# Patient Record
Sex: FEMALE | Race: WHITE | NOT HISPANIC OR LATINO | Employment: OTHER | RURAL
[De-identification: names, ages, dates, MRNs, and addresses within clinical notes are randomized per-mention and may not be internally consistent; named-entity substitution may affect disease eponyms.]

---

## 2017-03-14 ENCOUNTER — CONVERSION ENCOUNTER (OUTPATIENT)
Dept: FAMILY MEDICINE CLINIC | Facility: CLINIC | Age: 72
End: 2017-03-14

## 2017-09-20 ENCOUNTER — CONVERSION ENCOUNTER (OUTPATIENT)
Dept: FAMILY MEDICINE CLINIC | Facility: CLINIC | Age: 72
End: 2017-09-20

## 2017-10-06 ENCOUNTER — CONVERSION ENCOUNTER (OUTPATIENT)
Dept: FAMILY MEDICINE CLINIC | Facility: CLINIC | Age: 72
End: 2017-10-06

## 2017-11-03 ENCOUNTER — CONVERSION ENCOUNTER (OUTPATIENT)
Dept: FAMILY MEDICINE CLINIC | Facility: CLINIC | Age: 72
End: 2017-11-03

## 2017-11-17 ENCOUNTER — CONVERSION ENCOUNTER (OUTPATIENT)
Dept: FAMILY MEDICINE CLINIC | Facility: CLINIC | Age: 72
End: 2017-11-17

## 2017-12-12 ENCOUNTER — HOSPITAL ENCOUNTER (OUTPATIENT)
Dept: RESPIRATORY THERAPY | Facility: HOSPITAL | Age: 72
Discharge: HOME | End: 2017-12-12
Attending: INTERNAL MEDICINE | Admitting: INTERNAL MEDICINE

## 2018-01-26 ENCOUNTER — CONVERSION ENCOUNTER (OUTPATIENT)
Dept: FAMILY MEDICINE CLINIC | Facility: CLINIC | Age: 73
End: 2018-01-26

## 2018-02-05 ENCOUNTER — CONVERSION ENCOUNTER (OUTPATIENT)
Dept: FAMILY MEDICINE CLINIC | Facility: CLINIC | Age: 73
End: 2018-02-05

## 2018-03-21 ENCOUNTER — CONVERSION ENCOUNTER (OUTPATIENT)
Dept: FAMILY MEDICINE CLINIC | Facility: CLINIC | Age: 73
End: 2018-03-21

## 2018-03-21 ENCOUNTER — HOSPITAL ENCOUNTER (OUTPATIENT)
Dept: GENERAL RADIOLOGY | Facility: HOSPITAL | Age: 73
Discharge: HOME | End: 2018-03-21
Attending: FAMILY MEDICINE | Admitting: FAMILY MEDICINE

## 2018-10-25 ENCOUNTER — CONVERSION ENCOUNTER (OUTPATIENT)
Dept: FAMILY MEDICINE CLINIC | Facility: CLINIC | Age: 73
End: 2018-10-25

## 2018-11-09 ENCOUNTER — CONVERSION ENCOUNTER (OUTPATIENT)
Dept: FAMILY MEDICINE CLINIC | Facility: CLINIC | Age: 73
End: 2018-11-09

## 2018-12-11 ENCOUNTER — HOSPITAL ENCOUNTER (OUTPATIENT)
Dept: RESPIRATORY THERAPY | Facility: HOSPITAL | Age: 73
Discharge: HOME | End: 2018-12-11
Attending: INTERNAL MEDICINE | Admitting: INTERNAL MEDICINE

## 2019-02-18 ENCOUNTER — CONVERSION ENCOUNTER (OUTPATIENT)
Dept: FAMILY MEDICINE CLINIC | Facility: CLINIC | Age: 74
End: 2019-02-18

## 2019-03-27 ENCOUNTER — HOSPITAL ENCOUNTER (OUTPATIENT)
Dept: OTHER | Facility: HOSPITAL | Age: 74
Discharge: HOME | End: 2019-03-27
Attending: NURSE PRACTITIONER | Admitting: NURSE PRACTITIONER

## 2019-03-27 ENCOUNTER — CONVERSION ENCOUNTER (OUTPATIENT)
Dept: FAMILY MEDICINE CLINIC | Facility: CLINIC | Age: 74
End: 2019-03-27

## 2019-04-08 ENCOUNTER — CONVERSION ENCOUNTER (OUTPATIENT)
Dept: FAMILY MEDICINE CLINIC | Facility: CLINIC | Age: 74
End: 2019-04-08

## 2019-05-20 ENCOUNTER — HOSPITAL ENCOUNTER (OUTPATIENT)
Dept: OTHER | Facility: HOSPITAL | Age: 74
Discharge: HOME | End: 2019-05-20
Attending: FAMILY MEDICINE | Admitting: FAMILY MEDICINE

## 2019-06-03 VITALS
HEIGHT: 59 IN | DIASTOLIC BLOOD PRESSURE: 80 MMHG | HEIGHT: 59 IN | SYSTOLIC BLOOD PRESSURE: 111 MMHG | DIASTOLIC BLOOD PRESSURE: 77 MMHG | HEIGHT: 59 IN | WEIGHT: 197.4 LBS | HEART RATE: 100 BPM | RESPIRATION RATE: 18 BRPM | DIASTOLIC BLOOD PRESSURE: 86 MMHG | BODY MASS INDEX: 39.11 KG/M2 | HEART RATE: 93 BPM | OXYGEN SATURATION: 96 % | HEIGHT: 59 IN | HEART RATE: 86 BPM | SYSTOLIC BLOOD PRESSURE: 104 MMHG | OXYGEN SATURATION: 97 % | DIASTOLIC BLOOD PRESSURE: 75 MMHG | SYSTOLIC BLOOD PRESSURE: 161 MMHG | BODY MASS INDEX: 37.62 KG/M2 | WEIGHT: 186.25 LBS | WEIGHT: 196.2 LBS | HEIGHT: 59 IN | BODY MASS INDEX: 39.36 KG/M2 | RESPIRATION RATE: 18 BRPM | OXYGEN SATURATION: 96 % | DIASTOLIC BLOOD PRESSURE: 69 MMHG | DIASTOLIC BLOOD PRESSURE: 68 MMHG | BODY MASS INDEX: 38.75 KG/M2 | HEIGHT: 59 IN | HEIGHT: 59 IN | WEIGHT: 194 LBS | RESPIRATION RATE: 16 BRPM | SYSTOLIC BLOOD PRESSURE: 126 MMHG | HEIGHT: 59 IN | SYSTOLIC BLOOD PRESSURE: 102 MMHG | WEIGHT: 192.2 LBS | WEIGHT: 196 LBS | BODY MASS INDEX: 39.59 KG/M2 | HEIGHT: 59 IN | HEART RATE: 78 BPM | DIASTOLIC BLOOD PRESSURE: 77 MMHG | BODY MASS INDEX: 39.51 KG/M2 | OXYGEN SATURATION: 97 % | WEIGHT: 195.25 LBS | OXYGEN SATURATION: 94 % | OXYGEN SATURATION: 95 % | RESPIRATION RATE: 16 BRPM | HEART RATE: 85 BPM | HEART RATE: 80 BPM | SYSTOLIC BLOOD PRESSURE: 126 MMHG | WEIGHT: 188 LBS | OXYGEN SATURATION: 95 % | BODY MASS INDEX: 39.55 KG/M2 | HEART RATE: 80 BPM | DIASTOLIC BLOOD PRESSURE: 75 MMHG | HEART RATE: 96 BPM | HEART RATE: 85 BPM | DIASTOLIC BLOOD PRESSURE: 68 MMHG | SYSTOLIC BLOOD PRESSURE: 128 MMHG | BODY MASS INDEX: 39.8 KG/M2 | BODY MASS INDEX: 37.55 KG/M2 | SYSTOLIC BLOOD PRESSURE: 131 MMHG | HEART RATE: 84 BPM | WEIGHT: 196 LBS | RESPIRATION RATE: 20 BRPM | RESPIRATION RATE: 18 BRPM | BODY MASS INDEX: 39.43 KG/M2 | DIASTOLIC BLOOD PRESSURE: 77 MMHG | SYSTOLIC BLOOD PRESSURE: 137 MMHG | RESPIRATION RATE: 16 BRPM | WEIGHT: 195.6 LBS | SYSTOLIC BLOOD PRESSURE: 104 MMHG | RESPIRATION RATE: 18 BRPM | SYSTOLIC BLOOD PRESSURE: 125 MMHG | OXYGEN SATURATION: 96 % | BODY MASS INDEX: 37.9 KG/M2 | HEIGHT: 59 IN | HEART RATE: 77 BPM | DIASTOLIC BLOOD PRESSURE: 79 MMHG

## 2019-06-27 RX ORDER — LISINOPRIL 20 MG/1
TABLET ORAL
Qty: 90 TABLET | Refills: 1 | Status: SHIPPED | OUTPATIENT
Start: 2019-06-27 | End: 2019-12-13 | Stop reason: SDUPTHER

## 2019-07-04 RX ORDER — ATORVASTATIN CALCIUM 40 MG/1
TABLET, FILM COATED ORAL
Qty: 90 TABLET | Refills: 1 | Status: SHIPPED | OUTPATIENT
Start: 2019-07-04 | End: 2019-12-27 | Stop reason: SDUPTHER

## 2019-07-09 RX ORDER — POTASSIUM CHLORIDE 750 MG/1
TABLET, FILM COATED, EXTENDED RELEASE ORAL
Qty: 450 TABLET | Refills: 1 | Status: SHIPPED | OUTPATIENT
Start: 2019-07-09 | End: 2019-12-27 | Stop reason: SDUPTHER

## 2019-08-07 ENCOUNTER — OFFICE VISIT (OUTPATIENT)
Dept: FAMILY MEDICINE CLINIC | Facility: CLINIC | Age: 74
End: 2019-08-07

## 2019-08-07 ENCOUNTER — TELEPHONE (OUTPATIENT)
Dept: FAMILY MEDICINE CLINIC | Facility: CLINIC | Age: 74
End: 2019-08-07

## 2019-08-07 VITALS
RESPIRATION RATE: 18 BRPM | TEMPERATURE: 98 F | BODY MASS INDEX: 36.8 KG/M2 | HEART RATE: 81 BPM | WEIGHT: 200 LBS | SYSTOLIC BLOOD PRESSURE: 153 MMHG | HEIGHT: 62 IN | OXYGEN SATURATION: 94 % | DIASTOLIC BLOOD PRESSURE: 83 MMHG

## 2019-08-07 DIAGNOSIS — M79.662 PAIN OF LEFT CALF: Primary | ICD-10-CM

## 2019-08-07 PROBLEM — E03.9 HYPOTHYROIDISM: Status: ACTIVE | Noted: 2019-08-07

## 2019-08-07 PROBLEM — I27.0 PULMONARY HYPERTENSION, PRIMARY: Status: ACTIVE | Noted: 2019-08-07

## 2019-08-07 PROBLEM — M51.369 DEGENERATION OF INTERVERTEBRAL DISC OF LUMBAR REGION: Status: ACTIVE | Noted: 2019-08-07

## 2019-08-07 PROBLEM — I20.9 ANGINA PECTORIS: Status: ACTIVE | Noted: 2017-10-06

## 2019-08-07 PROBLEM — J30.9 ALLERGIC RHINITIS: Status: ACTIVE | Noted: 2019-08-07

## 2019-08-07 PROBLEM — I25.10 CORONARY ARTERY DISEASE: Status: ACTIVE | Noted: 2019-08-07

## 2019-08-07 PROBLEM — M51.36 DEGENERATION OF INTERVERTEBRAL DISC OF LUMBAR REGION: Status: ACTIVE | Noted: 2019-08-07

## 2019-08-07 PROBLEM — E78.5 HYPERLIPIDEMIA: Status: ACTIVE | Noted: 2019-08-07

## 2019-08-07 PROBLEM — M54.9 BACK PAIN: Status: ACTIVE | Noted: 2017-09-20

## 2019-08-07 PROBLEM — R32 URINARY INCONTINENCE: Status: ACTIVE | Noted: 2019-08-07

## 2019-08-07 PROBLEM — L43.9 LICHEN PLANUS: Status: ACTIVE | Noted: 2019-08-07

## 2019-08-07 PROBLEM — E66.9 OBESITY: Status: ACTIVE | Noted: 2019-08-07

## 2019-08-07 PROBLEM — F32.A DEPRESSION: Status: ACTIVE | Noted: 2019-08-07

## 2019-08-07 PROBLEM — J45.909 ASTHMA: Status: ACTIVE | Noted: 2019-08-07

## 2019-08-07 PROBLEM — I11.9 HYPERTENSIVE CARDIOVASCULAR DISEASE: Status: ACTIVE | Noted: 2019-08-07

## 2019-08-07 PROBLEM — I63.81 LACUNAR INFARCTION: Status: ACTIVE | Noted: 2019-08-07

## 2019-08-07 PROBLEM — L21.9 SEBORRHEA: Status: ACTIVE | Noted: 2019-08-07

## 2019-08-07 PROBLEM — I10 HYPERTENSION: Status: ACTIVE | Noted: 2019-08-07

## 2019-08-07 PROBLEM — G25.81 RESTLESS LEG SYNDROME: Status: ACTIVE | Noted: 2019-08-07

## 2019-08-07 PROBLEM — I05.9 MITRAL VALVE DISORDER: Status: ACTIVE | Noted: 2019-08-07

## 2019-08-07 PROBLEM — M81.0 POSTMENOPAUSAL OSTEOPOROSIS: Status: ACTIVE | Noted: 2019-08-07

## 2019-08-07 PROCEDURE — 99213 OFFICE O/P EST LOW 20 MIN: CPT | Performed by: NURSE PRACTITIONER

## 2019-08-07 RX ORDER — ASPIRIN 81 MG/1
TABLET ORAL
COMMUNITY
Start: 2014-04-10

## 2019-08-07 RX ORDER — ISOSORBIDE MONONITRATE 30 MG/1
1 TABLET, EXTENDED RELEASE ORAL EVERY 24 HOURS
Refills: 0 | COMMUNITY
Start: 2019-05-20 | End: 2019-11-18 | Stop reason: SDUPTHER

## 2019-08-07 RX ORDER — LEVOTHYROXINE SODIUM 0.07 MG/1
1 TABLET ORAL EVERY 24 HOURS
Refills: 0 | COMMUNITY
Start: 2019-06-05 | End: 2019-09-05 | Stop reason: SDUPTHER

## 2019-08-07 RX ORDER — PREDNISONE 10 MG/1
10 TABLET ORAL 2 TIMES DAILY
Refills: 0 | COMMUNITY
Start: 2019-05-20 | End: 2019-10-21

## 2019-08-07 RX ORDER — LORATADINE 10 MG/1
TABLET ORAL
COMMUNITY
Start: 2013-02-27 | End: 2023-03-03

## 2019-08-07 RX ORDER — FUROSEMIDE 40 MG/1
1 TABLET ORAL EVERY 24 HOURS
Refills: 0 | COMMUNITY
Start: 2019-07-31 | End: 2020-04-23

## 2019-08-07 RX ORDER — THEOPHYLLINE 400 MG/1
0.5 TABLET, EXTENDED RELEASE ORAL EVERY 24 HOURS
Refills: 0 | COMMUNITY
Start: 2019-07-13

## 2019-08-07 RX ORDER — ARFORMOTEROL TARTRATE 15 UG/2ML
1 SOLUTION RESPIRATORY (INHALATION) 2 TIMES DAILY
Refills: 0 | COMMUNITY
Start: 2019-06-19 | End: 2022-12-28

## 2019-08-07 RX ORDER — PANTOPRAZOLE SODIUM 40 MG/1
TABLET, DELAYED RELEASE ORAL
COMMUNITY
Start: 2017-11-17 | End: 2019-11-08

## 2019-09-05 RX ORDER — LEVOTHYROXINE SODIUM 0.07 MG/1
TABLET ORAL
Qty: 90 TABLET | Refills: 1 | Status: SHIPPED | OUTPATIENT
Start: 2019-09-05 | End: 2020-03-04

## 2019-10-03 ENCOUNTER — OFFICE VISIT (OUTPATIENT)
Dept: FAMILY MEDICINE CLINIC | Facility: CLINIC | Age: 74
End: 2019-10-03

## 2019-10-03 VITALS
SYSTOLIC BLOOD PRESSURE: 133 MMHG | BODY MASS INDEX: 38.09 KG/M2 | HEIGHT: 60 IN | WEIGHT: 194 LBS | DIASTOLIC BLOOD PRESSURE: 83 MMHG | RESPIRATION RATE: 16 BRPM | TEMPERATURE: 97.8 F | HEART RATE: 71 BPM | OXYGEN SATURATION: 95 %

## 2019-10-03 DIAGNOSIS — J01.00 ACUTE NON-RECURRENT MAXILLARY SINUSITIS: ICD-10-CM

## 2019-10-03 DIAGNOSIS — J42 ACUTE EXACERBATION OF CHRONIC BRONCHITIS (HCC): Primary | ICD-10-CM

## 2019-10-03 DIAGNOSIS — J20.9 ACUTE EXACERBATION OF CHRONIC BRONCHITIS (HCC): Primary | ICD-10-CM

## 2019-10-03 PROBLEM — E87.6 HYPOKALEMIA: Status: ACTIVE | Noted: 2019-10-03

## 2019-10-03 PROBLEM — N18.30 CHRONIC RENAL INSUFFICIENCY, STAGE III (MODERATE) (HCC): Status: ACTIVE | Noted: 2019-10-03

## 2019-10-03 PROCEDURE — 99213 OFFICE O/P EST LOW 20 MIN: CPT | Performed by: NURSE PRACTITIONER

## 2019-10-03 RX ORDER — DOXYCYCLINE 100 MG/1
100 CAPSULE ORAL 2 TIMES DAILY
Qty: 20 CAPSULE | Refills: 0 | Status: SHIPPED | OUTPATIENT
Start: 2019-10-03 | End: 2019-10-21

## 2019-10-21 ENCOUNTER — OFFICE VISIT (OUTPATIENT)
Dept: FAMILY MEDICINE CLINIC | Facility: CLINIC | Age: 74
End: 2019-10-21

## 2019-10-21 VITALS
TEMPERATURE: 97.6 F | RESPIRATION RATE: 18 BRPM | BODY MASS INDEX: 38.48 KG/M2 | SYSTOLIC BLOOD PRESSURE: 113 MMHG | DIASTOLIC BLOOD PRESSURE: 74 MMHG | OXYGEN SATURATION: 94 % | HEART RATE: 83 BPM | WEIGHT: 196 LBS | HEIGHT: 60 IN

## 2019-10-21 DIAGNOSIS — I10 ESSENTIAL HYPERTENSION: ICD-10-CM

## 2019-10-21 DIAGNOSIS — F33.41 RECURRENT MAJOR DEPRESSIVE DISORDER, IN PARTIAL REMISSION (HCC): ICD-10-CM

## 2019-10-21 DIAGNOSIS — E78.01 FAMILIAL HYPERCHOLESTEROLEMIA: ICD-10-CM

## 2019-10-21 DIAGNOSIS — E06.3 HYPOTHYROIDISM DUE TO HASHIMOTO'S THYROIDITIS: ICD-10-CM

## 2019-10-21 DIAGNOSIS — I27.0 PULMONARY HYPERTENSION, PRIMARY (HCC): ICD-10-CM

## 2019-10-21 DIAGNOSIS — M15.9 OSTEOARTHRITIS, GENERALIZED: ICD-10-CM

## 2019-10-21 DIAGNOSIS — L40.9 PSORIASIS: ICD-10-CM

## 2019-10-21 DIAGNOSIS — I25.118 CORONARY ARTERY DISEASE OF NATIVE HEART WITH STABLE ANGINA PECTORIS, UNSPECIFIED VESSEL OR LESION TYPE (HCC): ICD-10-CM

## 2019-10-21 DIAGNOSIS — G25.81 RESTLESS LEG SYNDROME: ICD-10-CM

## 2019-10-21 DIAGNOSIS — M50.90 CERVICAL DISC DISEASE: ICD-10-CM

## 2019-10-21 DIAGNOSIS — L43.9 LICHEN PLANUS: ICD-10-CM

## 2019-10-21 DIAGNOSIS — N18.30 CHRONIC RENAL INSUFFICIENCY, STAGE III (MODERATE) (HCC): ICD-10-CM

## 2019-10-21 DIAGNOSIS — N39.3 STRESS INCONTINENCE OF URINE: ICD-10-CM

## 2019-10-21 DIAGNOSIS — Z00.00 MEDICARE ANNUAL WELLNESS VISIT, SUBSEQUENT: Primary | ICD-10-CM

## 2019-10-21 DIAGNOSIS — E03.8 HYPOTHYROIDISM DUE TO HASHIMOTO'S THYROIDITIS: ICD-10-CM

## 2019-10-21 DIAGNOSIS — J30.1 SEASONAL ALLERGIC RHINITIS DUE TO POLLEN: ICD-10-CM

## 2019-10-21 DIAGNOSIS — Z23 NEED FOR IMMUNIZATION AGAINST INFLUENZA: ICD-10-CM

## 2019-10-21 DIAGNOSIS — J43.2 CENTRILOBULAR EMPHYSEMA (HCC): ICD-10-CM

## 2019-10-21 PROBLEM — M79.662 PAIN OF LEFT CALF: Status: RESOLVED | Noted: 2019-08-07 | Resolved: 2019-10-21

## 2019-10-21 PROCEDURE — 90653 IIV ADJUVANT VACCINE IM: CPT | Performed by: FAMILY MEDICINE

## 2019-10-21 PROCEDURE — G0439 PPPS, SUBSEQ VISIT: HCPCS | Performed by: FAMILY MEDICINE

## 2019-10-21 PROCEDURE — 99214 OFFICE O/P EST MOD 30 MIN: CPT | Performed by: FAMILY MEDICINE

## 2019-10-21 PROCEDURE — G0008 ADMIN INFLUENZA VIRUS VAC: HCPCS | Performed by: FAMILY MEDICINE

## 2019-10-22 LAB
ALBUMIN SERPL-MCNC: 4.2 G/DL (ref 3.5–4.8)
ALBUMIN/GLOB SERPL: 1.9 {RATIO} (ref 1.2–2.2)
ALP SERPL-CCNC: 82 IU/L (ref 39–117)
ALT SERPL-CCNC: 17 IU/L (ref 0–32)
AST SERPL-CCNC: 21 IU/L (ref 0–40)
BASOPHILS # BLD AUTO: 0 X10E3/UL (ref 0–0.2)
BASOPHILS NFR BLD AUTO: 0 %
BILIRUB SERPL-MCNC: 0.7 MG/DL (ref 0–1.2)
BUN SERPL-MCNC: 20 MG/DL (ref 8–27)
BUN/CREAT SERPL: 16 (ref 12–28)
CALCIUM SERPL-MCNC: 9.4 MG/DL (ref 8.7–10.3)
CHLORIDE SERPL-SCNC: 102 MMOL/L (ref 96–106)
CHOLEST SERPL-MCNC: 113 MG/DL (ref 100–199)
CO2 SERPL-SCNC: 25 MMOL/L (ref 20–29)
CREAT SERPL-MCNC: 1.22 MG/DL (ref 0.57–1)
EOSINOPHIL # BLD AUTO: 0.3 X10E3/UL (ref 0–0.4)
EOSINOPHIL NFR BLD AUTO: 3 %
ERYTHROCYTE [DISTWIDTH] IN BLOOD BY AUTOMATED COUNT: 13.7 % (ref 12.3–15.4)
GLOBULIN SER CALC-MCNC: 2.2 G/DL (ref 1.5–4.5)
GLUCOSE SERPL-MCNC: 91 MG/DL (ref 65–99)
HCT VFR BLD AUTO: 44.7 % (ref 34–46.6)
HDLC SERPL-MCNC: 41 MG/DL
HGB BLD-MCNC: 14.2 G/DL (ref 11.1–15.9)
IMM GRANULOCYTES # BLD AUTO: 0 X10E3/UL (ref 0–0.1)
IMM GRANULOCYTES NFR BLD AUTO: 0 %
LDLC SERPL CALC-MCNC: 46 MG/DL (ref 0–99)
LYMPHOCYTES # BLD AUTO: 2.7 X10E3/UL (ref 0.7–3.1)
LYMPHOCYTES NFR BLD AUTO: 27 %
MCH RBC QN AUTO: 28.7 PG (ref 26.6–33)
MCHC RBC AUTO-ENTMCNC: 31.8 G/DL (ref 31.5–35.7)
MCV RBC AUTO: 91 FL (ref 79–97)
MONOCYTES # BLD AUTO: 0.8 X10E3/UL (ref 0.1–0.9)
MONOCYTES NFR BLD AUTO: 8 %
NEUTROPHILS # BLD AUTO: 6.1 X10E3/UL (ref 1.4–7)
NEUTROPHILS NFR BLD AUTO: 62 %
PLATELET # BLD AUTO: 221 X10E3/UL (ref 150–450)
POTASSIUM SERPL-SCNC: 4.2 MMOL/L (ref 3.5–5.2)
PROT SERPL-MCNC: 6.4 G/DL (ref 6–8.5)
RBC # BLD AUTO: 4.94 X10E6/UL (ref 3.77–5.28)
SODIUM SERPL-SCNC: 144 MMOL/L (ref 134–144)
TRIGL SERPL-MCNC: 132 MG/DL (ref 0–149)
TSH SERPL DL<=0.005 MIU/L-ACNC: 2.19 UIU/ML (ref 0.45–4.5)
VLDLC SERPL CALC-MCNC: 26 MG/DL (ref 5–40)
WBC # BLD AUTO: 10 X10E3/UL (ref 3.4–10.8)

## 2019-11-05 PROBLEM — E78.2 MIXED HYPERLIPIDEMIA: Status: ACTIVE | Noted: 2019-08-07

## 2019-11-08 ENCOUNTER — OFFICE VISIT (OUTPATIENT)
Dept: CARDIOLOGY | Facility: CLINIC | Age: 74
End: 2019-11-08

## 2019-11-08 VITALS
SYSTOLIC BLOOD PRESSURE: 113 MMHG | DIASTOLIC BLOOD PRESSURE: 76 MMHG | BODY MASS INDEX: 38.09 KG/M2 | HEART RATE: 84 BPM | HEIGHT: 60 IN | WEIGHT: 194 LBS

## 2019-11-08 DIAGNOSIS — E78.2 MIXED HYPERLIPIDEMIA: ICD-10-CM

## 2019-11-08 DIAGNOSIS — I25.10 CORONARY ARTERY DISEASE INVOLVING NATIVE CORONARY ARTERY OF NATIVE HEART WITHOUT ANGINA PECTORIS: Primary | ICD-10-CM

## 2019-11-08 DIAGNOSIS — I10 ESSENTIAL HYPERTENSION: ICD-10-CM

## 2019-11-08 DIAGNOSIS — I05.9 MITRAL VALVE DISORDER: ICD-10-CM

## 2019-11-08 DIAGNOSIS — I20.9 ANGINA PECTORIS (HCC): ICD-10-CM

## 2019-11-08 PROCEDURE — 93000 ELECTROCARDIOGRAM COMPLETE: CPT | Performed by: INTERNAL MEDICINE

## 2019-11-08 PROCEDURE — 99214 OFFICE O/P EST MOD 30 MIN: CPT | Performed by: INTERNAL MEDICINE

## 2019-11-18 ENCOUNTER — TELEPHONE (OUTPATIENT)
Dept: CARDIOLOGY | Facility: CLINIC | Age: 74
End: 2019-11-18

## 2019-11-18 RX ORDER — ISOSORBIDE MONONITRATE 30 MG/1
30 TABLET, EXTENDED RELEASE ORAL EVERY 24 HOURS
Qty: 30 TABLET | Refills: 2 | Status: SHIPPED | OUTPATIENT
Start: 2019-11-18 | End: 2020-02-18

## 2019-12-13 RX ORDER — LISINOPRIL 20 MG/1
TABLET ORAL
Qty: 90 TABLET | Refills: 1 | Status: SHIPPED | OUTPATIENT
Start: 2019-12-13 | End: 2020-07-06 | Stop reason: SDUPTHER

## 2019-12-27 RX ORDER — POTASSIUM CHLORIDE 750 MG/1
TABLET, FILM COATED, EXTENDED RELEASE ORAL
Qty: 450 TABLET | Refills: 1 | Status: SHIPPED | OUTPATIENT
Start: 2019-12-27 | End: 2020-07-06 | Stop reason: SDUPTHER

## 2019-12-27 RX ORDER — ATORVASTATIN CALCIUM 40 MG/1
TABLET, FILM COATED ORAL
Qty: 90 TABLET | Refills: 1 | Status: SHIPPED | OUTPATIENT
Start: 2019-12-27 | End: 2020-07-07

## 2020-01-06 ENCOUNTER — OFFICE VISIT (OUTPATIENT)
Dept: FAMILY MEDICINE CLINIC | Facility: CLINIC | Age: 75
End: 2020-01-06

## 2020-01-06 VITALS
RESPIRATION RATE: 20 BRPM | OXYGEN SATURATION: 93 % | SYSTOLIC BLOOD PRESSURE: 101 MMHG | DIASTOLIC BLOOD PRESSURE: 69 MMHG | BODY MASS INDEX: 38.48 KG/M2 | HEART RATE: 93 BPM | TEMPERATURE: 97.5 F | HEIGHT: 60 IN | WEIGHT: 196 LBS

## 2020-01-06 DIAGNOSIS — J43.2 CENTRILOBULAR EMPHYSEMA (HCC): Primary | ICD-10-CM

## 2020-01-06 PROCEDURE — 99213 OFFICE O/P EST LOW 20 MIN: CPT | Performed by: FAMILY MEDICINE

## 2020-01-06 RX ORDER — PREDNISONE 20 MG/1
20 TABLET ORAL DAILY
Qty: 20 TABLET | Refills: 0 | Status: SHIPPED | OUTPATIENT
Start: 2020-01-06 | End: 2020-03-05 | Stop reason: SDUPTHER

## 2020-01-06 RX ORDER — SULFAMETHOXAZOLE AND TRIMETHOPRIM 800; 160 MG/1; MG/1
1 TABLET ORAL 2 TIMES DAILY
Qty: 20 TABLET | Refills: 0 | Status: SHIPPED | OUTPATIENT
Start: 2020-01-06 | End: 2020-03-05 | Stop reason: SDUPTHER

## 2020-02-18 RX ORDER — ISOSORBIDE MONONITRATE 30 MG/1
TABLET, EXTENDED RELEASE ORAL
Qty: 90 TABLET | Refills: 2 | Status: SHIPPED | OUTPATIENT
Start: 2020-02-18 | End: 2020-09-16 | Stop reason: SDUPTHER

## 2020-03-04 RX ORDER — LEVOTHYROXINE SODIUM 0.07 MG/1
TABLET ORAL
Qty: 90 TABLET | Refills: 1 | Status: SHIPPED | OUTPATIENT
Start: 2020-03-04 | End: 2020-09-02

## 2020-03-05 ENCOUNTER — TELEPHONE (OUTPATIENT)
Dept: FAMILY MEDICINE CLINIC | Facility: CLINIC | Age: 75
End: 2020-03-05

## 2020-03-05 RX ORDER — SULFAMETHOXAZOLE AND TRIMETHOPRIM 800; 160 MG/1; MG/1
1 TABLET ORAL 2 TIMES DAILY
Qty: 20 TABLET | Refills: 0 | Status: SHIPPED | OUTPATIENT
Start: 2020-03-05 | End: 2020-06-08

## 2020-04-13 ENCOUNTER — TELEPHONE (OUTPATIENT)
Dept: FAMILY MEDICINE CLINIC | Facility: CLINIC | Age: 75
End: 2020-04-13

## 2020-04-22 ENCOUNTER — TELEPHONE (OUTPATIENT)
Dept: FAMILY MEDICINE CLINIC | Facility: CLINIC | Age: 75
End: 2020-04-22

## 2020-04-23 RX ORDER — FUROSEMIDE 40 MG/1
TABLET ORAL
Qty: 90 TABLET | Refills: 1 | Status: SHIPPED | OUTPATIENT
Start: 2020-04-23 | End: 2020-09-16

## 2020-06-08 ENCOUNTER — OFFICE VISIT (OUTPATIENT)
Dept: FAMILY MEDICINE CLINIC | Facility: CLINIC | Age: 75
End: 2020-06-08

## 2020-06-08 VITALS
TEMPERATURE: 98.7 F | BODY MASS INDEX: 38.72 KG/M2 | HEIGHT: 60 IN | WEIGHT: 197.2 LBS | SYSTOLIC BLOOD PRESSURE: 133 MMHG | RESPIRATION RATE: 18 BRPM | OXYGEN SATURATION: 93 % | HEART RATE: 80 BPM | DIASTOLIC BLOOD PRESSURE: 79 MMHG

## 2020-06-08 DIAGNOSIS — J43.1 PANLOBULAR EMPHYSEMA (HCC): ICD-10-CM

## 2020-06-08 DIAGNOSIS — I10 ESSENTIAL HYPERTENSION: Primary | ICD-10-CM

## 2020-06-08 DIAGNOSIS — I25.10 CORONARY ARTERY DISEASE INVOLVING NATIVE CORONARY ARTERY OF NATIVE HEART WITHOUT ANGINA PECTORIS: ICD-10-CM

## 2020-06-08 PROBLEM — J42 ACUTE EXACERBATION OF CHRONIC BRONCHITIS: Status: RESOLVED | Noted: 2019-10-03 | Resolved: 2020-06-08

## 2020-06-08 PROBLEM — J20.9 ACUTE EXACERBATION OF CHRONIC BRONCHITIS: Status: RESOLVED | Noted: 2019-10-03 | Resolved: 2020-06-08

## 2020-06-08 PROCEDURE — 99213 OFFICE O/P EST LOW 20 MIN: CPT | Performed by: FAMILY MEDICINE

## 2020-06-08 RX ORDER — CEPHALEXIN 500 MG/1
CAPSULE ORAL
COMMUNITY
Start: 2020-05-22 | End: 2020-11-13

## 2020-07-06 RX ORDER — POTASSIUM CHLORIDE 750 MG/1
10 TABLET, FILM COATED, EXTENDED RELEASE ORAL 2 TIMES DAILY
Qty: 60 TABLET | Refills: 0 | Status: SHIPPED | OUTPATIENT
Start: 2020-07-06 | End: 2020-07-06

## 2020-07-06 RX ORDER — POTASSIUM CHLORIDE 750 MG/1
TABLET, FILM COATED, EXTENDED RELEASE ORAL
Qty: 180 TABLET | Refills: 1 | Status: SHIPPED | OUTPATIENT
Start: 2020-07-06 | End: 2020-10-01

## 2020-07-06 RX ORDER — LISINOPRIL 20 MG/1
20 TABLET ORAL DAILY
Qty: 90 TABLET | Refills: 1 | Status: SHIPPED | OUTPATIENT
Start: 2020-07-06 | End: 2020-10-01

## 2020-07-07 RX ORDER — ATORVASTATIN CALCIUM 40 MG/1
TABLET, FILM COATED ORAL
Qty: 90 TABLET | Refills: 1 | Status: SHIPPED | OUTPATIENT
Start: 2020-07-07 | End: 2020-12-12

## 2020-07-31 ENCOUNTER — TELEPHONE (OUTPATIENT)
Dept: FAMILY MEDICINE CLINIC | Facility: CLINIC | Age: 75
End: 2020-07-31

## 2020-07-31 DIAGNOSIS — E06.3 HYPOTHYROIDISM DUE TO HASHIMOTO'S THYROIDITIS: ICD-10-CM

## 2020-07-31 DIAGNOSIS — E03.8 HYPOTHYROIDISM DUE TO HASHIMOTO'S THYROIDITIS: ICD-10-CM

## 2020-07-31 DIAGNOSIS — E78.01 FAMILIAL HYPERCHOLESTEROLEMIA: ICD-10-CM

## 2020-07-31 DIAGNOSIS — I10 ESSENTIAL HYPERTENSION: Primary | ICD-10-CM

## 2020-08-05 LAB
ALBUMIN SERPL-MCNC: 4 G/DL (ref 3.7–4.7)
ALBUMIN/GLOB SERPL: 1.9 {RATIO} (ref 1.2–2.2)
ALP SERPL-CCNC: 88 IU/L (ref 39–117)
ALT SERPL-CCNC: 12 IU/L (ref 0–32)
AST SERPL-CCNC: 17 IU/L (ref 0–40)
BASOPHILS # BLD AUTO: 0.1 X10E3/UL (ref 0–0.2)
BASOPHILS NFR BLD AUTO: 1 %
BILIRUB SERPL-MCNC: 0.6 MG/DL (ref 0–1.2)
BUN SERPL-MCNC: 17 MG/DL (ref 8–27)
BUN/CREAT SERPL: 14 (ref 12–28)
CALCIUM SERPL-MCNC: 9.3 MG/DL (ref 8.7–10.3)
CHLORIDE SERPL-SCNC: 103 MMOL/L (ref 96–106)
CHOLEST SERPL-MCNC: 109 MG/DL (ref 100–199)
CO2 SERPL-SCNC: 25 MMOL/L (ref 20–29)
CREAT SERPL-MCNC: 1.2 MG/DL (ref 0.57–1)
EOSINOPHIL # BLD AUTO: 0.4 X10E3/UL (ref 0–0.4)
EOSINOPHIL NFR BLD AUTO: 4 %
ERYTHROCYTE [DISTWIDTH] IN BLOOD BY AUTOMATED COUNT: 12.5 % (ref 11.7–15.4)
GLOBULIN SER CALC-MCNC: 2.1 G/DL (ref 1.5–4.5)
GLUCOSE SERPL-MCNC: 96 MG/DL (ref 65–99)
HCT VFR BLD AUTO: 43.2 % (ref 34–46.6)
HDLC SERPL-MCNC: 40 MG/DL
HGB BLD-MCNC: 14.3 G/DL (ref 11.1–15.9)
IMM GRANULOCYTES # BLD AUTO: 0 X10E3/UL (ref 0–0.1)
IMM GRANULOCYTES NFR BLD AUTO: 0 %
LDLC SERPL CALC-MCNC: 50 MG/DL (ref 0–99)
LYMPHOCYTES # BLD AUTO: 2.4 X10E3/UL (ref 0.7–3.1)
LYMPHOCYTES NFR BLD AUTO: 26 %
MCH RBC QN AUTO: 29.9 PG (ref 26.6–33)
MCHC RBC AUTO-ENTMCNC: 33.1 G/DL (ref 31.5–35.7)
MCV RBC AUTO: 90 FL (ref 79–97)
MONOCYTES # BLD AUTO: 0.9 X10E3/UL (ref 0.1–0.9)
MONOCYTES NFR BLD AUTO: 10 %
NEUTROPHILS # BLD AUTO: 5.6 X10E3/UL (ref 1.4–7)
NEUTROPHILS NFR BLD AUTO: 59 %
PLATELET # BLD AUTO: 209 X10E3/UL (ref 150–450)
POTASSIUM SERPL-SCNC: 4.2 MMOL/L (ref 3.5–5.2)
PROT SERPL-MCNC: 6.1 G/DL (ref 6–8.5)
RBC # BLD AUTO: 4.79 X10E6/UL (ref 3.77–5.28)
SODIUM SERPL-SCNC: 143 MMOL/L (ref 134–144)
TRIGL SERPL-MCNC: 95 MG/DL (ref 0–149)
TSH SERPL DL<=0.005 MIU/L-ACNC: 1.37 UIU/ML (ref 0.45–4.5)
VLDLC SERPL CALC-MCNC: 19 MG/DL (ref 5–40)
WBC # BLD AUTO: 9.4 X10E3/UL (ref 3.4–10.8)

## 2020-09-02 RX ORDER — LEVOTHYROXINE SODIUM 0.07 MG/1
TABLET ORAL
Qty: 90 TABLET | Refills: 1 | Status: SHIPPED | OUTPATIENT
Start: 2020-09-02 | End: 2020-12-12

## 2020-09-16 ENCOUNTER — FLU SHOT (OUTPATIENT)
Dept: FAMILY MEDICINE CLINIC | Facility: CLINIC | Age: 75
End: 2020-09-16

## 2020-09-16 DIAGNOSIS — Z23 NEED FOR INFLUENZA VACCINATION: ICD-10-CM

## 2020-09-16 PROCEDURE — G0008 ADMIN INFLUENZA VIRUS VAC: HCPCS | Performed by: FAMILY MEDICINE

## 2020-09-16 PROCEDURE — 90694 VACC AIIV4 NO PRSRV 0.5ML IM: CPT | Performed by: FAMILY MEDICINE

## 2020-09-16 RX ORDER — ISOSORBIDE MONONITRATE 30 MG/1
30 TABLET, EXTENDED RELEASE ORAL DAILY
Qty: 90 TABLET | Refills: 2 | Status: SHIPPED | OUTPATIENT
Start: 2020-09-16 | End: 2021-05-27

## 2020-09-16 RX ORDER — FUROSEMIDE 40 MG/1
TABLET ORAL
Qty: 90 TABLET | Refills: 1 | Status: SHIPPED | OUTPATIENT
Start: 2020-09-16 | End: 2021-02-26

## 2020-10-01 RX ORDER — POTASSIUM CHLORIDE 750 MG/1
TABLET, FILM COATED, EXTENDED RELEASE ORAL
Qty: 450 TABLET | Refills: 0 | Status: SHIPPED | OUTPATIENT
Start: 2020-10-01 | End: 2021-01-03

## 2020-10-01 RX ORDER — LISINOPRIL 20 MG/1
TABLET ORAL
Qty: 90 TABLET | Refills: 1 | Status: SHIPPED | OUTPATIENT
Start: 2020-10-01 | End: 2021-05-27

## 2020-11-13 ENCOUNTER — OFFICE VISIT (OUTPATIENT)
Dept: CARDIOLOGY | Facility: CLINIC | Age: 75
End: 2020-11-13

## 2020-11-13 VITALS
HEART RATE: 83 BPM | HEIGHT: 60 IN | OXYGEN SATURATION: 95 % | WEIGHT: 198 LBS | SYSTOLIC BLOOD PRESSURE: 128 MMHG | DIASTOLIC BLOOD PRESSURE: 75 MMHG | BODY MASS INDEX: 38.87 KG/M2

## 2020-11-13 DIAGNOSIS — I05.9 MITRAL VALVE DISORDER: ICD-10-CM

## 2020-11-13 DIAGNOSIS — I20.9 ANGINA PECTORIS (HCC): Primary | ICD-10-CM

## 2020-11-13 DIAGNOSIS — I25.10 CORONARY ARTERY DISEASE INVOLVING NATIVE CORONARY ARTERY OF NATIVE HEART WITHOUT ANGINA PECTORIS: ICD-10-CM

## 2020-11-13 DIAGNOSIS — I10 ESSENTIAL HYPERTENSION: ICD-10-CM

## 2020-11-13 PROBLEM — J44.9 CHRONIC OBSTRUCTIVE PULMONARY DISEASE: Status: ACTIVE | Noted: 2020-11-13

## 2020-11-13 PROCEDURE — 99214 OFFICE O/P EST MOD 30 MIN: CPT | Performed by: INTERNAL MEDICINE

## 2020-11-13 PROCEDURE — 93000 ELECTROCARDIOGRAM COMPLETE: CPT | Performed by: INTERNAL MEDICINE

## 2020-12-10 ENCOUNTER — OFFICE VISIT (OUTPATIENT)
Dept: FAMILY MEDICINE CLINIC | Facility: CLINIC | Age: 75
End: 2020-12-10

## 2020-12-10 VITALS
SYSTOLIC BLOOD PRESSURE: 135 MMHG | DIASTOLIC BLOOD PRESSURE: 74 MMHG | BODY MASS INDEX: 39.07 KG/M2 | WEIGHT: 199 LBS | OXYGEN SATURATION: 93 % | RESPIRATION RATE: 18 BRPM | TEMPERATURE: 98.7 F | HEART RATE: 77 BPM | HEIGHT: 60 IN

## 2020-12-10 DIAGNOSIS — M50.90 CERVICAL DISC DISEASE: ICD-10-CM

## 2020-12-10 DIAGNOSIS — M81.0 POSTMENOPAUSAL OSTEOPOROSIS: ICD-10-CM

## 2020-12-10 DIAGNOSIS — I10 ESSENTIAL HYPERTENSION: ICD-10-CM

## 2020-12-10 DIAGNOSIS — M51.36 DEGENERATION OF INTERVERTEBRAL DISC OF LUMBAR REGION: ICD-10-CM

## 2020-12-10 DIAGNOSIS — E78.2 MIXED HYPERLIPIDEMIA: ICD-10-CM

## 2020-12-10 DIAGNOSIS — N18.31 CHRONIC RENAL IMPAIRMENT, STAGE 3A (HCC): ICD-10-CM

## 2020-12-10 DIAGNOSIS — I25.10 CORONARY ARTERY DISEASE INVOLVING NATIVE CORONARY ARTERY OF NATIVE HEART WITHOUT ANGINA PECTORIS: ICD-10-CM

## 2020-12-10 DIAGNOSIS — F33.41 RECURRENT MAJOR DEPRESSIVE DISORDER, IN PARTIAL REMISSION (HCC): ICD-10-CM

## 2020-12-10 DIAGNOSIS — M15.9 OSTEOARTHRITIS, GENERALIZED: ICD-10-CM

## 2020-12-10 DIAGNOSIS — J43.1 PANLOBULAR EMPHYSEMA (HCC): ICD-10-CM

## 2020-12-10 DIAGNOSIS — J30.1 SEASONAL ALLERGIC RHINITIS DUE TO POLLEN: ICD-10-CM

## 2020-12-10 DIAGNOSIS — N95.2 VAGINITIS, ATROPHIC: ICD-10-CM

## 2020-12-10 DIAGNOSIS — E03.9 ACQUIRED HYPOTHYROIDISM: ICD-10-CM

## 2020-12-10 DIAGNOSIS — E66.01 MORBIDLY OBESE (HCC): ICD-10-CM

## 2020-12-10 DIAGNOSIS — J45.50 SEVERE PERSISTENT ASTHMA WITHOUT COMPLICATION: ICD-10-CM

## 2020-12-10 DIAGNOSIS — G25.81 RESTLESS LEG SYNDROME: ICD-10-CM

## 2020-12-10 DIAGNOSIS — I27.0 PULMONARY HYPERTENSION, PRIMARY (HCC): ICD-10-CM

## 2020-12-10 DIAGNOSIS — Z00.00 MEDICARE ANNUAL WELLNESS VISIT, SUBSEQUENT: Primary | ICD-10-CM

## 2020-12-10 PROBLEM — I63.81 LACUNAR INFARCTION: Status: RESOLVED | Noted: 2019-08-07 | Resolved: 2020-12-10

## 2020-12-10 PROBLEM — M54.9 BACK PAIN: Status: RESOLVED | Noted: 2017-09-20 | Resolved: 2020-12-10

## 2020-12-10 PROBLEM — E87.6 HYPOKALEMIA: Status: RESOLVED | Noted: 2019-10-03 | Resolved: 2020-12-10

## 2020-12-10 PROBLEM — E66.9 OBESITY: Status: RESOLVED | Noted: 2019-08-07 | Resolved: 2020-12-10

## 2020-12-10 PROBLEM — I20.9 ANGINA PECTORIS (HCC): Status: RESOLVED | Noted: 2017-10-06 | Resolved: 2020-12-10

## 2020-12-10 PROBLEM — J44.9 CHRONIC OBSTRUCTIVE PULMONARY DISEASE (HCC): Status: RESOLVED | Noted: 2020-11-13 | Resolved: 2020-12-10

## 2020-12-10 PROCEDURE — G0439 PPPS, SUBSEQ VISIT: HCPCS | Performed by: FAMILY MEDICINE

## 2020-12-10 RX ORDER — TRIAMCINOLONE ACETONIDE 1 MG/G
CREAM TOPICAL 2 TIMES DAILY
Qty: 30 G | Refills: 0 | Status: SHIPPED | OUTPATIENT
Start: 2020-12-10 | End: 2022-12-28

## 2020-12-11 LAB
ALBUMIN SERPL-MCNC: 4.6 G/DL (ref 3.7–4.7)
ALBUMIN/GLOB SERPL: 2.1 {RATIO} (ref 1.2–2.2)
ALP SERPL-CCNC: 101 IU/L (ref 39–117)
ALT SERPL-CCNC: 16 IU/L (ref 0–32)
AST SERPL-CCNC: 21 IU/L (ref 0–40)
BASOPHILS # BLD AUTO: 0.1 X10E3/UL (ref 0–0.2)
BASOPHILS NFR BLD AUTO: 1 %
BILIRUB SERPL-MCNC: 0.8 MG/DL (ref 0–1.2)
BUN SERPL-MCNC: 20 MG/DL (ref 8–27)
BUN/CREAT SERPL: 16 (ref 12–28)
CALCIUM SERPL-MCNC: 9.5 MG/DL (ref 8.7–10.3)
CHLORIDE SERPL-SCNC: 103 MMOL/L (ref 96–106)
CO2 SERPL-SCNC: 27 MMOL/L (ref 20–29)
CREAT SERPL-MCNC: 1.23 MG/DL (ref 0.57–1)
EOSINOPHIL # BLD AUTO: 0.3 X10E3/UL (ref 0–0.4)
EOSINOPHIL NFR BLD AUTO: 3 %
ERYTHROCYTE [DISTWIDTH] IN BLOOD BY AUTOMATED COUNT: 12.6 % (ref 11.7–15.4)
GLOBULIN SER CALC-MCNC: 2.2 G/DL (ref 1.5–4.5)
GLUCOSE SERPL-MCNC: 98 MG/DL (ref 65–99)
HCT VFR BLD AUTO: 46.5 % (ref 34–46.6)
HGB BLD-MCNC: 15.3 G/DL (ref 11.1–15.9)
IMM GRANULOCYTES # BLD AUTO: 0 X10E3/UL (ref 0–0.1)
IMM GRANULOCYTES NFR BLD AUTO: 0 %
LYMPHOCYTES # BLD AUTO: 2.3 X10E3/UL (ref 0.7–3.1)
LYMPHOCYTES NFR BLD AUTO: 24 %
MCH RBC QN AUTO: 29.7 PG (ref 26.6–33)
MCHC RBC AUTO-ENTMCNC: 32.9 G/DL (ref 31.5–35.7)
MCV RBC AUTO: 90 FL (ref 79–97)
MONOCYTES # BLD AUTO: 0.8 X10E3/UL (ref 0.1–0.9)
MONOCYTES NFR BLD AUTO: 9 %
NEUTROPHILS # BLD AUTO: 6.1 X10E3/UL (ref 1.4–7)
NEUTROPHILS NFR BLD AUTO: 63 %
PLATELET # BLD AUTO: 223 X10E3/UL (ref 150–450)
POTASSIUM SERPL-SCNC: 4.6 MMOL/L (ref 3.5–5.2)
PROT SERPL-MCNC: 6.8 G/DL (ref 6–8.5)
RBC # BLD AUTO: 5.16 X10E6/UL (ref 3.77–5.28)
SODIUM SERPL-SCNC: 143 MMOL/L (ref 134–144)
WBC # BLD AUTO: 9.6 X10E3/UL (ref 3.4–10.8)

## 2020-12-12 RX ORDER — LEVOTHYROXINE SODIUM 0.07 MG/1
TABLET ORAL
Qty: 90 TABLET | Refills: 1 | Status: SHIPPED | OUTPATIENT
Start: 2020-12-12 | End: 2021-08-17

## 2020-12-12 RX ORDER — ATORVASTATIN CALCIUM 40 MG/1
TABLET, FILM COATED ORAL
Qty: 90 TABLET | Refills: 1 | Status: SHIPPED | OUTPATIENT
Start: 2020-12-12 | End: 2021-05-27

## 2021-01-03 RX ORDER — POTASSIUM CHLORIDE 750 MG/1
TABLET, FILM COATED, EXTENDED RELEASE ORAL
Qty: 450 TABLET | Refills: 0 | Status: SHIPPED | OUTPATIENT
Start: 2021-01-03 | End: 2021-05-24

## 2021-02-26 RX ORDER — FUROSEMIDE 40 MG/1
TABLET ORAL
Qty: 90 TABLET | Refills: 1 | Status: SHIPPED | OUTPATIENT
Start: 2021-02-26 | End: 2021-08-17

## 2021-03-04 ENCOUNTER — TELEPHONE (OUTPATIENT)
Dept: FAMILY MEDICINE CLINIC | Facility: CLINIC | Age: 76
End: 2021-03-04

## 2021-05-24 RX ORDER — POTASSIUM CHLORIDE 750 MG/1
TABLET, FILM COATED, EXTENDED RELEASE ORAL
Qty: 450 TABLET | Refills: 0 | Status: SHIPPED | OUTPATIENT
Start: 2021-05-24 | End: 2021-08-17

## 2021-05-27 RX ORDER — LISINOPRIL 20 MG/1
TABLET ORAL
Qty: 90 TABLET | Refills: 1 | Status: SHIPPED | OUTPATIENT
Start: 2021-05-27 | End: 2021-11-15

## 2021-05-27 RX ORDER — ISOSORBIDE MONONITRATE 30 MG/1
30 TABLET, EXTENDED RELEASE ORAL DAILY
Qty: 90 TABLET | Refills: 2 | Status: SHIPPED | OUTPATIENT
Start: 2021-05-27 | End: 2022-02-14

## 2021-05-27 RX ORDER — ATORVASTATIN CALCIUM 40 MG/1
TABLET, FILM COATED ORAL
Qty: 90 TABLET | Refills: 1 | Status: SHIPPED | OUTPATIENT
Start: 2021-05-27 | End: 2021-11-15

## 2021-06-14 ENCOUNTER — OFFICE VISIT (OUTPATIENT)
Dept: FAMILY MEDICINE CLINIC | Facility: CLINIC | Age: 76
End: 2021-06-14

## 2021-06-14 VITALS
DIASTOLIC BLOOD PRESSURE: 78 MMHG | BODY MASS INDEX: 39.38 KG/M2 | SYSTOLIC BLOOD PRESSURE: 125 MMHG | HEART RATE: 73 BPM | RESPIRATION RATE: 18 BRPM | WEIGHT: 200.6 LBS | HEIGHT: 60 IN | OXYGEN SATURATION: 96 % | TEMPERATURE: 98.2 F

## 2021-06-14 DIAGNOSIS — R10.2 PELVIC PAIN: ICD-10-CM

## 2021-06-14 DIAGNOSIS — E03.9 ACQUIRED HYPOTHYROIDISM: ICD-10-CM

## 2021-06-14 DIAGNOSIS — E78.2 MIXED HYPERLIPIDEMIA: ICD-10-CM

## 2021-06-14 DIAGNOSIS — I11.9 HYPERTENSIVE HEART DISEASE WITHOUT HEART FAILURE: ICD-10-CM

## 2021-06-14 DIAGNOSIS — N95.2 VAGINITIS, ATROPHIC: ICD-10-CM

## 2021-06-14 DIAGNOSIS — I10 ESSENTIAL HYPERTENSION: Primary | ICD-10-CM

## 2021-06-14 PROCEDURE — 99214 OFFICE O/P EST MOD 30 MIN: CPT | Performed by: FAMILY MEDICINE

## 2021-06-15 LAB
BASOPHILS # BLD AUTO: 0.1 X10E3/UL (ref 0–0.2)
BASOPHILS NFR BLD AUTO: 1 %
CHOLEST SERPL-MCNC: 104 MG/DL (ref 100–199)
EOSINOPHIL # BLD AUTO: 0.3 X10E3/UL (ref 0–0.4)
EOSINOPHIL NFR BLD AUTO: 3 %
ERYTHROCYTE [DISTWIDTH] IN BLOOD BY AUTOMATED COUNT: 12.2 % (ref 11.7–15.4)
HCT VFR BLD AUTO: 41.8 % (ref 34–46.6)
HDLC SERPL-MCNC: 35 MG/DL
HGB BLD-MCNC: 14.1 G/DL (ref 11.1–15.9)
IMM GRANULOCYTES # BLD AUTO: 0 X10E3/UL (ref 0–0.1)
IMM GRANULOCYTES NFR BLD AUTO: 0 %
LDLC SERPL CALC-MCNC: 50 MG/DL (ref 0–99)
LYMPHOCYTES # BLD AUTO: 2.4 X10E3/UL (ref 0.7–3.1)
LYMPHOCYTES NFR BLD AUTO: 25 %
MCH RBC QN AUTO: 29.9 PG (ref 26.6–33)
MCHC RBC AUTO-ENTMCNC: 33.7 G/DL (ref 31.5–35.7)
MCV RBC AUTO: 89 FL (ref 79–97)
MONOCYTES # BLD AUTO: 0.9 X10E3/UL (ref 0.1–0.9)
MONOCYTES NFR BLD AUTO: 10 %
NEUTROPHILS # BLD AUTO: 5.8 X10E3/UL (ref 1.4–7)
NEUTROPHILS NFR BLD AUTO: 61 %
PLATELET # BLD AUTO: 215 X10E3/UL (ref 150–450)
RBC # BLD AUTO: 4.71 X10E6/UL (ref 3.77–5.28)
TRIGL SERPL-MCNC: 101 MG/DL (ref 0–149)
TSH SERPL DL<=0.005 MIU/L-ACNC: 3.45 UIU/ML (ref 0.45–4.5)
VLDLC SERPL CALC-MCNC: 19 MG/DL (ref 5–40)
WBC # BLD AUTO: 9.5 X10E3/UL (ref 3.4–10.8)

## 2021-08-17 RX ORDER — LEVOTHYROXINE SODIUM 0.07 MG/1
TABLET ORAL
Qty: 90 TABLET | Refills: 1 | Status: SHIPPED | OUTPATIENT
Start: 2021-08-17 | End: 2022-02-13

## 2021-08-17 RX ORDER — FUROSEMIDE 40 MG/1
TABLET ORAL
Qty: 90 TABLET | Refills: 1 | Status: SHIPPED | OUTPATIENT
Start: 2021-08-17 | End: 2022-02-13

## 2021-08-17 RX ORDER — POTASSIUM CHLORIDE 750 MG/1
TABLET, FILM COATED, EXTENDED RELEASE ORAL
Qty: 450 TABLET | Refills: 0 | Status: SHIPPED | OUTPATIENT
Start: 2021-08-17 | End: 2021-11-15

## 2021-11-15 RX ORDER — POTASSIUM CHLORIDE 750 MG/1
TABLET, FILM COATED, EXTENDED RELEASE ORAL
Qty: 450 TABLET | Refills: 0 | Status: SHIPPED | OUTPATIENT
Start: 2021-11-15 | End: 2022-02-13

## 2021-11-15 RX ORDER — ATORVASTATIN CALCIUM 40 MG/1
TABLET, FILM COATED ORAL
Qty: 90 TABLET | Refills: 1 | Status: SHIPPED | OUTPATIENT
Start: 2021-11-15 | End: 2022-05-23

## 2021-11-15 RX ORDER — LISINOPRIL 20 MG/1
TABLET ORAL
Qty: 90 TABLET | Refills: 1 | Status: SHIPPED | OUTPATIENT
Start: 2021-11-15 | End: 2022-05-23

## 2021-12-07 ENCOUNTER — TELEPHONE (OUTPATIENT)
Dept: FAMILY MEDICINE CLINIC | Facility: CLINIC | Age: 76
End: 2021-12-07

## 2021-12-07 DIAGNOSIS — E03.9 ACQUIRED HYPOTHYROIDISM: ICD-10-CM

## 2021-12-07 DIAGNOSIS — I10 ESSENTIAL HYPERTENSION: Primary | ICD-10-CM

## 2021-12-07 DIAGNOSIS — E78.2 MIXED HYPERLIPIDEMIA: ICD-10-CM

## 2021-12-08 LAB
ALBUMIN SERPL-MCNC: 4.3 G/DL (ref 3.7–4.7)
ALBUMIN/GLOB SERPL: 1.9 {RATIO} (ref 1.2–2.2)
ALP SERPL-CCNC: 99 IU/L (ref 44–121)
ALT SERPL-CCNC: 17 IU/L (ref 0–32)
AST SERPL-CCNC: 21 IU/L (ref 0–40)
BASOPHILS # BLD AUTO: 0.1 X10E3/UL (ref 0–0.2)
BASOPHILS NFR BLD AUTO: 1 %
BILIRUB SERPL-MCNC: 0.9 MG/DL (ref 0–1.2)
BUN SERPL-MCNC: 16 MG/DL (ref 8–27)
BUN/CREAT SERPL: 14 (ref 12–28)
CALCIUM SERPL-MCNC: 9.4 MG/DL (ref 8.7–10.3)
CHLORIDE SERPL-SCNC: 103 MMOL/L (ref 96–106)
CHOLEST SERPL-MCNC: 113 MG/DL (ref 100–199)
CO2 SERPL-SCNC: 27 MMOL/L (ref 20–29)
CREAT SERPL-MCNC: 1.11 MG/DL (ref 0.57–1)
EOSINOPHIL # BLD AUTO: 0.3 X10E3/UL (ref 0–0.4)
EOSINOPHIL NFR BLD AUTO: 4 %
ERYTHROCYTE [DISTWIDTH] IN BLOOD BY AUTOMATED COUNT: 12.6 % (ref 11.7–15.4)
GLOBULIN SER CALC-MCNC: 2.3 G/DL (ref 1.5–4.5)
GLUCOSE SERPL-MCNC: 97 MG/DL (ref 65–99)
HCT VFR BLD AUTO: 46.1 % (ref 34–46.6)
HDLC SERPL-MCNC: 41 MG/DL
HGB BLD-MCNC: 14.8 G/DL (ref 11.1–15.9)
IMM GRANULOCYTES # BLD AUTO: 0 X10E3/UL (ref 0–0.1)
IMM GRANULOCYTES NFR BLD AUTO: 0 %
LDLC SERPL CALC-MCNC: 56 MG/DL (ref 0–99)
LYMPHOCYTES # BLD AUTO: 2.3 X10E3/UL (ref 0.7–3.1)
LYMPHOCYTES NFR BLD AUTO: 26 %
MCH RBC QN AUTO: 29.7 PG (ref 26.6–33)
MCHC RBC AUTO-ENTMCNC: 32.1 G/DL (ref 31.5–35.7)
MCV RBC AUTO: 93 FL (ref 79–97)
MONOCYTES # BLD AUTO: 0.8 X10E3/UL (ref 0.1–0.9)
MONOCYTES NFR BLD AUTO: 9 %
NEUTROPHILS # BLD AUTO: 5.2 X10E3/UL (ref 1.4–7)
NEUTROPHILS NFR BLD AUTO: 60 %
PLATELET # BLD AUTO: 210 X10E3/UL (ref 150–450)
POTASSIUM SERPL-SCNC: 4.6 MMOL/L (ref 3.5–5.2)
PROT SERPL-MCNC: 6.6 G/DL (ref 6–8.5)
RBC # BLD AUTO: 4.98 X10E6/UL (ref 3.77–5.28)
SODIUM SERPL-SCNC: 143 MMOL/L (ref 134–144)
TRIGL SERPL-MCNC: 80 MG/DL (ref 0–149)
TSH SERPL DL<=0.005 MIU/L-ACNC: 1.22 UIU/ML (ref 0.45–4.5)
VLDLC SERPL CALC-MCNC: 16 MG/DL (ref 5–40)
WBC # BLD AUTO: 8.8 X10E3/UL (ref 3.4–10.8)

## 2022-01-14 ENCOUNTER — OFFICE VISIT (OUTPATIENT)
Dept: CARDIOLOGY | Facility: CLINIC | Age: 77
End: 2022-01-14

## 2022-01-14 VITALS
SYSTOLIC BLOOD PRESSURE: 134 MMHG | HEIGHT: 60 IN | DIASTOLIC BLOOD PRESSURE: 81 MMHG | HEART RATE: 56 BPM | BODY MASS INDEX: 39.27 KG/M2 | WEIGHT: 200 LBS

## 2022-01-14 DIAGNOSIS — I25.10 CORONARY ARTERY DISEASE INVOLVING NATIVE CORONARY ARTERY OF NATIVE HEART WITHOUT ANGINA PECTORIS: Primary | ICD-10-CM

## 2022-01-14 DIAGNOSIS — E78.2 MIXED HYPERLIPIDEMIA: ICD-10-CM

## 2022-01-14 DIAGNOSIS — I05.9 MITRAL VALVE DISORDER: ICD-10-CM

## 2022-01-14 DIAGNOSIS — I10 ESSENTIAL HYPERTENSION: ICD-10-CM

## 2022-01-14 PROCEDURE — 93000 ELECTROCARDIOGRAM COMPLETE: CPT | Performed by: INTERNAL MEDICINE

## 2022-01-14 PROCEDURE — 99214 OFFICE O/P EST MOD 30 MIN: CPT | Performed by: INTERNAL MEDICINE

## 2022-02-13 RX ORDER — FUROSEMIDE 40 MG/1
TABLET ORAL
Qty: 90 TABLET | Refills: 1 | Status: SHIPPED | OUTPATIENT
Start: 2022-02-13 | End: 2022-08-12

## 2022-02-13 RX ORDER — LEVOTHYROXINE SODIUM 0.07 MG/1
TABLET ORAL
Qty: 90 TABLET | Refills: 1 | Status: SHIPPED | OUTPATIENT
Start: 2022-02-13 | End: 2022-08-10

## 2022-02-13 RX ORDER — POTASSIUM CHLORIDE 750 MG/1
TABLET, FILM COATED, EXTENDED RELEASE ORAL
Qty: 450 TABLET | Refills: 0 | Status: SHIPPED | OUTPATIENT
Start: 2022-02-13 | End: 2022-05-23

## 2022-02-14 RX ORDER — ISOSORBIDE MONONITRATE 30 MG/1
30 TABLET, EXTENDED RELEASE ORAL DAILY
Qty: 90 TABLET | Refills: 2 | Status: SHIPPED | OUTPATIENT
Start: 2022-02-14 | End: 2022-11-10

## 2022-05-04 ENCOUNTER — OFFICE VISIT (OUTPATIENT)
Dept: FAMILY MEDICINE CLINIC | Facility: CLINIC | Age: 77
End: 2022-05-04

## 2022-05-04 VITALS
TEMPERATURE: 97.5 F | DIASTOLIC BLOOD PRESSURE: 84 MMHG | HEIGHT: 60 IN | OXYGEN SATURATION: 94 % | WEIGHT: 204 LBS | RESPIRATION RATE: 16 BRPM | SYSTOLIC BLOOD PRESSURE: 132 MMHG | HEART RATE: 68 BPM | BODY MASS INDEX: 40.05 KG/M2

## 2022-05-04 DIAGNOSIS — E03.9 ACQUIRED HYPOTHYROIDISM: ICD-10-CM

## 2022-05-04 DIAGNOSIS — I10 ESSENTIAL HYPERTENSION: Primary | ICD-10-CM

## 2022-05-04 DIAGNOSIS — R30.0 DYSURIA: ICD-10-CM

## 2022-05-04 DIAGNOSIS — E78.2 MIXED HYPERLIPIDEMIA: ICD-10-CM

## 2022-05-04 DIAGNOSIS — R10.9 ABDOMINAL CRAMPING: ICD-10-CM

## 2022-05-04 LAB
ALBUMIN SERPL-MCNC: 4.3 G/DL (ref 3.5–5.2)
ALBUMIN/GLOB SERPL: 1.7 G/DL
ALP SERPL-CCNC: 93 U/L (ref 39–117)
ALT SERPL W P-5'-P-CCNC: 16 U/L (ref 1–33)
ANION GAP SERPL CALCULATED.3IONS-SCNC: 8.5 MMOL/L (ref 5–15)
AST SERPL-CCNC: 20 U/L (ref 1–32)
BASOPHILS # BLD AUTO: 0.08 10*3/MM3 (ref 0–0.2)
BASOPHILS NFR BLD AUTO: 0.8 % (ref 0–1.5)
BILIRUB BLD-MCNC: NEGATIVE MG/DL
BILIRUB SERPL-MCNC: 0.7 MG/DL (ref 0–1.2)
BUN SERPL-MCNC: 25 MG/DL (ref 8–23)
BUN/CREAT SERPL: 21.6 (ref 7–25)
CALCIUM SPEC-SCNC: 9.7 MG/DL (ref 8.6–10.5)
CHLORIDE SERPL-SCNC: 105 MMOL/L (ref 98–107)
CHOLEST SERPL-MCNC: 113 MG/DL (ref 0–200)
CLARITY, POC: CLEAR
CO2 SERPL-SCNC: 27.5 MMOL/L (ref 22–29)
COLOR UR: YELLOW
CREAT SERPL-MCNC: 1.16 MG/DL (ref 0.57–1)
DEPRECATED RDW RBC AUTO: 41.4 FL (ref 37–54)
EGFRCR SERPLBLD CKD-EPI 2021: 49 ML/MIN/1.73
EOSINOPHIL # BLD AUTO: 0.21 10*3/MM3 (ref 0–0.4)
EOSINOPHIL NFR BLD AUTO: 2.2 % (ref 0.3–6.2)
ERYTHROCYTE [DISTWIDTH] IN BLOOD BY AUTOMATED COUNT: 12.5 % (ref 12.3–15.4)
EXPIRATION DATE: NORMAL
GLOBULIN UR ELPH-MCNC: 2.5 GM/DL
GLUCOSE SERPL-MCNC: 103 MG/DL (ref 65–99)
GLUCOSE UR STRIP-MCNC: NEGATIVE MG/DL
HCT VFR BLD AUTO: 48.2 % (ref 34–46.6)
HDLC SERPL-MCNC: 39 MG/DL (ref 40–60)
HGB BLD-MCNC: 15.5 G/DL (ref 12–15.9)
IMM GRANULOCYTES # BLD AUTO: 0.04 10*3/MM3 (ref 0–0.05)
IMM GRANULOCYTES NFR BLD AUTO: 0.4 % (ref 0–0.5)
KETONES UR QL: NEGATIVE
LDLC SERPL CALC-MCNC: 52 MG/DL (ref 0–100)
LDLC/HDLC SERPL: 1.28 {RATIO}
LEUKOCYTE EST, POC: NEGATIVE
LYMPHOCYTES # BLD AUTO: 1.54 10*3/MM3 (ref 0.7–3.1)
LYMPHOCYTES NFR BLD AUTO: 16.2 % (ref 19.6–45.3)
Lab: NORMAL
MCH RBC QN AUTO: 28.9 PG (ref 26.6–33)
MCHC RBC AUTO-ENTMCNC: 32.2 G/DL (ref 31.5–35.7)
MCV RBC AUTO: 89.9 FL (ref 79–97)
MONOCYTES # BLD AUTO: 0.78 10*3/MM3 (ref 0.1–0.9)
MONOCYTES NFR BLD AUTO: 8.2 % (ref 5–12)
NEUTROPHILS NFR BLD AUTO: 6.84 10*3/MM3 (ref 1.7–7)
NEUTROPHILS NFR BLD AUTO: 72.2 % (ref 42.7–76)
NITRITE UR-MCNC: NEGATIVE MG/ML
NRBC BLD AUTO-RTO: 0 /100 WBC (ref 0–0.2)
PH UR: 5 [PH] (ref 5–8)
PLATELET # BLD AUTO: 203 10*3/MM3 (ref 140–450)
PMV BLD AUTO: 10.8 FL (ref 6–12)
POTASSIUM SERPL-SCNC: 4.7 MMOL/L (ref 3.5–5.2)
PROT SERPL-MCNC: 6.8 G/DL (ref 6–8.5)
PROT UR STRIP-MCNC: NEGATIVE MG/DL
RBC # BLD AUTO: 5.36 10*6/MM3 (ref 3.77–5.28)
RBC # UR STRIP: NEGATIVE /UL
SODIUM SERPL-SCNC: 141 MMOL/L (ref 136–145)
SP GR UR: 1.02 (ref 1–1.03)
TRIGL SERPL-MCNC: 120 MG/DL (ref 0–150)
TSH SERPL DL<=0.05 MIU/L-ACNC: 4.34 UIU/ML (ref 0.27–4.2)
UROBILINOGEN UR QL: NORMAL
VLDLC SERPL-MCNC: 22 MG/DL (ref 5–40)
WBC NRBC COR # BLD: 9.49 10*3/MM3 (ref 3.4–10.8)

## 2022-05-04 PROCEDURE — 1159F MED LIST DOCD IN RCRD: CPT | Performed by: NURSE PRACTITIONER

## 2022-05-04 PROCEDURE — 81003 URINALYSIS AUTO W/O SCOPE: CPT | Performed by: NURSE PRACTITIONER

## 2022-05-04 PROCEDURE — 85025 COMPLETE CBC W/AUTO DIFF WBC: CPT | Performed by: NURSE PRACTITIONER

## 2022-05-04 PROCEDURE — 84443 ASSAY THYROID STIM HORMONE: CPT | Performed by: NURSE PRACTITIONER

## 2022-05-04 PROCEDURE — 99214 OFFICE O/P EST MOD 30 MIN: CPT | Performed by: NURSE PRACTITIONER

## 2022-05-04 PROCEDURE — 80061 LIPID PANEL: CPT | Performed by: NURSE PRACTITIONER

## 2022-05-04 PROCEDURE — 1125F AMNT PAIN NOTED PAIN PRSNT: CPT | Performed by: NURSE PRACTITIONER

## 2022-05-04 PROCEDURE — 80053 COMPREHEN METABOLIC PANEL: CPT | Performed by: NURSE PRACTITIONER

## 2022-05-04 PROCEDURE — G0439 PPPS, SUBSEQ VISIT: HCPCS | Performed by: NURSE PRACTITIONER

## 2022-05-04 PROCEDURE — 36415 COLL VENOUS BLD VENIPUNCTURE: CPT | Performed by: NURSE PRACTITIONER

## 2022-05-04 PROCEDURE — 1170F FXNL STATUS ASSESSED: CPT | Performed by: NURSE PRACTITIONER

## 2022-05-04 RX ORDER — DICYCLOMINE HYDROCHLORIDE 10 MG/1
10 CAPSULE ORAL 2 TIMES DAILY
Qty: 60 CAPSULE | Refills: 1 | Status: SHIPPED | OUTPATIENT
Start: 2022-05-04 | End: 2022-12-28

## 2022-05-16 RX ORDER — LISINOPRIL 20 MG/1
TABLET ORAL
Qty: 90 TABLET | Refills: 1 | OUTPATIENT
Start: 2022-05-16

## 2022-05-16 RX ORDER — ATORVASTATIN CALCIUM 40 MG/1
TABLET, FILM COATED ORAL
Qty: 90 TABLET | Refills: 1 | OUTPATIENT
Start: 2022-05-16

## 2022-05-16 RX ORDER — POTASSIUM CHLORIDE 750 MG/1
TABLET, FILM COATED, EXTENDED RELEASE ORAL
Qty: 450 TABLET | Refills: 0 | OUTPATIENT
Start: 2022-05-16

## 2022-05-23 RX ORDER — POTASSIUM CHLORIDE 750 MG/1
TABLET, FILM COATED, EXTENDED RELEASE ORAL
Qty: 450 TABLET | Refills: 0 | Status: SHIPPED | OUTPATIENT
Start: 2022-05-23 | End: 2022-08-12

## 2022-05-23 RX ORDER — ATORVASTATIN CALCIUM 40 MG/1
TABLET, FILM COATED ORAL
Qty: 90 TABLET | Refills: 1 | Status: SHIPPED | OUTPATIENT
Start: 2022-05-23 | End: 2022-11-29

## 2022-05-23 RX ORDER — LISINOPRIL 20 MG/1
TABLET ORAL
Qty: 90 TABLET | Refills: 1 | Status: SHIPPED | OUTPATIENT
Start: 2022-05-23 | End: 2022-11-23

## 2022-08-09 ENCOUNTER — OFFICE VISIT (OUTPATIENT)
Dept: FAMILY MEDICINE CLINIC | Facility: CLINIC | Age: 77
End: 2022-08-09

## 2022-08-09 VITALS
WEIGHT: 199.2 LBS | BODY MASS INDEX: 39.11 KG/M2 | SYSTOLIC BLOOD PRESSURE: 150 MMHG | HEART RATE: 68 BPM | OXYGEN SATURATION: 92 % | TEMPERATURE: 97.3 F | DIASTOLIC BLOOD PRESSURE: 82 MMHG | HEIGHT: 60 IN

## 2022-08-09 DIAGNOSIS — J43.1 PANLOBULAR EMPHYSEMA: ICD-10-CM

## 2022-08-09 DIAGNOSIS — E03.9 ACQUIRED HYPOTHYROIDISM: ICD-10-CM

## 2022-08-09 DIAGNOSIS — I10 ESSENTIAL HYPERTENSION: Primary | ICD-10-CM

## 2022-08-09 PROCEDURE — 84443 ASSAY THYROID STIM HORMONE: CPT | Performed by: NURSE PRACTITIONER

## 2022-08-09 PROCEDURE — 99213 OFFICE O/P EST LOW 20 MIN: CPT | Performed by: NURSE PRACTITIONER

## 2022-08-09 PROCEDURE — 36415 COLL VENOUS BLD VENIPUNCTURE: CPT | Performed by: NURSE PRACTITIONER

## 2022-08-09 RX ORDER — IPRATROPIUM BROMIDE 17 UG/1
2 AEROSOL, METERED RESPIRATORY (INHALATION) 4 TIMES DAILY PRN
Qty: 100 EACH | Refills: 11 | Status: SHIPPED | OUTPATIENT
Start: 2022-08-09 | End: 2022-12-28

## 2022-08-10 ENCOUNTER — TELEPHONE (OUTPATIENT)
Dept: FAMILY MEDICINE CLINIC | Facility: CLINIC | Age: 77
End: 2022-08-10

## 2022-08-10 DIAGNOSIS — E03.9 ACQUIRED HYPOTHYROIDISM: Primary | ICD-10-CM

## 2022-08-10 LAB — TSH SERPL DL<=0.05 MIU/L-ACNC: 5.58 UIU/ML (ref 0.27–4.2)

## 2022-08-10 RX ORDER — LEVOTHYROXINE SODIUM 0.1 MG/1
100 TABLET ORAL DAILY
Qty: 30 TABLET | Refills: 6 | Status: SHIPPED | OUTPATIENT
Start: 2022-08-10 | End: 2023-02-23

## 2022-08-12 RX ORDER — POTASSIUM CHLORIDE 750 MG/1
TABLET, FILM COATED, EXTENDED RELEASE ORAL
Qty: 450 TABLET | Refills: 0 | Status: SHIPPED | OUTPATIENT
Start: 2022-08-12 | End: 2022-12-28

## 2022-08-12 RX ORDER — LEVOTHYROXINE SODIUM 0.07 MG/1
TABLET ORAL
Qty: 90 TABLET | Refills: 1 | Status: SHIPPED | OUTPATIENT
Start: 2022-08-12 | End: 2022-12-28

## 2022-08-12 RX ORDER — FUROSEMIDE 40 MG/1
TABLET ORAL
Qty: 90 TABLET | Refills: 1 | Status: SHIPPED | OUTPATIENT
Start: 2022-08-12 | End: 2022-12-28

## 2022-09-23 RX ORDER — POTASSIUM CHLORIDE 750 MG/1
TABLET, FILM COATED, EXTENDED RELEASE ORAL
Qty: 450 TABLET | Refills: 0 | OUTPATIENT
Start: 2022-09-23

## 2022-10-12 ENCOUNTER — OFFICE VISIT (OUTPATIENT)
Dept: CARDIOLOGY | Facility: CLINIC | Age: 77
End: 2022-10-12

## 2022-10-12 VITALS
DIASTOLIC BLOOD PRESSURE: 88 MMHG | HEIGHT: 60 IN | OXYGEN SATURATION: 97 % | HEART RATE: 83 BPM | WEIGHT: 200 LBS | SYSTOLIC BLOOD PRESSURE: 152 MMHG | BODY MASS INDEX: 39.27 KG/M2

## 2022-10-12 DIAGNOSIS — I25.10 CORONARY ARTERY DISEASE INVOLVING NATIVE CORONARY ARTERY OF NATIVE HEART WITHOUT ANGINA PECTORIS: Primary | ICD-10-CM

## 2022-10-12 DIAGNOSIS — I10 ESSENTIAL HYPERTENSION: ICD-10-CM

## 2022-10-12 DIAGNOSIS — R06.02 SHORTNESS OF BREATH: ICD-10-CM

## 2022-10-12 DIAGNOSIS — E78.2 MIXED HYPERLIPIDEMIA: ICD-10-CM

## 2022-10-12 PROCEDURE — 93000 ELECTROCARDIOGRAM COMPLETE: CPT | Performed by: INTERNAL MEDICINE

## 2022-10-12 PROCEDURE — 99214 OFFICE O/P EST MOD 30 MIN: CPT | Performed by: INTERNAL MEDICINE

## 2022-11-10 RX ORDER — ISOSORBIDE MONONITRATE 30 MG/1
30 TABLET, EXTENDED RELEASE ORAL DAILY
Qty: 90 TABLET | Refills: 2 | Status: SHIPPED | OUTPATIENT
Start: 2022-11-10

## 2022-11-17 RX ORDER — POTASSIUM CHLORIDE 750 MG/1
TABLET, FILM COATED, EXTENDED RELEASE ORAL
Qty: 450 TABLET | Refills: 0 | OUTPATIENT
Start: 2022-11-17

## 2022-11-17 RX ORDER — POTASSIUM CHLORIDE 750 MG/1
TABLET, EXTENDED RELEASE ORAL
Qty: 450 TABLET | OUTPATIENT
Start: 2022-11-17

## 2022-11-23 RX ORDER — LISINOPRIL 20 MG/1
TABLET ORAL
Qty: 90 TABLET | Refills: 1 | Status: SHIPPED | OUTPATIENT
Start: 2022-11-23 | End: 2023-01-25

## 2022-11-29 RX ORDER — ATORVASTATIN CALCIUM 40 MG/1
TABLET, FILM COATED ORAL
Qty: 90 TABLET | Refills: 1 | Status: SHIPPED | OUTPATIENT
Start: 2022-11-29

## 2022-12-26 ENCOUNTER — TELEPHONE (OUTPATIENT)
Dept: FAMILY MEDICINE CLINIC | Facility: CLINIC | Age: 77
End: 2022-12-26

## 2022-12-28 ENCOUNTER — HOME HEALTH ADMISSION (OUTPATIENT)
Dept: HOME HEALTH SERVICES | Facility: HOME HEALTHCARE | Age: 77
End: 2022-12-28

## 2022-12-28 ENCOUNTER — OFFICE VISIT (OUTPATIENT)
Dept: FAMILY MEDICINE CLINIC | Facility: CLINIC | Age: 77
End: 2022-12-28

## 2022-12-28 VITALS
WEIGHT: 187.4 LBS | SYSTOLIC BLOOD PRESSURE: 176 MMHG | TEMPERATURE: 97.3 F | BODY MASS INDEX: 36.79 KG/M2 | HEIGHT: 60 IN | HEART RATE: 67 BPM | DIASTOLIC BLOOD PRESSURE: 84 MMHG | OXYGEN SATURATION: 93 %

## 2022-12-28 DIAGNOSIS — J43.1 PANLOBULAR EMPHYSEMA: ICD-10-CM

## 2022-12-28 DIAGNOSIS — R60.9 EDEMA, UNSPECIFIED TYPE: ICD-10-CM

## 2022-12-28 DIAGNOSIS — I10 ESSENTIAL HYPERTENSION: ICD-10-CM

## 2022-12-28 DIAGNOSIS — J18.9 PNEUMONIA DUE TO INFECTIOUS ORGANISM, UNSPECIFIED LATERALITY, UNSPECIFIED PART OF LUNG: Primary | ICD-10-CM

## 2022-12-28 PROCEDURE — 99214 OFFICE O/P EST MOD 30 MIN: CPT | Performed by: NURSE PRACTITIONER

## 2022-12-28 RX ORDER — POTASSIUM CHLORIDE 750 MG/1
10 TABLET, FILM COATED, EXTENDED RELEASE ORAL DAILY
COMMUNITY
End: 2022-12-28

## 2022-12-28 RX ORDER — CEFUROXIME AXETIL 250 MG/1
500 TABLET ORAL
COMMUNITY
Start: 2022-12-25 | End: 2022-12-29

## 2022-12-28 RX ORDER — FUROSEMIDE 40 MG/1
40 TABLET ORAL DAILY
COMMUNITY
End: 2022-12-28

## 2022-12-28 RX ORDER — ACETAMINOPHEN 500 MG/1
5 TABLET, COATED ORAL DAILY PRN
COMMUNITY
End: 2023-02-25

## 2022-12-28 RX ORDER — PREDNISONE 20 MG/1
40 TABLET ORAL
COMMUNITY
Start: 2022-12-25 | End: 2022-12-28

## 2022-12-28 RX ORDER — IPRATROPIUM BROMIDE 17 UG/1
2 AEROSOL, METERED RESPIRATORY (INHALATION) 4 TIMES DAILY PRN
Qty: 100 EACH | Refills: 11
Start: 2022-12-28

## 2022-12-28 RX ORDER — FUROSEMIDE 40 MG/1
40 TABLET ORAL DAILY
Qty: 90 TABLET | Refills: 1 | Status: SHIPPED | OUTPATIENT
Start: 2022-12-28

## 2022-12-28 RX ORDER — POTASSIUM CHLORIDE 20 MEQ/1
20 TABLET, EXTENDED RELEASE ORAL 2 TIMES DAILY
Qty: 180 TABLET | Refills: 1 | Status: SHIPPED | OUTPATIENT
Start: 2022-12-28

## 2023-01-25 ENCOUNTER — OFFICE VISIT (OUTPATIENT)
Dept: FAMILY MEDICINE CLINIC | Facility: CLINIC | Age: 78
End: 2023-01-25
Payer: MEDICARE

## 2023-01-25 VITALS
HEART RATE: 76 BPM | HEIGHT: 60 IN | OXYGEN SATURATION: 97 % | TEMPERATURE: 96.8 F | DIASTOLIC BLOOD PRESSURE: 90 MMHG | WEIGHT: 188 LBS | SYSTOLIC BLOOD PRESSURE: 146 MMHG | BODY MASS INDEX: 36.91 KG/M2

## 2023-01-25 DIAGNOSIS — I10 ESSENTIAL HYPERTENSION: Primary | ICD-10-CM

## 2023-01-25 DIAGNOSIS — J43.1 PANLOBULAR EMPHYSEMA: ICD-10-CM

## 2023-01-25 PROCEDURE — 99213 OFFICE O/P EST LOW 20 MIN: CPT | Performed by: NURSE PRACTITIONER

## 2023-01-25 RX ORDER — LISINOPRIL 40 MG/1
40 TABLET ORAL DAILY
Qty: 90 TABLET | Refills: 1 | Status: SHIPPED | OUTPATIENT
Start: 2023-01-25

## 2023-02-06 RX ORDER — LEVOTHYROXINE SODIUM 0.07 MG/1
TABLET ORAL
Qty: 90 TABLET | Refills: 1 | OUTPATIENT
Start: 2023-02-06

## 2023-02-09 ENCOUNTER — OFFICE VISIT (OUTPATIENT)
Dept: FAMILY MEDICINE CLINIC | Facility: CLINIC | Age: 78
End: 2023-02-09
Payer: MEDICARE

## 2023-02-09 VITALS
DIASTOLIC BLOOD PRESSURE: 115 MMHG | HEART RATE: 69 BPM | SYSTOLIC BLOOD PRESSURE: 178 MMHG | OXYGEN SATURATION: 95 % | HEIGHT: 60 IN | BODY MASS INDEX: 36.48 KG/M2 | WEIGHT: 185.8 LBS | TEMPERATURE: 97.3 F

## 2023-02-09 DIAGNOSIS — E03.9 ACQUIRED HYPOTHYROIDISM: ICD-10-CM

## 2023-02-09 DIAGNOSIS — I10 ESSENTIAL HYPERTENSION: Primary | ICD-10-CM

## 2023-02-09 DIAGNOSIS — R73.9 HYPERGLYCEMIA: ICD-10-CM

## 2023-02-09 DIAGNOSIS — E78.2 MIXED HYPERLIPIDEMIA: ICD-10-CM

## 2023-02-09 DIAGNOSIS — I10 ESSENTIAL HYPERTENSION: ICD-10-CM

## 2023-02-09 LAB — HBA1C MFR BLD: 5.5 % (ref 3.5–5.6)

## 2023-02-09 PROCEDURE — 85025 COMPLETE CBC W/AUTO DIFF WBC: CPT | Performed by: NURSE PRACTITIONER

## 2023-02-09 PROCEDURE — 83036 HEMOGLOBIN GLYCOSYLATED A1C: CPT | Performed by: NURSE PRACTITIONER

## 2023-02-09 PROCEDURE — 99214 OFFICE O/P EST MOD 30 MIN: CPT | Performed by: NURSE PRACTITIONER

## 2023-02-09 PROCEDURE — 36415 COLL VENOUS BLD VENIPUNCTURE: CPT | Performed by: NURSE PRACTITIONER

## 2023-02-09 PROCEDURE — 80053 COMPREHEN METABOLIC PANEL: CPT | Performed by: NURSE PRACTITIONER

## 2023-02-09 PROCEDURE — 84443 ASSAY THYROID STIM HORMONE: CPT | Performed by: NURSE PRACTITIONER

## 2023-02-09 PROCEDURE — 80061 LIPID PANEL: CPT | Performed by: NURSE PRACTITIONER

## 2023-02-09 RX ORDER — AMLODIPINE BESYLATE 5 MG/1
5 TABLET ORAL DAILY
Qty: 30 TABLET | Refills: 6 | Status: SHIPPED | OUTPATIENT
Start: 2023-02-09

## 2023-02-10 ENCOUNTER — TELEPHONE (OUTPATIENT)
Dept: FAMILY MEDICINE CLINIC | Facility: CLINIC | Age: 78
End: 2023-02-10
Payer: MEDICARE

## 2023-02-10 LAB
ALBUMIN SERPL-MCNC: 4.1 G/DL (ref 3.5–5.2)
ALBUMIN/GLOB SERPL: 1.7 G/DL
ALP SERPL-CCNC: 88 U/L (ref 39–117)
ALT SERPL W P-5'-P-CCNC: 15 U/L (ref 1–33)
ANION GAP SERPL CALCULATED.3IONS-SCNC: 6 MMOL/L (ref 5–15)
AST SERPL-CCNC: 14 U/L (ref 1–32)
BASOPHILS # BLD AUTO: 0.08 10*3/MM3 (ref 0–0.2)
BASOPHILS NFR BLD AUTO: 1 % (ref 0–1.5)
BILIRUB SERPL-MCNC: 0.9 MG/DL (ref 0–1.2)
BUN SERPL-MCNC: 13 MG/DL (ref 8–23)
BUN/CREAT SERPL: 11.6 (ref 7–25)
CALCIUM SPEC-SCNC: 9.6 MG/DL (ref 8.6–10.5)
CHLORIDE SERPL-SCNC: 100 MMOL/L (ref 98–107)
CHOLEST SERPL-MCNC: 203 MG/DL (ref 0–200)
CO2 SERPL-SCNC: 34 MMOL/L (ref 22–29)
CREAT SERPL-MCNC: 1.12 MG/DL (ref 0.57–1)
DEPRECATED RDW RBC AUTO: 44.6 FL (ref 37–54)
EGFRCR SERPLBLD CKD-EPI 2021: 50.8 ML/MIN/1.73
EOSINOPHIL # BLD AUTO: 0.13 10*3/MM3 (ref 0–0.4)
EOSINOPHIL NFR BLD AUTO: 1.6 % (ref 0.3–6.2)
ERYTHROCYTE [DISTWIDTH] IN BLOOD BY AUTOMATED COUNT: 13.8 % (ref 12.3–15.4)
GLOBULIN UR ELPH-MCNC: 2.4 GM/DL
GLUCOSE SERPL-MCNC: 114 MG/DL (ref 65–99)
HCT VFR BLD AUTO: 45.4 % (ref 34–46.6)
HDLC SERPL-MCNC: 47 MG/DL (ref 40–60)
HGB BLD-MCNC: 14.9 G/DL (ref 12–15.9)
IMM GRANULOCYTES # BLD AUTO: 0.02 10*3/MM3 (ref 0–0.05)
IMM GRANULOCYTES NFR BLD AUTO: 0.3 % (ref 0–0.5)
LDLC SERPL CALC-MCNC: 137 MG/DL (ref 0–100)
LDLC/HDLC SERPL: 2.86 {RATIO}
LYMPHOCYTES # BLD AUTO: 1.74 10*3/MM3 (ref 0.7–3.1)
LYMPHOCYTES NFR BLD AUTO: 21.8 % (ref 19.6–45.3)
MCH RBC QN AUTO: 28.8 PG (ref 26.6–33)
MCHC RBC AUTO-ENTMCNC: 32.8 G/DL (ref 31.5–35.7)
MCV RBC AUTO: 87.8 FL (ref 79–97)
MONOCYTES # BLD AUTO: 0.56 10*3/MM3 (ref 0.1–0.9)
MONOCYTES NFR BLD AUTO: 7 % (ref 5–12)
NEUTROPHILS NFR BLD AUTO: 5.47 10*3/MM3 (ref 1.7–7)
NEUTROPHILS NFR BLD AUTO: 68.3 % (ref 42.7–76)
NRBC BLD AUTO-RTO: 0 /100 WBC (ref 0–0.2)
PLATELET # BLD AUTO: 204 10*3/MM3 (ref 140–450)
PMV BLD AUTO: 10.7 FL (ref 6–12)
POTASSIUM SERPL-SCNC: 3.5 MMOL/L (ref 3.5–5.2)
PROT SERPL-MCNC: 6.5 G/DL (ref 6–8.5)
RBC # BLD AUTO: 5.17 10*6/MM3 (ref 3.77–5.28)
SODIUM SERPL-SCNC: 140 MMOL/L (ref 136–145)
TRIGL SERPL-MCNC: 108 MG/DL (ref 0–150)
TSH SERPL DL<=0.05 MIU/L-ACNC: 82 UIU/ML (ref 0.27–4.2)
VLDLC SERPL-MCNC: 19 MG/DL (ref 5–40)
WBC NRBC COR # BLD: 8 10*3/MM3 (ref 3.4–10.8)

## 2023-02-10 RX ORDER — AMLODIPINE BESYLATE 5 MG/1
5 TABLET ORAL DAILY
Qty: 90 TABLET | OUTPATIENT
Start: 2023-02-10

## 2023-02-21 ENCOUNTER — TELEPHONE (OUTPATIENT)
Dept: FAMILY MEDICINE CLINIC | Facility: CLINIC | Age: 78
End: 2023-02-21

## 2023-02-23 ENCOUNTER — OFFICE VISIT (OUTPATIENT)
Dept: FAMILY MEDICINE CLINIC | Facility: CLINIC | Age: 78
End: 2023-02-23
Payer: MEDICARE

## 2023-02-23 ENCOUNTER — TELEPHONE (OUTPATIENT)
Dept: FAMILY MEDICINE CLINIC | Facility: CLINIC | Age: 78
End: 2023-02-23

## 2023-02-23 ENCOUNTER — HOME HEALTH ADMISSION (OUTPATIENT)
Dept: HOME HEALTH SERVICES | Facility: HOME HEALTHCARE | Age: 78
End: 2023-02-23
Payer: MEDICARE

## 2023-02-23 VITALS
RESPIRATION RATE: 18 BRPM | DIASTOLIC BLOOD PRESSURE: 96 MMHG | HEIGHT: 60 IN | TEMPERATURE: 97.8 F | HEART RATE: 68 BPM | SYSTOLIC BLOOD PRESSURE: 146 MMHG | OXYGEN SATURATION: 99 % | WEIGHT: 190 LBS | BODY MASS INDEX: 37.3 KG/M2

## 2023-02-23 DIAGNOSIS — R29.6 MULTIPLE FALLS: ICD-10-CM

## 2023-02-23 DIAGNOSIS — E03.9 ACQUIRED HYPOTHYROIDISM: ICD-10-CM

## 2023-02-23 DIAGNOSIS — M15.9 OSTEOARTHRITIS, GENERALIZED: ICD-10-CM

## 2023-02-23 DIAGNOSIS — J43.1 PANLOBULAR EMPHYSEMA: Primary | ICD-10-CM

## 2023-02-23 PROCEDURE — 99214 OFFICE O/P EST MOD 30 MIN: CPT | Performed by: NURSE PRACTITIONER

## 2023-02-23 RX ORDER — LEVOTHYROXINE SODIUM 0.12 MG/1
125 TABLET ORAL DAILY
Qty: 30 TABLET | Refills: 3 | Status: SHIPPED | OUTPATIENT
Start: 2023-02-23

## 2023-02-25 ENCOUNTER — HOME CARE VISIT (OUTPATIENT)
Dept: HOME HEALTH SERVICES | Facility: HOME HEALTHCARE | Age: 78
End: 2023-02-25
Payer: MEDICARE

## 2023-02-25 PROCEDURE — G0299 HHS/HOSPICE OF RN EA 15 MIN: HCPCS

## 2023-02-26 VITALS
OXYGEN SATURATION: 95 % | BODY MASS INDEX: 37.3 KG/M2 | WEIGHT: 190 LBS | HEART RATE: 66 BPM | SYSTOLIC BLOOD PRESSURE: 128 MMHG | RESPIRATION RATE: 19 BRPM | HEIGHT: 60 IN | DIASTOLIC BLOOD PRESSURE: 66 MMHG | TEMPERATURE: 98 F

## 2023-02-28 ENCOUNTER — HOME CARE VISIT (OUTPATIENT)
Dept: HOME HEALTH SERVICES | Facility: HOME HEALTHCARE | Age: 78
End: 2023-02-28
Payer: MEDICARE

## 2023-02-28 PROCEDURE — G0299 HHS/HOSPICE OF RN EA 15 MIN: HCPCS

## 2023-03-03 ENCOUNTER — HOME CARE VISIT (OUTPATIENT)
Dept: HOME HEALTH SERVICES | Facility: HOME HEALTHCARE | Age: 78
End: 2023-03-03
Payer: MEDICARE

## 2023-03-06 PROCEDURE — G0180 MD CERTIFICATION HHA PATIENT: HCPCS | Performed by: NURSE PRACTITIONER

## 2023-03-08 ENCOUNTER — HOME CARE VISIT (OUTPATIENT)
Dept: HOME HEALTH SERVICES | Facility: HOME HEALTHCARE | Age: 78
End: 2023-03-08
Payer: MEDICARE

## 2023-03-09 ENCOUNTER — HOME CARE VISIT (OUTPATIENT)
Dept: HOME HEALTH SERVICES | Facility: HOME HEALTHCARE | Age: 78
End: 2023-03-09
Payer: MEDICARE

## 2023-03-09 VITALS
SYSTOLIC BLOOD PRESSURE: 150 MMHG | TEMPERATURE: 97.6 F | HEART RATE: 71 BPM | DIASTOLIC BLOOD PRESSURE: 64 MMHG | OXYGEN SATURATION: 94 % | RESPIRATION RATE: 24 BRPM

## 2023-03-09 PROCEDURE — G0493 RN CARE EA 15 MIN HH/HOSPICE: HCPCS

## 2023-03-10 ENCOUNTER — HOME CARE VISIT (OUTPATIENT)
Dept: HOME HEALTH SERVICES | Facility: HOME HEALTHCARE | Age: 78
End: 2023-03-10
Payer: MEDICARE

## 2023-03-10 PROCEDURE — G0155 HHCP-SVS OF CSW,EA 15 MIN: HCPCS

## 2023-03-15 ENCOUNTER — HOME CARE VISIT (OUTPATIENT)
Dept: HOME HEALTH SERVICES | Facility: HOME HEALTHCARE | Age: 78
End: 2023-03-15
Payer: MEDICARE

## 2023-03-15 VITALS
DIASTOLIC BLOOD PRESSURE: 64 MMHG | TEMPERATURE: 97.2 F | SYSTOLIC BLOOD PRESSURE: 102 MMHG | RESPIRATION RATE: 28 BRPM | OXYGEN SATURATION: 90 % | HEART RATE: 64 BPM

## 2023-03-15 PROCEDURE — G0493 RN CARE EA 15 MIN HH/HOSPICE: HCPCS

## 2023-03-22 ENCOUNTER — HOME CARE VISIT (OUTPATIENT)
Dept: HOME HEALTH SERVICES | Facility: HOME HEALTHCARE | Age: 78
End: 2023-03-22
Payer: MEDICARE

## 2023-03-22 VITALS
SYSTOLIC BLOOD PRESSURE: 122 MMHG | RESPIRATION RATE: 17 BRPM | DIASTOLIC BLOOD PRESSURE: 66 MMHG | HEART RATE: 56 BPM | OXYGEN SATURATION: 95 % | TEMPERATURE: 97.6 F

## 2023-03-22 PROCEDURE — G0299 HHS/HOSPICE OF RN EA 15 MIN: HCPCS

## 2023-03-29 ENCOUNTER — HOME CARE VISIT (OUTPATIENT)
Dept: HOME HEALTH SERVICES | Facility: HOME HEALTHCARE | Age: 78
End: 2023-03-29
Payer: MEDICARE

## 2023-03-29 VITALS
HEART RATE: 60 BPM | RESPIRATION RATE: 18 BRPM | DIASTOLIC BLOOD PRESSURE: 90 MMHG | SYSTOLIC BLOOD PRESSURE: 140 MMHG | OXYGEN SATURATION: 95 % | TEMPERATURE: 97.4 F

## 2023-03-29 PROCEDURE — G0299 HHS/HOSPICE OF RN EA 15 MIN: HCPCS

## 2023-04-04 ENCOUNTER — HOME CARE VISIT (OUTPATIENT)
Dept: HOME HEALTH SERVICES | Facility: HOME HEALTHCARE | Age: 78
End: 2023-04-04
Payer: MEDICARE

## 2023-04-04 VITALS
TEMPERATURE: 98.8 F | RESPIRATION RATE: 17 BRPM | OXYGEN SATURATION: 97 % | HEART RATE: 78 BPM | DIASTOLIC BLOOD PRESSURE: 72 MMHG | SYSTOLIC BLOOD PRESSURE: 120 MMHG

## 2023-04-04 PROCEDURE — G0299 HHS/HOSPICE OF RN EA 15 MIN: HCPCS

## 2023-04-12 ENCOUNTER — HOME CARE VISIT (OUTPATIENT)
Dept: HOME HEALTH SERVICES | Facility: HOME HEALTHCARE | Age: 78
End: 2023-04-12
Payer: MEDICARE

## 2023-04-12 VITALS
DIASTOLIC BLOOD PRESSURE: 70 MMHG | OXYGEN SATURATION: 97 % | SYSTOLIC BLOOD PRESSURE: 140 MMHG | RESPIRATION RATE: 18 BRPM | HEART RATE: 65 BPM | TEMPERATURE: 98 F

## 2023-04-12 PROCEDURE — G0299 HHS/HOSPICE OF RN EA 15 MIN: HCPCS

## 2023-04-18 ENCOUNTER — HOME CARE VISIT (OUTPATIENT)
Dept: HOME HEALTH SERVICES | Facility: HOME HEALTHCARE | Age: 78
End: 2023-04-18
Payer: MEDICARE

## 2023-04-18 PROCEDURE — G0299 HHS/HOSPICE OF RN EA 15 MIN: HCPCS

## 2023-04-19 ENCOUNTER — OFFICE VISIT (OUTPATIENT)
Dept: FAMILY MEDICINE CLINIC | Facility: CLINIC | Age: 78
End: 2023-04-19
Payer: MEDICARE

## 2023-04-19 VITALS
SYSTOLIC BLOOD PRESSURE: 203 MMHG | DIASTOLIC BLOOD PRESSURE: 107 MMHG | OXYGEN SATURATION: 90 % | TEMPERATURE: 97.8 F | HEIGHT: 60 IN | WEIGHT: 193.6 LBS | HEART RATE: 70 BPM | BODY MASS INDEX: 38.01 KG/M2

## 2023-04-19 DIAGNOSIS — I10 ESSENTIAL HYPERTENSION: ICD-10-CM

## 2023-04-19 DIAGNOSIS — J44.1 COPD WITH EXACERBATION: ICD-10-CM

## 2023-04-19 DIAGNOSIS — R50.9 FEVER, UNSPECIFIED FEVER CAUSE: Primary | ICD-10-CM

## 2023-04-19 LAB
EXPIRATION DATE: NORMAL
INTERNAL CONTROL: NORMAL
Lab: NORMAL
SARS-COV-2 AG UPPER RESP QL IA.RAPID: NOT DETECTED

## 2023-04-19 PROCEDURE — 99213 OFFICE O/P EST LOW 20 MIN: CPT | Performed by: NURSE PRACTITIONER

## 2023-04-19 PROCEDURE — 87426 SARSCOV CORONAVIRUS AG IA: CPT | Performed by: NURSE PRACTITIONER

## 2023-04-19 PROCEDURE — 1159F MED LIST DOCD IN RCRD: CPT | Performed by: NURSE PRACTITIONER

## 2023-04-19 PROCEDURE — 3080F DIAST BP >= 90 MM HG: CPT | Performed by: NURSE PRACTITIONER

## 2023-04-19 PROCEDURE — 1160F RVW MEDS BY RX/DR IN RCRD: CPT | Performed by: NURSE PRACTITIONER

## 2023-04-19 PROCEDURE — 3077F SYST BP >= 140 MM HG: CPT | Performed by: NURSE PRACTITIONER

## 2023-04-19 RX ORDER — DOXYCYCLINE 100 MG/1
100 CAPSULE ORAL 2 TIMES DAILY
Qty: 20 CAPSULE | Refills: 0 | Status: SHIPPED | OUTPATIENT
Start: 2023-04-19

## 2023-04-28 RX ORDER — LISINOPRIL 20 MG/1
TABLET ORAL
Qty: 90 TABLET | Refills: 1 | OUTPATIENT
Start: 2023-04-28

## 2023-06-04 ENCOUNTER — APPOINTMENT (OUTPATIENT)
Dept: GENERAL RADIOLOGY | Facility: HOSPITAL | Age: 78
End: 2023-06-04
Payer: MEDICARE

## 2023-06-04 ENCOUNTER — HOSPITAL ENCOUNTER (INPATIENT)
Facility: HOSPITAL | Age: 78
LOS: 7 days | Discharge: NURSING, REHAB OR LONG-TERM CARE | End: 2023-06-12
Attending: EMERGENCY MEDICINE | Admitting: INTERNAL MEDICINE
Payer: MEDICARE

## 2023-06-04 DIAGNOSIS — R60.9 EDEMA, UNSPECIFIED TYPE: ICD-10-CM

## 2023-06-04 DIAGNOSIS — I10 ESSENTIAL HYPERTENSION: ICD-10-CM

## 2023-06-04 DIAGNOSIS — R06.03 ACUTE RESPIRATORY DISTRESS: Primary | ICD-10-CM

## 2023-06-04 DIAGNOSIS — T17.800A MULTIPLE TRACHEOBRONCHIAL MUCUS PLUGS: ICD-10-CM

## 2023-06-04 DIAGNOSIS — J44.1 CHRONIC OBSTRUCTIVE PULMONARY DISEASE WITH ACUTE EXACERBATION: ICD-10-CM

## 2023-06-04 LAB
ALBUMIN SERPL-MCNC: 4.2 G/DL (ref 3.5–5.2)
ALBUMIN/GLOB SERPL: 1.8 G/DL
ALP SERPL-CCNC: 70 U/L (ref 39–117)
ALT SERPL W P-5'-P-CCNC: 18 U/L (ref 1–33)
ANION GAP SERPL CALCULATED.3IONS-SCNC: 11 MMOL/L (ref 5–15)
AST SERPL-CCNC: 26 U/L (ref 1–32)
B PARAPERT DNA SPEC QL NAA+PROBE: NOT DETECTED
B PERT DNA SPEC QL NAA+PROBE: NOT DETECTED
BASOPHILS # BLD AUTO: 0.1 10*3/MM3 (ref 0–0.2)
BASOPHILS NFR BLD AUTO: 0.9 % (ref 0–1.5)
BILIRUB SERPL-MCNC: 1 MG/DL (ref 0–1.2)
BUN SERPL-MCNC: 11 MG/DL (ref 8–23)
BUN/CREAT SERPL: 8.7 (ref 7–25)
C PNEUM DNA NPH QL NAA+NON-PROBE: NOT DETECTED
CALCIUM SPEC-SCNC: 9 MG/DL (ref 8.6–10.5)
CHLORIDE SERPL-SCNC: 102 MMOL/L (ref 98–107)
CO2 SERPL-SCNC: 31 MMOL/L (ref 22–29)
CREAT SERPL-MCNC: 1.26 MG/DL (ref 0.57–1)
D-LACTATE SERPL-SCNC: 0.8 MMOL/L (ref 0.3–2)
DEPRECATED RDW RBC AUTO: 50.3 FL (ref 37–54)
EGFRCR SERPLBLD CKD-EPI 2021: 44.1 ML/MIN/1.73
EOSINOPHIL # BLD AUTO: 0.1 10*3/MM3 (ref 0–0.4)
EOSINOPHIL NFR BLD AUTO: 1.6 % (ref 0.3–6.2)
ERYTHROCYTE [DISTWIDTH] IN BLOOD BY AUTOMATED COUNT: 16 % (ref 12.3–15.4)
FLUAV SUBTYP SPEC NAA+PROBE: NOT DETECTED
FLUBV RNA ISLT QL NAA+PROBE: NOT DETECTED
GLOBULIN UR ELPH-MCNC: 2.4 GM/DL
GLUCOSE SERPL-MCNC: 80 MG/DL (ref 65–99)
HADV DNA SPEC NAA+PROBE: NOT DETECTED
HCOV 229E RNA SPEC QL NAA+PROBE: NOT DETECTED
HCOV HKU1 RNA SPEC QL NAA+PROBE: NOT DETECTED
HCOV NL63 RNA SPEC QL NAA+PROBE: NOT DETECTED
HCOV OC43 RNA SPEC QL NAA+PROBE: NOT DETECTED
HCT VFR BLD AUTO: 43.5 % (ref 34–46.6)
HGB BLD-MCNC: 14.3 G/DL (ref 12–15.9)
HMPV RNA NPH QL NAA+NON-PROBE: NOT DETECTED
HPIV1 RNA ISLT QL NAA+PROBE: NOT DETECTED
HPIV2 RNA SPEC QL NAA+PROBE: NOT DETECTED
HPIV3 RNA NPH QL NAA+PROBE: NOT DETECTED
HPIV4 P GENE NPH QL NAA+PROBE: NOT DETECTED
LYMPHOCYTES # BLD AUTO: 1.7 10*3/MM3 (ref 0.7–3.1)
LYMPHOCYTES NFR BLD AUTO: 19.6 % (ref 19.6–45.3)
M PNEUMO IGG SER IA-ACNC: NOT DETECTED
MCH RBC QN AUTO: 29.9 PG (ref 26.6–33)
MCHC RBC AUTO-ENTMCNC: 32.9 G/DL (ref 31.5–35.7)
MCV RBC AUTO: 91 FL (ref 79–97)
MONOCYTES # BLD AUTO: 0.8 10*3/MM3 (ref 0.1–0.9)
MONOCYTES NFR BLD AUTO: 9.5 % (ref 5–12)
NEUTROPHILS NFR BLD AUTO: 5.8 10*3/MM3 (ref 1.7–7)
NEUTROPHILS NFR BLD AUTO: 68.4 % (ref 42.7–76)
NRBC BLD AUTO-RTO: 0.1 /100 WBC (ref 0–0.2)
NT-PROBNP SERPL-MCNC: 279 PG/ML (ref 0–1800)
PLATELET # BLD AUTO: 175 10*3/MM3 (ref 140–450)
PMV BLD AUTO: 7.7 FL (ref 6–12)
POTASSIUM SERPL-SCNC: 3.3 MMOL/L (ref 3.5–5.2)
PROT SERPL-MCNC: 6.6 G/DL (ref 6–8.5)
RBC # BLD AUTO: 4.78 10*6/MM3 (ref 3.77–5.28)
RHINOVIRUS RNA SPEC NAA+PROBE: NOT DETECTED
RSV RNA NPH QL NAA+NON-PROBE: NOT DETECTED
SARS-COV-2 RNA NPH QL NAA+NON-PROBE: NOT DETECTED
SODIUM SERPL-SCNC: 144 MMOL/L (ref 136–145)
THEOPHYLLINE SERPL-MCNC: <0.8 MCG/ML (ref 10–20)
WBC NRBC COR # BLD: 8.5 10*3/MM3 (ref 3.4–10.8)

## 2023-06-04 PROCEDURE — 71045 X-RAY EXAM CHEST 1 VIEW: CPT

## 2023-06-04 PROCEDURE — 83605 ASSAY OF LACTIC ACID: CPT

## 2023-06-04 PROCEDURE — 80198 ASSAY OF THEOPHYLLINE: CPT | Performed by: EMERGENCY MEDICINE

## 2023-06-04 PROCEDURE — 87040 BLOOD CULTURE FOR BACTERIA: CPT | Performed by: EMERGENCY MEDICINE

## 2023-06-04 PROCEDURE — 0202U NFCT DS 22 TRGT SARS-COV-2: CPT | Performed by: INTERNAL MEDICINE

## 2023-06-04 PROCEDURE — 25010000002 ENOXAPARIN PER 10 MG: Performed by: INTERNAL MEDICINE

## 2023-06-04 PROCEDURE — 83880 ASSAY OF NATRIURETIC PEPTIDE: CPT | Performed by: EMERGENCY MEDICINE

## 2023-06-04 PROCEDURE — 94799 UNLISTED PULMONARY SVC/PX: CPT

## 2023-06-04 PROCEDURE — 80053 COMPREHEN METABOLIC PANEL: CPT | Performed by: EMERGENCY MEDICINE

## 2023-06-04 PROCEDURE — 0 POTASSIUM CHLORIDE 10 MEQ/100ML SOLUTION: Performed by: INTERNAL MEDICINE

## 2023-06-04 PROCEDURE — 99285 EMERGENCY DEPT VISIT HI MDM: CPT

## 2023-06-04 PROCEDURE — 94761 N-INVAS EAR/PLS OXIMETRY MLT: CPT

## 2023-06-04 PROCEDURE — G0378 HOSPITAL OBSERVATION PER HR: HCPCS

## 2023-06-04 PROCEDURE — 25010000002 DEXAMETHASONE PER 1 MG: Performed by: EMERGENCY MEDICINE

## 2023-06-04 PROCEDURE — 85025 COMPLETE CBC W/AUTO DIFF WBC: CPT | Performed by: EMERGENCY MEDICINE

## 2023-06-04 PROCEDURE — 93005 ELECTROCARDIOGRAM TRACING: CPT | Performed by: EMERGENCY MEDICINE

## 2023-06-04 RX ORDER — ATORVASTATIN CALCIUM 40 MG/1
40 TABLET, FILM COATED ORAL EVERY EVENING
Status: DISCONTINUED | OUTPATIENT
Start: 2023-06-05 | End: 2023-06-12 | Stop reason: HOSPADM

## 2023-06-04 RX ORDER — SODIUM CHLORIDE 0.9 % (FLUSH) 0.9 %
10 SYRINGE (ML) INJECTION AS NEEDED
Status: DISCONTINUED | OUTPATIENT
Start: 2023-06-04 | End: 2023-06-12 | Stop reason: HOSPADM

## 2023-06-04 RX ORDER — SODIUM CHLORIDE 0.9 % (FLUSH) 0.9 %
10 SYRINGE (ML) INJECTION EVERY 12 HOURS SCHEDULED
Status: DISCONTINUED | OUTPATIENT
Start: 2023-06-04 | End: 2023-06-12 | Stop reason: HOSPADM

## 2023-06-04 RX ORDER — ALBUTEROL SULFATE 2.5 MG/3ML
2.5 SOLUTION RESPIRATORY (INHALATION) ONCE
Status: COMPLETED | OUTPATIENT
Start: 2023-06-04 | End: 2023-06-04

## 2023-06-04 RX ORDER — ONDANSETRON 2 MG/ML
4 INJECTION INTRAMUSCULAR; INTRAVENOUS EVERY 6 HOURS PRN
Status: DISCONTINUED | OUTPATIENT
Start: 2023-06-04 | End: 2023-06-12 | Stop reason: HOSPADM

## 2023-06-04 RX ORDER — DEXAMETHASONE SODIUM PHOSPHATE 4 MG/ML
10 INJECTION, SOLUTION INTRA-ARTICULAR; INTRALESIONAL; INTRAMUSCULAR; INTRAVENOUS; SOFT TISSUE ONCE
Status: COMPLETED | OUTPATIENT
Start: 2023-06-04 | End: 2023-06-04

## 2023-06-04 RX ORDER — ISOSORBIDE MONONITRATE 30 MG/1
30 TABLET, EXTENDED RELEASE ORAL DAILY
Status: DISCONTINUED | OUTPATIENT
Start: 2023-06-04 | End: 2023-06-12 | Stop reason: HOSPADM

## 2023-06-04 RX ORDER — FUROSEMIDE 40 MG/1
40 TABLET ORAL DAILY
Status: DISCONTINUED | OUTPATIENT
Start: 2023-06-05 | End: 2023-06-05

## 2023-06-04 RX ORDER — ONDANSETRON 4 MG/1
4 TABLET, FILM COATED ORAL EVERY 6 HOURS PRN
Status: DISCONTINUED | OUTPATIENT
Start: 2023-06-04 | End: 2023-06-12 | Stop reason: HOSPADM

## 2023-06-04 RX ORDER — ENOXAPARIN SODIUM 100 MG/ML
40 INJECTION SUBCUTANEOUS
Status: DISCONTINUED | OUTPATIENT
Start: 2023-06-04 | End: 2023-06-06

## 2023-06-04 RX ORDER — SODIUM CHLORIDE 9 MG/ML
40 INJECTION, SOLUTION INTRAVENOUS AS NEEDED
Status: DISCONTINUED | OUTPATIENT
Start: 2023-06-04 | End: 2023-06-12 | Stop reason: HOSPADM

## 2023-06-04 RX ORDER — ASPIRIN 81 MG/1
81 TABLET ORAL DAILY
Status: DISCONTINUED | OUTPATIENT
Start: 2023-06-05 | End: 2023-06-12 | Stop reason: HOSPADM

## 2023-06-04 RX ORDER — ACETAMINOPHEN 650 MG/1
650 SUPPOSITORY RECTAL EVERY 4 HOURS PRN
Status: DISCONTINUED | OUTPATIENT
Start: 2023-06-04 | End: 2023-06-12 | Stop reason: HOSPADM

## 2023-06-04 RX ORDER — LISINOPRIL 20 MG/1
20 TABLET ORAL DAILY
Status: DISCONTINUED | OUTPATIENT
Start: 2023-06-04 | End: 2023-06-05

## 2023-06-04 RX ORDER — DOXYCYCLINE 100 MG/1
100 TABLET ORAL EVERY 12 HOURS SCHEDULED
Status: DISCONTINUED | OUTPATIENT
Start: 2023-06-04 | End: 2023-06-09

## 2023-06-04 RX ORDER — POTASSIUM CHLORIDE 20 MEQ/1
20 TABLET, EXTENDED RELEASE ORAL 2 TIMES DAILY
Status: DISCONTINUED | OUTPATIENT
Start: 2023-06-04 | End: 2023-06-12 | Stop reason: HOSPADM

## 2023-06-04 RX ORDER — NITROGLYCERIN 0.4 MG/1
0.4 TABLET SUBLINGUAL
Status: DISCONTINUED | OUTPATIENT
Start: 2023-06-04 | End: 2023-06-12 | Stop reason: HOSPADM

## 2023-06-04 RX ORDER — LEVOTHYROXINE SODIUM 0.12 MG/1
125 TABLET ORAL
Status: DISCONTINUED | OUTPATIENT
Start: 2023-06-05 | End: 2023-06-06

## 2023-06-04 RX ORDER — ACETAMINOPHEN 325 MG/1
650 TABLET ORAL EVERY 4 HOURS PRN
Status: DISCONTINUED | OUTPATIENT
Start: 2023-06-04 | End: 2023-06-12 | Stop reason: HOSPADM

## 2023-06-04 RX ORDER — LISINOPRIL 20 MG/1
20 TABLET ORAL DAILY
COMMUNITY
End: 2023-06-12 | Stop reason: HOSPADM

## 2023-06-04 RX ORDER — ACETAMINOPHEN 160 MG/5ML
650 SOLUTION ORAL EVERY 4 HOURS PRN
Status: DISCONTINUED | OUTPATIENT
Start: 2023-06-04 | End: 2023-06-12 | Stop reason: HOSPADM

## 2023-06-04 RX ORDER — IPRATROPIUM BROMIDE AND ALBUTEROL SULFATE 2.5; .5 MG/3ML; MG/3ML
3 SOLUTION RESPIRATORY (INHALATION)
Status: DISCONTINUED | OUTPATIENT
Start: 2023-06-04 | End: 2023-06-08

## 2023-06-04 RX ORDER — ASPIRIN 81 MG/1
81 TABLET ORAL DAILY
Status: ON HOLD | COMMUNITY

## 2023-06-04 RX ORDER — AMLODIPINE BESYLATE 5 MG/1
5 TABLET ORAL DAILY
Status: DISCONTINUED | OUTPATIENT
Start: 2023-06-04 | End: 2023-06-12 | Stop reason: HOSPADM

## 2023-06-04 RX ORDER — POLYETHYLENE GLYCOL 3350 17 G/17G
17 POWDER, FOR SOLUTION ORAL DAILY PRN
Status: DISCONTINUED | OUTPATIENT
Start: 2023-06-04 | End: 2023-06-12 | Stop reason: HOSPADM

## 2023-06-04 RX ORDER — AMOXICILLIN 250 MG
2 CAPSULE ORAL 2 TIMES DAILY
Status: DISCONTINUED | OUTPATIENT
Start: 2023-06-04 | End: 2023-06-12 | Stop reason: HOSPADM

## 2023-06-04 RX ORDER — BUDESONIDE AND FORMOTEROL FUMARATE DIHYDRATE 160; 4.5 UG/1; UG/1
2 AEROSOL RESPIRATORY (INHALATION)
Status: DISCONTINUED | OUTPATIENT
Start: 2023-06-04 | End: 2023-06-08

## 2023-06-04 RX ORDER — IPRATROPIUM BROMIDE AND ALBUTEROL SULFATE 2.5; .5 MG/3ML; MG/3ML
3 SOLUTION RESPIRATORY (INHALATION) ONCE
Status: COMPLETED | OUTPATIENT
Start: 2023-06-04 | End: 2023-06-04

## 2023-06-04 RX ORDER — BISACODYL 10 MG
10 SUPPOSITORY, RECTAL RECTAL DAILY PRN
Status: DISCONTINUED | OUTPATIENT
Start: 2023-06-04 | End: 2023-06-12 | Stop reason: HOSPADM

## 2023-06-04 RX ORDER — PREDNISONE 20 MG/1
40 TABLET ORAL
Status: DISCONTINUED | OUTPATIENT
Start: 2023-06-05 | End: 2023-06-06

## 2023-06-04 RX ORDER — LABETALOL HYDROCHLORIDE 5 MG/ML
10 INJECTION, SOLUTION INTRAVENOUS
Status: DISCONTINUED | OUTPATIENT
Start: 2023-06-04 | End: 2023-06-12 | Stop reason: HOSPADM

## 2023-06-04 RX ORDER — POTASSIUM CHLORIDE 7.45 MG/ML
10 INJECTION INTRAVENOUS
Status: DISPENSED | OUTPATIENT
Start: 2023-06-04 | End: 2023-06-04

## 2023-06-04 RX ORDER — BISACODYL 5 MG/1
5 TABLET, DELAYED RELEASE ORAL DAILY PRN
Status: DISCONTINUED | OUTPATIENT
Start: 2023-06-04 | End: 2023-06-12 | Stop reason: HOSPADM

## 2023-06-04 RX ADMIN — ISOSORBIDE MONONITRATE 30 MG: 30 TABLET, EXTENDED RELEASE ORAL at 18:07

## 2023-06-04 RX ADMIN — POTASSIUM CHLORIDE 10 MEQ: 7.46 INJECTION, SOLUTION INTRAVENOUS at 19:47

## 2023-06-04 RX ADMIN — IPRATROPIUM BROMIDE AND ALBUTEROL SULFATE 3 ML: .5; 3 SOLUTION RESPIRATORY (INHALATION) at 15:25

## 2023-06-04 RX ADMIN — DOXYCYCLINE 100 MG: 100 TABLET ORAL at 21:49

## 2023-06-04 RX ADMIN — LISINOPRIL 20 MG: 20 TABLET ORAL at 18:07

## 2023-06-04 RX ADMIN — SENNOSIDES AND DOCUSATE SODIUM 2 TABLET: 8.6; 5 TABLET ORAL at 21:49

## 2023-06-04 RX ADMIN — ALBUTEROL SULFATE 2.5 MG: 2.5 SOLUTION RESPIRATORY (INHALATION) at 15:28

## 2023-06-04 RX ADMIN — DEXAMETHASONE SODIUM PHOSPHATE 10 MG: 4 INJECTION, SOLUTION INTRAMUSCULAR; INTRAVENOUS at 15:37

## 2023-06-04 RX ADMIN — ALBUTEROL SULFATE 2.5 MG: 2.5 SOLUTION RESPIRATORY (INHALATION) at 17:28

## 2023-06-04 RX ADMIN — Medication 10 ML: at 21:49

## 2023-06-04 RX ADMIN — POTASSIUM CHLORIDE 20 MEQ: 1500 TABLET, EXTENDED RELEASE ORAL at 21:49

## 2023-06-04 RX ADMIN — AMLODIPINE BESYLATE 5 MG: 5 TABLET ORAL at 18:07

## 2023-06-04 RX ADMIN — ENOXAPARIN SODIUM 40 MG: 100 INJECTION SUBCUTANEOUS at 18:07

## 2023-06-05 LAB
ANION GAP SERPL CALCULATED.3IONS-SCNC: 8 MMOL/L (ref 5–15)
BASOPHILS # BLD AUTO: 0 10*3/MM3 (ref 0–0.2)
BASOPHILS NFR BLD AUTO: 0.1 % (ref 0–1.5)
BUN SERPL-MCNC: 19 MG/DL (ref 8–23)
BUN/CREAT SERPL: 11.3 (ref 7–25)
CALCIUM SPEC-SCNC: 8.8 MG/DL (ref 8.6–10.5)
CHLORIDE SERPL-SCNC: 102 MMOL/L (ref 98–107)
CO2 SERPL-SCNC: 34 MMOL/L (ref 22–29)
CREAT SERPL-MCNC: 1.68 MG/DL (ref 0.57–1)
DEPRECATED RDW RBC AUTO: 52.9 FL (ref 37–54)
EGFRCR SERPLBLD CKD-EPI 2021: 31.2 ML/MIN/1.73
EOSINOPHIL # BLD AUTO: 0 10*3/MM3 (ref 0–0.4)
EOSINOPHIL NFR BLD AUTO: 0 % (ref 0.3–6.2)
ERYTHROCYTE [DISTWIDTH] IN BLOOD BY AUTOMATED COUNT: 16.1 % (ref 12.3–15.4)
GLUCOSE SERPL-MCNC: 118 MG/DL (ref 65–99)
HCT VFR BLD AUTO: 38.3 % (ref 34–46.6)
HGB BLD-MCNC: 12.8 G/DL (ref 12–15.9)
LYMPHOCYTES # BLD AUTO: 0.7 10*3/MM3 (ref 0.7–3.1)
LYMPHOCYTES NFR BLD AUTO: 7.6 % (ref 19.6–45.3)
MCH RBC QN AUTO: 30 PG (ref 26.6–33)
MCHC RBC AUTO-ENTMCNC: 33.4 G/DL (ref 31.5–35.7)
MCV RBC AUTO: 89.7 FL (ref 79–97)
MONOCYTES # BLD AUTO: 0.4 10*3/MM3 (ref 0.1–0.9)
MONOCYTES NFR BLD AUTO: 4.2 % (ref 5–12)
NEUTROPHILS NFR BLD AUTO: 7.9 10*3/MM3 (ref 1.7–7)
NEUTROPHILS NFR BLD AUTO: 88.1 % (ref 42.7–76)
NRBC BLD AUTO-RTO: 0.1 /100 WBC (ref 0–0.2)
PLATELET # BLD AUTO: 155 10*3/MM3 (ref 140–450)
PMV BLD AUTO: 8.6 FL (ref 6–12)
POTASSIUM SERPL-SCNC: 4.2 MMOL/L (ref 3.5–5.2)
QT INTERVAL: 207 MS
RBC # BLD AUTO: 4.26 10*6/MM3 (ref 3.77–5.28)
SODIUM SERPL-SCNC: 144 MMOL/L (ref 136–145)
WBC NRBC COR # BLD: 8.9 10*3/MM3 (ref 3.4–10.8)

## 2023-06-05 PROCEDURE — 25010000002 ENOXAPARIN PER 10 MG: Performed by: INTERNAL MEDICINE

## 2023-06-05 PROCEDURE — 36415 COLL VENOUS BLD VENIPUNCTURE: CPT | Performed by: INTERNAL MEDICINE

## 2023-06-05 PROCEDURE — 93005 ELECTROCARDIOGRAM TRACING: CPT | Performed by: INTERNAL MEDICINE

## 2023-06-05 PROCEDURE — 94664 DEMO&/EVAL PT USE INHALER: CPT

## 2023-06-05 PROCEDURE — 93010 ELECTROCARDIOGRAM REPORT: CPT | Performed by: INTERNAL MEDICINE

## 2023-06-05 PROCEDURE — 97166 OT EVAL MOD COMPLEX 45 MIN: CPT

## 2023-06-05 PROCEDURE — 80048 BASIC METABOLIC PNL TOTAL CA: CPT | Performed by: INTERNAL MEDICINE

## 2023-06-05 PROCEDURE — 94640 AIRWAY INHALATION TREATMENT: CPT

## 2023-06-05 PROCEDURE — 85025 COMPLETE CBC W/AUTO DIFF WBC: CPT | Performed by: INTERNAL MEDICINE

## 2023-06-05 PROCEDURE — 25010000002 ONDANSETRON PER 1 MG: Performed by: INTERNAL MEDICINE

## 2023-06-05 PROCEDURE — 63710000001 PREDNISONE PER 1 MG: Performed by: INTERNAL MEDICINE

## 2023-06-05 PROCEDURE — 94799 UNLISTED PULMONARY SVC/PX: CPT

## 2023-06-05 PROCEDURE — 97162 PT EVAL MOD COMPLEX 30 MIN: CPT

## 2023-06-05 PROCEDURE — 94761 N-INVAS EAR/PLS OXIMETRY MLT: CPT

## 2023-06-05 RX ORDER — SODIUM CHLORIDE 9 MG/ML
75 INJECTION, SOLUTION INTRAVENOUS CONTINUOUS
Status: DISPENSED | OUTPATIENT
Start: 2023-06-05 | End: 2023-06-06

## 2023-06-05 RX ADMIN — ASPIRIN 81 MG: 81 TABLET, COATED ORAL at 08:44

## 2023-06-05 RX ADMIN — DOXYCYCLINE 100 MG: 100 TABLET ORAL at 08:45

## 2023-06-05 RX ADMIN — IPRATROPIUM BROMIDE AND ALBUTEROL SULFATE 3 ML: .5; 3 SOLUTION RESPIRATORY (INHALATION) at 06:58

## 2023-06-05 RX ADMIN — IPRATROPIUM BROMIDE AND ALBUTEROL SULFATE 3 ML: .5; 3 SOLUTION RESPIRATORY (INHALATION) at 15:12

## 2023-06-05 RX ADMIN — IPRATROPIUM BROMIDE AND ALBUTEROL SULFATE 3 ML: .5; 3 SOLUTION RESPIRATORY (INHALATION) at 20:38

## 2023-06-05 RX ADMIN — BUDESONIDE AND FORMOTEROL FUMARATE DIHYDRATE 2 PUFF: 160; 4.5 AEROSOL RESPIRATORY (INHALATION) at 20:38

## 2023-06-05 RX ADMIN — SODIUM CHLORIDE 75 ML/HR: 9 INJECTION, SOLUTION INTRAVENOUS at 08:45

## 2023-06-05 RX ADMIN — IPRATROPIUM BROMIDE AND ALBUTEROL SULFATE 3 ML: .5; 3 SOLUTION RESPIRATORY (INHALATION) at 11:02

## 2023-06-05 RX ADMIN — PREDNISONE 40 MG: 20 TABLET ORAL at 08:45

## 2023-06-05 RX ADMIN — BUDESONIDE AND FORMOTEROL FUMARATE DIHYDRATE 2 PUFF: 160; 4.5 AEROSOL RESPIRATORY (INHALATION) at 06:58

## 2023-06-05 RX ADMIN — ONDANSETRON 4 MG: 2 INJECTION INTRAMUSCULAR; INTRAVENOUS at 00:03

## 2023-06-05 RX ADMIN — AMLODIPINE BESYLATE 5 MG: 5 TABLET ORAL at 08:44

## 2023-06-05 RX ADMIN — LEVOTHYROXINE SODIUM 125 MCG: 0.12 TABLET ORAL at 05:53

## 2023-06-05 RX ADMIN — Medication 10 ML: at 08:45

## 2023-06-05 RX ADMIN — SENNOSIDES AND DOCUSATE SODIUM 2 TABLET: 8.6; 5 TABLET ORAL at 20:59

## 2023-06-05 RX ADMIN — SENNOSIDES AND DOCUSATE SODIUM 2 TABLET: 8.6; 5 TABLET ORAL at 08:44

## 2023-06-05 RX ADMIN — DOXYCYCLINE 100 MG: 100 TABLET ORAL at 20:59

## 2023-06-05 RX ADMIN — ATORVASTATIN CALCIUM 40 MG: 40 TABLET, FILM COATED ORAL at 16:56

## 2023-06-05 RX ADMIN — POTASSIUM CHLORIDE 20 MEQ: 1500 TABLET, EXTENDED RELEASE ORAL at 20:59

## 2023-06-05 RX ADMIN — ENOXAPARIN SODIUM 40 MG: 100 INJECTION SUBCUTANEOUS at 16:56

## 2023-06-05 RX ADMIN — POTASSIUM CHLORIDE 20 MEQ: 1500 TABLET, EXTENDED RELEASE ORAL at 08:45

## 2023-06-05 RX ADMIN — ISOSORBIDE MONONITRATE 30 MG: 30 TABLET, EXTENDED RELEASE ORAL at 08:44

## 2023-06-06 PROBLEM — T17.800A MULTIPLE TRACHEOBRONCHIAL MUCUS PLUGS: Status: ACTIVE | Noted: 2023-06-04

## 2023-06-06 LAB
ANION GAP SERPL CALCULATED.3IONS-SCNC: 10 MMOL/L (ref 5–15)
BASOPHILS # BLD AUTO: 0 10*3/MM3 (ref 0–0.2)
BASOPHILS NFR BLD AUTO: 0.1 % (ref 0–1.5)
BUN SERPL-MCNC: 22 MG/DL (ref 8–23)
BUN/CREAT SERPL: 14.8 (ref 7–25)
CALCIUM SPEC-SCNC: 9.3 MG/DL (ref 8.6–10.5)
CHLORIDE SERPL-SCNC: 105 MMOL/L (ref 98–107)
CO2 SERPL-SCNC: 31 MMOL/L (ref 22–29)
CREAT SERPL-MCNC: 1.49 MG/DL (ref 0.57–1)
DEPRECATED RDW RBC AUTO: 51.2 FL (ref 37–54)
EGFRCR SERPLBLD CKD-EPI 2021: 36 ML/MIN/1.73
EOSINOPHIL # BLD AUTO: 0 10*3/MM3 (ref 0–0.4)
EOSINOPHIL NFR BLD AUTO: 0 % (ref 0.3–6.2)
ERYTHROCYTE [DISTWIDTH] IN BLOOD BY AUTOMATED COUNT: 16 % (ref 12.3–15.4)
GEN 5 2HR TROPONIN T REFLEX: 32 NG/L
GLUCOSE SERPL-MCNC: 172 MG/DL (ref 65–99)
HCT VFR BLD AUTO: 37.8 % (ref 34–46.6)
HGB BLD-MCNC: 12 G/DL (ref 12–15.9)
LYMPHOCYTES # BLD AUTO: 0.7 10*3/MM3 (ref 0.7–3.1)
LYMPHOCYTES NFR BLD AUTO: 5.1 % (ref 19.6–45.3)
MCH RBC QN AUTO: 29.3 PG (ref 26.6–33)
MCHC RBC AUTO-ENTMCNC: 31.9 G/DL (ref 31.5–35.7)
MCV RBC AUTO: 92 FL (ref 79–97)
MONOCYTES # BLD AUTO: 1 10*3/MM3 (ref 0.1–0.9)
MONOCYTES NFR BLD AUTO: 6.9 % (ref 5–12)
NEUTROPHILS NFR BLD AUTO: 12.5 10*3/MM3 (ref 1.7–7)
NEUTROPHILS NFR BLD AUTO: 87.9 % (ref 42.7–76)
NRBC BLD AUTO-RTO: 0 /100 WBC (ref 0–0.2)
PLATELET # BLD AUTO: 170 10*3/MM3 (ref 140–450)
PMV BLD AUTO: 8.3 FL (ref 6–12)
POTASSIUM SERPL-SCNC: 4.5 MMOL/L (ref 3.5–5.2)
RBC # BLD AUTO: 4.11 10*6/MM3 (ref 3.77–5.28)
SODIUM SERPL-SCNC: 146 MMOL/L (ref 136–145)
T4 FREE SERPL-MCNC: 0.18 NG/DL (ref 0.93–1.7)
TROPONIN T DELTA: 4 NG/L
TROPONIN T SERPL HS-MCNC: 28 NG/L
TSH SERPL DL<=0.05 MIU/L-ACNC: 133.6 UIU/ML (ref 0.27–4.2)
WBC NRBC COR # BLD: 14.2 10*3/MM3 (ref 3.4–10.8)

## 2023-06-06 PROCEDURE — 25010000002 ENOXAPARIN PER 10 MG: Performed by: NURSE PRACTITIONER

## 2023-06-06 PROCEDURE — 63710000001 PREDNISONE PER 1 MG: Performed by: INTERNAL MEDICINE

## 2023-06-06 PROCEDURE — 93005 ELECTROCARDIOGRAM TRACING: CPT | Performed by: NURSE PRACTITIONER

## 2023-06-06 PROCEDURE — 84484 ASSAY OF TROPONIN QUANT: CPT | Performed by: NURSE PRACTITIONER

## 2023-06-06 PROCEDURE — 25010000002 FUROSEMIDE PER 20 MG: Performed by: INTERNAL MEDICINE

## 2023-06-06 PROCEDURE — 84443 ASSAY THYROID STIM HORMONE: CPT | Performed by: NURSE PRACTITIONER

## 2023-06-06 PROCEDURE — 80048 BASIC METABOLIC PNL TOTAL CA: CPT | Performed by: INTERNAL MEDICINE

## 2023-06-06 PROCEDURE — 36415 COLL VENOUS BLD VENIPUNCTURE: CPT | Performed by: INTERNAL MEDICINE

## 2023-06-06 PROCEDURE — 93010 ELECTROCARDIOGRAM REPORT: CPT | Performed by: INTERNAL MEDICINE

## 2023-06-06 PROCEDURE — 84439 ASSAY OF FREE THYROXINE: CPT | Performed by: INTERNAL MEDICINE

## 2023-06-06 PROCEDURE — 85025 COMPLETE CBC W/AUTO DIFF WBC: CPT | Performed by: INTERNAL MEDICINE

## 2023-06-06 PROCEDURE — 94799 UNLISTED PULMONARY SVC/PX: CPT

## 2023-06-06 PROCEDURE — 94664 DEMO&/EVAL PT USE INHALER: CPT

## 2023-06-06 PROCEDURE — 99222 1ST HOSP IP/OBS MODERATE 55: CPT | Performed by: INTERNAL MEDICINE

## 2023-06-06 RX ORDER — PREDNISONE 20 MG/1
40 TABLET ORAL 2 TIMES DAILY WITH MEALS
Status: DISCONTINUED | OUTPATIENT
Start: 2023-06-06 | End: 2023-06-08

## 2023-06-06 RX ORDER — ENOXAPARIN SODIUM 100 MG/ML
80 INJECTION SUBCUTANEOUS EVERY 12 HOURS SCHEDULED
Status: DISCONTINUED | OUTPATIENT
Start: 2023-06-06 | End: 2023-06-08

## 2023-06-06 RX ORDER — FUROSEMIDE 10 MG/ML
40 INJECTION INTRAMUSCULAR; INTRAVENOUS DAILY
Status: DISCONTINUED | OUTPATIENT
Start: 2023-06-06 | End: 2023-06-07

## 2023-06-06 RX ORDER — LEVOTHYROXINE SODIUM 0.15 MG/1
150 TABLET ORAL
Status: DISCONTINUED | OUTPATIENT
Start: 2023-06-07 | End: 2023-06-12 | Stop reason: HOSPADM

## 2023-06-06 RX ADMIN — IPRATROPIUM BROMIDE AND ALBUTEROL SULFATE 3 ML: .5; 3 SOLUTION RESPIRATORY (INHALATION) at 22:24

## 2023-06-06 RX ADMIN — IPRATROPIUM BROMIDE AND ALBUTEROL SULFATE 3 ML: .5; 3 SOLUTION RESPIRATORY (INHALATION) at 07:22

## 2023-06-06 RX ADMIN — SODIUM CHLORIDE 75 ML/HR: 9 INJECTION, SOLUTION INTRAVENOUS at 02:14

## 2023-06-06 RX ADMIN — PREDNISONE 40 MG: 20 TABLET ORAL at 08:34

## 2023-06-06 RX ADMIN — POTASSIUM CHLORIDE 20 MEQ: 1500 TABLET, EXTENDED RELEASE ORAL at 08:34

## 2023-06-06 RX ADMIN — Medication 10 ML: at 21:20

## 2023-06-06 RX ADMIN — ATORVASTATIN CALCIUM 40 MG: 40 TABLET, FILM COATED ORAL at 17:07

## 2023-06-06 RX ADMIN — BUDESONIDE AND FORMOTEROL FUMARATE DIHYDRATE 2 PUFF: 160; 4.5 AEROSOL RESPIRATORY (INHALATION) at 22:19

## 2023-06-06 RX ADMIN — ENOXAPARIN SODIUM 80 MG: 100 INJECTION SUBCUTANEOUS at 12:18

## 2023-06-06 RX ADMIN — SENNOSIDES AND DOCUSATE SODIUM 2 TABLET: 8.6; 5 TABLET ORAL at 21:20

## 2023-06-06 RX ADMIN — PREDNISONE 40 MG: 20 TABLET ORAL at 17:07

## 2023-06-06 RX ADMIN — BUDESONIDE AND FORMOTEROL FUMARATE DIHYDRATE 2 PUFF: 160; 4.5 AEROSOL RESPIRATORY (INHALATION) at 07:28

## 2023-06-06 RX ADMIN — LEVOTHYROXINE SODIUM 125 MCG: 0.12 TABLET ORAL at 05:17

## 2023-06-06 RX ADMIN — ENOXAPARIN SODIUM 80 MG: 100 INJECTION SUBCUTANEOUS at 21:20

## 2023-06-06 RX ADMIN — FUROSEMIDE 40 MG: 40 INJECTION, SOLUTION INTRAMUSCULAR; INTRAVENOUS at 18:02

## 2023-06-06 RX ADMIN — ISOSORBIDE MONONITRATE 30 MG: 30 TABLET, EXTENDED RELEASE ORAL at 08:34

## 2023-06-06 RX ADMIN — ASPIRIN 81 MG: 81 TABLET, COATED ORAL at 08:34

## 2023-06-06 RX ADMIN — POTASSIUM CHLORIDE 20 MEQ: 1500 TABLET, EXTENDED RELEASE ORAL at 21:20

## 2023-06-06 RX ADMIN — IPRATROPIUM BROMIDE AND ALBUTEROL SULFATE 3 ML: .5; 3 SOLUTION RESPIRATORY (INHALATION) at 14:42

## 2023-06-06 RX ADMIN — AMLODIPINE BESYLATE 5 MG: 5 TABLET ORAL at 08:34

## 2023-06-06 RX ADMIN — DOXYCYCLINE 100 MG: 100 TABLET ORAL at 08:34

## 2023-06-06 RX ADMIN — DOXYCYCLINE 100 MG: 100 TABLET ORAL at 21:20

## 2023-06-06 RX ADMIN — Medication 10 ML: at 08:34

## 2023-06-06 RX ADMIN — SENNOSIDES AND DOCUSATE SODIUM 2 TABLET: 8.6; 5 TABLET ORAL at 08:34

## 2023-06-07 ENCOUNTER — ANESTHESIA EVENT (OUTPATIENT)
Dept: MEDSURG UNIT | Facility: HOSPITAL | Age: 78
End: 2023-06-07

## 2023-06-07 ENCOUNTER — ANESTHESIA (OUTPATIENT)
Dept: MEDSURG UNIT | Facility: HOSPITAL | Age: 78
End: 2023-06-07

## 2023-06-07 ENCOUNTER — APPOINTMENT (OUTPATIENT)
Dept: CARDIOLOGY | Facility: HOSPITAL | Age: 78
End: 2023-06-07
Payer: MEDICARE

## 2023-06-07 LAB
ANION GAP SERPL CALCULATED.3IONS-SCNC: 11 MMOL/L (ref 5–15)
BASOPHILS # BLD AUTO: 0.1 10*3/MM3 (ref 0–0.2)
BASOPHILS NFR BLD AUTO: 0.8 % (ref 0–1.5)
BH CV ECHO MEAS - ACS: 1.59 CM
BH CV ECHO MEAS - AO MAX PG: 5.9 MMHG
BH CV ECHO MEAS - AO MEAN PG: 3.5 MMHG
BH CV ECHO MEAS - AO ROOT DIAM: 3 CM
BH CV ECHO MEAS - AO V2 MAX: 121.5 CM/SEC
BH CV ECHO MEAS - AO V2 VTI: 29.2 CM
BH CV ECHO MEAS - AVA(I,D): 1.98 CM2
BH CV ECHO MEAS - EDV(CUBED): 105.9 ML
BH CV ECHO MEAS - EDV(MOD-SP4): 38.5 ML
BH CV ECHO MEAS - EF(MOD-SP4): 54.6 %
BH CV ECHO MEAS - ESV(CUBED): 23.8 ML
BH CV ECHO MEAS - ESV(MOD-SP4): 17.5 ML
BH CV ECHO MEAS - FS: 39.2 %
BH CV ECHO MEAS - IVS/LVPW: 0.94 CM
BH CV ECHO MEAS - IVSD: 1.07 CM
BH CV ECHO MEAS - LA DIMENSION: 4.3 CM
BH CV ECHO MEAS - LV DIASTOLIC VOL/BSA (35-75): 20.6 CM2
BH CV ECHO MEAS - LV MASS(C)D: 190.5 GRAMS
BH CV ECHO MEAS - LV MAX PG: 3.4 MMHG
BH CV ECHO MEAS - LV MEAN PG: 1.87 MMHG
BH CV ECHO MEAS - LV SYSTOLIC VOL/BSA (12-30): 9.3 CM2
BH CV ECHO MEAS - LV V1 MAX: 92.1 CM/SEC
BH CV ECHO MEAS - LV V1 VTI: 21.3 CM
BH CV ECHO MEAS - LVIDD: 4.7 CM
BH CV ECHO MEAS - LVIDS: 2.9 CM
BH CV ECHO MEAS - LVOT AREA: 2.7 CM2
BH CV ECHO MEAS - LVOT DIAM: 1.86 CM
BH CV ECHO MEAS - LVPWD: 1.14 CM
BH CV ECHO MEAS - MV A MAX VEL: 68 CM/SEC
BH CV ECHO MEAS - MV DEC SLOPE: 435.7 CM/SEC2
BH CV ECHO MEAS - MV DEC TIME: 0.18 MSEC
BH CV ECHO MEAS - MV E MAX VEL: 79.9 CM/SEC
BH CV ECHO MEAS - MV E/A: 1.17
BH CV ECHO MEAS - MV MAX PG: 2.6 MMHG
BH CV ECHO MEAS - MV MEAN PG: 1.05 MMHG
BH CV ECHO MEAS - MV V2 VTI: 25.3 CM
BH CV ECHO MEAS - MVA(VTI): 2.29 CM2
BH CV ECHO MEAS - PA ACC TIME: 0.09 SEC
BH CV ECHO MEAS - PA PR(ACCEL): 38.5 MMHG
BH CV ECHO MEAS - PA V2 MAX: 94.7 CM/SEC
BH CV ECHO MEAS - RV MAX PG: 1.84 MMHG
BH CV ECHO MEAS - RV V1 MAX: 67.8 CM/SEC
BH CV ECHO MEAS - RV V1 VTI: 11.7 CM
BH CV ECHO MEAS - RVDD: 2.43 CM
BH CV ECHO MEAS - SI(MOD-SP4): 11.2 ML/M2
BH CV ECHO MEAS - SV(LVOT): 57.9 ML
BH CV ECHO MEAS - SV(MOD-SP4): 21 ML
BILIRUB UR QL STRIP: NEGATIVE
BUN SERPL-MCNC: 24 MG/DL (ref 8–23)
BUN/CREAT SERPL: 16.4 (ref 7–25)
CALCIUM SPEC-SCNC: 9.2 MG/DL (ref 8.6–10.5)
CHLORIDE SERPL-SCNC: 102 MMOL/L (ref 98–107)
CLARITY UR: CLEAR
CO2 SERPL-SCNC: 32 MMOL/L (ref 22–29)
COLOR UR: YELLOW
CREAT SERPL-MCNC: 1.46 MG/DL (ref 0.57–1)
DEPRECATED RDW RBC AUTO: 55.6 FL (ref 37–54)
EGFRCR SERPLBLD CKD-EPI 2021: 36.9 ML/MIN/1.73
EOSINOPHIL # BLD AUTO: 0 10*3/MM3 (ref 0–0.4)
EOSINOPHIL NFR BLD AUTO: 0 % (ref 0.3–6.2)
ERYTHROCYTE [DISTWIDTH] IN BLOOD BY AUTOMATED COUNT: 16.6 % (ref 12.3–15.4)
GLUCOSE SERPL-MCNC: 177 MG/DL (ref 65–99)
GLUCOSE UR STRIP-MCNC: NEGATIVE MG/DL
HCT VFR BLD AUTO: 39.4 % (ref 34–46.6)
HGB BLD-MCNC: 13 G/DL (ref 12–15.9)
HGB UR QL STRIP.AUTO: NEGATIVE
KETONES UR QL STRIP: NEGATIVE
LEUKOCYTE ESTERASE UR QL STRIP.AUTO: NEGATIVE
LYMPHOCYTES # BLD AUTO: 0.6 10*3/MM3 (ref 0.7–3.1)
LYMPHOCYTES NFR BLD AUTO: 4.3 % (ref 19.6–45.3)
MCH RBC QN AUTO: 29.8 PG (ref 26.6–33)
MCHC RBC AUTO-ENTMCNC: 32.9 G/DL (ref 31.5–35.7)
MCV RBC AUTO: 90.5 FL (ref 79–97)
MONOCYTES # BLD AUTO: 0.4 10*3/MM3 (ref 0.1–0.9)
MONOCYTES NFR BLD AUTO: 2.4 % (ref 5–12)
NEUTROPHILS NFR BLD AUTO: 13.5 10*3/MM3 (ref 1.7–7)
NEUTROPHILS NFR BLD AUTO: 92.5 % (ref 42.7–76)
NITRITE UR QL STRIP: NEGATIVE
NRBC BLD AUTO-RTO: 0.1 /100 WBC (ref 0–0.2)
PH UR STRIP.AUTO: 6 [PH] (ref 5–8)
PLATELET # BLD AUTO: 176 10*3/MM3 (ref 140–450)
PMV BLD AUTO: 8.8 FL (ref 6–12)
POTASSIUM SERPL-SCNC: 4.5 MMOL/L (ref 3.5–5.2)
PROT UR QL STRIP: NEGATIVE
QT INTERVAL: 414 MS
RBC # BLD AUTO: 4.35 10*6/MM3 (ref 3.77–5.28)
SODIUM SERPL-SCNC: 145 MMOL/L (ref 136–145)
SP GR UR STRIP: 1.01 (ref 1–1.03)
UROBILINOGEN UR QL STRIP: NORMAL
WBC NRBC COR # BLD: 14.6 10*3/MM3 (ref 3.4–10.8)

## 2023-06-07 PROCEDURE — 25010000002 ONDANSETRON PER 1 MG: Performed by: INTERNAL MEDICINE

## 2023-06-07 PROCEDURE — 36415 COLL VENOUS BLD VENIPUNCTURE: CPT | Performed by: INTERNAL MEDICINE

## 2023-06-07 PROCEDURE — 85025 COMPLETE CBC W/AUTO DIFF WBC: CPT | Performed by: INTERNAL MEDICINE

## 2023-06-07 PROCEDURE — 80048 BASIC METABOLIC PNL TOTAL CA: CPT | Performed by: INTERNAL MEDICINE

## 2023-06-07 PROCEDURE — 99232 SBSQ HOSP IP/OBS MODERATE 35: CPT | Performed by: INTERNAL MEDICINE

## 2023-06-07 PROCEDURE — 94799 UNLISTED PULMONARY SVC/PX: CPT

## 2023-06-07 PROCEDURE — 81003 URINALYSIS AUTO W/O SCOPE: CPT | Performed by: INTERNAL MEDICINE

## 2023-06-07 PROCEDURE — 93306 TTE W/DOPPLER COMPLETE: CPT

## 2023-06-07 PROCEDURE — 63710000001 PREDNISONE PER 1 MG: Performed by: INTERNAL MEDICINE

## 2023-06-07 PROCEDURE — 93306 TTE W/DOPPLER COMPLETE: CPT | Performed by: INTERNAL MEDICINE

## 2023-06-07 PROCEDURE — 25010000002 FUROSEMIDE PER 20 MG: Performed by: INTERNAL MEDICINE

## 2023-06-07 PROCEDURE — 94664 DEMO&/EVAL PT USE INHALER: CPT

## 2023-06-07 PROCEDURE — 25010000002 ENOXAPARIN PER 10 MG: Performed by: NURSE PRACTITIONER

## 2023-06-07 RX ORDER — FUROSEMIDE 10 MG/ML
40 INJECTION INTRAMUSCULAR; INTRAVENOUS
Status: DISCONTINUED | OUTPATIENT
Start: 2023-06-07 | End: 2023-06-08

## 2023-06-07 RX ADMIN — POTASSIUM CHLORIDE 20 MEQ: 1500 TABLET, EXTENDED RELEASE ORAL at 21:31

## 2023-06-07 RX ADMIN — FUROSEMIDE 40 MG: 40 INJECTION, SOLUTION INTRAMUSCULAR; INTRAVENOUS at 21:31

## 2023-06-07 RX ADMIN — BUDESONIDE AND FORMOTEROL FUMARATE DIHYDRATE 2 PUFF: 160; 4.5 AEROSOL RESPIRATORY (INHALATION) at 06:49

## 2023-06-07 RX ADMIN — FUROSEMIDE 40 MG: 40 INJECTION, SOLUTION INTRAMUSCULAR; INTRAVENOUS at 13:01

## 2023-06-07 RX ADMIN — Medication 10 MG: at 07:38

## 2023-06-07 RX ADMIN — LEVOTHYROXINE SODIUM 150 MCG: 0.15 TABLET ORAL at 05:00

## 2023-06-07 RX ADMIN — IPRATROPIUM BROMIDE AND ALBUTEROL SULFATE 3 ML: .5; 3 SOLUTION RESPIRATORY (INHALATION) at 20:14

## 2023-06-07 RX ADMIN — ISOSORBIDE MONONITRATE 30 MG: 30 TABLET, EXTENDED RELEASE ORAL at 13:00

## 2023-06-07 RX ADMIN — ASPIRIN 81 MG: 81 TABLET, COATED ORAL at 13:00

## 2023-06-07 RX ADMIN — DOXYCYCLINE 100 MG: 100 TABLET ORAL at 21:31

## 2023-06-07 RX ADMIN — ATORVASTATIN CALCIUM 40 MG: 40 TABLET, FILM COATED ORAL at 17:04

## 2023-06-07 RX ADMIN — IPRATROPIUM BROMIDE AND ALBUTEROL SULFATE 3 ML: .5; 3 SOLUTION RESPIRATORY (INHALATION) at 12:00

## 2023-06-07 RX ADMIN — IPRATROPIUM BROMIDE AND ALBUTEROL SULFATE 3 ML: .5; 3 SOLUTION RESPIRATORY (INHALATION) at 06:49

## 2023-06-07 RX ADMIN — SENNOSIDES AND DOCUSATE SODIUM 2 TABLET: 8.6; 5 TABLET ORAL at 21:31

## 2023-06-07 RX ADMIN — Medication 10 ML: at 21:31

## 2023-06-07 RX ADMIN — AMLODIPINE BESYLATE 5 MG: 5 TABLET ORAL at 13:01

## 2023-06-07 RX ADMIN — Medication 10 MG: at 04:53

## 2023-06-07 RX ADMIN — Medication 10 ML: at 07:40

## 2023-06-07 RX ADMIN — ONDANSETRON 4 MG: 2 INJECTION INTRAMUSCULAR; INTRAVENOUS at 17:04

## 2023-06-07 RX ADMIN — BUDESONIDE AND FORMOTEROL FUMARATE DIHYDRATE 2 PUFF: 160; 4.5 AEROSOL RESPIRATORY (INHALATION) at 20:14

## 2023-06-07 RX ADMIN — ENOXAPARIN SODIUM 80 MG: 100 INJECTION SUBCUTANEOUS at 21:31

## 2023-06-07 RX ADMIN — PREDNISONE 40 MG: 20 TABLET ORAL at 17:04

## 2023-06-08 ENCOUNTER — ANESTHESIA (OUTPATIENT)
Dept: GASTROENTEROLOGY | Facility: HOSPITAL | Age: 78
End: 2023-06-08
Payer: MEDICARE

## 2023-06-08 ENCOUNTER — ANESTHESIA EVENT (OUTPATIENT)
Dept: GASTROENTEROLOGY | Facility: HOSPITAL | Age: 78
End: 2023-06-08
Payer: MEDICARE

## 2023-06-08 LAB
ANION GAP SERPL CALCULATED.3IONS-SCNC: 10 MMOL/L (ref 5–15)
ANION GAP SERPL CALCULATED.3IONS-SCNC: 10 MMOL/L (ref 5–15)
ARTERIAL PATENCY WRIST A: POSITIVE
ATMOSPHERIC PRESS: ABNORMAL MM[HG]
B PARAPERT DNA SPEC QL NAA+PROBE: NOT DETECTED
B PERT DNA SPEC QL NAA+PROBE: NOT DETECTED
BASE EXCESS BLDA CALC-SCNC: 10.3 MMOL/L (ref 0–3)
BASOPHILS # BLD AUTO: 0 10*3/MM3 (ref 0–0.2)
BASOPHILS NFR BLD AUTO: 0.1 % (ref 0–1.5)
BDY SITE: ABNORMAL
BILIRUB UR QL STRIP: NEGATIVE
BUN SERPL-MCNC: 33 MG/DL (ref 8–23)
BUN SERPL-MCNC: 34 MG/DL (ref 8–23)
BUN/CREAT SERPL: 18.3 (ref 7–25)
BUN/CREAT SERPL: 23.6 (ref 7–25)
C PNEUM DNA NPH QL NAA+NON-PROBE: NOT DETECTED
CALCIUM SPEC-SCNC: 9 MG/DL (ref 8.6–10.5)
CALCIUM SPEC-SCNC: 9.5 MG/DL (ref 8.6–10.5)
CHLORIDE SERPL-SCNC: 94 MMOL/L (ref 98–107)
CHLORIDE SERPL-SCNC: 95 MMOL/L (ref 98–107)
CLARITY UR: CLEAR
CO2 BLDA-SCNC: 40.5 MMOL/L (ref 22–29)
CO2 SERPL-SCNC: 36 MMOL/L (ref 22–29)
CO2 SERPL-SCNC: 37 MMOL/L (ref 22–29)
COLOR UR: YELLOW
CREAT SERPL-MCNC: 1.44 MG/DL (ref 0.57–1)
CREAT SERPL-MCNC: 1.8 MG/DL (ref 0.57–1)
CREAT UR-MCNC: 38.3 MG/DL
DEPRECATED RDW RBC AUTO: 53.8 FL (ref 37–54)
EGFRCR SERPLBLD CKD-EPI 2021: 28.7 ML/MIN/1.73
EGFRCR SERPLBLD CKD-EPI 2021: 37.5 ML/MIN/1.73
EOSINOPHIL # BLD AUTO: 0 10*3/MM3 (ref 0–0.4)
EOSINOPHIL NFR BLD AUTO: 0 % (ref 0.3–6.2)
ERYTHROCYTE [DISTWIDTH] IN BLOOD BY AUTOMATED COUNT: 16.7 % (ref 12.3–15.4)
FLUAV SUBTYP SPEC NAA+PROBE: NOT DETECTED
FLUBV RNA ISLT QL NAA+PROBE: NOT DETECTED
GLUCOSE SERPL-MCNC: 138 MG/DL (ref 65–99)
GLUCOSE SERPL-MCNC: 163 MG/DL (ref 65–99)
GLUCOSE UR STRIP-MCNC: NEGATIVE MG/DL
HADV DNA SPEC NAA+PROBE: NOT DETECTED
HCO3 BLDA-SCNC: 38.5 MMOL/L (ref 21–28)
HCOV 229E RNA SPEC QL NAA+PROBE: NOT DETECTED
HCOV HKU1 RNA SPEC QL NAA+PROBE: NOT DETECTED
HCOV NL63 RNA SPEC QL NAA+PROBE: NOT DETECTED
HCOV OC43 RNA SPEC QL NAA+PROBE: NOT DETECTED
HCT VFR BLD AUTO: 42 % (ref 34–46.6)
HEMODILUTION: NO
HGB BLD-MCNC: 13.5 G/DL (ref 12–15.9)
HGB UR QL STRIP.AUTO: NEGATIVE
HMPV RNA NPH QL NAA+NON-PROBE: NOT DETECTED
HPIV1 RNA ISLT QL NAA+PROBE: NOT DETECTED
HPIV2 RNA SPEC QL NAA+PROBE: NOT DETECTED
HPIV3 RNA NPH QL NAA+PROBE: NOT DETECTED
HPIV4 P GENE NPH QL NAA+PROBE: NOT DETECTED
INHALED O2 CONCENTRATION: 44 %
KETONES UR QL STRIP: NEGATIVE
LEUKOCYTE ESTERASE UR QL STRIP.AUTO: NEGATIVE
LYMPHOCYTES # BLD AUTO: 0.9 10*3/MM3 (ref 0.7–3.1)
LYMPHOCYTES NFR BLD AUTO: 6.8 % (ref 19.6–45.3)
M PNEUMO IGG SER IA-ACNC: NOT DETECTED
MCH RBC QN AUTO: 29.6 PG (ref 26.6–33)
MCHC RBC AUTO-ENTMCNC: 32.1 G/DL (ref 31.5–35.7)
MCV RBC AUTO: 92.2 FL (ref 79–97)
MODALITY: ABNORMAL
MONOCYTES # BLD AUTO: 0.4 10*3/MM3 (ref 0.1–0.9)
MONOCYTES NFR BLD AUTO: 2.6 % (ref 5–12)
NEUTROPHILS NFR BLD AUTO: 12.4 10*3/MM3 (ref 1.7–7)
NEUTROPHILS NFR BLD AUTO: 90.5 % (ref 42.7–76)
NITRITE UR QL STRIP: NEGATIVE
NRBC BLD AUTO-RTO: 0 /100 WBC (ref 0–0.2)
NT-PROBNP SERPL-MCNC: 823 PG/ML (ref 0–1800)
PCO2 BLDA: 63.6 MM HG (ref 35–48)
PH BLDA: 7.39 PH UNITS (ref 7.35–7.45)
PH UR STRIP.AUTO: 8.5 [PH] (ref 5–8)
PLATELET # BLD AUTO: 216 10*3/MM3 (ref 140–450)
PMV BLD AUTO: 8.6 FL (ref 6–12)
PO2 BLDA: 60.5 MM HG (ref 83–108)
POTASSIUM SERPL-SCNC: 4.2 MMOL/L (ref 3.5–5.2)
POTASSIUM SERPL-SCNC: 4.9 MMOL/L (ref 3.5–5.2)
PROT ?TM UR-MCNC: 5.6 MG/DL
PROT UR QL STRIP: NEGATIVE
QT INTERVAL: 684 MS
RBC # BLD AUTO: 4.55 10*6/MM3 (ref 3.77–5.28)
RHINOVIRUS RNA SPEC NAA+PROBE: NOT DETECTED
RSV RNA NPH QL NAA+NON-PROBE: NOT DETECTED
SAO2 % BLDCOA: 89.4 % (ref 94–98)
SARS-COV-2 RNA NPH QL NAA+NON-PROBE: DETECTED
SODIUM SERPL-SCNC: 140 MMOL/L (ref 136–145)
SODIUM SERPL-SCNC: 142 MMOL/L (ref 136–145)
SODIUM UR-SCNC: 147 MMOL/L
SP GR UR STRIP: 1.01 (ref 1–1.03)
UROBILINOGEN UR QL STRIP: ABNORMAL
WBC NRBC COR # BLD: 13.7 10*3/MM3 (ref 3.4–10.8)

## 2023-06-08 PROCEDURE — 97535 SELF CARE MNGMENT TRAINING: CPT | Performed by: OCCUPATIONAL THERAPIST

## 2023-06-08 PROCEDURE — 87116 MYCOBACTERIA CULTURE: CPT | Performed by: INTERNAL MEDICINE

## 2023-06-08 PROCEDURE — 87102 FUNGUS ISOLATION CULTURE: CPT | Performed by: INTERNAL MEDICINE

## 2023-06-08 PROCEDURE — 80048 BASIC METABOLIC PNL TOTAL CA: CPT | Performed by: INTERNAL MEDICINE

## 2023-06-08 PROCEDURE — 0202U NFCT DS 22 TRGT SARS-COV-2: CPT | Performed by: INTERNAL MEDICINE

## 2023-06-08 PROCEDURE — 87798 DETECT AGENT NOS DNA AMP: CPT | Performed by: INTERNAL MEDICINE

## 2023-06-08 PROCEDURE — 94799 UNLISTED PULMONARY SVC/PX: CPT

## 2023-06-08 PROCEDURE — 87077 CULTURE AEROBIC IDENTIFY: CPT | Performed by: INTERNAL MEDICINE

## 2023-06-08 PROCEDURE — 81003 URINALYSIS AUTO W/O SCOPE: CPT | Performed by: INTERNAL MEDICINE

## 2023-06-08 PROCEDURE — 97530 THERAPEUTIC ACTIVITIES: CPT

## 2023-06-08 PROCEDURE — 63710000001 PREDNISONE PER 1 MG: Performed by: INTERNAL MEDICINE

## 2023-06-08 PROCEDURE — 97116 GAIT TRAINING THERAPY: CPT

## 2023-06-08 PROCEDURE — 87205 SMEAR GRAM STAIN: CPT | Performed by: INTERNAL MEDICINE

## 2023-06-08 PROCEDURE — 97530 THERAPEUTIC ACTIVITIES: CPT | Performed by: OCCUPATIONAL THERAPIST

## 2023-06-08 PROCEDURE — 84156 ASSAY OF PROTEIN URINE: CPT | Performed by: INTERNAL MEDICINE

## 2023-06-08 PROCEDURE — 36415 COLL VENOUS BLD VENIPUNCTURE: CPT | Performed by: INTERNAL MEDICINE

## 2023-06-08 PROCEDURE — 83880 ASSAY OF NATRIURETIC PEPTIDE: CPT | Performed by: INTERNAL MEDICINE

## 2023-06-08 PROCEDURE — 94761 N-INVAS EAR/PLS OXIMETRY MLT: CPT

## 2023-06-08 PROCEDURE — 36600 WITHDRAWAL OF ARTERIAL BLOOD: CPT

## 2023-06-08 PROCEDURE — 97110 THERAPEUTIC EXERCISES: CPT

## 2023-06-08 PROCEDURE — 25010000002 PROPOFOL 200 MG/20ML EMULSION: Performed by: ANESTHESIOLOGIST ASSISTANT

## 2023-06-08 PROCEDURE — 87070 CULTURE OTHR SPECIMN AEROBIC: CPT | Performed by: INTERNAL MEDICINE

## 2023-06-08 PROCEDURE — 87185 SC STD ENZYME DETCJ PER NZM: CPT | Performed by: INTERNAL MEDICINE

## 2023-06-08 PROCEDURE — 82570 ASSAY OF URINE CREATININE: CPT | Performed by: INTERNAL MEDICINE

## 2023-06-08 PROCEDURE — 93005 ELECTROCARDIOGRAM TRACING: CPT | Performed by: INTERNAL MEDICINE

## 2023-06-08 PROCEDURE — 88108 CYTOPATH CONCENTRATE TECH: CPT | Performed by: INTERNAL MEDICINE

## 2023-06-08 PROCEDURE — 87206 SMEAR FLUORESCENT/ACID STAI: CPT | Performed by: INTERNAL MEDICINE

## 2023-06-08 PROCEDURE — 85025 COMPLETE CBC W/AUTO DIFF WBC: CPT | Performed by: INTERNAL MEDICINE

## 2023-06-08 PROCEDURE — 84300 ASSAY OF URINE SODIUM: CPT | Performed by: INTERNAL MEDICINE

## 2023-06-08 PROCEDURE — 82803 BLOOD GASES ANY COMBINATION: CPT

## 2023-06-08 PROCEDURE — 25010000002 ENOXAPARIN PER 10 MG: Performed by: INTERNAL MEDICINE

## 2023-06-08 RX ORDER — IPRATROPIUM BROMIDE AND ALBUTEROL SULFATE 2.5; .5 MG/3ML; MG/3ML
3 SOLUTION RESPIRATORY (INHALATION)
Status: DISCONTINUED | OUTPATIENT
Start: 2023-06-08 | End: 2023-06-08 | Stop reason: SDUPTHER

## 2023-06-08 RX ORDER — IPRATROPIUM BROMIDE AND ALBUTEROL SULFATE 2.5; .5 MG/3ML; MG/3ML
3 SOLUTION RESPIRATORY (INHALATION)
Status: DISCONTINUED | OUTPATIENT
Start: 2023-06-08 | End: 2023-06-08

## 2023-06-08 RX ORDER — BUDESONIDE 0.5 MG/2ML
0.5 INHALANT ORAL
Status: DISCONTINUED | OUTPATIENT
Start: 2023-06-08 | End: 2023-06-08

## 2023-06-08 RX ORDER — FUROSEMIDE 40 MG/1
40 TABLET ORAL DAILY
Status: DISCONTINUED | OUTPATIENT
Start: 2023-06-08 | End: 2023-06-09

## 2023-06-08 RX ORDER — ENOXAPARIN SODIUM 100 MG/ML
1 INJECTION SUBCUTANEOUS EVERY 24 HOURS
Status: DISCONTINUED | OUTPATIENT
Start: 2023-06-08 | End: 2023-06-09

## 2023-06-08 RX ORDER — SODIUM CHLORIDE 9 MG/ML
INJECTION, SOLUTION INTRAVENOUS CONTINUOUS PRN
Status: DISCONTINUED | OUTPATIENT
Start: 2023-06-08 | End: 2023-06-08 | Stop reason: SURG

## 2023-06-08 RX ORDER — BUDESONIDE AND FORMOTEROL FUMARATE DIHYDRATE 160; 4.5 UG/1; UG/1
2 AEROSOL RESPIRATORY (INHALATION)
Status: DISCONTINUED | OUTPATIENT
Start: 2023-06-08 | End: 2023-06-12 | Stop reason: HOSPADM

## 2023-06-08 RX ORDER — ALBUTEROL SULFATE 90 UG/1
2 AEROSOL, METERED RESPIRATORY (INHALATION)
Status: DISCONTINUED | OUTPATIENT
Start: 2023-06-08 | End: 2023-06-12 | Stop reason: HOSPADM

## 2023-06-08 RX ORDER — COLCHICINE 0.6 MG/1
0.3 TABLET ORAL DAILY
Status: DISCONTINUED | OUTPATIENT
Start: 2023-06-08 | End: 2023-06-12 | Stop reason: HOSPADM

## 2023-06-08 RX ORDER — PROPOFOL 10 MG/ML
INJECTION, EMULSION INTRAVENOUS AS NEEDED
Status: DISCONTINUED | OUTPATIENT
Start: 2023-06-08 | End: 2023-06-08 | Stop reason: SURG

## 2023-06-08 RX ORDER — SODIUM CHLORIDE 9 MG/ML
10 INJECTION, SOLUTION INTRAVENOUS ONCE
Status: COMPLETED | OUTPATIENT
Start: 2023-06-08 | End: 2023-06-08

## 2023-06-08 RX ORDER — ACETAZOLAMIDE 250 MG/1
250 TABLET ORAL DAILY
Status: DISCONTINUED | OUTPATIENT
Start: 2023-06-08 | End: 2023-06-09

## 2023-06-08 RX ORDER — LIDOCAINE HYDROCHLORIDE 20 MG/ML
INJECTION, SOLUTION INFILTRATION; PERINEURAL AS NEEDED
Status: DISCONTINUED | OUTPATIENT
Start: 2023-06-08 | End: 2023-06-08 | Stop reason: HOSPADM

## 2023-06-08 RX ORDER — LIDOCAINE 50 MG/G
OINTMENT TOPICAL AS NEEDED
Status: DISCONTINUED | OUTPATIENT
Start: 2023-06-08 | End: 2023-06-08 | Stop reason: HOSPADM

## 2023-06-08 RX ORDER — GUAIFENESIN 600 MG/1
1200 TABLET, EXTENDED RELEASE ORAL EVERY 12 HOURS SCHEDULED
Status: DISCONTINUED | OUTPATIENT
Start: 2023-06-08 | End: 2023-06-12 | Stop reason: HOSPADM

## 2023-06-08 RX ADMIN — Medication 10 ML: at 20:45

## 2023-06-08 RX ADMIN — SENNOSIDES AND DOCUSATE SODIUM 2 TABLET: 8.6; 5 TABLET ORAL at 10:21

## 2023-06-08 RX ADMIN — GUAIFENESIN 1200 MG: 600 TABLET, EXTENDED RELEASE ORAL at 10:21

## 2023-06-08 RX ADMIN — POTASSIUM CHLORIDE 20 MEQ: 1500 TABLET, EXTENDED RELEASE ORAL at 20:45

## 2023-06-08 RX ADMIN — ASPIRIN 81 MG: 81 TABLET, COATED ORAL at 10:22

## 2023-06-08 RX ADMIN — SENNOSIDES AND DOCUSATE SODIUM 2 TABLET: 8.6; 5 TABLET ORAL at 20:45

## 2023-06-08 RX ADMIN — GUAIFENESIN 1200 MG: 600 TABLET, EXTENDED RELEASE ORAL at 20:45

## 2023-06-08 RX ADMIN — AMLODIPINE BESYLATE 5 MG: 5 TABLET ORAL at 10:21

## 2023-06-08 RX ADMIN — COLCHICINE 0.3 MG: 0.6 TABLET, FILM COATED ORAL at 11:56

## 2023-06-08 RX ADMIN — ACETAZOLAMIDE 250 MG: 250 TABLET ORAL at 11:56

## 2023-06-08 RX ADMIN — ISOSORBIDE MONONITRATE 30 MG: 30 TABLET, EXTENDED RELEASE ORAL at 10:21

## 2023-06-08 RX ADMIN — SODIUM CHLORIDE 10 ML/HR: 9 INJECTION, SOLUTION INTRAVENOUS at 07:49

## 2023-06-08 RX ADMIN — POTASSIUM CHLORIDE 20 MEQ: 1500 TABLET, EXTENDED RELEASE ORAL at 10:21

## 2023-06-08 RX ADMIN — ENOXAPARIN SODIUM 90 MG: 100 INJECTION SUBCUTANEOUS at 20:46

## 2023-06-08 RX ADMIN — DOXYCYCLINE 100 MG: 100 TABLET ORAL at 20:45

## 2023-06-08 RX ADMIN — BUDESONIDE AND FORMOTEROL FUMARATE DIHYDRATE 2 PUFF: 160; 4.5 AEROSOL RESPIRATORY (INHALATION) at 19:03

## 2023-06-08 RX ADMIN — PROPOFOL 70 MG: 10 INJECTION, EMULSION INTRAVENOUS at 07:58

## 2023-06-08 RX ADMIN — DOXYCYCLINE 100 MG: 100 TABLET ORAL at 10:21

## 2023-06-08 RX ADMIN — SODIUM CHLORIDE: 0.9 INJECTION, SOLUTION INTRAVENOUS at 07:54

## 2023-06-08 RX ADMIN — FUROSEMIDE 40 MG: 40 TABLET ORAL at 11:56

## 2023-06-08 RX ADMIN — IPRATROPIUM BROMIDE AND ALBUTEROL SULFATE 3 ML: .5; 3 SOLUTION RESPIRATORY (INHALATION) at 08:30

## 2023-06-08 RX ADMIN — ALBUTEROL SULFATE 2 PUFF: 108 INHALANT RESPIRATORY (INHALATION) at 19:01

## 2023-06-08 RX ADMIN — ATORVASTATIN CALCIUM 40 MG: 40 TABLET, FILM COATED ORAL at 17:42

## 2023-06-08 RX ADMIN — Medication 10 ML: at 10:22

## 2023-06-08 RX ADMIN — PREDNISONE 40 MG: 20 TABLET ORAL at 10:22

## 2023-06-08 RX ADMIN — ALBUTEROL SULFATE 2 PUFF: 108 INHALANT RESPIRATORY (INHALATION) at 11:22

## 2023-06-09 LAB
ALBUMIN SERPL-MCNC: 4.2 G/DL (ref 3.5–5.2)
ALBUMIN/GLOB SERPL: 1.8 G/DL
ALP SERPL-CCNC: 86 U/L (ref 39–117)
ALT SERPL W P-5'-P-CCNC: 25 U/L (ref 1–33)
ANION GAP SERPL CALCULATED.3IONS-SCNC: 10 MMOL/L (ref 5–15)
AST SERPL-CCNC: 26 U/L (ref 1–32)
BACTERIA SPEC AEROBE CULT: NORMAL
BACTERIA SPEC AEROBE CULT: NORMAL
BASOPHILS # BLD AUTO: 0.1 10*3/MM3 (ref 0–0.2)
BASOPHILS NFR BLD AUTO: 0.3 % (ref 0–1.5)
BILIRUB SERPL-MCNC: 0.5 MG/DL (ref 0–1.2)
BUN SERPL-MCNC: 33 MG/DL (ref 8–23)
BUN/CREAT SERPL: 26.6 (ref 7–25)
CALCIUM SPEC-SCNC: 9.4 MG/DL (ref 8.6–10.5)
CHLORIDE SERPL-SCNC: 95 MMOL/L (ref 98–107)
CO2 SERPL-SCNC: 38 MMOL/L (ref 22–29)
CREAT SERPL-MCNC: 1.24 MG/DL (ref 0.57–1)
DEPRECATED RDW RBC AUTO: 53.8 FL (ref 37–54)
EGFRCR SERPLBLD CKD-EPI 2021: 44.9 ML/MIN/1.73
EOSINOPHIL # BLD AUTO: 0 10*3/MM3 (ref 0–0.4)
EOSINOPHIL NFR BLD AUTO: 0.1 % (ref 0.3–6.2)
ERYTHROCYTE [DISTWIDTH] IN BLOOD BY AUTOMATED COUNT: 16.6 % (ref 12.3–15.4)
GLOBULIN UR ELPH-MCNC: 2.4 GM/DL
GLUCOSE SERPL-MCNC: 114 MG/DL (ref 65–99)
HCT VFR BLD AUTO: 43.6 % (ref 34–46.6)
HGB BLD-MCNC: 14 G/DL (ref 12–15.9)
LAB AP CASE REPORT: NORMAL
LYMPHOCYTES # BLD AUTO: 1.2 10*3/MM3 (ref 0.7–3.1)
LYMPHOCYTES NFR BLD AUTO: 6.3 % (ref 19.6–45.3)
MAGNESIUM SERPL-MCNC: 2.2 MG/DL (ref 1.6–2.4)
MCH RBC QN AUTO: 29.6 PG (ref 26.6–33)
MCHC RBC AUTO-ENTMCNC: 32.2 G/DL (ref 31.5–35.7)
MCV RBC AUTO: 92 FL (ref 79–97)
MONOCYTES # BLD AUTO: 1 10*3/MM3 (ref 0.1–0.9)
MONOCYTES NFR BLD AUTO: 4.9 % (ref 5–12)
NEUTROPHILS NFR BLD AUTO: 17.2 10*3/MM3 (ref 1.7–7)
NEUTROPHILS NFR BLD AUTO: 88.4 % (ref 42.7–76)
NRBC BLD AUTO-RTO: 0 /100 WBC (ref 0–0.2)
PATH REPORT.FINAL DX SPEC: NORMAL
PATH REPORT.GROSS SPEC: NORMAL
PHOSPHATE SERPL-MCNC: 4.7 MG/DL (ref 2.5–4.5)
PLATELET # BLD AUTO: 216 10*3/MM3 (ref 140–450)
PMV BLD AUTO: 8.8 FL (ref 6–12)
POTASSIUM SERPL-SCNC: 4.4 MMOL/L (ref 3.5–5.2)
PROT SERPL-MCNC: 6.6 G/DL (ref 6–8.5)
RBC # BLD AUTO: 4.74 10*6/MM3 (ref 3.77–5.28)
SODIUM SERPL-SCNC: 143 MMOL/L (ref 136–145)
WBC NRBC COR # BLD: 19.5 10*3/MM3 (ref 3.4–10.8)

## 2023-06-09 PROCEDURE — 94761 N-INVAS EAR/PLS OXIMETRY MLT: CPT

## 2023-06-09 PROCEDURE — 99232 SBSQ HOSP IP/OBS MODERATE 35: CPT | Performed by: NURSE PRACTITIONER

## 2023-06-09 PROCEDURE — 80053 COMPREHEN METABOLIC PANEL: CPT | Performed by: INTERNAL MEDICINE

## 2023-06-09 PROCEDURE — 97530 THERAPEUTIC ACTIVITIES: CPT

## 2023-06-09 PROCEDURE — 84100 ASSAY OF PHOSPHORUS: CPT | Performed by: INTERNAL MEDICINE

## 2023-06-09 PROCEDURE — 25010000002 ENOXAPARIN PER 10 MG: Performed by: INTERNAL MEDICINE

## 2023-06-09 PROCEDURE — 94799 UNLISTED PULMONARY SVC/PX: CPT

## 2023-06-09 PROCEDURE — 97110 THERAPEUTIC EXERCISES: CPT

## 2023-06-09 PROCEDURE — 83735 ASSAY OF MAGNESIUM: CPT | Performed by: INTERNAL MEDICINE

## 2023-06-09 PROCEDURE — 97116 GAIT TRAINING THERAPY: CPT

## 2023-06-09 PROCEDURE — 94664 DEMO&/EVAL PT USE INHALER: CPT

## 2023-06-09 PROCEDURE — 85025 COMPLETE CBC W/AUTO DIFF WBC: CPT | Performed by: INTERNAL MEDICINE

## 2023-06-09 RX ORDER — FUROSEMIDE 40 MG/1
40 TABLET ORAL
Status: DISCONTINUED | OUTPATIENT
Start: 2023-06-09 | End: 2023-06-12 | Stop reason: HOSPADM

## 2023-06-09 RX ORDER — CEPHALEXIN 500 MG/1
500 CAPSULE ORAL EVERY 8 HOURS SCHEDULED
Status: DISCONTINUED | OUTPATIENT
Start: 2023-06-09 | End: 2023-06-10

## 2023-06-09 RX ORDER — ENOXAPARIN SODIUM 100 MG/ML
1 INJECTION SUBCUTANEOUS EVERY 12 HOURS
Status: DISCONTINUED | OUTPATIENT
Start: 2023-06-09 | End: 2023-06-10

## 2023-06-09 RX ORDER — ACETAZOLAMIDE 250 MG/1
250 TABLET ORAL 2 TIMES DAILY
Status: DISCONTINUED | OUTPATIENT
Start: 2023-06-09 | End: 2023-06-12 | Stop reason: HOSPADM

## 2023-06-09 RX ORDER — DEXAMETHASONE 6 MG/1
6 TABLET ORAL DAILY
Status: DISCONTINUED | OUTPATIENT
Start: 2023-06-09 | End: 2023-06-12 | Stop reason: HOSPADM

## 2023-06-09 RX ADMIN — ALBUTEROL SULFATE 2 PUFF: 108 INHALANT RESPIRATORY (INHALATION) at 06:50

## 2023-06-09 RX ADMIN — POTASSIUM CHLORIDE 20 MEQ: 1500 TABLET, EXTENDED RELEASE ORAL at 08:18

## 2023-06-09 RX ADMIN — FUROSEMIDE 40 MG: 40 TABLET ORAL at 17:21

## 2023-06-09 RX ADMIN — ENOXAPARIN SODIUM 90 MG: 100 INJECTION SUBCUTANEOUS at 10:21

## 2023-06-09 RX ADMIN — SENNOSIDES AND DOCUSATE SODIUM 2 TABLET: 8.6; 5 TABLET ORAL at 08:18

## 2023-06-09 RX ADMIN — ACETAZOLAMIDE 250 MG: 250 TABLET ORAL at 21:11

## 2023-06-09 RX ADMIN — AMLODIPINE BESYLATE 5 MG: 5 TABLET ORAL at 08:18

## 2023-06-09 RX ADMIN — ASPIRIN 81 MG: 81 TABLET, COATED ORAL at 08:18

## 2023-06-09 RX ADMIN — Medication 10 ML: at 21:11

## 2023-06-09 RX ADMIN — COLCHICINE 0.3 MG: 0.6 TABLET, FILM COATED ORAL at 08:18

## 2023-06-09 RX ADMIN — Medication 10 MG: at 04:19

## 2023-06-09 RX ADMIN — ALBUTEROL SULFATE 2 PUFF: 108 INHALANT RESPIRATORY (INHALATION) at 15:14

## 2023-06-09 RX ADMIN — ENOXAPARIN SODIUM 90 MG: 100 INJECTION SUBCUTANEOUS at 21:12

## 2023-06-09 RX ADMIN — DEXAMETHASONE 6 MG: 6 TABLET ORAL at 17:20

## 2023-06-09 RX ADMIN — CEPHALEXIN 500 MG: 500 CAPSULE ORAL at 15:37

## 2023-06-09 RX ADMIN — LEVOTHYROXINE SODIUM 150 MCG: 0.15 TABLET ORAL at 04:19

## 2023-06-09 RX ADMIN — ISOSORBIDE MONONITRATE 30 MG: 30 TABLET, EXTENDED RELEASE ORAL at 08:18

## 2023-06-09 RX ADMIN — BUDESONIDE AND FORMOTEROL FUMARATE DIHYDRATE 2 PUFF: 160; 4.5 AEROSOL RESPIRATORY (INHALATION) at 06:50

## 2023-06-09 RX ADMIN — Medication 5000 UNITS: at 17:20

## 2023-06-09 RX ADMIN — ALBUTEROL SULFATE 2 PUFF: 108 INHALANT RESPIRATORY (INHALATION) at 12:18

## 2023-06-09 RX ADMIN — DOXYCYCLINE 100 MG: 100 TABLET ORAL at 08:18

## 2023-06-09 RX ADMIN — ATORVASTATIN CALCIUM 40 MG: 40 TABLET, FILM COATED ORAL at 17:20

## 2023-06-09 RX ADMIN — GUAIFENESIN 1200 MG: 600 TABLET, EXTENDED RELEASE ORAL at 21:11

## 2023-06-09 RX ADMIN — CEPHALEXIN 500 MG: 500 CAPSULE ORAL at 21:13

## 2023-06-09 RX ADMIN — GUAIFENESIN 1200 MG: 600 TABLET, EXTENDED RELEASE ORAL at 08:18

## 2023-06-09 RX ADMIN — FUROSEMIDE 40 MG: 40 TABLET ORAL at 08:18

## 2023-06-09 RX ADMIN — POTASSIUM CHLORIDE 20 MEQ: 1500 TABLET, EXTENDED RELEASE ORAL at 21:11

## 2023-06-09 RX ADMIN — Medication 10 ML: at 08:19

## 2023-06-09 RX ADMIN — SENNOSIDES AND DOCUSATE SODIUM 2 TABLET: 8.6; 5 TABLET ORAL at 21:11

## 2023-06-09 RX ADMIN — ACETAZOLAMIDE 250 MG: 250 TABLET ORAL at 08:18

## 2023-06-10 LAB
ANION GAP SERPL CALCULATED.3IONS-SCNC: 10 MMOL/L (ref 5–15)
BACTERIA SPEC RESP CULT: ABNORMAL
BACTERIA SPEC RESP CULT: ABNORMAL
BASOPHILS # BLD AUTO: 0 10*3/MM3 (ref 0–0.2)
BASOPHILS NFR BLD AUTO: 0.4 % (ref 0–1.5)
BUN SERPL-MCNC: 39 MG/DL (ref 8–23)
BUN/CREAT SERPL: 30.5 (ref 7–25)
CALCIUM SPEC-SCNC: 9.4 MG/DL (ref 8.6–10.5)
CHLORIDE SERPL-SCNC: 94 MMOL/L (ref 98–107)
CO2 SERPL-SCNC: 36 MMOL/L (ref 22–29)
CREAT SERPL-MCNC: 1.28 MG/DL (ref 0.57–1)
DEPRECATED RDW RBC AUTO: 54.3 FL (ref 37–54)
EGFRCR SERPLBLD CKD-EPI 2021: 43.2 ML/MIN/1.73
EOSINOPHIL # BLD AUTO: 0 10*3/MM3 (ref 0–0.4)
EOSINOPHIL NFR BLD AUTO: 0 % (ref 0.3–6.2)
ERYTHROCYTE [DISTWIDTH] IN BLOOD BY AUTOMATED COUNT: 16.8 % (ref 12.3–15.4)
GLUCOSE SERPL-MCNC: 178 MG/DL (ref 65–99)
GRAM STN SPEC: ABNORMAL
HCT VFR BLD AUTO: 43 % (ref 34–46.6)
HGB BLD-MCNC: 13.7 G/DL (ref 12–15.9)
LYMPHOCYTES # BLD AUTO: 0.7 10*3/MM3 (ref 0.7–3.1)
LYMPHOCYTES NFR BLD AUTO: 5.4 % (ref 19.6–45.3)
MCH RBC QN AUTO: 29.4 PG (ref 26.6–33)
MCHC RBC AUTO-ENTMCNC: 31.9 G/DL (ref 31.5–35.7)
MCV RBC AUTO: 92.1 FL (ref 79–97)
MONOCYTES # BLD AUTO: 0.5 10*3/MM3 (ref 0.1–0.9)
MONOCYTES NFR BLD AUTO: 3.8 % (ref 5–12)
NEUTROPHILS NFR BLD AUTO: 11.5 10*3/MM3 (ref 1.7–7)
NEUTROPHILS NFR BLD AUTO: 90.4 % (ref 42.7–76)
NRBC BLD AUTO-RTO: 0 /100 WBC (ref 0–0.2)
PLATELET # BLD AUTO: 206 10*3/MM3 (ref 140–450)
PMV BLD AUTO: 9.1 FL (ref 6–12)
POTASSIUM SERPL-SCNC: 3.9 MMOL/L (ref 3.5–5.2)
RBC # BLD AUTO: 4.66 10*6/MM3 (ref 3.77–5.28)
SODIUM SERPL-SCNC: 140 MMOL/L (ref 136–145)
WBC NRBC COR # BLD: 12.8 10*3/MM3 (ref 3.4–10.8)

## 2023-06-10 PROCEDURE — 25010000002 ENOXAPARIN PER 10 MG: Performed by: INTERNAL MEDICINE

## 2023-06-10 PROCEDURE — 80048 BASIC METABOLIC PNL TOTAL CA: CPT | Performed by: INTERNAL MEDICINE

## 2023-06-10 PROCEDURE — 94799 UNLISTED PULMONARY SVC/PX: CPT

## 2023-06-10 PROCEDURE — 94664 DEMO&/EVAL PT USE INHALER: CPT

## 2023-06-10 PROCEDURE — 85025 COMPLETE CBC W/AUTO DIFF WBC: CPT | Performed by: INTERNAL MEDICINE

## 2023-06-10 PROCEDURE — 36415 COLL VENOUS BLD VENIPUNCTURE: CPT | Performed by: INTERNAL MEDICINE

## 2023-06-10 RX ORDER — ENOXAPARIN SODIUM 100 MG/ML
40 INJECTION SUBCUTANEOUS DAILY
Status: DISCONTINUED | OUTPATIENT
Start: 2023-06-11 | End: 2023-06-12 | Stop reason: HOSPADM

## 2023-06-10 RX ORDER — CEFDINIR 300 MG/1
300 CAPSULE ORAL EVERY 12 HOURS SCHEDULED
Status: DISCONTINUED | OUTPATIENT
Start: 2023-06-10 | End: 2023-06-11

## 2023-06-10 RX ADMIN — Medication 10 ML: at 20:30

## 2023-06-10 RX ADMIN — POTASSIUM CHLORIDE 20 MEQ: 1500 TABLET, EXTENDED RELEASE ORAL at 20:30

## 2023-06-10 RX ADMIN — ALBUTEROL SULFATE 2 PUFF: 108 INHALANT RESPIRATORY (INHALATION) at 20:55

## 2023-06-10 RX ADMIN — FUROSEMIDE 40 MG: 40 TABLET ORAL at 09:07

## 2023-06-10 RX ADMIN — CEFDINIR 300 MG: 300 CAPSULE ORAL at 15:04

## 2023-06-10 RX ADMIN — Medication 5000 UNITS: at 09:07

## 2023-06-10 RX ADMIN — GUAIFENESIN 1200 MG: 600 TABLET, EXTENDED RELEASE ORAL at 09:06

## 2023-06-10 RX ADMIN — ATORVASTATIN CALCIUM 40 MG: 40 TABLET, FILM COATED ORAL at 17:53

## 2023-06-10 RX ADMIN — LEVOTHYROXINE SODIUM 150 MCG: 0.15 TABLET ORAL at 05:33

## 2023-06-10 RX ADMIN — SENNOSIDES AND DOCUSATE SODIUM 2 TABLET: 8.6; 5 TABLET ORAL at 09:06

## 2023-06-10 RX ADMIN — ACETAZOLAMIDE 250 MG: 250 TABLET ORAL at 20:30

## 2023-06-10 RX ADMIN — BUDESONIDE AND FORMOTEROL FUMARATE DIHYDRATE 2 PUFF: 160; 4.5 AEROSOL RESPIRATORY (INHALATION) at 20:55

## 2023-06-10 RX ADMIN — AMLODIPINE BESYLATE 5 MG: 5 TABLET ORAL at 09:06

## 2023-06-10 RX ADMIN — CEPHALEXIN 500 MG: 500 CAPSULE ORAL at 05:33

## 2023-06-10 RX ADMIN — BUDESONIDE AND FORMOTEROL FUMARATE DIHYDRATE 2 PUFF: 160; 4.5 AEROSOL RESPIRATORY (INHALATION) at 07:00

## 2023-06-10 RX ADMIN — ALBUTEROL SULFATE 2 PUFF: 108 INHALANT RESPIRATORY (INHALATION) at 15:33

## 2023-06-10 RX ADMIN — POTASSIUM CHLORIDE 20 MEQ: 1500 TABLET, EXTENDED RELEASE ORAL at 09:06

## 2023-06-10 RX ADMIN — COLCHICINE 0.3 MG: 0.6 TABLET, FILM COATED ORAL at 09:06

## 2023-06-10 RX ADMIN — ACETAZOLAMIDE 250 MG: 250 TABLET ORAL at 09:07

## 2023-06-10 RX ADMIN — ALBUTEROL SULFATE 2 PUFF: 108 INHALANT RESPIRATORY (INHALATION) at 07:00

## 2023-06-10 RX ADMIN — DEXAMETHASONE 6 MG: 6 TABLET ORAL at 09:07

## 2023-06-10 RX ADMIN — ENOXAPARIN SODIUM 90 MG: 100 INJECTION SUBCUTANEOUS at 09:08

## 2023-06-10 RX ADMIN — FUROSEMIDE 40 MG: 40 TABLET ORAL at 17:53

## 2023-06-10 RX ADMIN — CEFDINIR 300 MG: 300 CAPSULE ORAL at 20:35

## 2023-06-10 RX ADMIN — SENNOSIDES AND DOCUSATE SODIUM 2 TABLET: 8.6; 5 TABLET ORAL at 20:30

## 2023-06-10 RX ADMIN — GUAIFENESIN 1200 MG: 600 TABLET, EXTENDED RELEASE ORAL at 20:30

## 2023-06-10 RX ADMIN — ASPIRIN 81 MG: 81 TABLET, COATED ORAL at 09:06

## 2023-06-10 RX ADMIN — Medication 10 ML: at 09:08

## 2023-06-10 RX ADMIN — ALBUTEROL SULFATE 2 PUFF: 108 INHALANT RESPIRATORY (INHALATION) at 11:04

## 2023-06-10 RX ADMIN — ISOSORBIDE MONONITRATE 30 MG: 30 TABLET, EXTENDED RELEASE ORAL at 09:06

## 2023-06-11 ENCOUNTER — APPOINTMENT (OUTPATIENT)
Dept: GENERAL RADIOLOGY | Facility: HOSPITAL | Age: 78
End: 2023-06-11
Payer: MEDICARE

## 2023-06-11 LAB
ANION GAP SERPL CALCULATED.3IONS-SCNC: 10 MMOL/L (ref 5–15)
BASOPHILS # BLD AUTO: 0 10*3/MM3 (ref 0–0.2)
BASOPHILS NFR BLD AUTO: 0.2 % (ref 0–1.5)
BUN SERPL-MCNC: 43 MG/DL (ref 8–23)
BUN/CREAT SERPL: 30.5 (ref 7–25)
CALCIUM SPEC-SCNC: 9.5 MG/DL (ref 8.6–10.5)
CHLORIDE SERPL-SCNC: 97 MMOL/L (ref 98–107)
CO2 SERPL-SCNC: 36 MMOL/L (ref 22–29)
CREAT SERPL-MCNC: 1.41 MG/DL (ref 0.57–1)
DEPRECATED RDW RBC AUTO: 56.4 FL (ref 37–54)
EGFRCR SERPLBLD CKD-EPI 2021: 38.5 ML/MIN/1.73
EOSINOPHIL # BLD AUTO: 0 10*3/MM3 (ref 0–0.4)
EOSINOPHIL NFR BLD AUTO: 0.1 % (ref 0.3–6.2)
ERYTHROCYTE [DISTWIDTH] IN BLOOD BY AUTOMATED COUNT: 16.7 % (ref 12.3–15.4)
GLUCOSE SERPL-MCNC: 103 MG/DL (ref 65–99)
HCT VFR BLD AUTO: 41 % (ref 34–46.6)
HGB BLD-MCNC: 13.3 G/DL (ref 12–15.9)
LYMPHOCYTES # BLD AUTO: 1.2 10*3/MM3 (ref 0.7–3.1)
LYMPHOCYTES NFR BLD AUTO: 9.1 % (ref 19.6–45.3)
MCH RBC QN AUTO: 29.3 PG (ref 26.6–33)
MCHC RBC AUTO-ENTMCNC: 32.4 G/DL (ref 31.5–35.7)
MCV RBC AUTO: 90.5 FL (ref 79–97)
MONOCYTES # BLD AUTO: 1.3 10*3/MM3 (ref 0.1–0.9)
MONOCYTES NFR BLD AUTO: 9.5 % (ref 5–12)
MRSA DNA SPEC QL NAA+PROBE: NORMAL
NEUTROPHILS NFR BLD AUTO: 10.7 10*3/MM3 (ref 1.7–7)
NEUTROPHILS NFR BLD AUTO: 81.1 % (ref 42.7–76)
NRBC BLD AUTO-RTO: 0.1 /100 WBC (ref 0–0.2)
PLATELET # BLD AUTO: 210 10*3/MM3 (ref 140–450)
PMV BLD AUTO: 8.7 FL (ref 6–12)
POTASSIUM SERPL-SCNC: 3.9 MMOL/L (ref 3.5–5.2)
RBC # BLD AUTO: 4.53 10*6/MM3 (ref 3.77–5.28)
SODIUM SERPL-SCNC: 143 MMOL/L (ref 136–145)
WBC NRBC COR # BLD: 13.2 10*3/MM3 (ref 3.4–10.8)

## 2023-06-11 PROCEDURE — 25010000002 CEFTRIAXONE PER 250 MG: Performed by: INTERNAL MEDICINE

## 2023-06-11 PROCEDURE — 94664 DEMO&/EVAL PT USE INHALER: CPT

## 2023-06-11 PROCEDURE — 80048 BASIC METABOLIC PNL TOTAL CA: CPT | Performed by: INTERNAL MEDICINE

## 2023-06-11 PROCEDURE — 25010000002 ENOXAPARIN PER 10 MG: Performed by: INTERNAL MEDICINE

## 2023-06-11 PROCEDURE — 94761 N-INVAS EAR/PLS OXIMETRY MLT: CPT

## 2023-06-11 PROCEDURE — 94799 UNLISTED PULMONARY SVC/PX: CPT

## 2023-06-11 PROCEDURE — 85025 COMPLETE CBC W/AUTO DIFF WBC: CPT | Performed by: INTERNAL MEDICINE

## 2023-06-11 PROCEDURE — 71045 X-RAY EXAM CHEST 1 VIEW: CPT

## 2023-06-11 PROCEDURE — 87641 MR-STAPH DNA AMP PROBE: CPT | Performed by: INTERNAL MEDICINE

## 2023-06-11 PROCEDURE — 36415 COLL VENOUS BLD VENIPUNCTURE: CPT | Performed by: INTERNAL MEDICINE

## 2023-06-11 RX ADMIN — ACETAZOLAMIDE 250 MG: 250 TABLET ORAL at 20:13

## 2023-06-11 RX ADMIN — CEFDINIR 300 MG: 300 CAPSULE ORAL at 08:01

## 2023-06-11 RX ADMIN — POTASSIUM CHLORIDE 20 MEQ: 1500 TABLET, EXTENDED RELEASE ORAL at 08:01

## 2023-06-11 RX ADMIN — Medication 10 ML: at 08:02

## 2023-06-11 RX ADMIN — SENNOSIDES AND DOCUSATE SODIUM 2 TABLET: 8.6; 5 TABLET ORAL at 20:12

## 2023-06-11 RX ADMIN — AMLODIPINE BESYLATE 5 MG: 5 TABLET ORAL at 08:01

## 2023-06-11 RX ADMIN — GUAIFENESIN 1200 MG: 600 TABLET, EXTENDED RELEASE ORAL at 08:00

## 2023-06-11 RX ADMIN — ASPIRIN 81 MG: 81 TABLET, COATED ORAL at 08:01

## 2023-06-11 RX ADMIN — Medication 10 ML: at 20:12

## 2023-06-11 RX ADMIN — SENNOSIDES AND DOCUSATE SODIUM 2 TABLET: 8.6; 5 TABLET ORAL at 08:00

## 2023-06-11 RX ADMIN — BUDESONIDE AND FORMOTEROL FUMARATE DIHYDRATE 2 PUFF: 160; 4.5 AEROSOL RESPIRATORY (INHALATION) at 19:27

## 2023-06-11 RX ADMIN — ALBUTEROL SULFATE 2 PUFF: 108 INHALANT RESPIRATORY (INHALATION) at 11:08

## 2023-06-11 RX ADMIN — ALBUTEROL SULFATE 2 PUFF: 108 INHALANT RESPIRATORY (INHALATION) at 19:24

## 2023-06-11 RX ADMIN — FUROSEMIDE 40 MG: 40 TABLET ORAL at 08:00

## 2023-06-11 RX ADMIN — ENOXAPARIN SODIUM 40 MG: 100 INJECTION SUBCUTANEOUS at 17:20

## 2023-06-11 RX ADMIN — ISOSORBIDE MONONITRATE 30 MG: 30 TABLET, EXTENDED RELEASE ORAL at 08:01

## 2023-06-11 RX ADMIN — LEVOTHYROXINE SODIUM 150 MCG: 0.15 TABLET ORAL at 05:39

## 2023-06-11 RX ADMIN — Medication 5000 UNITS: at 08:01

## 2023-06-11 RX ADMIN — ALBUTEROL SULFATE 2 PUFF: 108 INHALANT RESPIRATORY (INHALATION) at 15:19

## 2023-06-11 RX ADMIN — DEXAMETHASONE 6 MG: 6 TABLET ORAL at 08:01

## 2023-06-11 RX ADMIN — ACETAZOLAMIDE 250 MG: 250 TABLET ORAL at 08:01

## 2023-06-11 RX ADMIN — ALBUTEROL SULFATE 2 PUFF: 108 INHALANT RESPIRATORY (INHALATION) at 07:40

## 2023-06-11 RX ADMIN — ATORVASTATIN CALCIUM 40 MG: 40 TABLET, FILM COATED ORAL at 17:20

## 2023-06-11 RX ADMIN — COLCHICINE 0.3 MG: 0.6 TABLET, FILM COATED ORAL at 08:00

## 2023-06-11 RX ADMIN — GUAIFENESIN 1200 MG: 600 TABLET, EXTENDED RELEASE ORAL at 20:12

## 2023-06-11 RX ADMIN — CEFTRIAXONE 2 G: 2 INJECTION, POWDER, FOR SOLUTION INTRAMUSCULAR; INTRAVENOUS at 20:13

## 2023-06-11 RX ADMIN — BUDESONIDE AND FORMOTEROL FUMARATE DIHYDRATE 2 PUFF: 160; 4.5 AEROSOL RESPIRATORY (INHALATION) at 07:40

## 2023-06-11 RX ADMIN — FUROSEMIDE 40 MG: 40 TABLET ORAL at 17:20

## 2023-06-11 RX ADMIN — POTASSIUM CHLORIDE 20 MEQ: 1500 TABLET, EXTENDED RELEASE ORAL at 20:13

## 2023-06-12 VITALS
OXYGEN SATURATION: 92 % | DIASTOLIC BLOOD PRESSURE: 80 MMHG | TEMPERATURE: 97.7 F | SYSTOLIC BLOOD PRESSURE: 145 MMHG | BODY MASS INDEX: 37.61 KG/M2 | HEIGHT: 60 IN | RESPIRATION RATE: 20 BRPM | HEART RATE: 72 BPM | WEIGHT: 191.58 LBS

## 2023-06-12 LAB
ANION GAP SERPL CALCULATED.3IONS-SCNC: 10 MMOL/L (ref 5–15)
BASOPHILS # BLD AUTO: 0 10*3/MM3 (ref 0–0.2)
BASOPHILS NFR BLD AUTO: 0.1 % (ref 0–1.5)
BUN SERPL-MCNC: 42 MG/DL (ref 8–23)
BUN/CREAT SERPL: 31.3 (ref 7–25)
CALCIUM SPEC-SCNC: 9.8 MG/DL (ref 8.6–10.5)
CHLORIDE SERPL-SCNC: 97 MMOL/L (ref 98–107)
CO2 SERPL-SCNC: 37 MMOL/L (ref 22–29)
CREAT SERPL-MCNC: 1.34 MG/DL (ref 0.57–1)
DEPRECATED RDW RBC AUTO: 55.1 FL (ref 37–54)
EGFRCR SERPLBLD CKD-EPI 2021: 40.9 ML/MIN/1.73
EOSINOPHIL # BLD AUTO: 0 10*3/MM3 (ref 0–0.4)
EOSINOPHIL NFR BLD AUTO: 0.2 % (ref 0.3–6.2)
ERYTHROCYTE [DISTWIDTH] IN BLOOD BY AUTOMATED COUNT: 16.6 % (ref 12.3–15.4)
GLUCOSE SERPL-MCNC: 102 MG/DL (ref 65–99)
HCT VFR BLD AUTO: 42.5 % (ref 34–46.6)
HGB BLD-MCNC: 14 G/DL (ref 12–15.9)
LYMPHOCYTES # BLD AUTO: 1.6 10*3/MM3 (ref 0.7–3.1)
LYMPHOCYTES NFR BLD AUTO: 12.8 % (ref 19.6–45.3)
MCH RBC QN AUTO: 29.9 PG (ref 26.6–33)
MCHC RBC AUTO-ENTMCNC: 33 G/DL (ref 31.5–35.7)
MCV RBC AUTO: 90.7 FL (ref 79–97)
MONOCYTES # BLD AUTO: 1.1 10*3/MM3 (ref 0.1–0.9)
MONOCYTES NFR BLD AUTO: 8.6 % (ref 5–12)
NEUTROPHILS NFR BLD AUTO: 78.3 % (ref 42.7–76)
NEUTROPHILS NFR BLD AUTO: 9.7 10*3/MM3 (ref 1.7–7)
NRBC BLD AUTO-RTO: 0.1 /100 WBC (ref 0–0.2)
P JIROVECII DNA L RESP QL NAA+NON-PROBE: NEGATIVE
PLATELET # BLD AUTO: 221 10*3/MM3 (ref 140–450)
PMV BLD AUTO: 8.2 FL (ref 6–12)
POTASSIUM SERPL-SCNC: 4.1 MMOL/L (ref 3.5–5.2)
RBC # BLD AUTO: 4.68 10*6/MM3 (ref 3.77–5.28)
REF LAB TEST METHOD: NORMAL
SODIUM SERPL-SCNC: 144 MMOL/L (ref 136–145)
WBC NRBC COR # BLD: 12.4 10*3/MM3 (ref 3.4–10.8)

## 2023-06-12 PROCEDURE — 80048 BASIC METABOLIC PNL TOTAL CA: CPT | Performed by: INTERNAL MEDICINE

## 2023-06-12 PROCEDURE — 94799 UNLISTED PULMONARY SVC/PX: CPT

## 2023-06-12 PROCEDURE — 97110 THERAPEUTIC EXERCISES: CPT

## 2023-06-12 PROCEDURE — 97530 THERAPEUTIC ACTIVITIES: CPT

## 2023-06-12 PROCEDURE — 85025 COMPLETE CBC W/AUTO DIFF WBC: CPT | Performed by: INTERNAL MEDICINE

## 2023-06-12 PROCEDURE — 94664 DEMO&/EVAL PT USE INHALER: CPT

## 2023-06-12 PROCEDURE — 25010000002 ENOXAPARIN PER 10 MG: Performed by: INTERNAL MEDICINE

## 2023-06-12 PROCEDURE — 36415 COLL VENOUS BLD VENIPUNCTURE: CPT | Performed by: INTERNAL MEDICINE

## 2023-06-12 PROCEDURE — 94761 N-INVAS EAR/PLS OXIMETRY MLT: CPT

## 2023-06-12 RX ORDER — COLCHICINE 0.6 MG/1
0.3 TABLET ORAL DAILY
Start: 2023-06-13 | End: 2023-06-12 | Stop reason: SDUPTHER

## 2023-06-12 RX ORDER — ACETAZOLAMIDE 250 MG/1
250 TABLET ORAL 2 TIMES DAILY
Start: 2023-06-12 | End: 2023-06-12 | Stop reason: SDUPTHER

## 2023-06-12 RX ORDER — POTASSIUM CHLORIDE 20 MEQ/1
20 TABLET, EXTENDED RELEASE ORAL 2 TIMES DAILY
Qty: 30 TABLET | Refills: 0 | Status: ON HOLD | OUTPATIENT
Start: 2023-06-12

## 2023-06-12 RX ORDER — ISOSORBIDE MONONITRATE 30 MG/1
30 TABLET, EXTENDED RELEASE ORAL DAILY
Qty: 30 TABLET | Refills: 0 | Status: ON HOLD | OUTPATIENT
Start: 2023-06-12

## 2023-06-12 RX ORDER — ATORVASTATIN CALCIUM 40 MG/1
40 TABLET, FILM COATED ORAL EVERY EVENING
Qty: 90 TABLET | Refills: 0 | Status: ON HOLD | OUTPATIENT
Start: 2023-06-12

## 2023-06-12 RX ORDER — COLCHICINE 0.6 MG/1
0.3 TABLET ORAL DAILY
Qty: 30 TABLET | Refills: 0 | Status: ON HOLD | OUTPATIENT
Start: 2023-06-13 | End: 2023-06-15

## 2023-06-12 RX ORDER — ALBUTEROL SULFATE 90 UG/1
2 AEROSOL, METERED RESPIRATORY (INHALATION)
Start: 2023-06-12 | End: 2023-06-12 | Stop reason: SDUPTHER

## 2023-06-12 RX ORDER — DEXAMETHASONE 6 MG/1
6 TABLET ORAL DAILY
Qty: 3 TABLET | Refills: 0
Start: 2023-06-13 | End: 2023-06-12 | Stop reason: SDUPTHER

## 2023-06-12 RX ORDER — AMLODIPINE BESYLATE 5 MG/1
5 TABLET ORAL DAILY
Qty: 30 TABLET | Refills: 0 | Status: ON HOLD | OUTPATIENT
Start: 2023-06-12

## 2023-06-12 RX ORDER — BUDESONIDE AND FORMOTEROL FUMARATE DIHYDRATE 160; 4.5 UG/1; UG/1
2 AEROSOL RESPIRATORY (INHALATION)
Qty: 6 G | Refills: 0 | Status: ON HOLD | OUTPATIENT
Start: 2023-06-12

## 2023-06-12 RX ORDER — DEXAMETHASONE 6 MG/1
6 TABLET ORAL DAILY
Qty: 3 TABLET | Refills: 0 | Status: ON HOLD | OUTPATIENT
Start: 2023-06-13 | End: 2023-06-16

## 2023-06-12 RX ORDER — LEVOTHYROXINE SODIUM 0.15 MG/1
150 TABLET ORAL
Qty: 30 TABLET | Refills: 0 | Status: ON HOLD | OUTPATIENT
Start: 2023-06-13

## 2023-06-12 RX ORDER — GUAIFENESIN 600 MG/1
1200 TABLET, EXTENDED RELEASE ORAL EVERY 12 HOURS SCHEDULED
Status: ON HOLD
Start: 2023-06-12 | End: 2023-06-15

## 2023-06-12 RX ORDER — FUROSEMIDE 40 MG/1
40 TABLET ORAL 2 TIMES DAILY
Start: 2023-06-12 | End: 2023-06-12 | Stop reason: SDUPTHER

## 2023-06-12 RX ORDER — CEFDINIR 300 MG/1
300 CAPSULE ORAL 2 TIMES DAILY
Qty: 8 CAPSULE | Refills: 0 | Status: ON HOLD | OUTPATIENT
Start: 2023-06-12 | End: 2023-06-16

## 2023-06-12 RX ORDER — ACETAZOLAMIDE 250 MG/1
250 TABLET ORAL 2 TIMES DAILY
Qty: 60 TABLET | Refills: 0 | Status: ON HOLD | OUTPATIENT
Start: 2023-06-12

## 2023-06-12 RX ORDER — FUROSEMIDE 40 MG/1
40 TABLET ORAL 2 TIMES DAILY
Qty: 60 TABLET | Refills: 0 | Status: ON HOLD | OUTPATIENT
Start: 2023-06-12 | End: 2023-06-15

## 2023-06-12 RX ORDER — ALBUTEROL SULFATE 90 UG/1
2 AEROSOL, METERED RESPIRATORY (INHALATION)
Qty: 8 G | Refills: 8 | Status: ON HOLD | OUTPATIENT
Start: 2023-06-12 | End: 2023-06-15

## 2023-06-12 RX ORDER — THEOPHYLLINE 400 MG/1
200 TABLET, EXTENDED RELEASE ORAL EVERY 24 HOURS
Qty: 30 TABLET | Refills: 0 | Status: ON HOLD | OUTPATIENT
Start: 2023-06-12 | End: 2023-06-15

## 2023-06-12 RX ORDER — CEFDINIR 300 MG/1
300 CAPSULE ORAL 2 TIMES DAILY
Qty: 8 CAPSULE | Refills: 0
Start: 2023-06-12 | End: 2023-06-12 | Stop reason: SDUPTHER

## 2023-06-12 RX ORDER — LEVOTHYROXINE SODIUM 0.15 MG/1
150 TABLET ORAL
Start: 2023-06-13 | End: 2023-06-12 | Stop reason: SDUPTHER

## 2023-06-12 RX ORDER — BUDESONIDE AND FORMOTEROL FUMARATE DIHYDRATE 160; 4.5 UG/1; UG/1
2 AEROSOL RESPIRATORY (INHALATION)
Refills: 12
Start: 2023-06-12 | End: 2023-06-12 | Stop reason: SDUPTHER

## 2023-06-12 RX ADMIN — BUDESONIDE AND FORMOTEROL FUMARATE DIHYDRATE 2 PUFF: 160; 4.5 AEROSOL RESPIRATORY (INHALATION) at 18:54

## 2023-06-12 RX ADMIN — AMLODIPINE BESYLATE 5 MG: 5 TABLET ORAL at 08:10

## 2023-06-12 RX ADMIN — GUAIFENESIN 1200 MG: 600 TABLET, EXTENDED RELEASE ORAL at 08:10

## 2023-06-12 RX ADMIN — POTASSIUM CHLORIDE 20 MEQ: 1500 TABLET, EXTENDED RELEASE ORAL at 08:09

## 2023-06-12 RX ADMIN — LEVOTHYROXINE SODIUM 150 MCG: 0.15 TABLET ORAL at 06:06

## 2023-06-12 RX ADMIN — SENNOSIDES AND DOCUSATE SODIUM 2 TABLET: 8.6; 5 TABLET ORAL at 08:10

## 2023-06-12 RX ADMIN — DEXAMETHASONE 6 MG: 6 TABLET ORAL at 08:10

## 2023-06-12 RX ADMIN — ATORVASTATIN CALCIUM 40 MG: 40 TABLET, FILM COATED ORAL at 17:06

## 2023-06-12 RX ADMIN — ENOXAPARIN SODIUM 40 MG: 100 INJECTION SUBCUTANEOUS at 17:06

## 2023-06-12 RX ADMIN — Medication 10 MG: at 04:18

## 2023-06-12 RX ADMIN — BUDESONIDE AND FORMOTEROL FUMARATE DIHYDRATE 2 PUFF: 160; 4.5 AEROSOL RESPIRATORY (INHALATION) at 07:17

## 2023-06-12 RX ADMIN — ALBUTEROL SULFATE 2 PUFF: 108 INHALANT RESPIRATORY (INHALATION) at 18:54

## 2023-06-12 RX ADMIN — COLCHICINE 0.3 MG: 0.6 TABLET, FILM COATED ORAL at 08:10

## 2023-06-12 RX ADMIN — FUROSEMIDE 40 MG: 40 TABLET ORAL at 08:09

## 2023-06-12 RX ADMIN — ISOSORBIDE MONONITRATE 30 MG: 30 TABLET, EXTENDED RELEASE ORAL at 08:10

## 2023-06-12 RX ADMIN — Medication 10 ML: at 08:10

## 2023-06-12 RX ADMIN — Medication 5000 UNITS: at 08:10

## 2023-06-12 RX ADMIN — ALBUTEROL SULFATE 2 PUFF: 108 INHALANT RESPIRATORY (INHALATION) at 07:14

## 2023-06-12 RX ADMIN — FUROSEMIDE 40 MG: 40 TABLET ORAL at 17:06

## 2023-06-12 RX ADMIN — ACETAZOLAMIDE 250 MG: 250 TABLET ORAL at 08:10

## 2023-06-12 RX ADMIN — ALBUTEROL SULFATE 2 PUFF: 108 INHALANT RESPIRATORY (INHALATION) at 11:07

## 2023-06-12 RX ADMIN — ALBUTEROL SULFATE 2 PUFF: 108 INHALANT RESPIRATORY (INHALATION) at 15:28

## 2023-06-12 RX ADMIN — ASPIRIN 81 MG: 81 TABLET, COATED ORAL at 08:10

## 2023-06-15 PROBLEM — J96.21 ACUTE ON CHRONIC RESPIRATORY FAILURE WITH HYPOXIA: Status: ACTIVE | Noted: 2023-06-15

## 2023-06-15 LAB
FUNGUS WND CULT: NORMAL
MYCOBACTERIUM SPEC CULT: NORMAL
NIGHT BLUE STAIN TISS: NORMAL

## 2023-06-22 LAB
FUNGUS WND CULT: NORMAL
MYCOBACTERIUM SPEC CULT: NORMAL
NIGHT BLUE STAIN TISS: NORMAL

## 2023-06-23 ENCOUNTER — INPATIENT HOSPITAL (AMBULATORY)
Dept: URBAN - METROPOLITAN AREA HOSPITAL 84 | Facility: HOSPITAL | Age: 78
End: 2023-06-23

## 2023-06-23 DIAGNOSIS — R11.0 NAUSEA: ICD-10-CM

## 2023-06-23 DIAGNOSIS — R10.84 GENERALIZED ABDOMINAL PAIN: ICD-10-CM

## 2023-06-23 PROCEDURE — 99222 1ST HOSP IP/OBS MODERATE 55: CPT | Mod: FS

## 2023-06-24 ENCOUNTER — INPATIENT HOSPITAL (AMBULATORY)
Dept: URBAN - METROPOLITAN AREA HOSPITAL 84 | Facility: HOSPITAL | Age: 78
End: 2023-06-24

## 2023-06-24 DIAGNOSIS — K63.89 OTHER SPECIFIED DISEASES OF INTESTINE: ICD-10-CM

## 2023-06-24 DIAGNOSIS — D72.829 ELEVATED WHITE BLOOD CELL COUNT, UNSPECIFIED: ICD-10-CM

## 2023-06-24 DIAGNOSIS — R11.0 NAUSEA: ICD-10-CM

## 2023-06-24 DIAGNOSIS — R94.5 ABNORMAL RESULTS OF LIVER FUNCTION STUDIES: ICD-10-CM

## 2023-06-24 DIAGNOSIS — R10.84 GENERALIZED ABDOMINAL PAIN: ICD-10-CM

## 2023-06-24 PROCEDURE — 99232 SBSQ HOSP IP/OBS MODERATE 35: CPT | Mod: FS | Performed by: NURSE PRACTITIONER

## 2023-06-29 LAB
FUNGUS WND CULT: NORMAL
MYCOBACTERIUM SPEC CULT: NORMAL
NIGHT BLUE STAIN TISS: NORMAL

## 2023-07-06 LAB
FUNGUS WND CULT: NORMAL
MYCOBACTERIUM SPEC CULT: NORMAL
NIGHT BLUE STAIN TISS: NORMAL

## 2023-07-11 LAB — QT INTERVAL: 684 MS

## 2023-07-13 LAB
MYCOBACTERIUM SPEC CULT: NORMAL
NIGHT BLUE STAIN TISS: NORMAL

## 2023-07-18 ENCOUNTER — APPOINTMENT (OUTPATIENT)
Dept: GENERAL RADIOLOGY | Facility: HOSPITAL | Age: 78
DRG: 175 | End: 2023-07-18
Payer: MEDICARE

## 2023-07-18 ENCOUNTER — HOSPITAL ENCOUNTER (INPATIENT)
Facility: HOSPITAL | Age: 78
LOS: 2 days | Discharge: NURSING, REHAB OR LONG-TERM CARE | DRG: 175 | End: 2023-07-20
Attending: INTERNAL MEDICINE | Admitting: STUDENT IN AN ORGANIZED HEALTH CARE EDUCATION/TRAINING PROGRAM
Payer: MEDICARE

## 2023-07-18 ENCOUNTER — APPOINTMENT (OUTPATIENT)
Dept: CARDIOLOGY | Facility: HOSPITAL | Age: 78
DRG: 175 | End: 2023-07-18
Payer: MEDICARE

## 2023-07-18 DIAGNOSIS — I26.99 PULMONARY EMBOLISM, UNSPECIFIED CHRONICITY, UNSPECIFIED PULMONARY EMBOLISM TYPE, UNSPECIFIED WHETHER ACUTE COR PULMONALE PRESENT: Primary | ICD-10-CM

## 2023-07-18 PROBLEM — E03.9 HYPOTHYROIDISM: Chronic | Status: ACTIVE | Noted: 2019-08-07

## 2023-07-18 PROBLEM — E66.9 OBESITY (BMI 30-39.9): Status: ACTIVE | Noted: 2023-07-18

## 2023-07-18 PROBLEM — J96.01 ACUTE RESPIRATORY FAILURE WITH HYPOXIA: Status: ACTIVE | Noted: 2023-06-15

## 2023-07-18 PROBLEM — E78.2 MIXED HYPERLIPIDEMIA: Chronic | Status: ACTIVE | Noted: 2019-08-07

## 2023-07-18 PROBLEM — E66.9 OBESITY (BMI 30-39.9): Chronic | Status: ACTIVE | Noted: 2023-07-18

## 2023-07-18 PROBLEM — I10 ESSENTIAL HYPERTENSION: Chronic | Status: ACTIVE | Noted: 2019-08-07

## 2023-07-18 LAB
ANION GAP SERPL CALCULATED.3IONS-SCNC: 8 MMOL/L (ref 5–15)
ANISOCYTOSIS BLD QL: ABNORMAL
APTT PPP: 31.3 SECONDS (ref 24–31)
B PARAPERT DNA SPEC QL NAA+PROBE: NOT DETECTED
B PERT DNA SPEC QL NAA+PROBE: NOT DETECTED
BASOPHILS # BLD MANUAL: 0.09 10*3/MM3 (ref 0–0.2)
BASOPHILS NFR BLD MANUAL: 1 % (ref 0–1.5)
BH CV ECHO MEAS - EDV(CUBED): 57.7 ML
BH CV ECHO MEAS - EDV(MOD-SP4): 54.8 ML
BH CV ECHO MEAS - EF(MOD-BP): 55 %
BH CV ECHO MEAS - EF(MOD-SP4): 54.9 %
BH CV ECHO MEAS - ESV(CUBED): 19.5 ML
BH CV ECHO MEAS - ESV(MOD-SP4): 24.7 ML
BH CV ECHO MEAS - FS: 30.4 %
BH CV ECHO MEAS - IVS/LVPW: 1.08 CM
BH CV ECHO MEAS - IVSD: 1.33 CM
BH CV ECHO MEAS - LA DIMENSION: 4.1 CM
BH CV ECHO MEAS - LV MASS(C)D: 172.4 GRAMS
BH CV ECHO MEAS - LVIDD: 3.9 CM
BH CV ECHO MEAS - LVIDS: 2.7 CM
BH CV ECHO MEAS - LVPWD: 1.23 CM
BH CV ECHO MEAS - RAP SYSTOLE: 8 MMHG
BH CV ECHO MEAS - RVDD: 3.5 CM
BH CV ECHO MEAS - RVSP: 35.3 MMHG
BH CV ECHO MEAS - SV(MOD-SP4): 30.1 ML
BH CV ECHO MEAS - TR MAX PG: 27.3 MMHG
BH CV ECHO MEAS - TR MAX VEL: 260.7 CM/SEC
BUN SERPL-MCNC: 16 MG/DL (ref 8–23)
BUN/CREAT SERPL: 20.8 (ref 7–25)
C PNEUM DNA NPH QL NAA+NON-PROBE: NOT DETECTED
CALCIUM SPEC-SCNC: 8.5 MG/DL (ref 8.6–10.5)
CHLORIDE SERPL-SCNC: 105 MMOL/L (ref 98–107)
CHOLEST SERPL-MCNC: 105 MG/DL (ref 0–200)
CO2 SERPL-SCNC: 25 MMOL/L (ref 22–29)
CREAT SERPL-MCNC: 0.77 MG/DL (ref 0.57–1)
D-LACTATE SERPL-SCNC: 0.6 MMOL/L (ref 0.5–2)
DEPRECATED RDW RBC AUTO: 55.6 FL (ref 37–54)
EGFRCR SERPLBLD CKD-EPI 2021: 79.6 ML/MIN/1.73
EOSINOPHIL # BLD MANUAL: 0.09 10*3/MM3 (ref 0–0.4)
EOSINOPHIL NFR BLD MANUAL: 1 % (ref 0.3–6.2)
ERYTHROCYTE [DISTWIDTH] IN BLOOD BY AUTOMATED COUNT: 16.4 % (ref 12.3–15.4)
FLUAV SUBTYP SPEC NAA+PROBE: NOT DETECTED
FLUBV RNA ISLT QL NAA+PROBE: NOT DETECTED
GLUCOSE SERPL-MCNC: 90 MG/DL (ref 65–99)
HADV DNA SPEC NAA+PROBE: NOT DETECTED
HCOV 229E RNA SPEC QL NAA+PROBE: NOT DETECTED
HCOV HKU1 RNA SPEC QL NAA+PROBE: NOT DETECTED
HCOV NL63 RNA SPEC QL NAA+PROBE: NOT DETECTED
HCOV OC43 RNA SPEC QL NAA+PROBE: NOT DETECTED
HCT VFR BLD AUTO: 29.9 % (ref 34–46.6)
HDLC SERPL-MCNC: 33 MG/DL (ref 40–60)
HGB BLD-MCNC: 10.3 G/DL (ref 12–15.9)
HMPV RNA NPH QL NAA+NON-PROBE: NOT DETECTED
HPIV1 RNA ISLT QL NAA+PROBE: NOT DETECTED
HPIV2 RNA SPEC QL NAA+PROBE: NOT DETECTED
HPIV3 RNA NPH QL NAA+PROBE: NOT DETECTED
HPIV4 P GENE NPH QL NAA+PROBE: NOT DETECTED
INR PPP: 1.07 (ref 0.93–1.1)
LDLC SERPL CALC-MCNC: 53 MG/DL (ref 0–100)
LDLC/HDLC SERPL: 1.58 {RATIO}
LYMPHOCYTES # BLD MANUAL: 1 10*3/MM3 (ref 0.7–3.1)
LYMPHOCYTES NFR BLD MANUAL: 7 % (ref 5–12)
M PNEUMO IGG SER IA-ACNC: NOT DETECTED
MCH RBC QN AUTO: 31.4 PG (ref 26.6–33)
MCHC RBC AUTO-ENTMCNC: 34.2 G/DL (ref 31.5–35.7)
MCV RBC AUTO: 91.7 FL (ref 79–97)
MONOCYTES # BLD: 0.64 10*3/MM3 (ref 0.1–0.9)
NEUTROPHILS # BLD AUTO: 7.28 10*3/MM3 (ref 1.7–7)
NEUTROPHILS NFR BLD MANUAL: 64 % (ref 42.7–76)
NEUTS BAND NFR BLD MANUAL: 16 % (ref 0–5)
NT-PROBNP SERPL-MCNC: 5071 PG/ML (ref 0–1800)
PLAT MORPH BLD: NORMAL
PLATELET # BLD AUTO: 200 10*3/MM3 (ref 140–450)
PMV BLD AUTO: 6.8 FL (ref 6–12)
POIKILOCYTOSIS BLD QL SMEAR: ABNORMAL
POTASSIUM SERPL-SCNC: 4.6 MMOL/L (ref 3.5–5.2)
PROCALCITONIN SERPL-MCNC: 0.12 NG/ML (ref 0–0.25)
PROTHROMBIN TIME: 11.4 SECONDS (ref 9.6–11.7)
RBC # BLD AUTO: 3.26 10*6/MM3 (ref 3.77–5.28)
RHINOVIRUS RNA SPEC NAA+PROBE: NOT DETECTED
RSV RNA NPH QL NAA+NON-PROBE: NOT DETECTED
SARS-COV-2 RNA NPH QL NAA+NON-PROBE: NOT DETECTED
SCAN SLIDE: NORMAL
SODIUM SERPL-SCNC: 138 MMOL/L (ref 136–145)
TRIGL SERPL-MCNC: 100 MG/DL (ref 0–150)
TROPONIN T SERPL HS-MCNC: 42 NG/L
VARIANT LYMPHS NFR BLD MANUAL: 11 % (ref 19.6–45.3)
VLDLC SERPL-MCNC: 19 MG/DL (ref 5–40)
WBC MORPH BLD: NORMAL
WBC NRBC COR # BLD: 9.1 10*3/MM3 (ref 3.4–10.8)

## 2023-07-18 PROCEDURE — 85007 BL SMEAR W/DIFF WBC COUNT: CPT | Performed by: PHYSICIAN ASSISTANT

## 2023-07-18 PROCEDURE — 93321 DOPPLER ECHO F-UP/LMTD STD: CPT | Performed by: INTERNAL MEDICINE

## 2023-07-18 PROCEDURE — 83605 ASSAY OF LACTIC ACID: CPT | Performed by: PHYSICIAN ASSISTANT

## 2023-07-18 PROCEDURE — 87102 FUNGUS ISOLATION CULTURE: CPT | Performed by: PHYSICIAN ASSISTANT

## 2023-07-18 PROCEDURE — 84484 ASSAY OF TROPONIN QUANT: CPT | Performed by: PHYSICIAN ASSISTANT

## 2023-07-18 PROCEDURE — 0202U NFCT DS 22 TRGT SARS-COV-2: CPT | Performed by: PHYSICIAN ASSISTANT

## 2023-07-18 PROCEDURE — 84145 PROCALCITONIN (PCT): CPT | Performed by: PHYSICIAN ASSISTANT

## 2023-07-18 PROCEDURE — 93325 DOPPLER ECHO COLOR FLOW MAPG: CPT | Performed by: INTERNAL MEDICINE

## 2023-07-18 PROCEDURE — 93325 DOPPLER ECHO COLOR FLOW MAPG: CPT

## 2023-07-18 PROCEDURE — 94640 AIRWAY INHALATION TREATMENT: CPT

## 2023-07-18 PROCEDURE — 25010000002 CEFTRIAXONE PER 250 MG: Performed by: STUDENT IN AN ORGANIZED HEALTH CARE EDUCATION/TRAINING PROGRAM

## 2023-07-18 PROCEDURE — 94799 UNLISTED PULMONARY SVC/PX: CPT

## 2023-07-18 PROCEDURE — 25010000002 ENOXAPARIN PER 10 MG: Performed by: PHYSICIAN ASSISTANT

## 2023-07-18 PROCEDURE — 92610 EVALUATE SWALLOWING FUNCTION: CPT

## 2023-07-18 PROCEDURE — 85610 PROTHROMBIN TIME: CPT | Performed by: PHYSICIAN ASSISTANT

## 2023-07-18 PROCEDURE — 85730 THROMBOPLASTIN TIME PARTIAL: CPT | Performed by: PHYSICIAN ASSISTANT

## 2023-07-18 PROCEDURE — 93321 DOPPLER ECHO F-UP/LMTD STD: CPT

## 2023-07-18 PROCEDURE — 83880 ASSAY OF NATRIURETIC PEPTIDE: CPT | Performed by: PHYSICIAN ASSISTANT

## 2023-07-18 PROCEDURE — 93308 TTE F-UP OR LMTD: CPT | Performed by: INTERNAL MEDICINE

## 2023-07-18 PROCEDURE — 85025 COMPLETE CBC W/AUTO DIFF WBC: CPT | Performed by: PHYSICIAN ASSISTANT

## 2023-07-18 PROCEDURE — 80061 LIPID PANEL: CPT | Performed by: PHYSICIAN ASSISTANT

## 2023-07-18 PROCEDURE — 93308 TTE F-UP OR LMTD: CPT

## 2023-07-18 PROCEDURE — 80048 BASIC METABOLIC PNL TOTAL CA: CPT | Performed by: PHYSICIAN ASSISTANT

## 2023-07-18 PROCEDURE — 97162 PT EVAL MOD COMPLEX 30 MIN: CPT

## 2023-07-18 PROCEDURE — 94761 N-INVAS EAR/PLS OXIMETRY MLT: CPT

## 2023-07-18 PROCEDURE — 71045 X-RAY EXAM CHEST 1 VIEW: CPT

## 2023-07-18 RX ORDER — SODIUM CHLORIDE 0.9 % (FLUSH) 0.9 %
10 SYRINGE (ML) INJECTION EVERY 12 HOURS SCHEDULED
Status: DISCONTINUED | OUTPATIENT
Start: 2023-07-18 | End: 2023-07-20 | Stop reason: HOSPADM

## 2023-07-18 RX ORDER — ONDANSETRON 2 MG/ML
4 INJECTION INTRAMUSCULAR; INTRAVENOUS EVERY 6 HOURS PRN
Status: DISCONTINUED | OUTPATIENT
Start: 2023-07-18 | End: 2023-07-20 | Stop reason: HOSPADM

## 2023-07-18 RX ORDER — GUAIFENESIN 600 MG/1
600 TABLET, EXTENDED RELEASE ORAL 2 TIMES DAILY
Status: DISCONTINUED | OUTPATIENT
Start: 2023-07-18 | End: 2023-07-20 | Stop reason: HOSPADM

## 2023-07-18 RX ORDER — DEXAMETHASONE 0.5 MG/1
1 TABLET ORAL EVERY OTHER DAY
Status: DISCONTINUED | OUTPATIENT
Start: 2023-07-25 | End: 2023-07-20 | Stop reason: HOSPADM

## 2023-07-18 RX ORDER — SENNOSIDES A AND B 8.6 MG/1
2 TABLET, FILM COATED ORAL NIGHTLY
Status: DISCONTINUED | OUTPATIENT
Start: 2023-07-18 | End: 2023-07-18 | Stop reason: SDUPTHER

## 2023-07-18 RX ORDER — ENOXAPARIN SODIUM 100 MG/ML
1 INJECTION SUBCUTANEOUS ONCE
Status: DISCONTINUED | OUTPATIENT
Start: 2023-07-18 | End: 2023-07-18

## 2023-07-18 RX ORDER — ACETAMINOPHEN 160 MG/5ML
650 SOLUTION ORAL EVERY 4 HOURS PRN
Status: DISCONTINUED | OUTPATIENT
Start: 2023-07-18 | End: 2023-07-20 | Stop reason: HOSPADM

## 2023-07-18 RX ORDER — SODIUM CHLORIDE 0.9 % (FLUSH) 0.9 %
10 SYRINGE (ML) INJECTION AS NEEDED
Status: DISCONTINUED | OUTPATIENT
Start: 2023-07-18 | End: 2023-07-20 | Stop reason: HOSPADM

## 2023-07-18 RX ORDER — POTASSIUM CHLORIDE 20 MEQ/1
20 TABLET, EXTENDED RELEASE ORAL 2 TIMES DAILY
Status: DISCONTINUED | OUTPATIENT
Start: 2023-07-18 | End: 2023-07-20 | Stop reason: HOSPADM

## 2023-07-18 RX ORDER — ONDANSETRON 4 MG/1
4 TABLET, FILM COATED ORAL EVERY 6 HOURS PRN
Status: DISCONTINUED | OUTPATIENT
Start: 2023-07-18 | End: 2023-07-20 | Stop reason: HOSPADM

## 2023-07-18 RX ORDER — POLYETHYLENE GLYCOL 3350 17 G/17G
17 POWDER, FOR SOLUTION ORAL DAILY PRN
Status: DISCONTINUED | OUTPATIENT
Start: 2023-07-18 | End: 2023-07-20 | Stop reason: HOSPADM

## 2023-07-18 RX ORDER — DEXAMETHASONE 2 MG/1
2 TABLET ORAL
Status: ON HOLD | COMMUNITY
End: 2023-07-18

## 2023-07-18 RX ORDER — ENOXAPARIN SODIUM 100 MG/ML
1 INJECTION SUBCUTANEOUS EVERY 12 HOURS
Status: DISCONTINUED | OUTPATIENT
Start: 2023-07-18 | End: 2023-07-20 | Stop reason: HOSPADM

## 2023-07-18 RX ORDER — ENOXAPARIN SODIUM 100 MG/ML
1 INJECTION SUBCUTANEOUS EVERY 12 HOURS
Status: DISCONTINUED | OUTPATIENT
Start: 2023-07-18 | End: 2023-07-18

## 2023-07-18 RX ORDER — SODIUM CHLORIDE 9 MG/ML
40 INJECTION, SOLUTION INTRAVENOUS AS NEEDED
Status: DISCONTINUED | OUTPATIENT
Start: 2023-07-18 | End: 2023-07-20 | Stop reason: HOSPADM

## 2023-07-18 RX ORDER — BISACODYL 5 MG/1
5 TABLET, DELAYED RELEASE ORAL DAILY PRN
Status: DISCONTINUED | OUTPATIENT
Start: 2023-07-18 | End: 2023-07-20 | Stop reason: HOSPADM

## 2023-07-18 RX ORDER — ALBUTEROL SULFATE 2.5 MG/3ML
2.5 SOLUTION RESPIRATORY (INHALATION) EVERY 4 HOURS PRN
Status: DISCONTINUED | OUTPATIENT
Start: 2023-07-18 | End: 2023-07-20 | Stop reason: HOSPADM

## 2023-07-18 RX ORDER — ACETAMINOPHEN 325 MG/1
650 TABLET ORAL EVERY 6 HOURS PRN
COMMUNITY
End: 2023-07-20 | Stop reason: HOSPADM

## 2023-07-18 RX ORDER — DEXAMETHASONE 0.5 MG/1
2 TABLET ORAL
Status: DISCONTINUED | OUTPATIENT
Start: 2023-07-18 | End: 2023-07-18

## 2023-07-18 RX ORDER — BISACODYL 10 MG
10 SUPPOSITORY, RECTAL RECTAL DAILY PRN
Status: DISCONTINUED | OUTPATIENT
Start: 2023-07-18 | End: 2023-07-20 | Stop reason: HOSPADM

## 2023-07-18 RX ORDER — ALUMINA, MAGNESIA, AND SIMETHICONE 2400; 2400; 240 MG/30ML; MG/30ML; MG/30ML
15 SUSPENSION ORAL EVERY 6 HOURS PRN
Status: DISCONTINUED | OUTPATIENT
Start: 2023-07-18 | End: 2023-07-20 | Stop reason: HOSPADM

## 2023-07-18 RX ORDER — ENOXAPARIN SODIUM 100 MG/ML
85 INJECTION SUBCUTANEOUS EVERY 12 HOURS SCHEDULED
Status: ON HOLD | COMMUNITY
End: 2023-07-18 | Stop reason: SDUPTHER

## 2023-07-18 RX ORDER — ASPIRIN 81 MG/1
81 TABLET ORAL DAILY
Status: DISCONTINUED | OUTPATIENT
Start: 2023-07-18 | End: 2023-07-20 | Stop reason: HOSPADM

## 2023-07-18 RX ORDER — IPRATROPIUM BROMIDE AND ALBUTEROL SULFATE 2.5; .5 MG/3ML; MG/3ML
3 SOLUTION RESPIRATORY (INHALATION) EVERY 4 HOURS PRN
COMMUNITY
End: 2023-07-20 | Stop reason: HOSPADM

## 2023-07-18 RX ORDER — DEXAMETHASONE 0.5 MG/1
1 TABLET ORAL
Status: DISCONTINUED | OUTPATIENT
Start: 2023-07-19 | End: 2023-07-20 | Stop reason: HOSPADM

## 2023-07-18 RX ORDER — CETIRIZINE HYDROCHLORIDE 10 MG/1
10 TABLET ORAL DAILY
Status: DISCONTINUED | OUTPATIENT
Start: 2023-07-18 | End: 2023-07-20 | Stop reason: HOSPADM

## 2023-07-18 RX ORDER — ATORVASTATIN CALCIUM 40 MG/1
40 TABLET, FILM COATED ORAL EVERY EVENING
Status: DISCONTINUED | OUTPATIENT
Start: 2023-07-18 | End: 2023-07-20 | Stop reason: HOSPADM

## 2023-07-18 RX ORDER — MIDODRINE HYDROCHLORIDE 5 MG/1
10 TABLET ORAL
Status: DISCONTINUED | OUTPATIENT
Start: 2023-07-18 | End: 2023-07-20 | Stop reason: HOSPADM

## 2023-07-18 RX ORDER — IPRATROPIUM BROMIDE AND ALBUTEROL SULFATE 2.5; .5 MG/3ML; MG/3ML
3 SOLUTION RESPIRATORY (INHALATION) 4 TIMES DAILY
Status: DISCONTINUED | OUTPATIENT
Start: 2023-07-18 | End: 2023-07-20 | Stop reason: HOSPADM

## 2023-07-18 RX ORDER — DOCUSATE SODIUM 100 MG/1
100 CAPSULE, LIQUID FILLED ORAL 2 TIMES DAILY
Status: DISCONTINUED | OUTPATIENT
Start: 2023-07-18 | End: 2023-07-18 | Stop reason: SDUPTHER

## 2023-07-18 RX ORDER — ENOXAPARIN SODIUM 100 MG/ML
1 INJECTION SUBCUTANEOUS EVERY 24 HOURS
Status: DISCONTINUED | OUTPATIENT
Start: 2023-07-18 | End: 2023-07-18

## 2023-07-18 RX ORDER — ACETAMINOPHEN 650 MG/1
650 SUPPOSITORY RECTAL EVERY 4 HOURS PRN
Status: DISCONTINUED | OUTPATIENT
Start: 2023-07-18 | End: 2023-07-20 | Stop reason: HOSPADM

## 2023-07-18 RX ORDER — POLYETHYLENE GLYCOL 3350 17 G/17G
17 POWDER, FOR SOLUTION ORAL DAILY PRN
COMMUNITY

## 2023-07-18 RX ORDER — CHOLECALCIFEROL (VITAMIN D3) 125 MCG
5 CAPSULE ORAL NIGHTLY PRN
Status: DISCONTINUED | OUTPATIENT
Start: 2023-07-18 | End: 2023-07-20 | Stop reason: HOSPADM

## 2023-07-18 RX ORDER — NITROGLYCERIN 0.4 MG/1
0.4 TABLET SUBLINGUAL
Status: DISCONTINUED | OUTPATIENT
Start: 2023-07-18 | End: 2023-07-20 | Stop reason: HOSPADM

## 2023-07-18 RX ORDER — THEOPHYLLINE 300 MG/1
300 TABLET, EXTENDED RELEASE ORAL DAILY
Status: DISCONTINUED | OUTPATIENT
Start: 2023-07-18 | End: 2023-07-20 | Stop reason: HOSPADM

## 2023-07-18 RX ORDER — ACETAMINOPHEN 325 MG/1
650 TABLET ORAL EVERY 4 HOURS PRN
Status: DISCONTINUED | OUTPATIENT
Start: 2023-07-18 | End: 2023-07-20 | Stop reason: HOSPADM

## 2023-07-18 RX ORDER — LEVOTHYROXINE SODIUM 0.15 MG/1
150 TABLET ORAL
Status: DISCONTINUED | OUTPATIENT
Start: 2023-07-18 | End: 2023-07-20 | Stop reason: HOSPADM

## 2023-07-18 RX ORDER — AMOXICILLIN 250 MG
2 CAPSULE ORAL 2 TIMES DAILY
Status: DISCONTINUED | OUTPATIENT
Start: 2023-07-18 | End: 2023-07-20 | Stop reason: HOSPADM

## 2023-07-18 RX ORDER — BUDESONIDE AND FORMOTEROL FUMARATE DIHYDRATE 160; 4.5 UG/1; UG/1
2 AEROSOL RESPIRATORY (INHALATION)
Status: DISCONTINUED | OUTPATIENT
Start: 2023-07-18 | End: 2023-07-19

## 2023-07-18 RX ADMIN — ENOXAPARIN SODIUM 80 MG: 100 INJECTION SUBCUTANEOUS at 05:08

## 2023-07-18 RX ADMIN — POTASSIUM CHLORIDE 20 MEQ: 1500 TABLET, EXTENDED RELEASE ORAL at 09:12

## 2023-07-18 RX ADMIN — MIDODRINE HYDROCHLORIDE 10 MG: 5 TABLET ORAL at 09:12

## 2023-07-18 RX ADMIN — GUAIFENESIN 600 MG: 600 TABLET, EXTENDED RELEASE ORAL at 22:04

## 2023-07-18 RX ADMIN — ASPIRIN 81 MG: 81 TABLET, COATED ORAL at 09:12

## 2023-07-18 RX ADMIN — POTASSIUM CHLORIDE 20 MEQ: 1500 TABLET, EXTENDED RELEASE ORAL at 20:31

## 2023-07-18 RX ADMIN — SENNOSIDES AND DOCUSATE SODIUM 2 TABLET: 50; 8.6 TABLET ORAL at 09:12

## 2023-07-18 RX ADMIN — CEFTRIAXONE 2000 MG: 2 INJECTION, POWDER, FOR SOLUTION INTRAMUSCULAR; INTRAVENOUS at 12:02

## 2023-07-18 RX ADMIN — THEOPHYLLINE 300 MG: 300 TABLET, EXTENDED RELEASE ORAL at 09:12

## 2023-07-18 RX ADMIN — CETIRIZINE HYDROCHLORIDE 10 MG: 10 TABLET, FILM COATED ORAL at 09:12

## 2023-07-18 RX ADMIN — BUDESONIDE AND FORMOTEROL FUMARATE DIHYDRATE 2 PUFF: 160; 4.5 AEROSOL RESPIRATORY (INHALATION) at 06:54

## 2023-07-18 RX ADMIN — DEXAMETHASONE 2 MG: 0.5 TABLET ORAL at 09:12

## 2023-07-18 RX ADMIN — Medication 10 ML: at 22:04

## 2023-07-18 RX ADMIN — LEVOTHYROXINE SODIUM 150 MCG: 0.15 TABLET ORAL at 05:08

## 2023-07-18 RX ADMIN — Medication 10 ML: at 09:12

## 2023-07-18 RX ADMIN — MIDODRINE HYDROCHLORIDE 10 MG: 5 TABLET ORAL at 12:02

## 2023-07-18 RX ADMIN — GUAIFENESIN 600 MG: 600 TABLET, EXTENDED RELEASE ORAL at 09:12

## 2023-07-18 RX ADMIN — IPRATROPIUM BROMIDE AND ALBUTEROL SULFATE 3 ML: .5; 3 SOLUTION RESPIRATORY (INHALATION) at 06:50

## 2023-07-19 PROCEDURE — 85610 PROTHROMBIN TIME: CPT | Performed by: STUDENT IN AN ORGANIZED HEALTH CARE EDUCATION/TRAINING PROGRAM

## 2023-07-19 PROCEDURE — 94799 UNLISTED PULMONARY SVC/PX: CPT

## 2023-07-19 PROCEDURE — 97530 THERAPEUTIC ACTIVITIES: CPT

## 2023-07-19 PROCEDURE — 97116 GAIT TRAINING THERAPY: CPT

## 2023-07-19 PROCEDURE — 94664 DEMO&/EVAL PT USE INHALER: CPT

## 2023-07-19 PROCEDURE — 97535 SELF CARE MNGMENT TRAINING: CPT

## 2023-07-19 PROCEDURE — 25010000002 ENOXAPARIN PER 10 MG: Performed by: PHYSICIAN ASSISTANT

## 2023-07-19 PROCEDURE — 94761 N-INVAS EAR/PLS OXIMETRY MLT: CPT

## 2023-07-19 PROCEDURE — 92526 ORAL FUNCTION THERAPY: CPT

## 2023-07-19 PROCEDURE — 97166 OT EVAL MOD COMPLEX 45 MIN: CPT

## 2023-07-19 RX ORDER — LEVOFLOXACIN 500 MG/1
500 TABLET, FILM COATED ORAL EVERY 24 HOURS
Status: DISCONTINUED | OUTPATIENT
Start: 2023-07-19 | End: 2023-07-20 | Stop reason: HOSPADM

## 2023-07-19 RX ORDER — WARFARIN SODIUM 3 MG/1
3 TABLET ORAL
Status: DISCONTINUED | OUTPATIENT
Start: 2023-07-19 | End: 2023-07-20 | Stop reason: HOSPADM

## 2023-07-19 RX ORDER — BUDESONIDE 0.5 MG/2ML
0.5 INHALANT ORAL
Status: DISCONTINUED | OUTPATIENT
Start: 2023-07-19 | End: 2023-07-20 | Stop reason: HOSPADM

## 2023-07-19 RX ADMIN — ASPIRIN 81 MG: 81 TABLET, COATED ORAL at 10:01

## 2023-07-19 RX ADMIN — IPRATROPIUM BROMIDE AND ALBUTEROL SULFATE 3 ML: .5; 3 SOLUTION RESPIRATORY (INHALATION) at 18:53

## 2023-07-19 RX ADMIN — Medication 10 ML: at 10:02

## 2023-07-19 RX ADMIN — ACETAMINOPHEN 650 MG: 325 TABLET, FILM COATED ORAL at 17:35

## 2023-07-19 RX ADMIN — IPRATROPIUM BROMIDE AND ALBUTEROL SULFATE 3 ML: .5; 3 SOLUTION RESPIRATORY (INHALATION) at 11:26

## 2023-07-19 RX ADMIN — IPRATROPIUM BROMIDE AND ALBUTEROL SULFATE 3 ML: .5; 3 SOLUTION RESPIRATORY (INHALATION) at 15:14

## 2023-07-19 RX ADMIN — SENNOSIDES AND DOCUSATE SODIUM 2 TABLET: 50; 8.6 TABLET ORAL at 20:23

## 2023-07-19 RX ADMIN — THEOPHYLLINE 300 MG: 300 TABLET, EXTENDED RELEASE ORAL at 10:02

## 2023-07-19 RX ADMIN — Medication 10 ML: at 20:24

## 2023-07-19 RX ADMIN — LEVOFLOXACIN 500 MG: 500 TABLET, FILM COATED ORAL at 11:31

## 2023-07-19 RX ADMIN — GUAIFENESIN 600 MG: 600 TABLET, EXTENDED RELEASE ORAL at 20:23

## 2023-07-19 RX ADMIN — ATORVASTATIN CALCIUM 40 MG: 40 TABLET, FILM COATED ORAL at 17:40

## 2023-07-19 RX ADMIN — WARFARIN SODIUM 3 MG: 3 TABLET ORAL at 17:35

## 2023-07-19 RX ADMIN — CETIRIZINE HYDROCHLORIDE 10 MG: 10 TABLET, FILM COATED ORAL at 10:02

## 2023-07-19 RX ADMIN — IPRATROPIUM BROMIDE AND ALBUTEROL SULFATE 3 ML: .5; 3 SOLUTION RESPIRATORY (INHALATION) at 08:30

## 2023-07-19 RX ADMIN — ENOXAPARIN SODIUM 90 MG: 100 INJECTION SUBCUTANEOUS at 05:55

## 2023-07-19 RX ADMIN — BUDESONIDE AND FORMOTEROL FUMARATE DIHYDRATE 2 PUFF: 160; 4.5 AEROSOL RESPIRATORY (INHALATION) at 08:35

## 2023-07-19 RX ADMIN — SENNOSIDES AND DOCUSATE SODIUM 2 TABLET: 50; 8.6 TABLET ORAL at 10:01

## 2023-07-19 RX ADMIN — DEXAMETHASONE 1 MG: 0.5 TABLET ORAL at 10:01

## 2023-07-19 RX ADMIN — MIDODRINE HYDROCHLORIDE 10 MG: 5 TABLET ORAL at 10:01

## 2023-07-19 RX ADMIN — BUDESONIDE 0.5 MG: 0.5 INHALANT RESPIRATORY (INHALATION) at 18:57

## 2023-07-19 RX ADMIN — GUAIFENESIN 600 MG: 600 TABLET, EXTENDED RELEASE ORAL at 10:02

## 2023-07-19 RX ADMIN — ENOXAPARIN SODIUM 90 MG: 100 INJECTION SUBCUTANEOUS at 17:36

## 2023-07-19 RX ADMIN — MIDODRINE HYDROCHLORIDE 10 MG: 5 TABLET ORAL at 17:35

## 2023-07-19 RX ADMIN — LEVOTHYROXINE SODIUM 150 MCG: 0.15 TABLET ORAL at 05:55

## 2023-07-20 VITALS
SYSTOLIC BLOOD PRESSURE: 156 MMHG | HEART RATE: 84 BPM | WEIGHT: 191.14 LBS | OXYGEN SATURATION: 98 % | HEIGHT: 62 IN | DIASTOLIC BLOOD PRESSURE: 79 MMHG | BODY MASS INDEX: 35.17 KG/M2 | TEMPERATURE: 97.7 F | RESPIRATION RATE: 16 BRPM

## 2023-07-20 PROBLEM — J96.21 ACUTE ON CHRONIC RESPIRATORY FAILURE WITH HYPOXIA: Status: RESOLVED | Noted: 2023-06-15 | Resolved: 2023-07-20

## 2023-07-20 PROBLEM — E03.9 HYPOTHYROIDISM: Chronic | Status: RESOLVED | Noted: 2019-08-07 | Resolved: 2023-07-20

## 2023-07-20 LAB
INR PPP: 1.1 (ref 2–3)
PROTHROMBIN TIME: 11.7 SECONDS (ref 19.4–28.5)

## 2023-07-20 PROCEDURE — 25010000002 ONDANSETRON PER 1 MG: Performed by: PHYSICIAN ASSISTANT

## 2023-07-20 PROCEDURE — 25010000002 ENOXAPARIN PER 10 MG: Performed by: PHYSICIAN ASSISTANT

## 2023-07-20 PROCEDURE — 94761 N-INVAS EAR/PLS OXIMETRY MLT: CPT

## 2023-07-20 PROCEDURE — 94799 UNLISTED PULMONARY SVC/PX: CPT

## 2023-07-20 PROCEDURE — 92526 ORAL FUNCTION THERAPY: CPT

## 2023-07-20 RX ORDER — OFLOXACIN 3 MG/ML
1 SOLUTION/ DROPS OPHTHALMIC 4 TIMES DAILY
Qty: 2 ML | Refills: 0 | Status: SHIPPED | OUTPATIENT
Start: 2023-07-20 | End: 2023-07-27

## 2023-07-20 RX ORDER — DEXAMETHASONE 1 MG
1 TABLET ORAL EVERY OTHER DAY
Qty: 2 TABLET | Refills: 0 | Status: SHIPPED | OUTPATIENT
Start: 2023-07-25 | End: 2023-07-28

## 2023-07-20 RX ORDER — LEVOFLOXACIN 500 MG/1
500 TABLET, FILM COATED ORAL EVERY 24 HOURS
Qty: 4 TABLET | Refills: 0 | Status: SHIPPED | OUTPATIENT
Start: 2023-07-20 | End: 2023-07-24

## 2023-07-20 RX ORDER — OFLOXACIN 3 MG/ML
1 SOLUTION/ DROPS OPHTHALMIC 4 TIMES DAILY
Qty: 2 ML | Refills: 0 | Status: CANCELLED | OUTPATIENT
Start: 2023-07-20 | End: 2023-07-27

## 2023-07-20 RX ORDER — DEXAMETHASONE 1 MG
1 TABLET ORAL
Qty: 4 TABLET | Refills: 0 | Status: SHIPPED | OUTPATIENT
Start: 2023-07-20 | End: 2023-07-24

## 2023-07-20 RX ORDER — WARFARIN SODIUM 3 MG/1
TABLET ORAL
Qty: 1 TABLET | Refills: 0 | Status: SHIPPED | OUTPATIENT
Start: 2023-07-20

## 2023-07-20 RX ORDER — ENOXAPARIN SODIUM 100 MG/ML
1 INJECTION SUBCUTANEOUS EVERY 12 HOURS
Qty: 9 ML | Refills: 0 | Status: SHIPPED | OUTPATIENT
Start: 2023-07-20 | End: 2023-07-25

## 2023-07-20 RX ORDER — OFLOXACIN 3 MG/ML
1 SOLUTION/ DROPS OPHTHALMIC 4 TIMES DAILY
Status: DISCONTINUED | OUTPATIENT
Start: 2023-07-20 | End: 2023-07-20 | Stop reason: HOSPADM

## 2023-07-20 RX ADMIN — OFLOXACIN 1 DROP: 3 SOLUTION OPHTHALMIC at 11:27

## 2023-07-20 RX ADMIN — THEOPHYLLINE 300 MG: 300 TABLET, EXTENDED RELEASE ORAL at 08:51

## 2023-07-20 RX ADMIN — IPRATROPIUM BROMIDE AND ALBUTEROL SULFATE 3 ML: .5; 3 SOLUTION RESPIRATORY (INHALATION) at 11:40

## 2023-07-20 RX ADMIN — BUDESONIDE 0.5 MG: 0.5 INHALANT RESPIRATORY (INHALATION) at 07:00

## 2023-07-20 RX ADMIN — ASPIRIN 81 MG: 81 TABLET, COATED ORAL at 08:50

## 2023-07-20 RX ADMIN — ONDANSETRON 4 MG: 2 INJECTION INTRAMUSCULAR; INTRAVENOUS at 09:04

## 2023-07-20 RX ADMIN — CETIRIZINE HYDROCHLORIDE 10 MG: 10 TABLET, FILM COATED ORAL at 08:51

## 2023-07-20 RX ADMIN — GUAIFENESIN 600 MG: 600 TABLET, EXTENDED RELEASE ORAL at 08:50

## 2023-07-20 RX ADMIN — Medication 10 ML: at 08:51

## 2023-07-20 RX ADMIN — LEVOFLOXACIN 500 MG: 500 TABLET, FILM COATED ORAL at 11:27

## 2023-07-20 RX ADMIN — LEVOTHYROXINE SODIUM 150 MCG: 0.15 TABLET ORAL at 05:34

## 2023-07-20 RX ADMIN — POTASSIUM CHLORIDE 20 MEQ: 1500 TABLET, EXTENDED RELEASE ORAL at 08:52

## 2023-07-20 RX ADMIN — IPRATROPIUM BROMIDE AND ALBUTEROL SULFATE 3 ML: .5; 3 SOLUTION RESPIRATORY (INHALATION) at 06:59

## 2023-07-20 RX ADMIN — ENOXAPARIN SODIUM 90 MG: 100 INJECTION SUBCUTANEOUS at 05:34

## 2023-07-20 RX ADMIN — DEXAMETHASONE 1 MG: 0.5 TABLET ORAL at 08:51

## 2023-07-23 LAB — BACTERIA ISLT: NORMAL

## 2023-08-03 ENCOUNTER — TELEPHONE (OUTPATIENT)
Dept: PHARMACY | Facility: HOSPITAL | Age: 78
End: 2023-08-03
Payer: MEDICARE

## 2023-08-16 ENCOUNTER — TELEPHONE (OUTPATIENT)
Dept: PHARMACY | Facility: HOSPITAL | Age: 78
End: 2023-08-16
Payer: MEDICARE

## 2023-08-23 ENCOUNTER — TELEPHONE (OUTPATIENT)
Dept: PHARMACY | Facility: HOSPITAL | Age: 78
End: 2023-08-23
Payer: MEDICARE

## 2023-08-28 ENCOUNTER — TELEPHONE (OUTPATIENT)
Dept: FAMILY MEDICINE CLINIC | Facility: CLINIC | Age: 78
End: 2023-08-28
Payer: MEDICARE

## 2023-09-01 ENCOUNTER — TELEPHONE (OUTPATIENT)
Dept: PHARMACY | Facility: HOSPITAL | Age: 78
End: 2023-09-01
Payer: MEDICARE

## 2023-09-12 ENCOUNTER — TELEPHONE (OUTPATIENT)
Dept: FAMILY MEDICINE CLINIC | Facility: CLINIC | Age: 78
End: 2023-09-12
Payer: MEDICARE

## 2023-10-06 ENCOUNTER — HOME HEALTH ADMISSION (OUTPATIENT)
Dept: HOME HEALTH SERVICES | Facility: HOME HEALTHCARE | Age: 78
End: 2023-10-06
Payer: MEDICARE

## 2023-10-06 ENCOUNTER — READMISSION MANAGEMENT (OUTPATIENT)
Dept: CALL CENTER | Facility: HOSPITAL | Age: 78
End: 2023-10-06
Payer: MEDICARE

## 2023-10-06 ENCOUNTER — TRANSCRIBE ORDERS (OUTPATIENT)
Dept: HOME HEALTH SERVICES | Facility: HOME HEALTHCARE | Age: 78
End: 2023-10-06
Payer: MEDICARE

## 2023-10-06 DIAGNOSIS — I50.20 SYSTOLIC HEART FAILURE, UNSPECIFIED HF CHRONICITY: Primary | ICD-10-CM

## 2023-10-07 ENCOUNTER — HOME CARE VISIT (OUTPATIENT)
Dept: HOME HEALTH SERVICES | Facility: HOME HEALTHCARE | Age: 78
End: 2023-10-07
Payer: MEDICARE

## 2023-10-07 VITALS
DIASTOLIC BLOOD PRESSURE: 78 MMHG | TEMPERATURE: 97.8 F | RESPIRATION RATE: 18 BRPM | HEART RATE: 93 BPM | OXYGEN SATURATION: 96 % | SYSTOLIC BLOOD PRESSURE: 138 MMHG

## 2023-10-07 PROCEDURE — G0151 HHCP-SERV OF PT,EA 15 MIN: HCPCS

## 2023-10-08 ENCOUNTER — HOME CARE VISIT (OUTPATIENT)
Dept: HOME HEALTH SERVICES | Facility: HOME HEALTHCARE | Age: 78
End: 2023-10-08
Payer: MEDICARE

## 2023-10-09 ENCOUNTER — HOME CARE VISIT (OUTPATIENT)
Dept: HOME HEALTH SERVICES | Facility: HOME HEALTHCARE | Age: 78
End: 2023-10-09
Payer: MEDICARE

## 2023-10-09 ENCOUNTER — TELEPHONE (OUTPATIENT)
Dept: FAMILY MEDICINE CLINIC | Facility: CLINIC | Age: 78
End: 2023-10-09

## 2023-10-09 ENCOUNTER — TRANSITIONAL CARE MANAGEMENT TELEPHONE ENCOUNTER (OUTPATIENT)
Dept: CALL CENTER | Facility: HOSPITAL | Age: 78
End: 2023-10-09
Payer: MEDICARE

## 2023-10-09 PROCEDURE — G0299 HHS/HOSPICE OF RN EA 15 MIN: HCPCS

## 2023-10-10 ENCOUNTER — HOME CARE VISIT (OUTPATIENT)
Dept: HOME HEALTH SERVICES | Facility: HOME HEALTHCARE | Age: 78
End: 2023-10-10
Payer: MEDICARE

## 2023-10-10 ENCOUNTER — TRANSITIONAL CARE MANAGEMENT TELEPHONE ENCOUNTER (OUTPATIENT)
Dept: CALL CENTER | Facility: HOSPITAL | Age: 78
End: 2023-10-10
Payer: MEDICARE

## 2023-10-10 VITALS
HEART RATE: 76 BPM | DIASTOLIC BLOOD PRESSURE: 90 MMHG | OXYGEN SATURATION: 97 % | TEMPERATURE: 98 F | SYSTOLIC BLOOD PRESSURE: 150 MMHG

## 2023-10-10 VITALS
TEMPERATURE: 97 F | WEIGHT: 182 LBS | HEART RATE: 67 BPM | RESPIRATION RATE: 17 BRPM | DIASTOLIC BLOOD PRESSURE: 66 MMHG | SYSTOLIC BLOOD PRESSURE: 124 MMHG | OXYGEN SATURATION: 97 % | BODY MASS INDEX: 33.29 KG/M2

## 2023-10-10 PROCEDURE — G0151 HHCP-SERV OF PT,EA 15 MIN: HCPCS

## 2023-10-11 ENCOUNTER — TELEPHONE (OUTPATIENT)
Dept: FAMILY MEDICINE CLINIC | Facility: CLINIC | Age: 78
End: 2023-10-11
Payer: MEDICARE

## 2023-10-12 ENCOUNTER — HOME CARE VISIT (OUTPATIENT)
Dept: HOME HEALTH SERVICES | Facility: HOME HEALTHCARE | Age: 78
End: 2023-10-12
Payer: MEDICARE

## 2023-10-12 VITALS
RESPIRATION RATE: 15 BRPM | OXYGEN SATURATION: 95 % | DIASTOLIC BLOOD PRESSURE: 70 MMHG | HEART RATE: 82 BPM | SYSTOLIC BLOOD PRESSURE: 132 MMHG | TEMPERATURE: 97.7 F

## 2023-10-12 PROCEDURE — G0157 HHC PT ASSISTANT EA 15: HCPCS

## 2023-10-13 ENCOUNTER — TELEPHONE (OUTPATIENT)
Dept: FAMILY MEDICINE CLINIC | Facility: CLINIC | Age: 78
End: 2023-10-13
Payer: MEDICARE

## 2023-10-13 ENCOUNTER — HOME CARE VISIT (OUTPATIENT)
Dept: HOME HEALTH SERVICES | Facility: HOME HEALTHCARE | Age: 78
End: 2023-10-13
Payer: MEDICARE

## 2023-10-13 DIAGNOSIS — I26.99 PULMONARY EMBOLISM, UNSPECIFIED CHRONICITY, UNSPECIFIED PULMONARY EMBOLISM TYPE, UNSPECIFIED WHETHER ACUTE COR PULMONALE PRESENT: Primary | ICD-10-CM

## 2023-10-13 PROCEDURE — G0299 HHS/HOSPICE OF RN EA 15 MIN: HCPCS

## 2023-10-13 RX ORDER — FLUTICASONE FUROATE AND VILANTEROL TRIFENATATE 100; 25 UG/1; UG/1
1 POWDER RESPIRATORY (INHALATION)
Qty: 3 EACH | Refills: 3 | Status: SHIPPED | OUTPATIENT
Start: 2023-10-13 | End: 2023-11-12

## 2023-10-13 RX ORDER — POLYETHYLENE GLYCOL 3350 17 G/17G
17 POWDER, FOR SOLUTION ORAL DAILY PRN
Qty: 30 EACH | Refills: 11 | Status: SHIPPED | OUTPATIENT
Start: 2023-10-13 | End: 2024-10-07

## 2023-10-13 RX ORDER — LORATADINE 10 MG/1
10 TABLET ORAL DAILY
Qty: 30 TABLET | Refills: 0 | Status: SHIPPED | OUTPATIENT
Start: 2023-10-13

## 2023-10-13 RX ORDER — SENNOSIDES A AND B 8.6 MG/1
2 TABLET, FILM COATED ORAL NIGHTLY
Qty: 60 TABLET | Refills: 0 | Status: SHIPPED | OUTPATIENT
Start: 2023-10-13

## 2023-10-13 RX ORDER — MIDODRINE HYDROCHLORIDE 10 MG/1
10 TABLET ORAL
Qty: 90 TABLET | Refills: 0 | Status: SHIPPED | OUTPATIENT
Start: 2023-10-13

## 2023-10-13 RX ORDER — FLUTICASONE FUROATE AND VILANTEROL TRIFENATATE 100; 25 UG/1; UG/1
1 POWDER RESPIRATORY (INHALATION)
COMMUNITY
End: 2023-10-13 | Stop reason: SDUPTHER

## 2023-10-13 RX ORDER — ALBUTEROL SULFATE 1.25 MG/3ML
1 SOLUTION RESPIRATORY (INHALATION) EVERY 4 HOURS PRN
Qty: 360 ML | Refills: 3 | Status: SHIPPED | OUTPATIENT
Start: 2023-10-13 | End: 2024-01-11

## 2023-10-13 RX ORDER — ALBUTEROL SULFATE 1.25 MG/3ML
SOLUTION RESPIRATORY (INHALATION)
COMMUNITY
Start: 2023-09-27 | Stop reason: SDUPTHER

## 2023-10-14 VITALS
HEART RATE: 77 BPM | RESPIRATION RATE: 16 BRPM | TEMPERATURE: 97.2 F | OXYGEN SATURATION: 97 % | SYSTOLIC BLOOD PRESSURE: 120 MMHG | DIASTOLIC BLOOD PRESSURE: 64 MMHG

## 2023-10-16 ENCOUNTER — TELEPHONE (OUTPATIENT)
Dept: PHARMACY | Facility: HOSPITAL | Age: 78
End: 2023-10-16
Payer: MEDICARE

## 2023-10-16 ENCOUNTER — HOME CARE VISIT (OUTPATIENT)
Dept: HOME HEALTH SERVICES | Facility: HOME HEALTHCARE | Age: 78
End: 2023-10-16
Payer: MEDICARE

## 2023-10-16 DIAGNOSIS — I70.90 ASVD (ARTERIOSCLEROTIC VASCULAR DISEASE): Primary | ICD-10-CM

## 2023-10-17 ENCOUNTER — HOME CARE VISIT (OUTPATIENT)
Dept: HOME HEALTH SERVICES | Facility: HOME HEALTHCARE | Age: 78
End: 2023-10-17
Payer: MEDICARE

## 2023-10-22 ENCOUNTER — TRANSCRIBE ORDERS (OUTPATIENT)
Dept: HOME HEALTH SERVICES | Facility: HOME HEALTHCARE | Age: 78
End: 2023-10-22
Payer: MEDICARE

## 2023-10-22 DIAGNOSIS — J44.1 COPD EXACERBATION: ICD-10-CM

## 2023-10-22 DIAGNOSIS — I48.91 ATRIAL FIBRILLATION, UNSPECIFIED TYPE: Primary | ICD-10-CM

## 2023-10-23 ENCOUNTER — TELEPHONE (OUTPATIENT)
Dept: PHARMACY | Facility: HOSPITAL | Age: 78
End: 2023-10-23
Payer: MEDICARE

## 2023-10-23 ENCOUNTER — OFFICE VISIT (OUTPATIENT)
Dept: FAMILY MEDICINE CLINIC | Facility: CLINIC | Age: 78
End: 2023-10-23
Payer: MEDICARE

## 2023-10-23 ENCOUNTER — TRANSITIONAL CARE MANAGEMENT TELEPHONE ENCOUNTER (OUTPATIENT)
Dept: FAMILY MEDICINE CLINIC | Facility: CLINIC | Age: 78
End: 2023-10-23
Payer: MEDICARE

## 2023-10-23 VITALS
OXYGEN SATURATION: 93 % | HEART RATE: 86 BPM | HEIGHT: 62 IN | SYSTOLIC BLOOD PRESSURE: 150 MMHG | TEMPERATURE: 97.8 F | WEIGHT: 199.2 LBS | BODY MASS INDEX: 36.66 KG/M2 | DIASTOLIC BLOOD PRESSURE: 75 MMHG

## 2023-10-23 DIAGNOSIS — Z00.01 ENCOUNTER FOR ANNUAL GENERAL MEDICAL EXAMINATION WITH ABNORMAL FINDINGS IN ADULT: Primary | ICD-10-CM

## 2023-10-23 DIAGNOSIS — T17.800A MULTIPLE TRACHEOBRONCHIAL MUCUS PLUGS: ICD-10-CM

## 2023-10-23 DIAGNOSIS — I26.99 PULMONARY EMBOLISM, UNSPECIFIED CHRONICITY, UNSPECIFIED PULMONARY EMBOLISM TYPE, UNSPECIFIED WHETHER ACUTE COR PULMONALE PRESENT: ICD-10-CM

## 2023-10-23 DIAGNOSIS — J45.50 SEVERE PERSISTENT ASTHMA WITHOUT COMPLICATION: ICD-10-CM

## 2023-10-23 DIAGNOSIS — J43.1 PANLOBULAR EMPHYSEMA: Chronic | ICD-10-CM

## 2023-10-23 DIAGNOSIS — I10 ESSENTIAL HYPERTENSION: Chronic | ICD-10-CM

## 2023-10-23 DIAGNOSIS — F33.41 RECURRENT MAJOR DEPRESSIVE DISORDER, IN PARTIAL REMISSION: ICD-10-CM

## 2023-10-23 DIAGNOSIS — Z78.0 POSTMENOPAUSE: ICD-10-CM

## 2023-10-23 DIAGNOSIS — I25.10 CORONARY ARTERY DISEASE INVOLVING NATIVE CORONARY ARTERY OF NATIVE HEART WITHOUT ANGINA PECTORIS: ICD-10-CM

## 2023-10-23 DIAGNOSIS — E66.01 CLASS 2 SEVERE OBESITY DUE TO EXCESS CALORIES WITH SERIOUS COMORBIDITY AND BODY MASS INDEX (BMI) OF 36.0 TO 36.9 IN ADULT: ICD-10-CM

## 2023-10-23 DIAGNOSIS — N39.0 URINARY TRACT INFECTION WITHOUT HEMATURIA, SITE UNSPECIFIED: ICD-10-CM

## 2023-10-23 DIAGNOSIS — E55.9 VITAMIN D DEFICIENCY: ICD-10-CM

## 2023-10-23 PROBLEM — R06.03 ACUTE RESPIRATORY DISTRESS: Status: RESOLVED | Noted: 2023-06-04 | Resolved: 2023-10-23

## 2023-10-23 PROBLEM — E66.812 CLASS 2 SEVERE OBESITY WITH SERIOUS COMORBIDITY AND BODY MASS INDEX (BMI) OF 36.0 TO 36.9 IN ADULT: Status: ACTIVE | Noted: 2019-08-07

## 2023-10-23 RX ORDER — THEOPHYLLINE 100 MG/1
100 TABLET, EXTENDED RELEASE ORAL 2 TIMES DAILY
Qty: 60 TABLET | Refills: 0 | Status: SHIPPED | OUTPATIENT
Start: 2023-10-23 | End: 2023-10-24 | Stop reason: SDUPTHER

## 2023-10-23 RX ORDER — TORSEMIDE 20 MG/1
1 TABLET ORAL
COMMUNITY
Start: 2023-10-21

## 2023-10-23 RX ORDER — SPIRONOLACTONE 25 MG/1
1 TABLET ORAL DAILY
COMMUNITY
Start: 2023-10-21

## 2023-10-23 RX ORDER — DILTIAZEM HYDROCHLORIDE 180 MG/1
1 CAPSULE, COATED, EXTENDED RELEASE ORAL DAILY
COMMUNITY
Start: 2023-10-21

## 2023-10-24 ENCOUNTER — ANTICOAGULATION VISIT (OUTPATIENT)
Dept: PHARMACY | Facility: HOSPITAL | Age: 78
End: 2023-10-24
Payer: MEDICARE

## 2023-10-24 ENCOUNTER — HOME CARE VISIT (OUTPATIENT)
Dept: HOME HEALTH SERVICES | Facility: HOME HEALTHCARE | Age: 78
End: 2023-10-24
Payer: MEDICARE

## 2023-10-24 VITALS
HEART RATE: 78 BPM | OXYGEN SATURATION: 98 % | SYSTOLIC BLOOD PRESSURE: 120 MMHG | DIASTOLIC BLOOD PRESSURE: 80 MMHG | TEMPERATURE: 98 F

## 2023-10-24 DIAGNOSIS — I70.90 ASVD (ARTERIOSCLEROTIC VASCULAR DISEASE): Primary | ICD-10-CM

## 2023-10-24 LAB
HH POC INTERNATIONAL NORMALIZATION RATIO: 1.6
HH POC PROTIME: 17.3 SECONDS
INR PPP: 1.6 (ref 2–3)

## 2023-10-24 PROCEDURE — G0151 HHCP-SERV OF PT,EA 15 MIN: HCPCS

## 2023-10-24 PROCEDURE — G0299 HHS/HOSPICE OF RN EA 15 MIN: HCPCS

## 2023-10-24 RX ORDER — MIDODRINE HYDROCHLORIDE 10 MG/1
10 TABLET ORAL
Qty: 90 TABLET | Refills: 0 | Status: SHIPPED | OUTPATIENT
Start: 2023-10-24 | End: 2023-10-27 | Stop reason: SDUPTHER

## 2023-10-24 RX ORDER — BUDESONIDE 90 UG/1
AEROSOL, POWDER RESPIRATORY (INHALATION)
COMMUNITY
Start: 2023-10-15 | End: 2023-10-27 | Stop reason: SDUPTHER

## 2023-10-24 RX ORDER — SENNOSIDES A AND B 8.6 MG/1
2 TABLET, FILM COATED ORAL NIGHTLY
Qty: 60 TABLET | Refills: 0 | Status: SHIPPED | OUTPATIENT
Start: 2023-10-24 | End: 2023-10-27 | Stop reason: SDUPTHER

## 2023-10-24 RX ORDER — GUAIFENESIN 600 MG/1
600 TABLET, EXTENDED RELEASE ORAL 2 TIMES DAILY
Qty: 60 TABLET | Refills: 0 | Status: SHIPPED | OUTPATIENT
Start: 2023-10-24 | End: 2023-10-27 | Stop reason: SDUPTHER

## 2023-10-24 RX ORDER — POLYETHYLENE GLYCOL 3350 17 G/17G
17 POWDER, FOR SOLUTION ORAL DAILY PRN
Qty: 30 EACH | Refills: 11 | Status: SHIPPED | OUTPATIENT
Start: 2023-10-24 | End: 2023-10-27 | Stop reason: SDUPTHER

## 2023-10-24 RX ORDER — THEOPHYLLINE 100 MG/1
100 TABLET, EXTENDED RELEASE ORAL 2 TIMES DAILY
Qty: 60 TABLET | Refills: 0 | Status: SHIPPED | OUTPATIENT
Start: 2023-10-24

## 2023-10-25 ENCOUNTER — TELEPHONE (OUTPATIENT)
Dept: PHARMACY | Facility: HOSPITAL | Age: 78
End: 2023-10-25
Payer: MEDICARE

## 2023-10-25 VITALS
HEART RATE: 65 BPM | SYSTOLIC BLOOD PRESSURE: 120 MMHG | DIASTOLIC BLOOD PRESSURE: 58 MMHG | OXYGEN SATURATION: 98 % | RESPIRATION RATE: 16 BRPM | TEMPERATURE: 97.7 F

## 2023-10-26 ENCOUNTER — HOME CARE VISIT (OUTPATIENT)
Dept: HOME HEALTH SERVICES | Facility: HOME HEALTHCARE | Age: 78
End: 2023-10-26
Payer: MEDICARE

## 2023-10-26 VITALS
TEMPERATURE: 97.7 F | OXYGEN SATURATION: 94 % | DIASTOLIC BLOOD PRESSURE: 72 MMHG | RESPIRATION RATE: 16 BRPM | HEART RATE: 83 BPM | SYSTOLIC BLOOD PRESSURE: 138 MMHG

## 2023-10-26 PROCEDURE — G0157 HHC PT ASSISTANT EA 15: HCPCS

## 2023-10-27 ENCOUNTER — ANTICOAGULATION VISIT (OUTPATIENT)
Dept: PHARMACY | Facility: HOSPITAL | Age: 78
End: 2023-10-27
Payer: MEDICARE

## 2023-10-27 ENCOUNTER — CLINICAL SUPPORT (OUTPATIENT)
Dept: FAMILY MEDICINE CLINIC | Facility: CLINIC | Age: 78
End: 2023-10-27
Payer: MEDICARE

## 2023-10-27 ENCOUNTER — HOME CARE VISIT (OUTPATIENT)
Dept: HOME HEALTH SERVICES | Facility: HOME HEALTHCARE | Age: 78
End: 2023-10-27
Payer: MEDICARE

## 2023-10-27 DIAGNOSIS — F33.41 RECURRENT MAJOR DEPRESSIVE DISORDER, IN PARTIAL REMISSION: ICD-10-CM

## 2023-10-27 DIAGNOSIS — E55.9 VITAMIN D DEFICIENCY: ICD-10-CM

## 2023-10-27 DIAGNOSIS — N39.0 URINARY TRACT INFECTION WITHOUT HEMATURIA, SITE UNSPECIFIED: ICD-10-CM

## 2023-10-27 DIAGNOSIS — I26.99 PULMONARY EMBOLISM, UNSPECIFIED CHRONICITY, UNSPECIFIED PULMONARY EMBOLISM TYPE, UNSPECIFIED WHETHER ACUTE COR PULMONALE PRESENT: ICD-10-CM

## 2023-10-27 DIAGNOSIS — I70.90 ASVD (ARTERIOSCLEROTIC VASCULAR DISEASE): Primary | ICD-10-CM

## 2023-10-27 DIAGNOSIS — I10 ESSENTIAL HYPERTENSION: Chronic | ICD-10-CM

## 2023-10-27 LAB
25(OH)D3 SERPL-MCNC: 30 NG/ML (ref 30–100)
ALBUMIN SERPL-MCNC: 3.7 G/DL (ref 3.5–5.2)
ALBUMIN/GLOB SERPL: 1.4 G/DL
ALP SERPL-CCNC: 62 U/L (ref 39–117)
ALT SERPL W P-5'-P-CCNC: 51 U/L (ref 1–33)
ANION GAP SERPL CALCULATED.3IONS-SCNC: 6.1 MMOL/L (ref 5–15)
AST SERPL-CCNC: 22 U/L (ref 1–32)
BASOPHILS # BLD AUTO: 0.03 10*3/MM3 (ref 0–0.2)
BASOPHILS NFR BLD AUTO: 0.2 % (ref 0–1.5)
BILIRUB SERPL-MCNC: 0.3 MG/DL (ref 0–1.2)
BILIRUB UR QL STRIP: NEGATIVE
BUN SERPL-MCNC: 20 MG/DL (ref 8–23)
BUN/CREAT SERPL: 22.5 (ref 7–25)
CALCIUM SPEC-SCNC: 9.6 MG/DL (ref 8.6–10.5)
CHLORIDE SERPL-SCNC: 97 MMOL/L (ref 98–107)
CLARITY UR: CLEAR
CO2 SERPL-SCNC: 37.9 MMOL/L (ref 22–29)
COLOR UR: YELLOW
CREAT SERPL-MCNC: 0.89 MG/DL (ref 0.57–1)
DEPRECATED RDW RBC AUTO: 41 FL (ref 37–54)
EGFRCR SERPLBLD CKD-EPI 2021: 66.5 ML/MIN/1.73
EOSINOPHIL # BLD AUTO: 0.57 10*3/MM3 (ref 0–0.4)
EOSINOPHIL NFR BLD AUTO: 3.7 % (ref 0.3–6.2)
ERYTHROCYTE [DISTWIDTH] IN BLOOD BY AUTOMATED COUNT: 13.4 % (ref 12.3–15.4)
GLOBULIN UR ELPH-MCNC: 2.7 GM/DL
GLUCOSE SERPL-MCNC: 111 MG/DL (ref 65–99)
GLUCOSE UR STRIP-MCNC: NEGATIVE MG/DL
HCT VFR BLD AUTO: 43 % (ref 34–46.6)
HGB BLD-MCNC: 13.8 G/DL (ref 12–15.9)
HGB UR QL STRIP.AUTO: NEGATIVE
HH POC INTERNATIONAL NORMALIZATION RATIO: 1.7
HH POC PROTIME: 18.4 SECONDS
HOLD SPECIMEN: NORMAL
IMM GRANULOCYTES # BLD AUTO: 0.18 10*3/MM3 (ref 0–0.05)
IMM GRANULOCYTES NFR BLD AUTO: 1.2 % (ref 0–0.5)
INR PPP: 1.26 (ref 2–3)
INR PPP: 1.7 (ref 2–3)
KETONES UR QL STRIP: NEGATIVE
LEUKOCYTE ESTERASE UR QL STRIP.AUTO: NEGATIVE
LYMPHOCYTES # BLD AUTO: 1.59 10*3/MM3 (ref 0.7–3.1)
LYMPHOCYTES NFR BLD AUTO: 10.4 % (ref 19.6–45.3)
MAGNESIUM SERPL-MCNC: 2.1 MG/DL (ref 1.6–2.4)
MCH RBC QN AUTO: 27.5 PG (ref 26.6–33)
MCHC RBC AUTO-ENTMCNC: 32.1 G/DL (ref 31.5–35.7)
MCV RBC AUTO: 85.7 FL (ref 79–97)
MONOCYTES # BLD AUTO: 1.53 10*3/MM3 (ref 0.1–0.9)
MONOCYTES NFR BLD AUTO: 10 % (ref 5–12)
NEUTROPHILS NFR BLD AUTO: 11.43 10*3/MM3 (ref 1.7–7)
NEUTROPHILS NFR BLD AUTO: 74.5 % (ref 42.7–76)
NITRITE UR QL STRIP: NEGATIVE
NRBC BLD AUTO-RTO: 0 /100 WBC (ref 0–0.2)
PH UR STRIP.AUTO: 8 [PH] (ref 5–8)
PLATELET # BLD AUTO: 177 10*3/MM3 (ref 140–450)
PMV BLD AUTO: 12.2 FL (ref 6–12)
POTASSIUM SERPL-SCNC: 3.8 MMOL/L (ref 3.5–5.2)
PROT SERPL-MCNC: 6.4 G/DL (ref 6–8.5)
PROT UR QL STRIP: NEGATIVE
PROTHROMBIN TIME: 13.5 SECONDS (ref 19.4–28.5)
RBC # BLD AUTO: 5.02 10*6/MM3 (ref 3.77–5.28)
SODIUM SERPL-SCNC: 141 MMOL/L (ref 136–145)
SP GR UR STRIP: 1.02 (ref 1–1.03)
TSH SERPL DL<=0.05 MIU/L-ACNC: 18 UIU/ML (ref 0.27–4.2)
UROBILINOGEN UR QL STRIP: NORMAL
VIT B12 BLD-MCNC: 735 PG/ML (ref 211–946)
WBC NRBC COR # BLD: 15.33 10*3/MM3 (ref 3.4–10.8)

## 2023-10-27 PROCEDURE — 85610 PROTHROMBIN TIME: CPT | Performed by: PREVENTIVE MEDICINE

## 2023-10-27 PROCEDURE — 83735 ASSAY OF MAGNESIUM: CPT | Performed by: PREVENTIVE MEDICINE

## 2023-10-27 PROCEDURE — 80053 COMPREHEN METABOLIC PANEL: CPT | Performed by: PREVENTIVE MEDICINE

## 2023-10-27 PROCEDURE — 84443 ASSAY THYROID STIM HORMONE: CPT | Performed by: PREVENTIVE MEDICINE

## 2023-10-27 PROCEDURE — 85025 COMPLETE CBC W/AUTO DIFF WBC: CPT | Performed by: PREVENTIVE MEDICINE

## 2023-10-27 PROCEDURE — 82306 VITAMIN D 25 HYDROXY: CPT | Performed by: PREVENTIVE MEDICINE

## 2023-10-27 PROCEDURE — 81003 URINALYSIS AUTO W/O SCOPE: CPT | Performed by: PREVENTIVE MEDICINE

## 2023-10-27 PROCEDURE — G0299 HHS/HOSPICE OF RN EA 15 MIN: HCPCS

## 2023-10-27 PROCEDURE — 82607 VITAMIN B-12: CPT | Performed by: PREVENTIVE MEDICINE

## 2023-10-28 VITALS
RESPIRATION RATE: 16 BRPM | DIASTOLIC BLOOD PRESSURE: 66 MMHG | HEART RATE: 66 BPM | TEMPERATURE: 97 F | SYSTOLIC BLOOD PRESSURE: 124 MMHG | OXYGEN SATURATION: 99 %

## 2023-10-29 DIAGNOSIS — E03.9 HYPOTHYROIDISM, UNSPECIFIED TYPE: Primary | ICD-10-CM

## 2023-10-29 RX ORDER — LEVOTHYROXINE SODIUM 175 UG/1
175 TABLET ORAL DAILY
Qty: 90 TABLET | Refills: 1 | Status: SHIPPED | OUTPATIENT
Start: 2023-10-29

## 2023-10-30 ENCOUNTER — HOME CARE VISIT (OUTPATIENT)
Dept: HOME HEALTH SERVICES | Facility: HOME HEALTHCARE | Age: 78
End: 2023-10-30
Payer: MEDICARE

## 2023-10-30 ENCOUNTER — TELEPHONE (OUTPATIENT)
Dept: FAMILY MEDICINE CLINIC | Facility: CLINIC | Age: 78
End: 2023-10-30
Payer: MEDICARE

## 2023-10-30 ENCOUNTER — ANTICOAGULATION VISIT (OUTPATIENT)
Dept: PHARMACY | Facility: HOSPITAL | Age: 78
End: 2023-10-30
Payer: MEDICARE

## 2023-10-30 DIAGNOSIS — I70.90 ASVD (ARTERIOSCLEROTIC VASCULAR DISEASE): Primary | ICD-10-CM

## 2023-10-30 LAB
HH POC INTERNATIONAL NORMALIZATION RATIO: 5
HH POC PROTIME: 51.5 SECONDS
INR PPP: 5 (ref 2–3)

## 2023-10-30 PROCEDURE — G0299 HHS/HOSPICE OF RN EA 15 MIN: HCPCS

## 2023-10-30 RX ORDER — POLYETHYLENE GLYCOL 3350 17 G/17G
17 POWDER, FOR SOLUTION ORAL DAILY PRN
Qty: 30 EACH | Refills: 11 | Status: SHIPPED | OUTPATIENT
Start: 2023-10-30 | End: 2024-10-24

## 2023-10-30 RX ORDER — GUAIFENESIN 600 MG/1
600 TABLET, EXTENDED RELEASE ORAL 2 TIMES DAILY
Qty: 60 TABLET | Refills: 0 | Status: SHIPPED | OUTPATIENT
Start: 2023-10-30 | End: 2023-11-29

## 2023-10-30 RX ORDER — MIDODRINE HYDROCHLORIDE 10 MG/1
10 TABLET ORAL
Qty: 90 TABLET | Refills: 0 | Status: SHIPPED | OUTPATIENT
Start: 2023-10-30

## 2023-10-30 RX ORDER — IPRATROPIUM BROMIDE AND ALBUTEROL SULFATE 2.5; .5 MG/3ML; MG/3ML
3 SOLUTION RESPIRATORY (INHALATION) 4 TIMES DAILY
Qty: 360 ML | Refills: 0 | Status: SHIPPED | OUTPATIENT
Start: 2023-10-30

## 2023-10-30 RX ORDER — SENNOSIDES A AND B 8.6 MG/1
2 TABLET, FILM COATED ORAL NIGHTLY
Qty: 60 TABLET | Refills: 0 | Status: SHIPPED | OUTPATIENT
Start: 2023-10-30

## 2023-10-30 RX ORDER — THEOPHYLLINE 100 MG/1
100 TABLET, EXTENDED RELEASE ORAL 2 TIMES DAILY
Qty: 60 TABLET | Refills: 0 | OUTPATIENT
Start: 2023-10-30

## 2023-10-30 RX ORDER — BUDESONIDE 90 UG/1
AEROSOL, POWDER RESPIRATORY (INHALATION)
Qty: 3 EACH | Refills: 3 | Status: SHIPPED | OUTPATIENT
Start: 2023-10-30

## 2023-10-31 ENCOUNTER — HOME CARE VISIT (OUTPATIENT)
Dept: HOME HEALTH SERVICES | Facility: HOME HEALTHCARE | Age: 78
End: 2023-10-31
Payer: MEDICARE

## 2023-10-31 VITALS
OXYGEN SATURATION: 98 % | RESPIRATION RATE: 17 BRPM | SYSTOLIC BLOOD PRESSURE: 128 MMHG | TEMPERATURE: 98 F | HEART RATE: 87 BPM | DIASTOLIC BLOOD PRESSURE: 66 MMHG

## 2023-10-31 VITALS
OXYGEN SATURATION: 94 % | TEMPERATURE: 98.2 F | HEART RATE: 85 BPM | SYSTOLIC BLOOD PRESSURE: 132 MMHG | RESPIRATION RATE: 18 BRPM | DIASTOLIC BLOOD PRESSURE: 60 MMHG

## 2023-10-31 PROCEDURE — G0157 HHC PT ASSISTANT EA 15: HCPCS

## 2023-11-02 ENCOUNTER — HOME CARE VISIT (OUTPATIENT)
Dept: HOME HEALTH SERVICES | Facility: HOME HEALTHCARE | Age: 78
End: 2023-11-02
Payer: MEDICARE

## 2023-11-02 ENCOUNTER — ANTICOAGULATION VISIT (OUTPATIENT)
Dept: PHARMACY | Facility: HOSPITAL | Age: 78
End: 2023-11-02
Payer: MEDICARE

## 2023-11-02 VITALS
DIASTOLIC BLOOD PRESSURE: 68 MMHG | OXYGEN SATURATION: 94 % | SYSTOLIC BLOOD PRESSURE: 130 MMHG | HEART RATE: 85 BPM | TEMPERATURE: 97.8 F | RESPIRATION RATE: 17 BRPM

## 2023-11-02 DIAGNOSIS — I70.90 ASVD (ARTERIOSCLEROTIC VASCULAR DISEASE): Primary | ICD-10-CM

## 2023-11-02 LAB
HH POC INTERNATIONAL NORMALIZATION RATIO: 3.9
HH POC PROTIME: 41 SECONDS
INR PPP: 3.9 (ref 2–3)

## 2023-11-02 PROCEDURE — G0157 HHC PT ASSISTANT EA 15: HCPCS

## 2023-11-02 PROCEDURE — G0299 HHS/HOSPICE OF RN EA 15 MIN: HCPCS

## 2023-11-03 VITALS
BODY MASS INDEX: 32.92 KG/M2 | HEART RATE: 67 BPM | DIASTOLIC BLOOD PRESSURE: 66 MMHG | TEMPERATURE: 98 F | WEIGHT: 180 LBS | RESPIRATION RATE: 17 BRPM | OXYGEN SATURATION: 98 % | SYSTOLIC BLOOD PRESSURE: 120 MMHG

## 2023-11-06 ENCOUNTER — HOME CARE VISIT (OUTPATIENT)
Dept: HOME HEALTH SERVICES | Facility: HOME HEALTHCARE | Age: 78
End: 2023-11-06
Payer: MEDICARE

## 2023-11-06 ENCOUNTER — ANTICOAGULATION VISIT (OUTPATIENT)
Dept: PHARMACY | Facility: HOSPITAL | Age: 78
End: 2023-11-06
Payer: MEDICARE

## 2023-11-06 DIAGNOSIS — I26.99 PULMONARY EMBOLISM, UNSPECIFIED CHRONICITY, UNSPECIFIED PULMONARY EMBOLISM TYPE, UNSPECIFIED WHETHER ACUTE COR PULMONALE PRESENT: ICD-10-CM

## 2023-11-06 DIAGNOSIS — I70.90 ASVD (ARTERIOSCLEROTIC VASCULAR DISEASE): Primary | ICD-10-CM

## 2023-11-06 LAB
HH POC INTERNATIONAL NORMALIZATION RATIO: 4
HH POC PROTIME: 41 SECONDS
INR PPP: 4 (ref 2–3)

## 2023-11-06 PROCEDURE — G0299 HHS/HOSPICE OF RN EA 15 MIN: HCPCS

## 2023-11-06 RX ORDER — WARFARIN SODIUM 2.5 MG/1
TABLET ORAL
Qty: 30 TABLET | Refills: 1 | Status: SHIPPED | OUTPATIENT
Start: 2023-11-06

## 2023-11-07 ENCOUNTER — HOME CARE VISIT (OUTPATIENT)
Dept: HOME HEALTH SERVICES | Facility: HOME HEALTHCARE | Age: 78
End: 2023-11-07
Payer: MEDICARE

## 2023-11-07 VITALS
SYSTOLIC BLOOD PRESSURE: 130 MMHG | TEMPERATURE: 98 F | DIASTOLIC BLOOD PRESSURE: 66 MMHG | RESPIRATION RATE: 16 BRPM | OXYGEN SATURATION: 99 % | HEART RATE: 78 BPM

## 2023-11-08 ENCOUNTER — ANTICOAGULATION VISIT (OUTPATIENT)
Dept: PHARMACY | Facility: HOSPITAL | Age: 78
End: 2023-11-08
Payer: MEDICARE

## 2023-11-08 ENCOUNTER — HOME CARE VISIT (OUTPATIENT)
Dept: HOME HEALTH SERVICES | Facility: HOME HEALTHCARE | Age: 78
End: 2023-11-08
Payer: MEDICARE

## 2023-11-08 DIAGNOSIS — I26.01 ACUTE SEPTIC PULMONARY EMBOLISM WITH ACUTE COR PULMONALE: Primary | ICD-10-CM

## 2023-11-08 LAB
HH POC INTERNATIONAL NORMALIZATION RATIO: 1.9
HH POC PROTIME: 19.8 SECONDS
INR PPP: 1.9 (ref 2–3)

## 2023-11-08 PROCEDURE — G0299 HHS/HOSPICE OF RN EA 15 MIN: HCPCS

## 2023-11-09 ENCOUNTER — HOME CARE VISIT (OUTPATIENT)
Dept: HOME HEALTH SERVICES | Facility: HOME HEALTHCARE | Age: 78
End: 2023-11-09
Payer: MEDICARE

## 2023-11-09 VITALS
HEART RATE: 65 BPM | RESPIRATION RATE: 16 BRPM | TEMPERATURE: 97 F | SYSTOLIC BLOOD PRESSURE: 126 MMHG | OXYGEN SATURATION: 96 % | DIASTOLIC BLOOD PRESSURE: 66 MMHG

## 2023-11-09 VITALS
HEART RATE: 78 BPM | SYSTOLIC BLOOD PRESSURE: 128 MMHG | DIASTOLIC BLOOD PRESSURE: 70 MMHG | OXYGEN SATURATION: 96 % | TEMPERATURE: 98.2 F

## 2023-11-09 PROCEDURE — G0151 HHCP-SERV OF PT,EA 15 MIN: HCPCS

## 2023-11-13 ENCOUNTER — HOME CARE VISIT (OUTPATIENT)
Dept: HOME HEALTH SERVICES | Facility: HOME HEALTHCARE | Age: 78
End: 2023-11-13
Payer: MEDICARE

## 2023-11-13 ENCOUNTER — ANTICOAGULATION VISIT (OUTPATIENT)
Dept: PHARMACY | Facility: HOSPITAL | Age: 78
End: 2023-11-13
Payer: MEDICARE

## 2023-11-13 DIAGNOSIS — I26.99 PULMONARY EMBOLISM, UNSPECIFIED CHRONICITY, UNSPECIFIED PULMONARY EMBOLISM TYPE, UNSPECIFIED WHETHER ACUTE COR PULMONALE PRESENT: Primary | ICD-10-CM

## 2023-11-13 LAB
HH POC INTERNATIONAL NORMALIZATION RATIO: 1.4
HH POC PROTIME: 15.3 SECONDS
INR PPP: 1.4 (ref 2–3)

## 2023-11-13 PROCEDURE — G0299 HHS/HOSPICE OF RN EA 15 MIN: HCPCS

## 2023-11-14 ENCOUNTER — HOME CARE VISIT (OUTPATIENT)
Dept: HOME HEALTH SERVICES | Facility: HOME HEALTHCARE | Age: 78
End: 2023-11-14
Payer: MEDICARE

## 2023-11-14 VITALS
DIASTOLIC BLOOD PRESSURE: 68 MMHG | SYSTOLIC BLOOD PRESSURE: 130 MMHG | TEMPERATURE: 98.7 F | RESPIRATION RATE: 14 BRPM | OXYGEN SATURATION: 95 % | HEART RATE: 82 BPM

## 2023-11-14 PROCEDURE — G0157 HHC PT ASSISTANT EA 15: HCPCS

## 2023-11-15 VITALS
TEMPERATURE: 98 F | RESPIRATION RATE: 16 BRPM | SYSTOLIC BLOOD PRESSURE: 128 MMHG | OXYGEN SATURATION: 99 % | HEART RATE: 78 BPM | DIASTOLIC BLOOD PRESSURE: 70 MMHG

## 2023-11-16 ENCOUNTER — TELEPHONE (OUTPATIENT)
Dept: PHARMACY | Facility: HOSPITAL | Age: 78
End: 2023-11-16
Payer: MEDICARE

## 2023-11-16 ENCOUNTER — HOME CARE VISIT (OUTPATIENT)
Dept: HOME HEALTH SERVICES | Facility: HOME HEALTHCARE | Age: 78
End: 2023-11-16
Payer: MEDICARE

## 2023-11-16 VITALS
OXYGEN SATURATION: 96 % | DIASTOLIC BLOOD PRESSURE: 80 MMHG | TEMPERATURE: 97.9 F | SYSTOLIC BLOOD PRESSURE: 129 MMHG | HEART RATE: 79 BPM

## 2023-11-16 PROCEDURE — G0151 HHCP-SERV OF PT,EA 15 MIN: HCPCS

## 2023-11-17 ENCOUNTER — HOME CARE VISIT (OUTPATIENT)
Dept: HOME HEALTH SERVICES | Facility: HOME HEALTHCARE | Age: 78
End: 2023-11-17
Payer: MEDICARE

## 2023-11-17 ENCOUNTER — TELEPHONE (OUTPATIENT)
Dept: FAMILY MEDICINE CLINIC | Facility: CLINIC | Age: 78
End: 2023-11-17
Payer: MEDICARE

## 2023-11-17 ENCOUNTER — ANTICOAGULATION VISIT (OUTPATIENT)
Dept: PHARMACY | Facility: HOSPITAL | Age: 78
End: 2023-11-17
Payer: MEDICARE

## 2023-11-17 DIAGNOSIS — I26.99 PULMONARY EMBOLISM, UNSPECIFIED CHRONICITY, UNSPECIFIED PULMONARY EMBOLISM TYPE, UNSPECIFIED WHETHER ACUTE COR PULMONALE PRESENT: Primary | ICD-10-CM

## 2023-11-17 LAB
HH POC INTERNATIONAL NORMALIZATION RATIO: 1.2
HH POC PROTIME: 13.7 SECONDS
INR PPP: 1.2 (ref 2–3)

## 2023-11-17 PROCEDURE — G0299 HHS/HOSPICE OF RN EA 15 MIN: HCPCS

## 2023-11-17 RX ORDER — WARFARIN SODIUM 7.5 MG/1
7.5 TABLET ORAL EVERY EVENING
Qty: 30 TABLET | Refills: 1 | Status: SHIPPED | OUTPATIENT
Start: 2023-11-17

## 2023-11-17 RX ORDER — DILTIAZEM HYDROCHLORIDE 180 MG/1
180 CAPSULE, COATED, EXTENDED RELEASE ORAL DAILY
Qty: 90 CAPSULE | Refills: 1 | Status: SHIPPED | OUTPATIENT
Start: 2023-11-17

## 2023-11-17 RX ORDER — FLUTICASONE FUROATE AND VILANTEROL TRIFENATATE 100; 25 UG/1; UG/1
POWDER RESPIRATORY (INHALATION)
Qty: 3 EACH | Refills: 3 | Status: SHIPPED | OUTPATIENT
Start: 2023-11-17

## 2023-11-17 RX ORDER — ATORVASTATIN CALCIUM 40 MG/1
40 TABLET, FILM COATED ORAL EVERY MORNING
Qty: 90 TABLET | Refills: 1 | Status: SHIPPED | OUTPATIENT
Start: 2023-11-17

## 2023-11-17 RX ORDER — SPIRONOLACTONE 25 MG/1
25 TABLET ORAL DAILY
Qty: 90 TABLET | Refills: 0 | Status: SHIPPED | OUTPATIENT
Start: 2023-11-17

## 2023-11-17 RX ORDER — POTASSIUM CHLORIDE 1500 MG/1
TABLET, EXTENDED RELEASE ORAL
Qty: 60 TABLET | Refills: 1 | Status: SHIPPED | OUTPATIENT
Start: 2023-11-17

## 2023-11-17 RX ORDER — THEOPHYLLINE 400 MG/1
1200 TABLET, EXTENDED RELEASE ORAL EVERY MORNING
Qty: 90 TABLET | OUTPATIENT
Start: 2023-11-17

## 2023-11-17 RX ORDER — TORSEMIDE 20 MG/1
20 TABLET ORAL DAILY
Qty: 23 TABLET | Refills: 0 | Status: SHIPPED | OUTPATIENT
Start: 2023-11-17

## 2023-11-17 RX ORDER — LEVOTHYROXINE SODIUM 137 UG/1
137 TABLET ORAL EVERY MORNING
Qty: 30 TABLET | Refills: 1 | Status: SHIPPED | OUTPATIENT
Start: 2023-11-17 | End: 2023-11-20

## 2023-11-18 VITALS
SYSTOLIC BLOOD PRESSURE: 122 MMHG | RESPIRATION RATE: 16 BRPM | DIASTOLIC BLOOD PRESSURE: 60 MMHG | TEMPERATURE: 98 F | OXYGEN SATURATION: 99 % | HEART RATE: 78 BPM

## 2023-11-20 ENCOUNTER — HOME CARE VISIT (OUTPATIENT)
Dept: HOME HEALTH SERVICES | Facility: HOME HEALTHCARE | Age: 78
End: 2023-11-20
Payer: MEDICARE

## 2023-11-20 ENCOUNTER — ANTICOAGULATION VISIT (OUTPATIENT)
Dept: PHARMACY | Facility: HOSPITAL | Age: 78
End: 2023-11-20
Payer: MEDICARE

## 2023-11-20 DIAGNOSIS — I26.99 PULMONARY EMBOLISM, UNSPECIFIED CHRONICITY, UNSPECIFIED PULMONARY EMBOLISM TYPE, UNSPECIFIED WHETHER ACUTE COR PULMONALE PRESENT: Primary | ICD-10-CM

## 2023-11-20 LAB
HH POC INTERNATIONAL NORMALIZATION RATIO: 1.7
HH POC PROTIME: 18.5 SECONDS
INR PPP: 1.7 (ref 2–3)

## 2023-11-20 PROCEDURE — G0299 HHS/HOSPICE OF RN EA 15 MIN: HCPCS

## 2023-11-20 RX ORDER — LEVOTHYROXINE SODIUM 137 UG/1
137 TABLET ORAL EVERY MORNING
Qty: 30 TABLET | Refills: 0 | Status: SHIPPED | OUTPATIENT
Start: 2023-11-20

## 2023-11-21 ENCOUNTER — HOME CARE VISIT (OUTPATIENT)
Dept: HOME HEALTH SERVICES | Facility: HOME HEALTHCARE | Age: 78
End: 2023-11-21
Payer: MEDICARE

## 2023-11-21 VITALS
TEMPERATURE: 97 F | HEART RATE: 67 BPM | OXYGEN SATURATION: 99 % | DIASTOLIC BLOOD PRESSURE: 60 MMHG | SYSTOLIC BLOOD PRESSURE: 136 MMHG | RESPIRATION RATE: 17 BRPM

## 2023-11-22 RX ORDER — MIDODRINE HYDROCHLORIDE 10 MG/1
10 TABLET ORAL
Qty: 270 TABLET | OUTPATIENT
Start: 2023-11-22

## 2023-11-23 RX ORDER — MIDODRINE HYDROCHLORIDE 10 MG/1
10 TABLET ORAL
Qty: 270 TABLET | Refills: 0 | Status: SHIPPED | OUTPATIENT
Start: 2023-11-23

## 2023-11-28 ENCOUNTER — HOME CARE VISIT (OUTPATIENT)
Dept: HOME HEALTH SERVICES | Facility: HOME HEALTHCARE | Age: 78
End: 2023-11-28
Payer: MEDICARE

## 2023-11-28 VITALS
SYSTOLIC BLOOD PRESSURE: 130 MMHG | DIASTOLIC BLOOD PRESSURE: 80 MMHG | HEART RATE: 77 BPM | OXYGEN SATURATION: 97 % | TEMPERATURE: 97.7 F

## 2023-11-28 PROCEDURE — G0151 HHCP-SERV OF PT,EA 15 MIN: HCPCS

## 2023-11-30 ENCOUNTER — ANTICOAGULATION VISIT (OUTPATIENT)
Dept: PHARMACY | Facility: HOSPITAL | Age: 78
End: 2023-11-30
Payer: MEDICARE

## 2023-11-30 ENCOUNTER — HOME CARE VISIT (OUTPATIENT)
Dept: HOME HEALTH SERVICES | Facility: HOME HEALTHCARE | Age: 78
End: 2023-11-30
Payer: MEDICARE

## 2023-11-30 DIAGNOSIS — I26.99 PULMONARY EMBOLISM, UNSPECIFIED CHRONICITY, UNSPECIFIED PULMONARY EMBOLISM TYPE, UNSPECIFIED WHETHER ACUTE COR PULMONALE PRESENT: Primary | ICD-10-CM

## 2023-11-30 LAB
HH POC INTERNATIONAL NORMALIZATION RATIO: 15.7
HH POC PROTIME: 15.7 SECONDS
INR PPP: 1.4 (ref 2–3)

## 2023-11-30 PROCEDURE — G0299 HHS/HOSPICE OF RN EA 15 MIN: HCPCS

## 2023-11-30 RX ORDER — GUAIFENESIN 600 MG/1
600 TABLET, EXTENDED RELEASE ORAL 2 TIMES DAILY
Qty: 60 TABLET | Refills: 0 | Status: SHIPPED | OUTPATIENT
Start: 2023-11-30

## 2023-11-30 RX ORDER — SENNOSIDES 8.6 MG
2 TABLET ORAL EVERY EVENING
Qty: 60 TABLET | Refills: 0 | Status: SHIPPED | OUTPATIENT
Start: 2023-11-30

## 2023-12-01 VITALS
DIASTOLIC BLOOD PRESSURE: 60 MMHG | HEART RATE: 78 BPM | TEMPERATURE: 97.4 F | RESPIRATION RATE: 16 BRPM | OXYGEN SATURATION: 99 % | SYSTOLIC BLOOD PRESSURE: 132 MMHG

## 2023-12-04 ENCOUNTER — OFFICE VISIT (OUTPATIENT)
Dept: FAMILY MEDICINE CLINIC | Facility: CLINIC | Age: 78
End: 2023-12-04
Payer: MEDICARE

## 2023-12-04 VITALS
WEIGHT: 191.4 LBS | TEMPERATURE: 97 F | DIASTOLIC BLOOD PRESSURE: 74 MMHG | BODY MASS INDEX: 35.22 KG/M2 | OXYGEN SATURATION: 96 % | SYSTOLIC BLOOD PRESSURE: 128 MMHG | HEART RATE: 83 BPM | HEIGHT: 62 IN

## 2023-12-04 DIAGNOSIS — J43.1 PANLOBULAR EMPHYSEMA: Chronic | ICD-10-CM

## 2023-12-04 DIAGNOSIS — I70.90 ASVD (ARTERIOSCLEROTIC VASCULAR DISEASE): ICD-10-CM

## 2023-12-04 DIAGNOSIS — E66.01 CLASS 2 SEVERE OBESITY DUE TO EXCESS CALORIES WITH SERIOUS COMORBIDITY AND BODY MASS INDEX (BMI) OF 35.0 TO 35.9 IN ADULT: ICD-10-CM

## 2023-12-04 DIAGNOSIS — J45.50 SEVERE PERSISTENT ASTHMA WITHOUT COMPLICATION: ICD-10-CM

## 2023-12-04 DIAGNOSIS — M81.0 POSTMENOPAUSAL OSTEOPOROSIS: ICD-10-CM

## 2023-12-04 DIAGNOSIS — Z00.01 ENCOUNTER FOR ANNUAL GENERAL MEDICAL EXAMINATION WITH ABNORMAL FINDINGS IN ADULT: Primary | ICD-10-CM

## 2023-12-04 DIAGNOSIS — E78.2 MIXED HYPERLIPIDEMIA: Chronic | ICD-10-CM

## 2023-12-04 DIAGNOSIS — F33.41 RECURRENT MAJOR DEPRESSIVE DISORDER, IN PARTIAL REMISSION: ICD-10-CM

## 2023-12-04 DIAGNOSIS — I26.99 PULMONARY EMBOLISM, UNSPECIFIED CHRONICITY, UNSPECIFIED PULMONARY EMBOLISM TYPE, UNSPECIFIED WHETHER ACUTE COR PULMONALE PRESENT: ICD-10-CM

## 2023-12-04 DIAGNOSIS — I10 ESSENTIAL HYPERTENSION: Chronic | ICD-10-CM

## 2023-12-05 ENCOUNTER — HOME CARE VISIT (OUTPATIENT)
Dept: HOME HEALTH SERVICES | Facility: HOME HEALTHCARE | Age: 78
End: 2023-12-05
Payer: MEDICARE

## 2023-12-05 ENCOUNTER — ANTICOAGULATION VISIT (OUTPATIENT)
Dept: PHARMACY | Facility: HOSPITAL | Age: 78
End: 2023-12-05
Payer: MEDICARE

## 2023-12-05 DIAGNOSIS — I26.99 PULMONARY EMBOLISM, UNSPECIFIED CHRONICITY, UNSPECIFIED PULMONARY EMBOLISM TYPE, UNSPECIFIED WHETHER ACUTE COR PULMONALE PRESENT: Primary | ICD-10-CM

## 2023-12-05 LAB
HH POC INTERNATIONAL NORMALIZATION RATIO: 3.1
HH POC PROTIME: 30.3 SECONDS
INR PPP: 3.1 (ref 2–3)

## 2023-12-05 PROCEDURE — G0299 HHS/HOSPICE OF RN EA 15 MIN: HCPCS

## 2023-12-06 ENCOUNTER — CLINICAL SUPPORT (OUTPATIENT)
Dept: FAMILY MEDICINE CLINIC | Facility: CLINIC | Age: 78
End: 2023-12-06
Payer: MEDICARE

## 2023-12-06 VITALS
RESPIRATION RATE: 16 BRPM | HEART RATE: 87 BPM | TEMPERATURE: 98 F | DIASTOLIC BLOOD PRESSURE: 64 MMHG | OXYGEN SATURATION: 99 % | SYSTOLIC BLOOD PRESSURE: 126 MMHG

## 2023-12-06 DIAGNOSIS — E78.2 MIXED HYPERLIPIDEMIA: Chronic | ICD-10-CM

## 2023-12-06 DIAGNOSIS — I10 ESSENTIAL HYPERTENSION: Chronic | ICD-10-CM

## 2023-12-06 DIAGNOSIS — E03.9 HYPOTHYROIDISM, UNSPECIFIED TYPE: ICD-10-CM

## 2023-12-06 DIAGNOSIS — F33.41 RECURRENT MAJOR DEPRESSIVE DISORDER, IN PARTIAL REMISSION: ICD-10-CM

## 2023-12-06 LAB
ALBUMIN SERPL-MCNC: 4.2 G/DL (ref 3.5–5.2)
ALBUMIN/GLOB SERPL: 1.8 G/DL
ALP SERPL-CCNC: 64 U/L (ref 39–117)
ALT SERPL W P-5'-P-CCNC: 20 U/L (ref 1–33)
ANION GAP SERPL CALCULATED.3IONS-SCNC: 9 MMOL/L (ref 5–15)
AST SERPL-CCNC: 21 U/L (ref 1–32)
BASOPHILS # BLD AUTO: 0.08 10*3/MM3 (ref 0–0.2)
BASOPHILS NFR BLD AUTO: 1 % (ref 0–1.5)
BILIRUB SERPL-MCNC: 0.5 MG/DL (ref 0–1.2)
BUN SERPL-MCNC: 30 MG/DL (ref 8–23)
BUN/CREAT SERPL: 22.1 (ref 7–25)
CALCIUM SPEC-SCNC: 9.6 MG/DL (ref 8.6–10.5)
CHLORIDE SERPL-SCNC: 104 MMOL/L (ref 98–107)
CHOLEST SERPL-MCNC: 121 MG/DL (ref 0–200)
CO2 SERPL-SCNC: 29 MMOL/L (ref 22–29)
CREAT SERPL-MCNC: 1.36 MG/DL (ref 0.57–1)
DEPRECATED RDW RBC AUTO: 43.6 FL (ref 37–54)
EGFRCR SERPLBLD CKD-EPI 2021: 40 ML/MIN/1.73
EOSINOPHIL # BLD AUTO: 0.65 10*3/MM3 (ref 0–0.4)
EOSINOPHIL NFR BLD AUTO: 8.5 % (ref 0.3–6.2)
ERYTHROCYTE [DISTWIDTH] IN BLOOD BY AUTOMATED COUNT: 15 % (ref 12.3–15.4)
GLOBULIN UR ELPH-MCNC: 2.4 GM/DL
GLUCOSE SERPL-MCNC: 111 MG/DL (ref 65–99)
HCT VFR BLD AUTO: 41.7 % (ref 34–46.6)
HDLC SERPL-MCNC: 37 MG/DL (ref 40–60)
HGB BLD-MCNC: 13.4 G/DL (ref 12–15.9)
IMM GRANULOCYTES # BLD AUTO: 0.02 10*3/MM3 (ref 0–0.05)
IMM GRANULOCYTES NFR BLD AUTO: 0.3 % (ref 0–0.5)
LDLC SERPL CALC-MCNC: 63 MG/DL (ref 0–100)
LDLC/HDLC SERPL: 1.64 {RATIO}
LYMPHOCYTES # BLD AUTO: 1.9 10*3/MM3 (ref 0.7–3.1)
LYMPHOCYTES NFR BLD AUTO: 24.9 % (ref 19.6–45.3)
MAGNESIUM SERPL-MCNC: 2 MG/DL (ref 1.6–2.4)
MCH RBC QN AUTO: 26.1 PG (ref 26.6–33)
MCHC RBC AUTO-ENTMCNC: 32.1 G/DL (ref 31.5–35.7)
MCV RBC AUTO: 81.3 FL (ref 79–97)
MONOCYTES # BLD AUTO: 0.84 10*3/MM3 (ref 0.1–0.9)
MONOCYTES NFR BLD AUTO: 11 % (ref 5–12)
NEUTROPHILS NFR BLD AUTO: 4.13 10*3/MM3 (ref 1.7–7)
NEUTROPHILS NFR BLD AUTO: 54.3 % (ref 42.7–76)
NRBC BLD AUTO-RTO: 0 /100 WBC (ref 0–0.2)
PLATELET # BLD AUTO: 174 10*3/MM3 (ref 140–450)
PMV BLD AUTO: 10.8 FL (ref 6–12)
POTASSIUM SERPL-SCNC: 4.2 MMOL/L (ref 3.5–5.2)
PROT SERPL-MCNC: 6.6 G/DL (ref 6–8.5)
RBC # BLD AUTO: 5.13 10*6/MM3 (ref 3.77–5.28)
SODIUM SERPL-SCNC: 142 MMOL/L (ref 136–145)
T4 FREE SERPL-MCNC: 1.67 NG/DL (ref 0.93–1.7)
TRIGL SERPL-MCNC: 117 MG/DL (ref 0–150)
TSH SERPL DL<=0.05 MIU/L-ACNC: 0.07 UIU/ML (ref 0.27–4.2)
VIT B12 BLD-MCNC: 618 PG/ML (ref 211–946)
VLDLC SERPL-MCNC: 21 MG/DL (ref 5–40)
WBC NRBC COR # BLD AUTO: 7.62 10*3/MM3 (ref 3.4–10.8)

## 2023-12-06 PROCEDURE — 36415 COLL VENOUS BLD VENIPUNCTURE: CPT | Performed by: PREVENTIVE MEDICINE

## 2023-12-06 PROCEDURE — 84443 ASSAY THYROID STIM HORMONE: CPT | Performed by: PREVENTIVE MEDICINE

## 2023-12-06 PROCEDURE — 85025 COMPLETE CBC W/AUTO DIFF WBC: CPT | Performed by: PREVENTIVE MEDICINE

## 2023-12-06 PROCEDURE — 83735 ASSAY OF MAGNESIUM: CPT | Performed by: PREVENTIVE MEDICINE

## 2023-12-06 PROCEDURE — 84439 ASSAY OF FREE THYROXINE: CPT | Performed by: PREVENTIVE MEDICINE

## 2023-12-06 PROCEDURE — 82607 VITAMIN B-12: CPT | Performed by: PREVENTIVE MEDICINE

## 2023-12-06 PROCEDURE — 80061 LIPID PANEL: CPT | Performed by: PREVENTIVE MEDICINE

## 2023-12-06 PROCEDURE — 80053 COMPREHEN METABOLIC PANEL: CPT | Performed by: PREVENTIVE MEDICINE

## 2023-12-07 ENCOUNTER — TELEPHONE (OUTPATIENT)
Dept: FAMILY MEDICINE CLINIC | Facility: CLINIC | Age: 78
End: 2023-12-07
Payer: MEDICARE

## 2023-12-07 DIAGNOSIS — E03.9 HYPOTHYROIDISM, UNSPECIFIED TYPE: Primary | ICD-10-CM

## 2023-12-07 DIAGNOSIS — R79.89 ELEVATED SERUM CREATININE: ICD-10-CM

## 2023-12-07 RX ORDER — LEVOTHYROXINE SODIUM 0.12 MG/1
125 TABLET ORAL DAILY
Qty: 90 TABLET | Refills: 1 | Status: SHIPPED | OUTPATIENT
Start: 2023-12-07

## 2023-12-12 ENCOUNTER — ANTICOAGULATION VISIT (OUTPATIENT)
Dept: PHARMACY | Facility: HOSPITAL | Age: 78
End: 2023-12-12
Payer: MEDICARE

## 2023-12-12 ENCOUNTER — HOME CARE VISIT (OUTPATIENT)
Dept: HOME HEALTH SERVICES | Facility: HOME HEALTHCARE | Age: 78
End: 2023-12-12
Payer: MEDICARE

## 2023-12-12 DIAGNOSIS — I26.99 PULMONARY EMBOLISM, UNSPECIFIED CHRONICITY, UNSPECIFIED PULMONARY EMBOLISM TYPE, UNSPECIFIED WHETHER ACUTE COR PULMONALE PRESENT: Primary | ICD-10-CM

## 2023-12-12 LAB
HH POC INTERNATIONAL NORMALIZATION RATIO: 1.6
HH POC PROTIME: 17.7 SECONDS
INR PPP: 1.6 (ref 2–3)

## 2023-12-12 PROCEDURE — G0299 HHS/HOSPICE OF RN EA 15 MIN: HCPCS

## 2023-12-12 RX ORDER — LEVOTHYROXINE SODIUM 137 UG/1
137 TABLET ORAL EVERY MORNING
Qty: 30 TABLET | Refills: 0 | OUTPATIENT
Start: 2023-12-12

## 2023-12-13 VITALS
OXYGEN SATURATION: 98 % | BODY MASS INDEX: 33.84 KG/M2 | TEMPERATURE: 97 F | DIASTOLIC BLOOD PRESSURE: 72 MMHG | RESPIRATION RATE: 17 BRPM | SYSTOLIC BLOOD PRESSURE: 130 MMHG | WEIGHT: 185 LBS | HEART RATE: 77 BPM

## 2023-12-19 ENCOUNTER — HOME CARE VISIT (OUTPATIENT)
Dept: HOME HEALTH SERVICES | Facility: HOME HEALTHCARE | Age: 78
End: 2023-12-19
Payer: MEDICARE

## 2023-12-19 ENCOUNTER — ANTICOAGULATION VISIT (OUTPATIENT)
Dept: PHARMACY | Facility: HOSPITAL | Age: 78
End: 2023-12-19
Payer: MEDICARE

## 2023-12-19 DIAGNOSIS — I26.99 PULMONARY EMBOLISM, UNSPECIFIED CHRONICITY, UNSPECIFIED PULMONARY EMBOLISM TYPE, UNSPECIFIED WHETHER ACUTE COR PULMONALE PRESENT: ICD-10-CM

## 2023-12-19 DIAGNOSIS — I26.01 ACUTE SEPTIC PULMONARY EMBOLISM WITH ACUTE COR PULMONALE: Primary | ICD-10-CM

## 2023-12-19 LAB
HH POC INTERNATIONAL NORMALIZATION RATIO: 1.7
HH POC PROTIME: 18.7 SECONDS
INR PPP: 1.7 (ref 2–3)

## 2023-12-19 PROCEDURE — G0299 HHS/HOSPICE OF RN EA 15 MIN: HCPCS

## 2023-12-19 RX ORDER — WARFARIN SODIUM 2.5 MG/1
TABLET ORAL
Qty: 30 TABLET | Refills: 1 | Status: SHIPPED | OUTPATIENT
Start: 2023-12-19

## 2023-12-20 VITALS
HEART RATE: 78 BPM | SYSTOLIC BLOOD PRESSURE: 126 MMHG | RESPIRATION RATE: 16 BRPM | OXYGEN SATURATION: 99 % | TEMPERATURE: 98 F | DIASTOLIC BLOOD PRESSURE: 66 MMHG

## 2023-12-26 ENCOUNTER — ANTICOAGULATION VISIT (OUTPATIENT)
Dept: PHARMACY | Facility: HOSPITAL | Age: 78
End: 2023-12-26
Payer: MEDICARE

## 2023-12-26 ENCOUNTER — HOME CARE VISIT (OUTPATIENT)
Dept: HOME HEALTH SERVICES | Facility: HOME HEALTHCARE | Age: 78
End: 2023-12-26
Payer: MEDICARE

## 2023-12-26 DIAGNOSIS — I26.99 PULMONARY EMBOLISM, UNSPECIFIED CHRONICITY, UNSPECIFIED PULMONARY EMBOLISM TYPE, UNSPECIFIED WHETHER ACUTE COR PULMONALE PRESENT: Primary | ICD-10-CM

## 2023-12-26 LAB
HH POC INTERNATIONAL NORMALIZATION RATIO: 2.5
HH POC PROTIME: 26.9 SECONDS
INR PPP: 2.5 (ref 2–3)

## 2023-12-26 PROCEDURE — G0299 HHS/HOSPICE OF RN EA 15 MIN: HCPCS

## 2023-12-27 ENCOUNTER — HOME CARE VISIT (OUTPATIENT)
Dept: HOME HEALTH SERVICES | Facility: HOME HEALTHCARE | Age: 78
End: 2023-12-27
Payer: MEDICARE

## 2023-12-28 VITALS
RESPIRATION RATE: 16 BRPM | SYSTOLIC BLOOD PRESSURE: 132 MMHG | HEART RATE: 77 BPM | TEMPERATURE: 97 F | OXYGEN SATURATION: 99 % | DIASTOLIC BLOOD PRESSURE: 66 MMHG

## 2023-12-28 RX ORDER — WARFARIN SODIUM 2.5 MG/1
2.5 TABLET ORAL NIGHTLY
Qty: 30 TABLET | Refills: 1 | Status: SHIPPED | OUTPATIENT
Start: 2023-12-28

## 2024-01-02 ENCOUNTER — HOME CARE VISIT (OUTPATIENT)
Dept: HOME HEALTH SERVICES | Facility: HOME HEALTHCARE | Age: 79
End: 2024-01-02
Payer: MEDICARE

## 2024-01-02 ENCOUNTER — ANTICOAGULATION VISIT (OUTPATIENT)
Dept: PHARMACY | Facility: HOSPITAL | Age: 79
End: 2024-01-02
Payer: MEDICARE

## 2024-01-02 DIAGNOSIS — I26.99 PULMONARY EMBOLISM, UNSPECIFIED CHRONICITY, UNSPECIFIED PULMONARY EMBOLISM TYPE, UNSPECIFIED WHETHER ACUTE COR PULMONALE PRESENT: Primary | ICD-10-CM

## 2024-01-02 LAB — INR PPP: 4.9 (ref 2–3)

## 2024-01-02 PROCEDURE — G0299 HHS/HOSPICE OF RN EA 15 MIN: HCPCS

## 2024-01-02 RX ORDER — GUAIFENESIN 600 MG/1
600 TABLET, EXTENDED RELEASE ORAL 2 TIMES DAILY
Qty: 60 TABLET | Refills: 0 | Status: SHIPPED | OUTPATIENT
Start: 2024-01-02

## 2024-01-02 RX ORDER — SENNOSIDES 8.6 MG
2 TABLET ORAL EVERY EVENING
Qty: 60 TABLET | Refills: 0 | Status: SHIPPED | OUTPATIENT
Start: 2024-01-02

## 2024-01-03 ENCOUNTER — HOME CARE VISIT (OUTPATIENT)
Dept: HOME HEALTH SERVICES | Facility: HOME HEALTHCARE | Age: 79
End: 2024-01-03
Payer: MEDICARE

## 2024-01-03 VITALS
HEART RATE: 77 BPM | SYSTOLIC BLOOD PRESSURE: 128 MMHG | RESPIRATION RATE: 16 BRPM | DIASTOLIC BLOOD PRESSURE: 62 MMHG | OXYGEN SATURATION: 97 % | TEMPERATURE: 97.6 F

## 2024-01-03 LAB
HH POC INTERNATIONAL NORMALIZATION RATIO: 4.2
HH POC PROTIME: 26.8 SECONDS

## 2024-01-04 ENCOUNTER — HOME CARE VISIT (OUTPATIENT)
Dept: HOME HEALTH SERVICES | Facility: HOME HEALTHCARE | Age: 79
End: 2024-01-04
Payer: MEDICARE

## 2024-01-04 ENCOUNTER — ANTICOAGULATION VISIT (OUTPATIENT)
Dept: PHARMACY | Facility: HOSPITAL | Age: 79
End: 2024-01-04
Payer: MEDICARE

## 2024-01-04 DIAGNOSIS — I26.99 PULMONARY EMBOLISM, UNSPECIFIED CHRONICITY, UNSPECIFIED PULMONARY EMBOLISM TYPE, UNSPECIFIED WHETHER ACUTE COR PULMONALE PRESENT: Primary | ICD-10-CM

## 2024-01-04 LAB
HH POC INTERNATIONAL NORMALIZATION RATIO: 1.9
HH POC PROTIME: 19.8 SECONDS
INR PPP: 1.9

## 2024-01-04 PROCEDURE — G0299 HHS/HOSPICE OF RN EA 15 MIN: HCPCS

## 2024-01-05 VITALS
SYSTOLIC BLOOD PRESSURE: 110 MMHG | HEART RATE: 77 BPM | TEMPERATURE: 97.8 F | DIASTOLIC BLOOD PRESSURE: 60 MMHG | OXYGEN SATURATION: 95 % | RESPIRATION RATE: 16 BRPM

## 2024-01-09 ENCOUNTER — ANTICOAGULATION VISIT (OUTPATIENT)
Dept: PHARMACY | Facility: HOSPITAL | Age: 79
End: 2024-01-09
Payer: MEDICARE

## 2024-01-09 ENCOUNTER — HOME CARE VISIT (OUTPATIENT)
Dept: HOME HEALTH SERVICES | Facility: HOME HEALTHCARE | Age: 79
End: 2024-01-09
Payer: MEDICARE

## 2024-01-09 ENCOUNTER — TELEPHONE (OUTPATIENT)
Dept: FAMILY MEDICINE CLINIC | Facility: CLINIC | Age: 79
End: 2024-01-09
Payer: MEDICARE

## 2024-01-09 DIAGNOSIS — I26.99 PULMONARY EMBOLISM, UNSPECIFIED CHRONICITY, UNSPECIFIED PULMONARY EMBOLISM TYPE, UNSPECIFIED WHETHER ACUTE COR PULMONALE PRESENT: Primary | ICD-10-CM

## 2024-01-09 LAB
HH POC INTERNATIONAL NORMALIZATION RATIO: 1.3
HH POC PROTIME: 14.5 SECONDS
INR PPP: 1.3 (ref 2–3)

## 2024-01-09 PROCEDURE — G0299 HHS/HOSPICE OF RN EA 15 MIN: HCPCS

## 2024-01-09 RX ORDER — WARFARIN SODIUM 2.5 MG/1
TABLET ORAL
Qty: 30 TABLET | Refills: 1 | Status: SHIPPED | OUTPATIENT
Start: 2024-01-09 | End: 2024-01-15 | Stop reason: SDUPTHER

## 2024-01-09 RX ORDER — LEVOTHYROXINE SODIUM 137 UG/1
137 TABLET ORAL EVERY MORNING
Qty: 90 TABLET | Refills: 0 | Status: SHIPPED | OUTPATIENT
Start: 2024-01-09

## 2024-01-10 VITALS
OXYGEN SATURATION: 99 % | HEART RATE: 66 BPM | WEIGHT: 190 LBS | SYSTOLIC BLOOD PRESSURE: 126 MMHG | BODY MASS INDEX: 34.75 KG/M2 | RESPIRATION RATE: 16 BRPM | DIASTOLIC BLOOD PRESSURE: 66 MMHG | TEMPERATURE: 98 F

## 2024-01-12 ENCOUNTER — ANTICOAGULATION VISIT (OUTPATIENT)
Dept: PHARMACY | Facility: HOSPITAL | Age: 79
End: 2024-01-12
Payer: MEDICARE

## 2024-01-12 ENCOUNTER — HOME CARE VISIT (OUTPATIENT)
Dept: HOME HEALTH SERVICES | Facility: HOME HEALTHCARE | Age: 79
End: 2024-01-12
Payer: MEDICARE

## 2024-01-12 DIAGNOSIS — I26.99 PULMONARY EMBOLISM, UNSPECIFIED CHRONICITY, UNSPECIFIED PULMONARY EMBOLISM TYPE, UNSPECIFIED WHETHER ACUTE COR PULMONALE PRESENT: Primary | ICD-10-CM

## 2024-01-12 LAB
HH POC INTERNATIONAL NORMALIZATION RATIO: 1.6
HH POC PROTIME: 17.3 SECONDS
INR PPP: 1.6

## 2024-01-12 PROCEDURE — G0299 HHS/HOSPICE OF RN EA 15 MIN: HCPCS

## 2024-01-13 VITALS
TEMPERATURE: 98 F | RESPIRATION RATE: 17 BRPM | WEIGHT: 188 LBS | BODY MASS INDEX: 34.39 KG/M2 | SYSTOLIC BLOOD PRESSURE: 128 MMHG | DIASTOLIC BLOOD PRESSURE: 66 MMHG | OXYGEN SATURATION: 97 % | HEART RATE: 66 BPM

## 2024-01-15 RX ORDER — WARFARIN SODIUM 2.5 MG/1
2.5 TABLET ORAL NIGHTLY
Qty: 30 TABLET | Refills: 1 | Status: SHIPPED | OUTPATIENT
Start: 2024-01-15 | End: 2024-01-18 | Stop reason: SDUPTHER

## 2024-01-16 ENCOUNTER — ANTICOAGULATION VISIT (OUTPATIENT)
Dept: PHARMACY | Facility: HOSPITAL | Age: 79
End: 2024-01-16
Payer: MEDICARE

## 2024-01-16 ENCOUNTER — HOME CARE VISIT (OUTPATIENT)
Dept: HOME HEALTH SERVICES | Facility: HOME HEALTHCARE | Age: 79
End: 2024-01-16
Payer: MEDICARE

## 2024-01-16 LAB
HH POC INTERNATIONAL NORMALIZATION RATIO: 1.7
HH POC PROTIME: 18.3 SECONDS
INR PPP: 1.7 (ref 2–3)

## 2024-01-16 PROCEDURE — G0299 HHS/HOSPICE OF RN EA 15 MIN: HCPCS

## 2024-01-18 VITALS
SYSTOLIC BLOOD PRESSURE: 126 MMHG | HEART RATE: 65 BPM | BODY MASS INDEX: 34.02 KG/M2 | TEMPERATURE: 97.1 F | RESPIRATION RATE: 18 BRPM | DIASTOLIC BLOOD PRESSURE: 64 MMHG | WEIGHT: 186 LBS | OXYGEN SATURATION: 96 %

## 2024-01-18 RX ORDER — WARFARIN SODIUM 2.5 MG/1
2.5 TABLET ORAL NIGHTLY
Qty: 30 TABLET | Refills: 1 | Status: SHIPPED | OUTPATIENT
Start: 2024-01-18

## 2024-01-22 ENCOUNTER — ANTICOAGULATION VISIT (OUTPATIENT)
Dept: PHARMACY | Facility: HOSPITAL | Age: 79
End: 2024-01-22
Payer: MEDICARE

## 2024-01-22 ENCOUNTER — HOME CARE VISIT (OUTPATIENT)
Dept: HOME HEALTH SERVICES | Facility: HOME HEALTHCARE | Age: 79
End: 2024-01-22
Payer: MEDICARE

## 2024-01-22 DIAGNOSIS — I26.99 PULMONARY EMBOLISM, UNSPECIFIED CHRONICITY, UNSPECIFIED PULMONARY EMBOLISM TYPE, UNSPECIFIED WHETHER ACUTE COR PULMONALE PRESENT: Primary | ICD-10-CM

## 2024-01-22 LAB
HH POC INTERNATIONAL NORMALIZATION RATIO: 1.5
HH POC PROTIME: 16.7 SECONDS
INR PPP: 1.5 (ref 2–3)

## 2024-01-22 PROCEDURE — G0299 HHS/HOSPICE OF RN EA 15 MIN: HCPCS

## 2024-01-24 VITALS
DIASTOLIC BLOOD PRESSURE: 66 MMHG | WEIGHT: 190 LBS | TEMPERATURE: 97.8 F | RESPIRATION RATE: 17 BRPM | BODY MASS INDEX: 34.75 KG/M2 | SYSTOLIC BLOOD PRESSURE: 124 MMHG | HEART RATE: 76 BPM | OXYGEN SATURATION: 98 %

## 2024-01-25 ENCOUNTER — ANTICOAGULATION VISIT (OUTPATIENT)
Dept: PHARMACY | Facility: HOSPITAL | Age: 79
End: 2024-01-25
Payer: MEDICARE

## 2024-01-25 ENCOUNTER — HOME CARE VISIT (OUTPATIENT)
Dept: HOME HEALTH SERVICES | Facility: HOME HEALTHCARE | Age: 79
End: 2024-01-25
Payer: MEDICARE

## 2024-01-25 DIAGNOSIS — I26.01 ACUTE SEPTIC PULMONARY EMBOLISM WITH ACUTE COR PULMONALE: Primary | ICD-10-CM

## 2024-01-25 LAB
HH POC INTERNATIONAL NORMALIZATION RATIO: 2.1
HH POC PROTIME: 22.3 SECONDS
INR PPP: 2.1 (ref 2–3)

## 2024-01-25 PROCEDURE — G0299 HHS/HOSPICE OF RN EA 15 MIN: HCPCS

## 2024-01-26 VITALS
SYSTOLIC BLOOD PRESSURE: 100 MMHG | OXYGEN SATURATION: 99 % | DIASTOLIC BLOOD PRESSURE: 60 MMHG | HEART RATE: 87 BPM | RESPIRATION RATE: 17 BRPM | TEMPERATURE: 98 F

## 2024-01-30 ENCOUNTER — HOME CARE VISIT (OUTPATIENT)
Dept: HOME HEALTH SERVICES | Facility: HOME HEALTHCARE | Age: 79
End: 2024-01-30
Payer: MEDICARE

## 2024-01-31 ENCOUNTER — HOME CARE VISIT (OUTPATIENT)
Dept: HOME HEALTH SERVICES | Facility: HOME HEALTHCARE | Age: 79
End: 2024-01-31
Payer: MEDICARE

## 2024-01-31 ENCOUNTER — ANTICOAGULATION VISIT (OUTPATIENT)
Dept: PHARMACY | Facility: HOSPITAL | Age: 79
End: 2024-01-31
Payer: MEDICARE

## 2024-01-31 DIAGNOSIS — R60.9 EDEMA, UNSPECIFIED TYPE: ICD-10-CM

## 2024-01-31 DIAGNOSIS — I26.01 ACUTE SEPTIC PULMONARY EMBOLISM WITH ACUTE COR PULMONALE: Primary | ICD-10-CM

## 2024-01-31 DIAGNOSIS — I10 ESSENTIAL HYPERTENSION: ICD-10-CM

## 2024-01-31 LAB
HH POC INTERNATIONAL NORMALIZATION RATIO: 1.9
HH POC PROTIME: 20.1 SECONDS
INR PPP: 1.9 (ref 2–3)

## 2024-01-31 PROCEDURE — G0299 HHS/HOSPICE OF RN EA 15 MIN: HCPCS

## 2024-01-31 RX ORDER — SPIRONOLACTONE 25 MG/1
TABLET ORAL
Qty: 90 TABLET | Refills: 0 | Status: SHIPPED | OUTPATIENT
Start: 2024-01-31

## 2024-02-01 VITALS
DIASTOLIC BLOOD PRESSURE: 60 MMHG | RESPIRATION RATE: 16 BRPM | OXYGEN SATURATION: 99 % | TEMPERATURE: 98 F | HEART RATE: 77 BPM | SYSTOLIC BLOOD PRESSURE: 110 MMHG | WEIGHT: 190 LBS | BODY MASS INDEX: 34.75 KG/M2

## 2024-02-02 ENCOUNTER — ANTICOAGULATION VISIT (OUTPATIENT)
Dept: PHARMACY | Facility: HOSPITAL | Age: 79
End: 2024-02-02
Payer: MEDICARE

## 2024-02-02 ENCOUNTER — HOME CARE VISIT (OUTPATIENT)
Dept: HOME HEALTH SERVICES | Facility: HOME HEALTHCARE | Age: 79
End: 2024-02-02
Payer: MEDICARE

## 2024-02-02 DIAGNOSIS — I26.99 PULMONARY EMBOLISM, UNSPECIFIED CHRONICITY, UNSPECIFIED PULMONARY EMBOLISM TYPE, UNSPECIFIED WHETHER ACUTE COR PULMONALE PRESENT: Primary | ICD-10-CM

## 2024-02-02 LAB
HH POC INTERNATIONAL NORMALIZATION RATIO: 2.4
HH POC PROTIME: 24.5 SECONDS
INR PPP: 2.4 (ref 2–3)

## 2024-02-02 PROCEDURE — G0162 HHC RN E&M PLAN SVS, 15 MIN: HCPCS

## 2024-02-02 RX ORDER — ISOSORBIDE MONONITRATE 30 MG/1
30 TABLET, EXTENDED RELEASE ORAL DAILY
Qty: 90 TABLET | Refills: 2 | OUTPATIENT
Start: 2024-02-02

## 2024-02-02 RX ORDER — FUROSEMIDE 40 MG/1
40 TABLET ORAL DAILY
Qty: 90 TABLET | Refills: 1 | OUTPATIENT
Start: 2024-02-02

## 2024-02-03 VITALS
TEMPERATURE: 97 F | HEART RATE: 76 BPM | DIASTOLIC BLOOD PRESSURE: 60 MMHG | OXYGEN SATURATION: 99 % | RESPIRATION RATE: 18 BRPM | SYSTOLIC BLOOD PRESSURE: 110 MMHG

## 2024-02-08 RX ORDER — GUAIFENESIN 600 MG/1
600 TABLET, EXTENDED RELEASE ORAL 2 TIMES DAILY
Qty: 60 TABLET | Refills: 0 | Status: SHIPPED | OUTPATIENT
Start: 2024-02-08

## 2024-02-08 RX ORDER — SENNOSIDES 8.6 MG
2 TABLET ORAL EVERY EVENING
Qty: 60 TABLET | Refills: 0 | Status: SHIPPED | OUTPATIENT
Start: 2024-02-08

## 2024-02-14 ENCOUNTER — ANTICOAGULATION VISIT (OUTPATIENT)
Dept: PHARMACY | Facility: HOSPITAL | Age: 79
End: 2024-02-14
Payer: MEDICARE

## 2024-02-14 ENCOUNTER — ANTICOAGULATION VISIT (OUTPATIENT)
Dept: FAMILY MEDICINE CLINIC | Facility: CLINIC | Age: 79
End: 2024-02-14
Payer: MEDICARE

## 2024-02-14 ENCOUNTER — CLINICAL SUPPORT (OUTPATIENT)
Dept: FAMILY MEDICINE CLINIC | Facility: CLINIC | Age: 79
End: 2024-02-14
Payer: MEDICARE

## 2024-02-14 DIAGNOSIS — E03.9 HYPOTHYROIDISM, UNSPECIFIED TYPE: ICD-10-CM

## 2024-02-14 DIAGNOSIS — R79.89 ELEVATED SERUM CREATININE: ICD-10-CM

## 2024-02-14 DIAGNOSIS — I10 ESSENTIAL HYPERTENSION: Primary | Chronic | ICD-10-CM

## 2024-02-14 DIAGNOSIS — I26.99 PULMONARY EMBOLISM, UNSPECIFIED CHRONICITY, UNSPECIFIED PULMONARY EMBOLISM TYPE, UNSPECIFIED WHETHER ACUTE COR PULMONALE PRESENT: Primary | ICD-10-CM

## 2024-02-14 LAB
ALBUMIN SERPL-MCNC: 3.8 G/DL (ref 3.5–5.2)
ALBUMIN/GLOB SERPL: 1.4 G/DL
ALP SERPL-CCNC: 79 U/L (ref 39–117)
ALT SERPL W P-5'-P-CCNC: 18 U/L (ref 1–33)
ANION GAP SERPL CALCULATED.3IONS-SCNC: 11.9 MMOL/L (ref 5–15)
AST SERPL-CCNC: 15 U/L (ref 1–32)
BILIRUB SERPL-MCNC: 0.5 MG/DL (ref 0–1.2)
BUN SERPL-MCNC: 36 MG/DL (ref 8–23)
BUN/CREAT SERPL: 26.3 (ref 7–25)
CALCIUM SPEC-SCNC: 9.6 MG/DL (ref 8.6–10.5)
CHLORIDE SERPL-SCNC: 102 MMOL/L (ref 98–107)
CO2 SERPL-SCNC: 27.1 MMOL/L (ref 22–29)
CREAT SERPL-MCNC: 1.37 MG/DL (ref 0.57–1)
EGFRCR SERPLBLD CKD-EPI 2021: 39.6 ML/MIN/1.73
GLOBULIN UR ELPH-MCNC: 2.8 GM/DL
GLUCOSE SERPL-MCNC: 151 MG/DL (ref 65–99)
INR PPP: 2.3 (ref 0.9–1.1)
INR PPP: 2.3 (ref 2–3)
POTASSIUM SERPL-SCNC: 3.9 MMOL/L (ref 3.5–5.2)
PROT SERPL-MCNC: 6.6 G/DL (ref 6–8.5)
SODIUM SERPL-SCNC: 141 MMOL/L (ref 136–145)
T4 FREE SERPL-MCNC: 2.83 NG/DL (ref 0.93–1.7)
TSH SERPL DL<=0.05 MIU/L-ACNC: 0.06 UIU/ML (ref 0.27–4.2)

## 2024-02-14 PROCEDURE — 84439 ASSAY OF FREE THYROXINE: CPT | Performed by: PREVENTIVE MEDICINE

## 2024-02-14 PROCEDURE — 84443 ASSAY THYROID STIM HORMONE: CPT | Performed by: PREVENTIVE MEDICINE

## 2024-02-14 PROCEDURE — 80053 COMPREHEN METABOLIC PANEL: CPT | Performed by: PREVENTIVE MEDICINE

## 2024-02-14 PROCEDURE — 36415 COLL VENOUS BLD VENIPUNCTURE: CPT | Performed by: PREVENTIVE MEDICINE

## 2024-02-14 RX ORDER — WARFARIN SODIUM 2.5 MG/1
TABLET ORAL
Qty: 30 TABLET | Refills: 1 | Status: SHIPPED | OUTPATIENT
Start: 2024-02-14

## 2024-02-15 ENCOUNTER — TELEPHONE (OUTPATIENT)
Dept: FAMILY MEDICINE CLINIC | Facility: CLINIC | Age: 79
End: 2024-02-15
Payer: MEDICARE

## 2024-02-16 ENCOUNTER — TELEPHONE (OUTPATIENT)
Dept: PHARMACY | Facility: HOSPITAL | Age: 79
End: 2024-02-16
Payer: MEDICARE

## 2024-02-23 RX ORDER — TORSEMIDE 20 MG/1
5 TABLET ORAL DAILY
Qty: 23 TABLET | Refills: 0 | OUTPATIENT
Start: 2024-02-23

## 2024-02-28 ENCOUNTER — ANTICOAGULATION VISIT (OUTPATIENT)
Dept: PHARMACY | Facility: HOSPITAL | Age: 79
End: 2024-02-28
Payer: MEDICARE

## 2024-02-28 ENCOUNTER — ANTICOAGULATION VISIT (OUTPATIENT)
Dept: FAMILY MEDICINE CLINIC | Facility: CLINIC | Age: 79
End: 2024-02-28
Payer: MEDICARE

## 2024-02-28 DIAGNOSIS — I26.01 ACUTE SEPTIC PULMONARY EMBOLISM WITH ACUTE COR PULMONALE: Primary | ICD-10-CM

## 2024-02-28 DIAGNOSIS — I26.99 PULMONARY EMBOLISM, UNSPECIFIED CHRONICITY, UNSPECIFIED PULMONARY EMBOLISM TYPE, UNSPECIFIED WHETHER ACUTE COR PULMONALE PRESENT: Primary | ICD-10-CM

## 2024-02-28 LAB — INR PPP: 2.7 (ref 0.9–1.1)

## 2024-02-28 RX ORDER — TORSEMIDE 20 MG/1
20 TABLET ORAL DAILY
Qty: 23 TABLET | Refills: 0 | OUTPATIENT
Start: 2024-02-28

## 2024-02-28 RX ORDER — TORSEMIDE 20 MG/1
20 TABLET ORAL DAILY
Qty: 23 TABLET | Refills: 0 | Status: CANCELLED | OUTPATIENT
Start: 2024-02-28

## 2024-03-01 DIAGNOSIS — I10 ESSENTIAL HYPERTENSION: ICD-10-CM

## 2024-03-01 DIAGNOSIS — R60.9 EDEMA, UNSPECIFIED TYPE: ICD-10-CM

## 2024-03-01 RX ORDER — POTASSIUM CHLORIDE 1500 MG/1
TABLET, EXTENDED RELEASE ORAL
Qty: 60 TABLET | Refills: 1 | OUTPATIENT
Start: 2024-03-01

## 2024-03-01 RX ORDER — POTASSIUM CHLORIDE 20 MEQ/1
20 TABLET, EXTENDED RELEASE ORAL 2 TIMES DAILY
Qty: 30 TABLET | Refills: 0 | Status: SHIPPED | OUTPATIENT
Start: 2024-03-01

## 2024-03-12 RX ORDER — SENNOSIDES 8.6 MG
2 TABLET ORAL EVERY EVENING
Qty: 60 TABLET | Refills: 0 | Status: SHIPPED | OUTPATIENT
Start: 2024-03-12

## 2024-03-15 ENCOUNTER — TELEPHONE (OUTPATIENT)
Dept: FAMILY MEDICINE CLINIC | Facility: CLINIC | Age: 79
End: 2024-03-15
Payer: MEDICARE

## 2024-03-25 ENCOUNTER — PATIENT OUTREACH (OUTPATIENT)
Dept: CASE MANAGEMENT | Facility: OTHER | Age: 79
End: 2024-03-25
Payer: MEDICARE

## 2024-03-28 ENCOUNTER — ANTICOAGULATION VISIT (OUTPATIENT)
Dept: PHARMACY | Facility: HOSPITAL | Age: 79
End: 2024-03-28
Payer: MEDICARE

## 2024-03-28 ENCOUNTER — ANTICOAGULATION VISIT (OUTPATIENT)
Dept: FAMILY MEDICINE CLINIC | Facility: CLINIC | Age: 79
End: 2024-03-28
Payer: MEDICARE

## 2024-03-28 DIAGNOSIS — I26.99 PULMONARY EMBOLISM, UNSPECIFIED CHRONICITY, UNSPECIFIED PULMONARY EMBOLISM TYPE, UNSPECIFIED WHETHER ACUTE COR PULMONALE PRESENT: Primary | ICD-10-CM

## 2024-03-28 LAB
INR PPP: 3.1
INR PPP: 3.1 (ref 0.9–1.1)

## 2024-04-02 RX ORDER — DILTIAZEM HYDROCHLORIDE 180 MG/1
CAPSULE, COATED, EXTENDED RELEASE ORAL
Qty: 90 CAPSULE | Refills: 0 | Status: SHIPPED | OUTPATIENT
Start: 2024-04-02

## 2024-04-05 RX ORDER — LEVOTHYROXINE SODIUM 137 UG/1
137 TABLET ORAL EVERY MORNING
Qty: 90 TABLET | Refills: 0 | OUTPATIENT
Start: 2024-04-05

## 2024-04-07 RX ORDER — MIDODRINE HYDROCHLORIDE 10 MG/1
10 TABLET ORAL
Qty: 270 TABLET | Refills: 0 | Status: SHIPPED | OUTPATIENT
Start: 2024-04-07

## 2024-04-12 ENCOUNTER — OFFICE VISIT (OUTPATIENT)
Dept: FAMILY MEDICINE CLINIC | Facility: CLINIC | Age: 79
End: 2024-04-12
Payer: MEDICARE

## 2024-04-12 VITALS
DIASTOLIC BLOOD PRESSURE: 86 MMHG | HEIGHT: 62 IN | WEIGHT: 198 LBS | HEART RATE: 87 BPM | SYSTOLIC BLOOD PRESSURE: 136 MMHG | OXYGEN SATURATION: 95 % | TEMPERATURE: 97.8 F | BODY MASS INDEX: 36.44 KG/M2

## 2024-04-12 DIAGNOSIS — H61.23 EXCESSIVE CERUMEN IN BOTH EAR CANALS: ICD-10-CM

## 2024-04-12 DIAGNOSIS — I10 ESSENTIAL HYPERTENSION: Chronic | ICD-10-CM

## 2024-04-12 DIAGNOSIS — F33.41 RECURRENT MAJOR DEPRESSIVE DISORDER, IN PARTIAL REMISSION: ICD-10-CM

## 2024-04-12 DIAGNOSIS — J45.50 SEVERE PERSISTENT ASTHMA WITHOUT COMPLICATION: ICD-10-CM

## 2024-04-12 DIAGNOSIS — Z78.0 POST-MENOPAUSAL: Primary | ICD-10-CM

## 2024-04-12 DIAGNOSIS — I25.10 CORONARY ARTERY DISEASE INVOLVING NATIVE CORONARY ARTERY OF NATIVE HEART WITHOUT ANGINA PECTORIS: ICD-10-CM

## 2024-04-12 DIAGNOSIS — L43.9 LICHEN PLANUS: ICD-10-CM

## 2024-04-12 DIAGNOSIS — E78.2 MIXED HYPERLIPIDEMIA: Chronic | ICD-10-CM

## 2024-04-12 DIAGNOSIS — M81.0 POSTMENOPAUSAL OSTEOPOROSIS: ICD-10-CM

## 2024-04-12 DIAGNOSIS — J43.1 PANLOBULAR EMPHYSEMA: Chronic | ICD-10-CM

## 2024-04-12 DIAGNOSIS — I26.99 PULMONARY EMBOLISM, UNSPECIFIED CHRONICITY, UNSPECIFIED PULMONARY EMBOLISM TYPE, UNSPECIFIED WHETHER ACUTE COR PULMONALE PRESENT: ICD-10-CM

## 2024-04-12 DIAGNOSIS — N39.3 STRESS INCONTINENCE OF URINE: ICD-10-CM

## 2024-04-12 DIAGNOSIS — E66.01 CLASS 2 SEVERE OBESITY DUE TO EXCESS CALORIES WITH SERIOUS COMORBIDITY AND BODY MASS INDEX (BMI) OF 36.0 TO 36.9 IN ADULT: ICD-10-CM

## 2024-04-12 DIAGNOSIS — I70.90 ASVD (ARTERIOSCLEROTIC VASCULAR DISEASE): ICD-10-CM

## 2024-04-12 DIAGNOSIS — I11.9 HYPERTENSIVE HEART DISEASE, UNSPECIFIED WHETHER HEART FAILURE PRESENT: ICD-10-CM

## 2024-04-12 RX ORDER — ISOSORBIDE MONONITRATE 30 MG/1
30 TABLET, EXTENDED RELEASE ORAL DAILY
COMMUNITY

## 2024-04-12 RX ORDER — FUROSEMIDE 40 MG/1
40 TABLET ORAL DAILY
COMMUNITY

## 2024-04-16 RX ORDER — SENNOSIDES 8.6 MG
2 TABLET ORAL EVERY EVENING
Qty: 60 TABLET | Refills: 0 | Status: SHIPPED | OUTPATIENT
Start: 2024-04-16

## 2024-04-22 ENCOUNTER — CLINICAL SUPPORT (OUTPATIENT)
Dept: FAMILY MEDICINE CLINIC | Facility: CLINIC | Age: 79
End: 2024-04-22
Payer: MEDICARE

## 2024-04-22 ENCOUNTER — ANTICOAGULATION VISIT (OUTPATIENT)
Dept: PHARMACY | Facility: HOSPITAL | Age: 79
End: 2024-04-22
Payer: MEDICARE

## 2024-04-22 DIAGNOSIS — E78.2 MIXED HYPERLIPIDEMIA: Chronic | ICD-10-CM

## 2024-04-22 DIAGNOSIS — I10 ESSENTIAL HYPERTENSION: Chronic | ICD-10-CM

## 2024-04-22 DIAGNOSIS — I26.01 ACUTE SEPTIC PULMONARY EMBOLISM WITH ACUTE COR PULMONALE: Primary | ICD-10-CM

## 2024-04-22 DIAGNOSIS — N39.3 STRESS INCONTINENCE OF URINE: ICD-10-CM

## 2024-04-22 DIAGNOSIS — I05.9 MITRAL VALVE DISORDER: Primary | ICD-10-CM

## 2024-04-22 DIAGNOSIS — F33.41 RECURRENT MAJOR DEPRESSIVE DISORDER, IN PARTIAL REMISSION: ICD-10-CM

## 2024-04-22 LAB
HOLD SPECIMEN: NORMAL
INR PPP: 1.5 (ref 0.9–1.1)
T4 FREE SERPL-MCNC: 1.85 NG/DL (ref 0.93–1.7)
TSH SERPL DL<=0.05 MIU/L-ACNC: 0.01 UIU/ML (ref 0.27–4.2)

## 2024-04-22 PROCEDURE — 85610 PROTHROMBIN TIME: CPT | Performed by: PREVENTIVE MEDICINE

## 2024-04-22 PROCEDURE — 83735 ASSAY OF MAGNESIUM: CPT | Performed by: PREVENTIVE MEDICINE

## 2024-04-22 PROCEDURE — 84443 ASSAY THYROID STIM HORMONE: CPT | Performed by: PREVENTIVE MEDICINE

## 2024-04-22 PROCEDURE — 85025 COMPLETE CBC W/AUTO DIFF WBC: CPT | Performed by: PREVENTIVE MEDICINE

## 2024-04-22 PROCEDURE — 84439 ASSAY OF FREE THYROXINE: CPT | Performed by: PREVENTIVE MEDICINE

## 2024-04-22 PROCEDURE — 82607 VITAMIN B-12: CPT | Performed by: PREVENTIVE MEDICINE

## 2024-04-22 PROCEDURE — 36415 COLL VENOUS BLD VENIPUNCTURE: CPT | Performed by: PREVENTIVE MEDICINE

## 2024-04-22 PROCEDURE — 80061 LIPID PANEL: CPT | Performed by: PREVENTIVE MEDICINE

## 2024-04-22 PROCEDURE — 80053 COMPREHEN METABOLIC PANEL: CPT | Performed by: PREVENTIVE MEDICINE

## 2024-04-22 PROCEDURE — 81001 URINALYSIS AUTO W/SCOPE: CPT | Performed by: PREVENTIVE MEDICINE

## 2024-04-22 PROCEDURE — 36416 COLLJ CAPILLARY BLOOD SPEC: CPT | Performed by: PREVENTIVE MEDICINE

## 2024-04-23 ENCOUNTER — TELEPHONE (OUTPATIENT)
Dept: FAMILY MEDICINE CLINIC | Facility: CLINIC | Age: 79
End: 2024-04-23
Payer: MEDICARE

## 2024-04-23 DIAGNOSIS — E78.2 MIXED HYPERLIPIDEMIA: Primary | Chronic | ICD-10-CM

## 2024-04-23 DIAGNOSIS — E03.9 HYPOTHYROIDISM, UNSPECIFIED TYPE: ICD-10-CM

## 2024-04-23 LAB
ALBUMIN SERPL-MCNC: 3.8 G/DL (ref 3.5–5.2)
ALBUMIN/GLOB SERPL: 1.5 G/DL
ALP SERPL-CCNC: 87 U/L (ref 39–117)
ALT SERPL W P-5'-P-CCNC: 23 U/L (ref 1–33)
ANION GAP SERPL CALCULATED.3IONS-SCNC: 12.6 MMOL/L (ref 5–15)
AST SERPL-CCNC: 24 U/L (ref 1–32)
BACTERIA UR QL AUTO: ABNORMAL /HPF
BASOPHILS # BLD AUTO: 0.07 10*3/MM3 (ref 0–0.2)
BASOPHILS NFR BLD AUTO: 0.9 % (ref 0–1.5)
BILIRUB SERPL-MCNC: 0.4 MG/DL (ref 0–1.2)
BILIRUB UR QL STRIP: NEGATIVE
BUN SERPL-MCNC: 17 MG/DL (ref 8–23)
BUN/CREAT SERPL: 13.5 (ref 7–25)
CALCIUM SPEC-SCNC: 9.9 MG/DL (ref 8.6–10.5)
CHLORIDE SERPL-SCNC: 99 MMOL/L (ref 98–107)
CHOLEST SERPL-MCNC: 188 MG/DL (ref 0–200)
CLARITY UR: ABNORMAL
CO2 SERPL-SCNC: 28.4 MMOL/L (ref 22–29)
COLOR UR: ABNORMAL
CREAT SERPL-MCNC: 1.26 MG/DL (ref 0.57–1)
DEPRECATED RDW RBC AUTO: 37.1 FL (ref 37–54)
EGFRCR SERPLBLD CKD-EPI 2021: 43.8 ML/MIN/1.73
EOSINOPHIL # BLD AUTO: 0.52 10*3/MM3 (ref 0–0.4)
EOSINOPHIL NFR BLD AUTO: 6.3 % (ref 0.3–6.2)
ERYTHROCYTE [DISTWIDTH] IN BLOOD BY AUTOMATED COUNT: 12.4 % (ref 12.3–15.4)
GLOBULIN UR ELPH-MCNC: 2.6 GM/DL
GLUCOSE SERPL-MCNC: 93 MG/DL (ref 65–99)
GLUCOSE UR STRIP-MCNC: NEGATIVE MG/DL
HCT VFR BLD AUTO: 43.3 % (ref 34–46.6)
HDLC SERPL-MCNC: 27 MG/DL (ref 40–60)
HGB BLD-MCNC: 14.1 G/DL (ref 12–15.9)
HGB UR QL STRIP.AUTO: NEGATIVE
HYALINE CASTS UR QL AUTO: ABNORMAL /LPF
IMM GRANULOCYTES # BLD AUTO: 0.05 10*3/MM3 (ref 0–0.05)
IMM GRANULOCYTES NFR BLD AUTO: 0.6 % (ref 0–0.5)
KETONES UR QL STRIP: NEGATIVE
LDLC SERPL CALC-MCNC: 133 MG/DL (ref 0–100)
LDLC/HDLC SERPL: 4.84 {RATIO}
LEUKOCYTE ESTERASE UR QL STRIP.AUTO: ABNORMAL
LYMPHOCYTES # BLD AUTO: 1.53 10*3/MM3 (ref 0.7–3.1)
LYMPHOCYTES NFR BLD AUTO: 18.7 % (ref 19.6–45.3)
MAGNESIUM SERPL-MCNC: 2.1 MG/DL (ref 1.6–2.4)
MCH RBC QN AUTO: 27.5 PG (ref 26.6–33)
MCHC RBC AUTO-ENTMCNC: 32.6 G/DL (ref 31.5–35.7)
MCV RBC AUTO: 84.4 FL (ref 79–97)
MONOCYTES # BLD AUTO: 0.88 10*3/MM3 (ref 0.1–0.9)
MONOCYTES NFR BLD AUTO: 10.7 % (ref 5–12)
NEUTROPHILS NFR BLD AUTO: 5.15 10*3/MM3 (ref 1.7–7)
NEUTROPHILS NFR BLD AUTO: 62.8 % (ref 42.7–76)
NITRITE UR QL STRIP: NEGATIVE
NRBC BLD AUTO-RTO: 0 /100 WBC (ref 0–0.2)
PH UR STRIP.AUTO: 5.5 [PH] (ref 5–8)
PLATELET # BLD AUTO: 269 10*3/MM3 (ref 140–450)
PMV BLD AUTO: 10.9 FL (ref 6–12)
POTASSIUM SERPL-SCNC: 4.7 MMOL/L (ref 3.5–5.2)
PROT SERPL-MCNC: 6.4 G/DL (ref 6–8.5)
PROT UR QL STRIP: ABNORMAL
RBC # BLD AUTO: 5.13 10*6/MM3 (ref 3.77–5.28)
RBC # UR STRIP: ABNORMAL /HPF
REF LAB TEST METHOD: ABNORMAL
SODIUM SERPL-SCNC: 140 MMOL/L (ref 136–145)
SP GR UR STRIP: 1.02 (ref 1–1.03)
SQUAMOUS #/AREA URNS HPF: ABNORMAL /HPF
TRIGL SERPL-MCNC: 151 MG/DL (ref 0–150)
UROBILINOGEN UR QL STRIP: ABNORMAL
VIT B12 BLD-MCNC: 1813 PG/ML (ref 211–946)
VLDLC SERPL-MCNC: 28 MG/DL (ref 5–40)
WBC # UR STRIP: ABNORMAL /HPF
WBC NRBC COR # BLD AUTO: 8.2 10*3/MM3 (ref 3.4–10.8)

## 2024-04-23 RX ORDER — LEVOTHYROXINE SODIUM 0.12 MG/1
125 TABLET ORAL DAILY
Qty: 90 TABLET | Refills: 1 | Status: SHIPPED | OUTPATIENT
Start: 2024-04-23

## 2024-04-23 RX ORDER — ATORVASTATIN CALCIUM 80 MG/1
80 TABLET, FILM COATED ORAL DAILY
Qty: 90 TABLET | Refills: 1 | Status: SHIPPED | OUTPATIENT
Start: 2024-04-23

## 2024-05-06 ENCOUNTER — TELEPHONE (OUTPATIENT)
Dept: PHARMACY | Facility: HOSPITAL | Age: 79
End: 2024-05-06
Payer: MEDICARE

## 2024-05-06 RX ORDER — DILTIAZEM HYDROCHLORIDE 180 MG/1
CAPSULE, COATED, EXTENDED RELEASE ORAL
Qty: 90 CAPSULE | Refills: 0 | Status: SHIPPED | OUTPATIENT
Start: 2024-05-06

## 2024-05-06 RX ORDER — ATORVASTATIN CALCIUM 40 MG/1
40 TABLET, FILM COATED ORAL EVERY MORNING
Qty: 90 TABLET | Refills: 1 | OUTPATIENT
Start: 2024-05-06

## 2024-05-06 RX ORDER — SPIRONOLACTONE 25 MG/1
TABLET ORAL
Qty: 90 TABLET | Refills: 0 | Status: SHIPPED | OUTPATIENT
Start: 2024-05-06

## 2024-05-07 ENCOUNTER — ANTICOAGULATION VISIT (OUTPATIENT)
Dept: FAMILY MEDICINE CLINIC | Facility: CLINIC | Age: 79
End: 2024-05-07
Payer: MEDICARE

## 2024-05-07 ENCOUNTER — ANTICOAGULATION VISIT (OUTPATIENT)
Dept: PHARMACY | Facility: HOSPITAL | Age: 79
End: 2024-05-07
Payer: MEDICARE

## 2024-05-07 DIAGNOSIS — I26.99 PULMONARY EMBOLISM, UNSPECIFIED CHRONICITY, UNSPECIFIED PULMONARY EMBOLISM TYPE, UNSPECIFIED WHETHER ACUTE COR PULMONALE PRESENT: Primary | ICD-10-CM

## 2024-05-07 DIAGNOSIS — I26.99 PULMONARY EMBOLISM, UNSPECIFIED CHRONICITY, UNSPECIFIED PULMONARY EMBOLISM TYPE, UNSPECIFIED WHETHER ACUTE COR PULMONALE PRESENT: ICD-10-CM

## 2024-05-07 LAB — INR PPP: 1.8 (ref 2–3)

## 2024-05-07 PROCEDURE — 36416 COLLJ CAPILLARY BLOOD SPEC: CPT | Performed by: PREVENTIVE MEDICINE

## 2024-05-07 PROCEDURE — 85610 PROTHROMBIN TIME: CPT | Performed by: PREVENTIVE MEDICINE

## 2024-05-07 RX ORDER — WARFARIN SODIUM 2.5 MG/1
TABLET ORAL
Qty: 30 TABLET | Refills: 1 | Status: SHIPPED | OUTPATIENT
Start: 2024-05-07

## 2024-05-13 RX ORDER — SENNOSIDES 8.6 MG
2 TABLET ORAL EVERY EVENING
Qty: 60 TABLET | Refills: 0 | Status: SHIPPED | OUTPATIENT
Start: 2024-05-13

## 2024-05-21 ENCOUNTER — ANTICOAGULATION VISIT (OUTPATIENT)
Dept: PHARMACY | Facility: HOSPITAL | Age: 79
End: 2024-05-21
Payer: MEDICARE

## 2024-05-21 ENCOUNTER — TRANSITIONAL CARE MANAGEMENT TELEPHONE ENCOUNTER (OUTPATIENT)
Dept: FAMILY MEDICINE CLINIC | Facility: CLINIC | Age: 79
End: 2024-05-21
Payer: MEDICARE

## 2024-05-21 ENCOUNTER — ANTICOAGULATION VISIT (OUTPATIENT)
Dept: FAMILY MEDICINE CLINIC | Facility: CLINIC | Age: 79
End: 2024-05-21
Payer: MEDICARE

## 2024-05-21 DIAGNOSIS — I26.01 ACUTE SEPTIC PULMONARY EMBOLISM WITH ACUTE COR PULMONALE: Primary | ICD-10-CM

## 2024-05-21 DIAGNOSIS — I26.99 PULMONARY EMBOLISM, UNSPECIFIED CHRONICITY, UNSPECIFIED PULMONARY EMBOLISM TYPE, UNSPECIFIED WHETHER ACUTE COR PULMONALE PRESENT: Primary | ICD-10-CM

## 2024-05-21 LAB
INR PPP: 3.7 (ref 0.9–1.1)
INR PPP: 3.7 (ref 2–3)

## 2024-05-21 PROCEDURE — 85610 PROTHROMBIN TIME: CPT | Performed by: PREVENTIVE MEDICINE

## 2024-05-21 PROCEDURE — 36416 COLLJ CAPILLARY BLOOD SPEC: CPT | Performed by: PREVENTIVE MEDICINE

## 2024-05-21 PROCEDURE — 36415 COLL VENOUS BLD VENIPUNCTURE: CPT | Performed by: PREVENTIVE MEDICINE

## 2024-05-23 ENCOUNTER — TELEPHONE (OUTPATIENT)
Dept: FAMILY MEDICINE CLINIC | Facility: CLINIC | Age: 79
End: 2024-05-23
Payer: MEDICARE

## 2024-05-23 ENCOUNTER — HOSPITAL ENCOUNTER (EMERGENCY)
Facility: HOSPITAL | Age: 79
Discharge: HOME | End: 2024-05-23
Payer: MEDICARE

## 2024-05-23 ENCOUNTER — APPOINTMENT (OUTPATIENT)
Dept: GENERAL RADIOLOGY | Facility: HOSPITAL | Age: 79
End: 2024-05-23
Payer: MEDICARE

## 2024-05-23 VITALS
WEIGHT: 196.65 LBS | TEMPERATURE: 97.7 F | DIASTOLIC BLOOD PRESSURE: 72 MMHG | HEART RATE: 89 BPM | OXYGEN SATURATION: 98 % | SYSTOLIC BLOOD PRESSURE: 151 MMHG | HEIGHT: 60 IN | RESPIRATION RATE: 22 BRPM | BODY MASS INDEX: 38.61 KG/M2

## 2024-05-23 DIAGNOSIS — J44.9 CHRONIC OBSTRUCTIVE PULMONARY DISEASE, UNSPECIFIED COPD TYPE: ICD-10-CM

## 2024-05-23 DIAGNOSIS — K59.00 CONSTIPATION, UNSPECIFIED CONSTIPATION TYPE: Primary | ICD-10-CM

## 2024-05-23 PROCEDURE — 94799 UNLISTED PULMONARY SVC/PX: CPT

## 2024-05-23 PROCEDURE — 94640 AIRWAY INHALATION TREATMENT: CPT

## 2024-05-23 PROCEDURE — 99283 EMERGENCY DEPT VISIT LOW MDM: CPT

## 2024-05-23 PROCEDURE — 96372 THER/PROPH/DIAG INJ SC/IM: CPT

## 2024-05-23 PROCEDURE — 74018 RADEX ABDOMEN 1 VIEW: CPT

## 2024-05-23 PROCEDURE — 25010000002 METHYLPREDNISOLONE PER 125 MG: Performed by: NURSE PRACTITIONER

## 2024-05-23 RX ORDER — SORBITOL SOLUTION 70 %
30 SOLUTION, ORAL MISCELLANEOUS ONCE
Status: COMPLETED | OUTPATIENT
Start: 2024-05-23 | End: 2024-05-23

## 2024-05-23 RX ORDER — IPRATROPIUM BROMIDE AND ALBUTEROL SULFATE 2.5; .5 MG/3ML; MG/3ML
3 SOLUTION RESPIRATORY (INHALATION) ONCE
Status: COMPLETED | OUTPATIENT
Start: 2024-05-23 | End: 2024-05-23

## 2024-05-23 RX ORDER — PREDNISONE 20 MG/1
20 TABLET ORAL DAILY
Qty: 5 TABLET | Refills: 0 | Status: SHIPPED | OUTPATIENT
Start: 2024-05-23

## 2024-05-23 RX ORDER — METHYLPREDNISOLONE SODIUM SUCCINATE 125 MG/2ML
80 INJECTION, POWDER, LYOPHILIZED, FOR SOLUTION INTRAMUSCULAR; INTRAVENOUS ONCE
Status: COMPLETED | OUTPATIENT
Start: 2024-05-23 | End: 2024-05-23

## 2024-05-23 RX ADMIN — IPRATROPIUM BROMIDE AND ALBUTEROL SULFATE 3 ML: .5; 3 SOLUTION RESPIRATORY (INHALATION) at 18:05

## 2024-05-23 RX ADMIN — SORBITOL SOLUTION (BULK) 30 ML: 70 SOLUTION at 18:01

## 2024-05-23 RX ADMIN — METHYLPREDNISOLONE SODIUM SUCCINATE 80 MG: 125 INJECTION, POWDER, FOR SOLUTION INTRAMUSCULAR; INTRAVENOUS at 18:02

## 2024-05-24 ENCOUNTER — TELEPHONE (OUTPATIENT)
Dept: PHARMACY | Facility: HOSPITAL | Age: 79
End: 2024-05-24
Payer: MEDICARE

## 2024-05-28 ENCOUNTER — ANTICOAGULATION VISIT (OUTPATIENT)
Dept: PHARMACY | Facility: HOSPITAL | Age: 79
End: 2024-05-28
Payer: MEDICARE

## 2024-05-28 ENCOUNTER — ANTICOAGULATION VISIT (OUTPATIENT)
Dept: FAMILY MEDICINE CLINIC | Facility: CLINIC | Age: 79
End: 2024-05-28
Payer: MEDICARE

## 2024-05-28 DIAGNOSIS — I26.99 PULMONARY EMBOLISM, UNSPECIFIED CHRONICITY, UNSPECIFIED PULMONARY EMBOLISM TYPE, UNSPECIFIED WHETHER ACUTE COR PULMONALE PRESENT: Primary | ICD-10-CM

## 2024-05-28 LAB — INR PPP: 2.7 (ref 0.9–1.1)

## 2024-05-28 PROCEDURE — 36415 COLL VENOUS BLD VENIPUNCTURE: CPT | Performed by: PREVENTIVE MEDICINE

## 2024-05-28 PROCEDURE — 36416 COLLJ CAPILLARY BLOOD SPEC: CPT | Performed by: PREVENTIVE MEDICINE

## 2024-05-28 PROCEDURE — 85610 PROTHROMBIN TIME: CPT | Performed by: PREVENTIVE MEDICINE

## 2024-05-31 ENCOUNTER — PATIENT OUTREACH (OUTPATIENT)
Dept: CASE MANAGEMENT | Facility: OTHER | Age: 79
End: 2024-05-31
Payer: MEDICARE

## 2024-06-11 ENCOUNTER — ANTICOAGULATION VISIT (OUTPATIENT)
Dept: FAMILY MEDICINE CLINIC | Facility: CLINIC | Age: 79
End: 2024-06-11
Payer: MEDICARE

## 2024-06-11 ENCOUNTER — ANTICOAGULATION VISIT (OUTPATIENT)
Dept: PHARMACY | Facility: HOSPITAL | Age: 79
End: 2024-06-11
Payer: MEDICARE

## 2024-06-11 DIAGNOSIS — I26.99 PULMONARY EMBOLISM, UNSPECIFIED CHRONICITY, UNSPECIFIED PULMONARY EMBOLISM TYPE, UNSPECIFIED WHETHER ACUTE COR PULMONALE PRESENT: Primary | ICD-10-CM

## 2024-06-11 LAB — INR PPP: 3.6 (ref 0.9–1.1)

## 2024-06-11 PROCEDURE — 85610 PROTHROMBIN TIME: CPT | Performed by: PREVENTIVE MEDICINE

## 2024-06-11 PROCEDURE — 36416 COLLJ CAPILLARY BLOOD SPEC: CPT | Performed by: PREVENTIVE MEDICINE

## 2024-06-11 PROCEDURE — 36415 COLL VENOUS BLD VENIPUNCTURE: CPT | Performed by: PREVENTIVE MEDICINE

## 2024-06-11 RX ORDER — SENNOSIDES 8.6 MG
2 TABLET ORAL EVERY EVENING
Qty: 60 TABLET | Refills: 0 | Status: SHIPPED | OUTPATIENT
Start: 2024-06-11

## 2024-06-18 ENCOUNTER — HOSPITAL ENCOUNTER (INPATIENT)
Facility: HOSPITAL | Age: 79
LOS: 3 days | Discharge: HOME | End: 2024-06-21
Attending: INTERNAL MEDICINE | Admitting: FAMILY MEDICINE
Payer: MEDICARE

## 2024-06-18 DIAGNOSIS — G25.81 RESTLESS LEG SYNDROME: ICD-10-CM

## 2024-06-18 DIAGNOSIS — T17.800A MULTIPLE TRACHEOBRONCHIAL MUCUS PLUGS: Primary | ICD-10-CM

## 2024-06-18 DIAGNOSIS — J98.11 ATELECTASIS: ICD-10-CM

## 2024-06-18 PROBLEM — T17.500A MUCUS PLUGGING OF BRONCHI: Status: ACTIVE | Noted: 2024-06-18

## 2024-06-18 PROBLEM — J44.1 COPD EXACERBATION: Status: ACTIVE | Noted: 2024-06-18

## 2024-06-18 PROBLEM — D72.829 LEUKOCYTOSIS: Status: ACTIVE | Noted: 2024-06-18

## 2024-06-18 PROBLEM — J44.1 COPD WITH ACUTE EXACERBATION: Status: ACTIVE | Noted: 2024-06-18

## 2024-06-18 PROBLEM — R79.1 SUPRATHERAPEUTIC INR: Status: ACTIVE | Noted: 2024-06-18

## 2024-06-18 PROBLEM — J96.11 CHRONIC RESPIRATORY FAILURE WITH HYPOXIA: Status: ACTIVE | Noted: 2024-06-18

## 2024-06-18 LAB
B PARAPERT DNA SPEC QL NAA+PROBE: NOT DETECTED
B PERT DNA SPEC QL NAA+PROBE: NOT DETECTED
C PNEUM DNA NPH QL NAA+NON-PROBE: NOT DETECTED
FLUAV SUBTYP SPEC NAA+PROBE: NOT DETECTED
FLUBV RNA ISLT QL NAA+PROBE: NOT DETECTED
GLUCOSE BLDC GLUCOMTR-MCNC: 271 MG/DL (ref 70–105)
HADV DNA SPEC NAA+PROBE: NOT DETECTED
HCOV 229E RNA SPEC QL NAA+PROBE: NOT DETECTED
HCOV HKU1 RNA SPEC QL NAA+PROBE: NOT DETECTED
HCOV NL63 RNA SPEC QL NAA+PROBE: NOT DETECTED
HCOV OC43 RNA SPEC QL NAA+PROBE: NOT DETECTED
HMPV RNA NPH QL NAA+NON-PROBE: NOT DETECTED
HPIV1 RNA ISLT QL NAA+PROBE: NOT DETECTED
HPIV2 RNA SPEC QL NAA+PROBE: NOT DETECTED
HPIV3 RNA NPH QL NAA+PROBE: NOT DETECTED
HPIV4 P GENE NPH QL NAA+PROBE: NOT DETECTED
M PNEUMO IGG SER IA-ACNC: NOT DETECTED
RHINOVIRUS RNA SPEC NAA+PROBE: NOT DETECTED
RSV RNA NPH QL NAA+NON-PROBE: NOT DETECTED
SARS-COV-2 RNA NPH QL NAA+NON-PROBE: NOT DETECTED

## 2024-06-18 PROCEDURE — 92610 EVALUATE SWALLOWING FUNCTION: CPT

## 2024-06-18 PROCEDURE — 93010 ELECTROCARDIOGRAM REPORT: CPT | Performed by: INTERNAL MEDICINE

## 2024-06-18 PROCEDURE — 94761 N-INVAS EAR/PLS OXIMETRY MLT: CPT

## 2024-06-18 PROCEDURE — 94799 UNLISTED PULMONARY SVC/PX: CPT

## 2024-06-18 PROCEDURE — 0202U NFCT DS 22 TRGT SARS-COV-2: CPT

## 2024-06-18 PROCEDURE — 82948 REAGENT STRIP/BLOOD GLUCOSE: CPT

## 2024-06-18 PROCEDURE — 93005 ELECTROCARDIOGRAM TRACING: CPT

## 2024-06-18 RX ORDER — MIDODRINE HYDROCHLORIDE 5 MG/1
10 TABLET ORAL 3 TIMES DAILY PRN
Status: DISCONTINUED | OUTPATIENT
Start: 2024-06-18 | End: 2024-06-21 | Stop reason: HOSPADM

## 2024-06-18 RX ORDER — SPIRONOLACTONE 25 MG/1
25 TABLET ORAL DAILY
Status: DISCONTINUED | OUTPATIENT
Start: 2024-06-18 | End: 2024-06-21 | Stop reason: HOSPADM

## 2024-06-18 RX ORDER — MONTELUKAST SODIUM 10 MG/1
10 TABLET ORAL NIGHTLY
Status: DISCONTINUED | OUTPATIENT
Start: 2024-06-18 | End: 2024-06-21 | Stop reason: HOSPADM

## 2024-06-18 RX ORDER — ONDANSETRON 2 MG/ML
4 INJECTION INTRAMUSCULAR; INTRAVENOUS EVERY 6 HOURS PRN
Status: DISCONTINUED | OUTPATIENT
Start: 2024-06-18 | End: 2024-06-21 | Stop reason: HOSPADM

## 2024-06-18 RX ORDER — PREDNISONE 10 MG/1
10 TABLET ORAL DAILY
Status: DISCONTINUED | OUTPATIENT
Start: 2024-06-23 | End: 2024-06-21 | Stop reason: HOSPADM

## 2024-06-18 RX ORDER — ATORVASTATIN CALCIUM 40 MG/1
40 TABLET, FILM COATED ORAL DAILY
Status: DISCONTINUED | OUTPATIENT
Start: 2024-06-18 | End: 2024-06-19

## 2024-06-18 RX ORDER — UREA 10 %
5 LOTION (ML) TOPICAL NIGHTLY PRN
Status: DISCONTINUED | OUTPATIENT
Start: 2024-06-18 | End: 2024-06-21 | Stop reason: HOSPADM

## 2024-06-18 RX ORDER — SENNOSIDES A AND B 8.6 MG/1
2 TABLET, FILM COATED ORAL EVERY EVENING
Status: DISCONTINUED | OUTPATIENT
Start: 2024-06-18 | End: 2024-06-21 | Stop reason: HOSPADM

## 2024-06-18 RX ORDER — ACETAMINOPHEN 325 MG/1
650 TABLET ORAL EVERY 6 HOURS PRN
Status: DISCONTINUED | OUTPATIENT
Start: 2024-06-18 | End: 2024-06-21 | Stop reason: HOSPADM

## 2024-06-18 RX ORDER — PREDNISONE 20 MG/1
20 TABLET ORAL DAILY
Qty: 2 TABLET | Refills: 0 | Status: DISCONTINUED | OUTPATIENT
Start: 2024-06-20 | End: 2024-06-18

## 2024-06-18 RX ORDER — GUAIFENESIN 600 MG/1
1200 TABLET, EXTENDED RELEASE ORAL EVERY 12 HOURS SCHEDULED
Status: DISCONTINUED | OUTPATIENT
Start: 2024-06-18 | End: 2024-06-21 | Stop reason: HOSPADM

## 2024-06-18 RX ORDER — PREDNISONE 20 MG/1
20 TABLET ORAL DAILY
Status: DISCONTINUED | OUTPATIENT
Start: 2024-06-21 | End: 2024-06-21 | Stop reason: HOSPADM

## 2024-06-18 RX ORDER — ISOSORBIDE MONONITRATE 30 MG/1
30 TABLET, EXTENDED RELEASE ORAL DAILY
Status: DISCONTINUED | OUTPATIENT
Start: 2024-06-18 | End: 2024-06-19

## 2024-06-18 RX ORDER — MELATONIN
2000 DAILY
Status: DISCONTINUED | OUTPATIENT
Start: 2024-06-18 | End: 2024-06-21 | Stop reason: HOSPADM

## 2024-06-18 RX ORDER — DILTIAZEM HYDROCHLORIDE 180 MG/1
180 CAPSULE, COATED, EXTENDED RELEASE ORAL
Status: DISCONTINUED | OUTPATIENT
Start: 2024-06-19 | End: 2024-06-21 | Stop reason: HOSPADM

## 2024-06-18 RX ORDER — IPRATROPIUM BROMIDE AND ALBUTEROL SULFATE 2.5; .5 MG/3ML; MG/3ML
3 SOLUTION RESPIRATORY (INHALATION)
Status: DISCONTINUED | OUTPATIENT
Start: 2024-06-18 | End: 2024-06-21 | Stop reason: HOSPADM

## 2024-06-18 RX ORDER — BUDESONIDE 0.5 MG/2ML
0.5 INHALANT ORAL
Status: DISCONTINUED | OUTPATIENT
Start: 2024-06-18 | End: 2024-06-21 | Stop reason: HOSPADM

## 2024-06-18 RX ORDER — SPIRONOLACTONE 25 MG/1
25 TABLET ORAL DAILY
Status: DISCONTINUED | OUTPATIENT
Start: 2024-06-19 | End: 2024-06-18

## 2024-06-18 RX ORDER — AZITHROMYCIN 250 MG/1
250 TABLET, FILM COATED ORAL
Status: DISCONTINUED | OUTPATIENT
Start: 2024-06-19 | End: 2024-06-21 | Stop reason: HOSPADM

## 2024-06-18 RX ORDER — CALCIUM CARBONATE 500 MG/1
2 TABLET, CHEWABLE ORAL 3 TIMES DAILY PRN
Status: DISCONTINUED | OUTPATIENT
Start: 2024-06-18 | End: 2024-06-21 | Stop reason: HOSPADM

## 2024-06-18 RX ORDER — MIDODRINE HYDROCHLORIDE 5 MG/1
10 TABLET ORAL
Status: DISCONTINUED | OUTPATIENT
Start: 2024-06-18 | End: 2024-06-18

## 2024-06-18 RX ORDER — FUROSEMIDE 40 MG/1
40 TABLET ORAL DAILY
Status: DISCONTINUED | OUTPATIENT
Start: 2024-06-18 | End: 2024-06-21 | Stop reason: HOSPADM

## 2024-06-18 RX ORDER — ASPIRIN 81 MG/1
81 TABLET ORAL DAILY
Status: DISCONTINUED | OUTPATIENT
Start: 2024-06-18 | End: 2024-06-21 | Stop reason: HOSPADM

## 2024-06-18 RX ORDER — THEOPHYLLINE 100 MG/1
100 TABLET, EXTENDED RELEASE ORAL 2 TIMES DAILY
Status: DISCONTINUED | OUTPATIENT
Start: 2024-06-18 | End: 2024-06-18

## 2024-06-18 RX ORDER — PREDNISONE 10 MG/1
10 TABLET ORAL DAILY
Qty: 2 TABLET | Refills: 0 | Status: DISCONTINUED | OUTPATIENT
Start: 2024-06-22 | End: 2024-06-18

## 2024-06-18 RX ADMIN — IPRATROPIUM BROMIDE AND ALBUTEROL SULFATE 3 ML: .5; 3 SOLUTION RESPIRATORY (INHALATION) at 16:18

## 2024-06-18 RX ADMIN — FUROSEMIDE 40 MG: 40 TABLET ORAL at 19:40

## 2024-06-18 RX ADMIN — ATORVASTATIN CALCIUM 40 MG: 40 TABLET, FILM COATED ORAL at 19:40

## 2024-06-18 RX ADMIN — SPIRONOLACTONE 25 MG: 25 TABLET ORAL at 19:39

## 2024-06-18 RX ADMIN — ASPIRIN 81 MG: 81 TABLET, COATED ORAL at 19:40

## 2024-06-18 RX ADMIN — ISOSORBIDE MONONITRATE 30 MG: 30 TABLET, EXTENDED RELEASE ORAL at 19:39

## 2024-06-18 RX ADMIN — MONTELUKAST 10 MG: 10 TABLET, FILM COATED ORAL at 20:56

## 2024-06-18 RX ADMIN — Medication 2000 UNITS: at 19:40

## 2024-06-18 RX ADMIN — IPRATROPIUM BROMIDE AND ALBUTEROL SULFATE 3 ML: .5; 3 SOLUTION RESPIRATORY (INHALATION) at 19:09

## 2024-06-18 RX ADMIN — BUDESONIDE INHALATION 0.5 MG: 0.5 SUSPENSION RESPIRATORY (INHALATION) at 19:13

## 2024-06-18 RX ADMIN — GUAIFENESIN 1200 MG: 600 TABLET, EXTENDED RELEASE ORAL at 20:56

## 2024-06-18 RX ADMIN — THEOPHYLLINE ANHYDROUS 100 MG: 100 CAPSULE, EXTENDED RELEASE ORAL at 20:56

## 2024-06-18 RX ADMIN — SENNOSIDES 2 TABLET: 8.6 TABLET, FILM COATED ORAL at 19:40

## 2024-06-19 ENCOUNTER — APPOINTMENT (OUTPATIENT)
Dept: GENERAL RADIOLOGY | Facility: HOSPITAL | Age: 79
End: 2024-06-19
Payer: MEDICARE

## 2024-06-19 ENCOUNTER — ANESTHESIA EVENT (OUTPATIENT)
Dept: GASTROENTEROLOGY | Facility: HOSPITAL | Age: 79
End: 2024-06-19
Payer: MEDICARE

## 2024-06-19 ENCOUNTER — ANESTHESIA (OUTPATIENT)
Dept: GASTROENTEROLOGY | Facility: HOSPITAL | Age: 79
End: 2024-06-19
Payer: MEDICARE

## 2024-06-19 PROBLEM — J98.11 ATELECTASIS: Status: ACTIVE | Noted: 2024-06-18

## 2024-06-19 LAB
B PARAPERT DNA SPEC QL NAA+PROBE: NOT DETECTED
B PERT DNA SPEC QL NAA+PROBE: NOT DETECTED
C PNEUM DNA NPH QL NAA+NON-PROBE: NOT DETECTED
FLUAV SUBTYP SPEC NAA+PROBE: NOT DETECTED
FLUBV RNA ISLT QL NAA+PROBE: NOT DETECTED
GLUCOSE BLDC GLUCOMTR-MCNC: 150 MG/DL (ref 70–105)
GLUCOSE BLDC GLUCOMTR-MCNC: 167 MG/DL (ref 70–105)
GLUCOSE BLDC GLUCOMTR-MCNC: 239 MG/DL (ref 70–105)
HADV DNA SPEC NAA+PROBE: NOT DETECTED
HBA1C MFR BLD: 6.54 % (ref 4.8–5.6)
HCOV 229E RNA SPEC QL NAA+PROBE: NOT DETECTED
HCOV HKU1 RNA SPEC QL NAA+PROBE: NOT DETECTED
HCOV NL63 RNA SPEC QL NAA+PROBE: NOT DETECTED
HCOV OC43 RNA SPEC QL NAA+PROBE: NOT DETECTED
HMPV RNA NPH QL NAA+NON-PROBE: NOT DETECTED
HPIV1 RNA ISLT QL NAA+PROBE: NOT DETECTED
HPIV2 RNA SPEC QL NAA+PROBE: NOT DETECTED
HPIV3 RNA NPH QL NAA+PROBE: NOT DETECTED
HPIV4 P GENE NPH QL NAA+PROBE: NOT DETECTED
INR PPP: 3.04 (ref 2–3)
M PNEUMO IGG SER IA-ACNC: NOT DETECTED
PROTHROMBIN TIME: 30.5 SECONDS (ref 19.4–28.5)
QT INTERVAL: 429 MS
QTC INTERVAL: 421 MS
RHINOVIRUS RNA SPEC NAA+PROBE: DETECTED
RSV RNA NPH QL NAA+NON-PROBE: NOT DETECTED
SARS-COV-2 RNA NPH QL NAA+NON-PROBE: NOT DETECTED

## 2024-06-19 PROCEDURE — 87798 DETECT AGENT NOS DNA AMP: CPT | Performed by: INTERNAL MEDICINE

## 2024-06-19 PROCEDURE — 82948 REAGENT STRIP/BLOOD GLUCOSE: CPT

## 2024-06-19 PROCEDURE — 97162 PT EVAL MOD COMPLEX 30 MIN: CPT

## 2024-06-19 PROCEDURE — 0202U NFCT DS 22 TRGT SARS-COV-2: CPT | Performed by: INTERNAL MEDICINE

## 2024-06-19 PROCEDURE — 83036 HEMOGLOBIN GLYCOSYLATED A1C: CPT

## 2024-06-19 PROCEDURE — 94761 N-INVAS EAR/PLS OXIMETRY MLT: CPT

## 2024-06-19 PROCEDURE — 87206 SMEAR FLUORESCENT/ACID STAI: CPT | Performed by: INTERNAL MEDICINE

## 2024-06-19 PROCEDURE — 94799 UNLISTED PULMONARY SVC/PX: CPT

## 2024-06-19 PROCEDURE — 88108 CYTOPATH CONCENTRATE TECH: CPT | Performed by: INTERNAL MEDICINE

## 2024-06-19 PROCEDURE — 87071 CULTURE AEROBIC QUANT OTHER: CPT | Performed by: INTERNAL MEDICINE

## 2024-06-19 PROCEDURE — 92611 MOTION FLUOROSCOPY/SWALLOW: CPT

## 2024-06-19 PROCEDURE — 25010000002 PROPOFOL 200 MG/20ML EMULSION: Performed by: NURSE ANESTHETIST, CERTIFIED REGISTERED

## 2024-06-19 PROCEDURE — 87102 FUNGUS ISOLATION CULTURE: CPT | Performed by: INTERNAL MEDICINE

## 2024-06-19 PROCEDURE — 74230 X-RAY XM SWLNG FUNCJ C+: CPT

## 2024-06-19 PROCEDURE — 87116 MYCOBACTERIA CULTURE: CPT | Performed by: INTERNAL MEDICINE

## 2024-06-19 PROCEDURE — 94664 DEMO&/EVAL PT USE INHALER: CPT

## 2024-06-19 PROCEDURE — 0B9D8ZX DRAINAGE OF RIGHT MIDDLE LUNG LOBE, VIA NATURAL OR ARTIFICIAL OPENING ENDOSCOPIC, DIAGNOSTIC: ICD-10-PCS | Performed by: INTERNAL MEDICINE

## 2024-06-19 PROCEDURE — 25010000002 ONDANSETRON PER 1 MG

## 2024-06-19 PROCEDURE — 97166 OT EVAL MOD COMPLEX 45 MIN: CPT

## 2024-06-19 PROCEDURE — 87205 SMEAR GRAM STAIN: CPT | Performed by: INTERNAL MEDICINE

## 2024-06-19 PROCEDURE — 85610 PROTHROMBIN TIME: CPT | Performed by: FAMILY MEDICINE

## 2024-06-19 RX ORDER — WARFARIN SODIUM 2.5 MG/1
2.5 TABLET ORAL SEE ADMIN INSTRUCTIONS
COMMUNITY
End: 2024-06-25 | Stop reason: SDUPTHER

## 2024-06-19 RX ORDER — ONDANSETRON 2 MG/ML
4 INJECTION INTRAMUSCULAR; INTRAVENOUS ONCE AS NEEDED
Status: DISCONTINUED | OUTPATIENT
Start: 2024-06-19 | End: 2024-06-19 | Stop reason: HOSPADM

## 2024-06-19 RX ORDER — ATORVASTATIN CALCIUM 80 MG/1
40 TABLET, FILM COATED ORAL DAILY
COMMUNITY

## 2024-06-19 RX ORDER — MIDODRINE HYDROCHLORIDE 10 MG/1
10 TABLET ORAL
COMMUNITY

## 2024-06-19 RX ORDER — SPIRONOLACTONE 25 MG/1
25 TABLET ORAL DAILY
COMMUNITY

## 2024-06-19 RX ORDER — WARFARIN SODIUM 2.5 MG/1
2.5 TABLET ORAL
Status: DISCONTINUED | OUTPATIENT
Start: 2024-06-19 | End: 2024-06-21 | Stop reason: HOSPADM

## 2024-06-19 RX ORDER — MEPERIDINE HYDROCHLORIDE 25 MG/ML
12.5 INJECTION INTRAMUSCULAR; INTRAVENOUS; SUBCUTANEOUS
Status: DISCONTINUED | OUTPATIENT
Start: 2024-06-19 | End: 2024-06-19 | Stop reason: HOSPADM

## 2024-06-19 RX ORDER — ATORVASTATIN CALCIUM 40 MG/1
80 TABLET, FILM COATED ORAL DAILY
Status: DISCONTINUED | OUTPATIENT
Start: 2024-06-19 | End: 2024-06-21 | Stop reason: HOSPADM

## 2024-06-19 RX ORDER — SODIUM CHLORIDE 9 MG/ML
INJECTION, SOLUTION INTRAVENOUS CONTINUOUS PRN
Status: DISCONTINUED | OUTPATIENT
Start: 2024-06-19 | End: 2024-06-19 | Stop reason: SURG

## 2024-06-19 RX ORDER — LABETALOL HYDROCHLORIDE 5 MG/ML
5 INJECTION, SOLUTION INTRAVENOUS
Status: DISCONTINUED | OUTPATIENT
Start: 2024-06-19 | End: 2024-06-19 | Stop reason: HOSPADM

## 2024-06-19 RX ORDER — WARFARIN SODIUM 2.5 MG/1
5 TABLET ORAL WEEKLY
COMMUNITY

## 2024-06-19 RX ORDER — DILTIAZEM HYDROCHLORIDE 180 MG/1
180 CAPSULE, COATED, EXTENDED RELEASE ORAL DAILY
COMMUNITY

## 2024-06-19 RX ORDER — LEVOTHYROXINE SODIUM 0.12 MG/1
125 TABLET ORAL DAILY
Status: DISCONTINUED | OUTPATIENT
Start: 2024-06-20 | End: 2024-06-21 | Stop reason: HOSPADM

## 2024-06-19 RX ORDER — LEVOTHYROXINE SODIUM 0.12 MG/1
125 TABLET ORAL DAILY
COMMUNITY

## 2024-06-19 RX ORDER — FLUTICASONE FUROATE AND VILANTEROL 100; 25 UG/1; UG/1
1 POWDER RESPIRATORY (INHALATION)
COMMUNITY

## 2024-06-19 RX ORDER — HYDRALAZINE HYDROCHLORIDE 20 MG/ML
5 INJECTION INTRAMUSCULAR; INTRAVENOUS
Status: DISCONTINUED | OUTPATIENT
Start: 2024-06-19 | End: 2024-06-19 | Stop reason: HOSPADM

## 2024-06-19 RX ORDER — PROPOFOL 10 MG/ML
INJECTION, EMULSION INTRAVENOUS AS NEEDED
Status: DISCONTINUED | OUTPATIENT
Start: 2024-06-19 | End: 2024-06-19 | Stop reason: SURG

## 2024-06-19 RX ORDER — IPRATROPIUM BROMIDE AND ALBUTEROL SULFATE 2.5; .5 MG/3ML; MG/3ML
3 SOLUTION RESPIRATORY (INHALATION) ONCE AS NEEDED
Status: COMPLETED | OUTPATIENT
Start: 2024-06-19 | End: 2024-06-19

## 2024-06-19 RX ORDER — POTASSIUM CHLORIDE 20 MEQ/1
20 TABLET, EXTENDED RELEASE ORAL 2 TIMES DAILY PRN
COMMUNITY

## 2024-06-19 RX ORDER — EPHEDRINE SULFATE 5 MG/ML
5 INJECTION INTRAVENOUS ONCE AS NEEDED
Status: DISCONTINUED | OUTPATIENT
Start: 2024-06-19 | End: 2024-06-19 | Stop reason: HOSPADM

## 2024-06-19 RX ORDER — GUAIFENESIN 600 MG/1
1200 TABLET, EXTENDED RELEASE ORAL 2 TIMES DAILY
COMMUNITY

## 2024-06-19 RX ORDER — SENNOSIDES A AND B 8.6 MG/1
2 TABLET, FILM COATED ORAL DAILY
COMMUNITY

## 2024-06-19 RX ORDER — LIDOCAINE HYDROCHLORIDE 20 MG/ML
INJECTION, SOLUTION EPIDURAL; INFILTRATION; INTRACAUDAL; PERINEURAL AS NEEDED
Status: DISCONTINUED | OUTPATIENT
Start: 2024-06-19 | End: 2024-06-19 | Stop reason: SURG

## 2024-06-19 RX ADMIN — BUDESONIDE INHALATION 0.5 MG: 0.5 SUSPENSION RESPIRATORY (INHALATION) at 19:54

## 2024-06-19 RX ADMIN — ATORVASTATIN CALCIUM 80 MG: 40 TABLET, FILM COATED ORAL at 20:48

## 2024-06-19 RX ADMIN — MONTELUKAST 10 MG: 10 TABLET, FILM COATED ORAL at 20:48

## 2024-06-19 RX ADMIN — ONDANSETRON 4 MG: 2 INJECTION INTRAMUSCULAR; INTRAVENOUS at 23:36

## 2024-06-19 RX ADMIN — BARIUM SULFATE 50 ML: 400 SUSPENSION ORAL at 11:49

## 2024-06-19 RX ADMIN — WARFARIN SODIUM 2.5 MG: 2.5 TABLET ORAL at 18:23

## 2024-06-19 RX ADMIN — IPRATROPIUM BROMIDE AND ALBUTEROL SULFATE 3 ML: .5; 3 SOLUTION RESPIRATORY (INHALATION) at 06:37

## 2024-06-19 RX ADMIN — PROPOFOL 100 MG: 10 INJECTION, EMULSION INTRAVENOUS at 08:30

## 2024-06-19 RX ADMIN — IPRATROPIUM BROMIDE AND ALBUTEROL SULFATE 3 ML: .5; 3 SOLUTION RESPIRATORY (INHALATION) at 14:24

## 2024-06-19 RX ADMIN — BUDESONIDE INHALATION 0.5 MG: 0.5 SUSPENSION RESPIRATORY (INHALATION) at 06:37

## 2024-06-19 RX ADMIN — IPRATROPIUM BROMIDE AND ALBUTEROL SULFATE 3 ML: .5; 3 SOLUTION RESPIRATORY (INHALATION) at 08:59

## 2024-06-19 RX ADMIN — LIDOCAINE HYDROCHLORIDE 100 MG: 20 INJECTION, SOLUTION EPIDURAL; INFILTRATION; INTRACAUDAL; PERINEURAL at 08:30

## 2024-06-19 RX ADMIN — SENNOSIDES 2 TABLET: 8.6 TABLET, FILM COATED ORAL at 18:23

## 2024-06-19 RX ADMIN — IPRATROPIUM BROMIDE AND ALBUTEROL SULFATE 3 ML: .5; 3 SOLUTION RESPIRATORY (INHALATION) at 19:49

## 2024-06-19 RX ADMIN — GUAIFENESIN 1200 MG: 600 TABLET, EXTENDED RELEASE ORAL at 20:48

## 2024-06-19 RX ADMIN — SODIUM CHLORIDE: 9 INJECTION, SOLUTION INTRAVENOUS at 08:26

## 2024-06-20 ENCOUNTER — APPOINTMENT (OUTPATIENT)
Dept: GENERAL RADIOLOGY | Facility: HOSPITAL | Age: 79
End: 2024-06-20
Payer: MEDICARE

## 2024-06-20 LAB
BACTERIA SPEC RESP CULT: NORMAL
BILIRUB UR QL STRIP: NEGATIVE
CLARITY UR: CLEAR
COLOR UR: YELLOW
GLUCOSE BLDC GLUCOMTR-MCNC: 108 MG/DL (ref 70–105)
GLUCOSE BLDC GLUCOMTR-MCNC: 121 MG/DL (ref 70–105)
GLUCOSE BLDC GLUCOMTR-MCNC: 194 MG/DL (ref 70–105)
GLUCOSE UR STRIP-MCNC: NEGATIVE MG/DL
GRAM STN SPEC: NORMAL
HGB UR QL STRIP.AUTO: NEGATIVE
INR PPP: 2.21 (ref 2–3)
KETONES UR QL STRIP: NEGATIVE
LAB AP CASE REPORT: NORMAL
LEUKOCYTE ESTERASE UR QL STRIP.AUTO: NEGATIVE
NITRITE UR QL STRIP: NEGATIVE
PATH REPORT.FINAL DX SPEC: NORMAL
PATH REPORT.GROSS SPEC: NORMAL
PH UR STRIP.AUTO: 5.5 [PH] (ref 5–8)
PROT UR QL STRIP: NEGATIVE
PROTHROMBIN TIME: 22.7 SECONDS (ref 19.4–28.5)
SP GR UR STRIP: 1.02 (ref 1–1.03)
UROBILINOGEN UR QL STRIP: NORMAL

## 2024-06-20 PROCEDURE — 82948 REAGENT STRIP/BLOOD GLUCOSE: CPT

## 2024-06-20 PROCEDURE — 94799 UNLISTED PULMONARY SVC/PX: CPT

## 2024-06-20 PROCEDURE — 81003 URINALYSIS AUTO W/O SCOPE: CPT

## 2024-06-20 PROCEDURE — 97116 GAIT TRAINING THERAPY: CPT

## 2024-06-20 PROCEDURE — 94664 DEMO&/EVAL PT USE INHALER: CPT

## 2024-06-20 PROCEDURE — 94761 N-INVAS EAR/PLS OXIMETRY MLT: CPT

## 2024-06-20 PROCEDURE — 85610 PROTHROMBIN TIME: CPT | Performed by: FAMILY MEDICINE

## 2024-06-20 PROCEDURE — 92526 ORAL FUNCTION THERAPY: CPT

## 2024-06-20 PROCEDURE — 63710000001 PREDNISONE PER 1 MG

## 2024-06-20 PROCEDURE — 74022 RADEX COMPL AQT ABD SERIES: CPT

## 2024-06-20 PROCEDURE — 97110 THERAPEUTIC EXERCISES: CPT

## 2024-06-20 PROCEDURE — 63710000001 PREDNISONE PER 5 MG

## 2024-06-20 PROCEDURE — 87205 SMEAR GRAM STAIN: CPT

## 2024-06-20 RX ADMIN — MONTELUKAST 10 MG: 10 TABLET, FILM COATED ORAL at 22:04

## 2024-06-20 RX ADMIN — SPIRONOLACTONE 25 MG: 25 TABLET ORAL at 12:18

## 2024-06-20 RX ADMIN — IPRATROPIUM BROMIDE AND ALBUTEROL SULFATE 3 ML: .5; 3 SOLUTION RESPIRATORY (INHALATION) at 18:30

## 2024-06-20 RX ADMIN — WARFARIN SODIUM 2.5 MG: 2.5 TABLET ORAL at 18:02

## 2024-06-20 RX ADMIN — BUDESONIDE INHALATION 0.5 MG: 0.5 SUSPENSION RESPIRATORY (INHALATION) at 18:35

## 2024-06-20 RX ADMIN — PREDNISONE 30 MG: 20 TABLET ORAL at 12:19

## 2024-06-20 RX ADMIN — IPRATROPIUM BROMIDE AND ALBUTEROL SULFATE 3 ML: .5; 3 SOLUTION RESPIRATORY (INHALATION) at 14:30

## 2024-06-20 RX ADMIN — Medication 5 MG: at 22:04

## 2024-06-20 RX ADMIN — ATORVASTATIN CALCIUM 80 MG: 40 TABLET, FILM COATED ORAL at 22:04

## 2024-06-20 RX ADMIN — GUAIFENESIN 1200 MG: 600 TABLET, EXTENDED RELEASE ORAL at 12:18

## 2024-06-20 RX ADMIN — GUAIFENESIN 1200 MG: 600 TABLET, EXTENDED RELEASE ORAL at 22:04

## 2024-06-20 RX ADMIN — IPRATROPIUM BROMIDE AND ALBUTEROL SULFATE 3 ML: .5; 3 SOLUTION RESPIRATORY (INHALATION) at 11:25

## 2024-06-20 RX ADMIN — Medication 2000 UNITS: at 12:19

## 2024-06-20 RX ADMIN — SENNOSIDES 2 TABLET: 8.6 TABLET, FILM COATED ORAL at 18:02

## 2024-06-20 RX ADMIN — DILTIAZEM HYDROCHLORIDE 180 MG: 180 CAPSULE, COATED, EXTENDED RELEASE ORAL at 12:18

## 2024-06-20 RX ADMIN — ASPIRIN 81 MG: 81 TABLET, COATED ORAL at 12:18

## 2024-06-20 RX ADMIN — IPRATROPIUM BROMIDE AND ALBUTEROL SULFATE 3 ML: .5; 3 SOLUTION RESPIRATORY (INHALATION) at 06:44

## 2024-06-20 RX ADMIN — FUROSEMIDE 40 MG: 40 TABLET ORAL at 12:18

## 2024-06-20 RX ADMIN — LEVOTHYROXINE SODIUM 125 MCG: 0.12 TABLET ORAL at 12:19

## 2024-06-20 RX ADMIN — BUDESONIDE INHALATION 0.5 MG: 0.5 SUSPENSION RESPIRATORY (INHALATION) at 06:40

## 2024-06-20 RX ADMIN — AZITHROMYCIN DIHYDRATE 250 MG: 250 TABLET ORAL at 12:19

## 2024-06-21 ENCOUNTER — READMISSION MANAGEMENT (OUTPATIENT)
Dept: CALL CENTER | Facility: HOSPITAL | Age: 79
End: 2024-06-21
Payer: MEDICARE

## 2024-06-21 VITALS
OXYGEN SATURATION: 97 % | DIASTOLIC BLOOD PRESSURE: 81 MMHG | TEMPERATURE: 97.6 F | WEIGHT: 207.01 LBS | SYSTOLIC BLOOD PRESSURE: 147 MMHG | BODY MASS INDEX: 40.64 KG/M2 | HEART RATE: 78 BPM | HEIGHT: 60 IN | RESPIRATION RATE: 20 BRPM

## 2024-06-21 LAB
ANION GAP SERPL CALCULATED.3IONS-SCNC: 10 MMOL/L (ref 5–15)
BACTERIA SPEC AEROBE CULT: NORMAL
BUN SERPL-MCNC: 26 MG/DL (ref 8–23)
BUN/CREAT SERPL: 24.5 (ref 7–25)
CALCIUM SPEC-SCNC: 9.3 MG/DL (ref 8.6–10.5)
CHLORIDE SERPL-SCNC: 99 MMOL/L (ref 98–107)
CO2 SERPL-SCNC: 28 MMOL/L (ref 22–29)
CREAT SERPL-MCNC: 1.06 MG/DL (ref 0.57–1)
EGFRCR SERPLBLD CKD-EPI 2021: 53.9 ML/MIN/1.73
GLUCOSE BLDC GLUCOMTR-MCNC: 140 MG/DL (ref 70–105)
GLUCOSE SERPL-MCNC: 158 MG/DL (ref 65–99)
GRAM STN SPEC: NORMAL
GRAM STN SPEC: NORMAL
INR PPP: 1.82 (ref 2–3)
POTASSIUM SERPL-SCNC: 4.9 MMOL/L (ref 3.5–5.2)
PROTHROMBIN TIME: 19 SECONDS (ref 19.4–28.5)
SODIUM SERPL-SCNC: 137 MMOL/L (ref 136–145)

## 2024-06-21 PROCEDURE — 63710000001 PREDNISONE PER 1 MG

## 2024-06-21 PROCEDURE — 94664 DEMO&/EVAL PT USE INHALER: CPT

## 2024-06-21 PROCEDURE — 82948 REAGENT STRIP/BLOOD GLUCOSE: CPT

## 2024-06-21 PROCEDURE — 97116 GAIT TRAINING THERAPY: CPT

## 2024-06-21 PROCEDURE — 80048 BASIC METABOLIC PNL TOTAL CA: CPT | Performed by: INTERNAL MEDICINE

## 2024-06-21 PROCEDURE — 97110 THERAPEUTIC EXERCISES: CPT

## 2024-06-21 PROCEDURE — 94761 N-INVAS EAR/PLS OXIMETRY MLT: CPT

## 2024-06-21 PROCEDURE — 94799 UNLISTED PULMONARY SVC/PX: CPT

## 2024-06-21 PROCEDURE — 85610 PROTHROMBIN TIME: CPT | Performed by: FAMILY MEDICINE

## 2024-06-21 RX ORDER — SENNOSIDES A AND B 8.6 MG/1
2 TABLET, FILM COATED ORAL EVERY EVENING
Qty: 60 TABLET | Refills: 0 | Status: SHIPPED | OUTPATIENT
Start: 2024-06-21

## 2024-06-21 RX ORDER — MIDODRINE HYDROCHLORIDE 10 MG/1
10 TABLET ORAL
Qty: 270 TABLET | Refills: 0 | Status: SHIPPED | OUTPATIENT
Start: 2024-06-21

## 2024-06-21 RX ORDER — DILTIAZEM HYDROCHLORIDE 180 MG/1
180 CAPSULE, COATED, EXTENDED RELEASE ORAL DAILY
Qty: 90 CAPSULE | Refills: 0 | Status: SHIPPED | OUTPATIENT
Start: 2024-06-21

## 2024-06-21 RX ORDER — AZITHROMYCIN 250 MG/1
TABLET, FILM COATED ORAL
Qty: 3 TABLET | Refills: 0 | Status: SHIPPED | OUTPATIENT
Start: 2024-06-22

## 2024-06-21 RX ORDER — PREDNISONE 10 MG/1
TABLET ORAL
Qty: 13 TABLET | Refills: 0 | Status: SHIPPED | OUTPATIENT
Start: 2024-06-21 | End: 2024-06-28

## 2024-06-21 RX ORDER — BUDESONIDE 0.5 MG/2ML
0.5 INHALANT ORAL
Qty: 1 EACH | Refills: 0 | Status: SHIPPED | OUTPATIENT
Start: 2024-06-21

## 2024-06-21 RX ADMIN — GUAIFENESIN 1200 MG: 600 TABLET, EXTENDED RELEASE ORAL at 08:12

## 2024-06-21 RX ADMIN — LEVOTHYROXINE SODIUM 125 MCG: 0.12 TABLET ORAL at 08:13

## 2024-06-21 RX ADMIN — IPRATROPIUM BROMIDE AND ALBUTEROL SULFATE 3 ML: .5; 3 SOLUTION RESPIRATORY (INHALATION) at 12:03

## 2024-06-21 RX ADMIN — Medication 2000 UNITS: at 08:13

## 2024-06-21 RX ADMIN — ASPIRIN 81 MG: 81 TABLET, COATED ORAL at 08:13

## 2024-06-21 RX ADMIN — IPRATROPIUM BROMIDE AND ALBUTEROL SULFATE 3 ML: .5; 3 SOLUTION RESPIRATORY (INHALATION) at 07:57

## 2024-06-21 RX ADMIN — PREDNISONE 20 MG: 20 TABLET ORAL at 08:14

## 2024-06-21 RX ADMIN — AZITHROMYCIN DIHYDRATE 250 MG: 250 TABLET ORAL at 08:13

## 2024-06-21 RX ADMIN — BUDESONIDE INHALATION 0.5 MG: 0.5 SUSPENSION RESPIRATORY (INHALATION) at 08:01

## 2024-06-21 RX ADMIN — DILTIAZEM HYDROCHLORIDE 180 MG: 180 CAPSULE, COATED, EXTENDED RELEASE ORAL at 08:13

## 2024-06-21 RX ADMIN — SPIRONOLACTONE 25 MG: 25 TABLET ORAL at 08:12

## 2024-06-21 RX ADMIN — FUROSEMIDE 40 MG: 40 TABLET ORAL at 08:13

## 2024-06-24 ENCOUNTER — TELEPHONE (OUTPATIENT)
Dept: PHARMACY | Facility: HOSPITAL | Age: 79
End: 2024-06-24
Payer: MEDICARE

## 2024-06-24 ENCOUNTER — TRANSITIONAL CARE MANAGEMENT TELEPHONE ENCOUNTER (OUTPATIENT)
Dept: CALL CENTER | Facility: HOSPITAL | Age: 79
End: 2024-06-24
Payer: MEDICARE

## 2024-06-24 LAB
P JIROVECII DNA L RESP QL NAA+NON-PROBE: NEGATIVE
REF LAB TEST METHOD: NORMAL

## 2024-06-25 ENCOUNTER — ANTICOAGULATION VISIT (OUTPATIENT)
Dept: PHARMACY | Facility: HOSPITAL | Age: 79
End: 2024-06-25
Payer: MEDICARE

## 2024-06-25 LAB — INR PPP: 1.3 (ref 2–3)

## 2024-06-25 RX ORDER — WARFARIN SODIUM 2.5 MG/1
2.5 TABLET ORAL SEE ADMIN INSTRUCTIONS
Qty: 108 TABLET | Refills: 3 | Status: SHIPPED | OUTPATIENT
Start: 2024-06-25

## 2024-06-26 LAB
FUNGUS WND CULT: NORMAL
MYCOBACTERIUM SPEC CULT: NORMAL
NIGHT BLUE STAIN TISS: NORMAL

## 2024-06-28 ENCOUNTER — ANTICOAGULATION VISIT (OUTPATIENT)
Dept: PHARMACY | Facility: HOSPITAL | Age: 79
End: 2024-06-28
Payer: MEDICARE

## 2024-06-28 DIAGNOSIS — I26.99 PULMONARY EMBOLISM, UNSPECIFIED CHRONICITY, UNSPECIFIED PULMONARY EMBOLISM TYPE, UNSPECIFIED WHETHER ACUTE COR PULMONALE PRESENT: Primary | ICD-10-CM

## 2024-06-28 LAB — INR PPP: 1.2 (ref 2–3)

## 2024-07-01 RX ORDER — LEVOTHYROXINE SODIUM 0.12 MG/1
125 TABLET ORAL DAILY
Qty: 90 TABLET | OUTPATIENT
Start: 2024-07-01

## 2024-07-02 ENCOUNTER — READMISSION MANAGEMENT (OUTPATIENT)
Dept: CALL CENTER | Facility: HOSPITAL | Age: 79
End: 2024-07-02
Payer: MEDICARE

## 2024-07-02 ENCOUNTER — APPOINTMENT (OUTPATIENT)
Dept: GENERAL RADIOLOGY | Facility: HOSPITAL | Age: 79
DRG: 191 | End: 2024-07-02
Payer: MEDICARE

## 2024-07-02 ENCOUNTER — HOSPITAL ENCOUNTER (INPATIENT)
Facility: HOSPITAL | Age: 79
LOS: 3 days | Discharge: HOME HEALTH | DRG: 191 | End: 2024-07-07
Attending: EMERGENCY MEDICINE | Admitting: EMERGENCY MEDICINE
Payer: MEDICARE

## 2024-07-02 DIAGNOSIS — R53.1 GENERAL WEAKNESS: ICD-10-CM

## 2024-07-02 DIAGNOSIS — J44.1 COPD EXACERBATION: Primary | ICD-10-CM

## 2024-07-02 LAB
ALBUMIN SERPL-MCNC: 3.9 G/DL (ref 3.5–5.2)
ALBUMIN/GLOB SERPL: 1.6 G/DL
ALP SERPL-CCNC: 87 U/L (ref 39–117)
ALT SERPL W P-5'-P-CCNC: 24 U/L (ref 1–33)
ANION GAP SERPL CALCULATED.3IONS-SCNC: 8.3 MMOL/L (ref 5–15)
ARTERIAL PATENCY WRIST A: POSITIVE
AST SERPL-CCNC: 14 U/L (ref 1–32)
ATMOSPHERIC PRESS: ABNORMAL MM[HG]
B PARAPERT DNA SPEC QL NAA+PROBE: NOT DETECTED
B PERT DNA SPEC QL NAA+PROBE: NOT DETECTED
BASE EXCESS BLDA CALC-SCNC: 10 MMOL/L (ref 0–3)
BASOPHILS # BLD AUTO: 0.08 10*3/MM3 (ref 0–0.2)
BASOPHILS NFR BLD AUTO: 0.3 % (ref 0–1.5)
BDY SITE: ABNORMAL
BILIRUB SERPL-MCNC: 0.5 MG/DL (ref 0–1.2)
BILIRUB UR QL STRIP: NEGATIVE
BUN SERPL-MCNC: 36 MG/DL (ref 8–23)
BUN/CREAT SERPL: 27.9 (ref 7–25)
C PNEUM DNA NPH QL NAA+NON-PROBE: NOT DETECTED
CALCIUM SPEC-SCNC: 9.7 MG/DL (ref 8.6–10.5)
CHLORIDE SERPL-SCNC: 101 MMOL/L (ref 98–107)
CLARITY UR: CLEAR
CO2 BLDA-SCNC: 36.5 MMOL/L (ref 22–29)
CO2 SERPL-SCNC: 32.7 MMOL/L (ref 22–29)
COLOR UR: YELLOW
CREAT SERPL-MCNC: 1.29 MG/DL (ref 0.57–1)
DEPRECATED RDW RBC AUTO: 50.4 FL (ref 37–54)
EGFRCR SERPLBLD CKD-EPI 2021: 42.6 ML/MIN/1.73
EOSINOPHIL # BLD AUTO: 0.17 10*3/MM3 (ref 0–0.4)
EOSINOPHIL NFR BLD AUTO: 0.7 % (ref 0.3–6.2)
ERYTHROCYTE [DISTWIDTH] IN BLOOD BY AUTOMATED COUNT: 15.7 % (ref 12.3–15.4)
FLUAV SUBTYP SPEC NAA+PROBE: NOT DETECTED
FLUBV RNA ISLT QL NAA+PROBE: NOT DETECTED
GEN 5 2HR TROPONIN T REFLEX: 16 NG/L
GLOBULIN UR ELPH-MCNC: 2.4 GM/DL
GLUCOSE SERPL-MCNC: 115 MG/DL (ref 65–99)
GLUCOSE UR STRIP-MCNC: NEGATIVE MG/DL
HADV DNA SPEC NAA+PROBE: NOT DETECTED
HCO3 BLDA-SCNC: 35 MMOL/L (ref 21–28)
HCOV 229E RNA SPEC QL NAA+PROBE: NOT DETECTED
HCOV HKU1 RNA SPEC QL NAA+PROBE: NOT DETECTED
HCOV NL63 RNA SPEC QL NAA+PROBE: NOT DETECTED
HCOV OC43 RNA SPEC QL NAA+PROBE: NOT DETECTED
HCT VFR BLD AUTO: 45.6 % (ref 34–46.6)
HEMODILUTION: NO
HGB BLD-MCNC: 14.1 G/DL (ref 12–15.9)
HGB UR QL STRIP.AUTO: NEGATIVE
HMPV RNA NPH QL NAA+NON-PROBE: NOT DETECTED
HPIV1 RNA ISLT QL NAA+PROBE: NOT DETECTED
HPIV2 RNA SPEC QL NAA+PROBE: NOT DETECTED
HPIV3 RNA NPH QL NAA+PROBE: NOT DETECTED
HPIV4 P GENE NPH QL NAA+PROBE: NOT DETECTED
IMM GRANULOCYTES # BLD AUTO: 0.19 10*3/MM3 (ref 0–0.05)
IMM GRANULOCYTES NFR BLD AUTO: 0.8 % (ref 0–0.5)
INHALED O2 CONCENTRATION: 32 %
INR PPP: 2.85 (ref 2–3)
KETONES UR QL STRIP: ABNORMAL
LEUKOCYTE ESTERASE UR QL STRIP.AUTO: NEGATIVE
LYMPHOCYTES # BLD AUTO: 4.08 10*3/MM3 (ref 0.7–3.1)
LYMPHOCYTES NFR BLD AUTO: 16.3 % (ref 19.6–45.3)
M PNEUMO IGG SER IA-ACNC: NOT DETECTED
MCH RBC QN AUTO: 27.8 PG (ref 26.6–33)
MCHC RBC AUTO-ENTMCNC: 30.9 G/DL (ref 31.5–35.7)
MCV RBC AUTO: 89.8 FL (ref 79–97)
MODALITY: ABNORMAL
MONOCYTES # BLD AUTO: 2.54 10*3/MM3 (ref 0.1–0.9)
MONOCYTES NFR BLD AUTO: 10.1 % (ref 5–12)
NEUTROPHILS NFR BLD AUTO: 17.97 10*3/MM3 (ref 1.7–7)
NEUTROPHILS NFR BLD AUTO: 71.8 % (ref 42.7–76)
NITRITE UR QL STRIP: NEGATIVE
NRBC BLD AUTO-RTO: 0 /100 WBC (ref 0–0.2)
NT-PROBNP SERPL-MCNC: 257 PG/ML (ref 0–1800)
PCO2 BLDA: 46.6 MM HG (ref 35–48)
PH BLDA: 7.48 PH UNITS (ref 7.35–7.45)
PH UR STRIP.AUTO: 5.5 [PH] (ref 5–8)
PLATELET # BLD AUTO: 206 10*3/MM3 (ref 140–450)
PMV BLD AUTO: 10.3 FL (ref 6–12)
PO2 BLD: 274 MM[HG] (ref 0–500)
PO2 BLDA: 87.6 MM HG (ref 83–108)
POTASSIUM SERPL-SCNC: 4.4 MMOL/L (ref 3.5–5.2)
PROT SERPL-MCNC: 6.3 G/DL (ref 6–8.5)
PROT UR QL STRIP: NEGATIVE
PROTHROMBIN TIME: 28.7 SECONDS (ref 19.4–28.5)
RBC # BLD AUTO: 5.08 10*6/MM3 (ref 3.77–5.28)
RHINOVIRUS RNA SPEC NAA+PROBE: NOT DETECTED
RSV RNA NPH QL NAA+NON-PROBE: NOT DETECTED
SAO2 % BLDCOA: 97.2 % (ref 94–98)
SARS-COV-2 RNA NPH QL NAA+NON-PROBE: NOT DETECTED
SODIUM SERPL-SCNC: 142 MMOL/L (ref 136–145)
SP GR UR STRIP: 1.02 (ref 1–1.03)
TROPONIN T DELTA: -2 NG/L
TROPONIN T SERPL HS-MCNC: 18 NG/L
UROBILINOGEN UR QL STRIP: ABNORMAL
WBC NRBC COR # BLD AUTO: 25.03 10*3/MM3 (ref 3.4–10.8)

## 2024-07-02 PROCEDURE — 99285 EMERGENCY DEPT VISIT HI MDM: CPT

## 2024-07-02 PROCEDURE — 84484 ASSAY OF TROPONIN QUANT: CPT

## 2024-07-02 PROCEDURE — G0378 HOSPITAL OBSERVATION PER HR: HCPCS

## 2024-07-02 PROCEDURE — 71045 X-RAY EXAM CHEST 1 VIEW: CPT

## 2024-07-02 PROCEDURE — 93005 ELECTROCARDIOGRAM TRACING: CPT | Performed by: EMERGENCY MEDICINE

## 2024-07-02 PROCEDURE — 83880 ASSAY OF NATRIURETIC PEPTIDE: CPT

## 2024-07-02 PROCEDURE — 94799 UNLISTED PULMONARY SVC/PX: CPT

## 2024-07-02 PROCEDURE — 25010000002 METHYLPREDNISOLONE PER 125 MG

## 2024-07-02 PROCEDURE — 85610 PROTHROMBIN TIME: CPT

## 2024-07-02 PROCEDURE — 80053 COMPREHEN METABOLIC PANEL: CPT

## 2024-07-02 PROCEDURE — 82803 BLOOD GASES ANY COMBINATION: CPT

## 2024-07-02 PROCEDURE — 0202U NFCT DS 22 TRGT SARS-COV-2: CPT

## 2024-07-02 PROCEDURE — 36600 WITHDRAWAL OF ARTERIAL BLOOD: CPT

## 2024-07-02 PROCEDURE — 36415 COLL VENOUS BLD VENIPUNCTURE: CPT

## 2024-07-02 PROCEDURE — 93005 ELECTROCARDIOGRAM TRACING: CPT

## 2024-07-02 PROCEDURE — 85025 COMPLETE CBC W/AUTO DIFF WBC: CPT

## 2024-07-02 PROCEDURE — 81003 URINALYSIS AUTO W/O SCOPE: CPT

## 2024-07-02 RX ORDER — SODIUM CHLORIDE 0.9 % (FLUSH) 0.9 %
10 SYRINGE (ML) INJECTION AS NEEDED
Status: DISCONTINUED | OUTPATIENT
Start: 2024-07-02 | End: 2024-07-07 | Stop reason: HOSPADM

## 2024-07-02 RX ORDER — METHYLPREDNISOLONE SODIUM SUCCINATE 125 MG/2ML
125 INJECTION, POWDER, LYOPHILIZED, FOR SOLUTION INTRAMUSCULAR; INTRAVENOUS ONCE
Status: COMPLETED | OUTPATIENT
Start: 2024-07-02 | End: 2024-07-02

## 2024-07-02 RX ORDER — IPRATROPIUM BROMIDE AND ALBUTEROL SULFATE 2.5; .5 MG/3ML; MG/3ML
3 SOLUTION RESPIRATORY (INHALATION) ONCE
Status: COMPLETED | OUTPATIENT
Start: 2024-07-02 | End: 2024-07-02

## 2024-07-02 RX ADMIN — IPRATROPIUM BROMIDE AND ALBUTEROL SULFATE 3 ML: .5; 3 SOLUTION RESPIRATORY (INHALATION) at 19:10

## 2024-07-02 RX ADMIN — METHYLPREDNISOLONE SODIUM SUCCINATE 125 MG: 125 INJECTION, POWDER, FOR SOLUTION INTRAMUSCULAR; INTRAVENOUS at 15:36

## 2024-07-03 ENCOUNTER — APPOINTMENT (OUTPATIENT)
Dept: CT IMAGING | Facility: HOSPITAL | Age: 79
DRG: 191 | End: 2024-07-03
Payer: MEDICARE

## 2024-07-03 LAB
ANION GAP SERPL CALCULATED.3IONS-SCNC: 13.5 MMOL/L (ref 5–15)
BASOPHILS # BLD AUTO: 0.03 10*3/MM3 (ref 0–0.2)
BASOPHILS NFR BLD AUTO: 0.2 % (ref 0–1.5)
BUN SERPL-MCNC: 34 MG/DL (ref 8–23)
BUN/CREAT SERPL: 26.6 (ref 7–25)
CALCIUM SPEC-SCNC: 9.6 MG/DL (ref 8.6–10.5)
CHLORIDE SERPL-SCNC: 99 MMOL/L (ref 98–107)
CO2 SERPL-SCNC: 29.5 MMOL/L (ref 22–29)
CREAT SERPL-MCNC: 1.28 MG/DL (ref 0.57–1)
DEPRECATED RDW RBC AUTO: 50.9 FL (ref 37–54)
EGFRCR SERPLBLD CKD-EPI 2021: 43 ML/MIN/1.73
EOSINOPHIL # BLD AUTO: 0 10*3/MM3 (ref 0–0.4)
EOSINOPHIL NFR BLD AUTO: 0 % (ref 0.3–6.2)
ERYTHROCYTE [DISTWIDTH] IN BLOOD BY AUTOMATED COUNT: 15.9 % (ref 12.3–15.4)
FUNGUS WND CULT: NORMAL
GLUCOSE SERPL-MCNC: 233 MG/DL (ref 65–99)
HCT VFR BLD AUTO: 45.2 % (ref 34–46.6)
HGB BLD-MCNC: 13.8 G/DL (ref 12–15.9)
IMM GRANULOCYTES # BLD AUTO: 0.17 10*3/MM3 (ref 0–0.05)
IMM GRANULOCYTES NFR BLD AUTO: 0.9 % (ref 0–0.5)
LYMPHOCYTES # BLD AUTO: 0.65 10*3/MM3 (ref 0.7–3.1)
LYMPHOCYTES NFR BLD AUTO: 3.4 % (ref 19.6–45.3)
MCH RBC QN AUTO: 27.4 PG (ref 26.6–33)
MCHC RBC AUTO-ENTMCNC: 30.5 G/DL (ref 31.5–35.7)
MCV RBC AUTO: 89.9 FL (ref 79–97)
MONOCYTES # BLD AUTO: 0.26 10*3/MM3 (ref 0.1–0.9)
MONOCYTES NFR BLD AUTO: 1.4 % (ref 5–12)
MYCOBACTERIUM SPEC CULT: NORMAL
NEUTROPHILS NFR BLD AUTO: 18.02 10*3/MM3 (ref 1.7–7)
NEUTROPHILS NFR BLD AUTO: 94.1 % (ref 42.7–76)
NIGHT BLUE STAIN TISS: NORMAL
NRBC BLD AUTO-RTO: 0 /100 WBC (ref 0–0.2)
NT-PROBNP SERPL-MCNC: 707 PG/ML (ref 0–1800)
PLATELET # BLD AUTO: 193 10*3/MM3 (ref 140–450)
PMV BLD AUTO: 11 FL (ref 6–12)
POTASSIUM SERPL-SCNC: 4.7 MMOL/L (ref 3.5–5.2)
QT INTERVAL: 392 MS
QTC INTERVAL: 427 MS
RBC # BLD AUTO: 5.03 10*6/MM3 (ref 3.77–5.28)
SODIUM SERPL-SCNC: 142 MMOL/L (ref 136–145)
WBC NRBC COR # BLD AUTO: 19.13 10*3/MM3 (ref 3.4–10.8)

## 2024-07-03 PROCEDURE — 80048 BASIC METABOLIC PNL TOTAL CA: CPT | Performed by: EMERGENCY MEDICINE

## 2024-07-03 PROCEDURE — 94761 N-INVAS EAR/PLS OXIMETRY MLT: CPT

## 2024-07-03 PROCEDURE — 71250 CT THORAX DX C-: CPT

## 2024-07-03 PROCEDURE — 25010000002 METHYLPREDNISOLONE PER 40 MG: Performed by: PHYSICIAN ASSISTANT

## 2024-07-03 PROCEDURE — 85025 COMPLETE CBC W/AUTO DIFF WBC: CPT | Performed by: EMERGENCY MEDICINE

## 2024-07-03 PROCEDURE — 94640 AIRWAY INHALATION TREATMENT: CPT

## 2024-07-03 PROCEDURE — 25010000002 CEFTRIAXONE PER 250 MG: Performed by: INTERNAL MEDICINE

## 2024-07-03 PROCEDURE — 83880 ASSAY OF NATRIURETIC PEPTIDE: CPT | Performed by: INTERNAL MEDICINE

## 2024-07-03 PROCEDURE — 94664 DEMO&/EVAL PT USE INHALER: CPT

## 2024-07-03 PROCEDURE — G0378 HOSPITAL OBSERVATION PER HR: HCPCS

## 2024-07-03 PROCEDURE — 87040 BLOOD CULTURE FOR BACTERIA: CPT | Performed by: INTERNAL MEDICINE

## 2024-07-03 PROCEDURE — 94799 UNLISTED PULMONARY SVC/PX: CPT

## 2024-07-03 RX ORDER — ACETAMINOPHEN 325 MG/1
650 TABLET ORAL EVERY 6 HOURS PRN
Status: DISCONTINUED | OUTPATIENT
Start: 2024-07-03 | End: 2024-07-07 | Stop reason: HOSPADM

## 2024-07-03 RX ORDER — BISACODYL 5 MG/1
5 TABLET, DELAYED RELEASE ORAL DAILY PRN
Status: DISCONTINUED | OUTPATIENT
Start: 2024-07-03 | End: 2024-07-07 | Stop reason: HOSPADM

## 2024-07-03 RX ORDER — SODIUM CHLORIDE 9 MG/ML
40 INJECTION, SOLUTION INTRAVENOUS AS NEEDED
Status: DISCONTINUED | OUTPATIENT
Start: 2024-07-03 | End: 2024-07-07 | Stop reason: HOSPADM

## 2024-07-03 RX ORDER — SODIUM CHLORIDE 0.9 % (FLUSH) 0.9 %
10 SYRINGE (ML) INJECTION EVERY 12 HOURS SCHEDULED
Status: DISCONTINUED | OUTPATIENT
Start: 2024-07-03 | End: 2024-07-07 | Stop reason: HOSPADM

## 2024-07-03 RX ORDER — POLYETHYLENE GLYCOL 3350 17 G/17G
17 POWDER, FOR SOLUTION ORAL DAILY PRN
Status: DISCONTINUED | OUTPATIENT
Start: 2024-07-03 | End: 2024-07-07 | Stop reason: HOSPADM

## 2024-07-03 RX ORDER — ATORVASTATIN CALCIUM 40 MG/1
40 TABLET, FILM COATED ORAL DAILY
Status: DISCONTINUED | OUTPATIENT
Start: 2024-07-04 | End: 2024-07-07 | Stop reason: HOSPADM

## 2024-07-03 RX ORDER — IPRATROPIUM BROMIDE AND ALBUTEROL SULFATE 2.5; .5 MG/3ML; MG/3ML
3 SOLUTION RESPIRATORY (INHALATION) EVERY 4 HOURS PRN
Status: DISCONTINUED | OUTPATIENT
Start: 2024-07-03 | End: 2024-07-07 | Stop reason: HOSPADM

## 2024-07-03 RX ORDER — BUDESONIDE AND FORMOTEROL FUMARATE DIHYDRATE 160; 4.5 UG/1; UG/1
2 AEROSOL RESPIRATORY (INHALATION)
Status: DISCONTINUED | OUTPATIENT
Start: 2024-07-03 | End: 2024-07-03

## 2024-07-03 RX ORDER — GUAIFENESIN 600 MG/1
1200 TABLET, EXTENDED RELEASE ORAL 2 TIMES DAILY
Status: DISCONTINUED | OUTPATIENT
Start: 2024-07-03 | End: 2024-07-03

## 2024-07-03 RX ORDER — ALPRAZOLAM 0.5 MG/1
0.5 TABLET ORAL ONCE
Status: COMPLETED | OUTPATIENT
Start: 2024-07-03 | End: 2024-07-03

## 2024-07-03 RX ORDER — SPIRONOLACTONE 25 MG/1
25 TABLET ORAL DAILY
Status: DISCONTINUED | OUTPATIENT
Start: 2024-07-04 | End: 2024-07-07 | Stop reason: HOSPADM

## 2024-07-03 RX ORDER — ASPIRIN 81 MG/1
81 TABLET ORAL DAILY
Status: DISCONTINUED | OUTPATIENT
Start: 2024-07-04 | End: 2024-07-07 | Stop reason: HOSPADM

## 2024-07-03 RX ORDER — IPRATROPIUM BROMIDE AND ALBUTEROL SULFATE 2.5; .5 MG/3ML; MG/3ML
3 SOLUTION RESPIRATORY (INHALATION) ONCE
Status: COMPLETED | OUTPATIENT
Start: 2024-07-03 | End: 2024-07-03

## 2024-07-03 RX ORDER — GUAIFENESIN 600 MG/1
600 TABLET, EXTENDED RELEASE ORAL 2 TIMES DAILY
Status: DISCONTINUED | OUTPATIENT
Start: 2024-07-03 | End: 2024-07-07 | Stop reason: HOSPADM

## 2024-07-03 RX ORDER — DILTIAZEM HYDROCHLORIDE 180 MG/1
180 CAPSULE, COATED, EXTENDED RELEASE ORAL DAILY
Status: DISCONTINUED | OUTPATIENT
Start: 2024-07-03 | End: 2024-07-07 | Stop reason: HOSPADM

## 2024-07-03 RX ORDER — IPRATROPIUM BROMIDE AND ALBUTEROL SULFATE 2.5; .5 MG/3ML; MG/3ML
3 SOLUTION RESPIRATORY (INHALATION)
Status: DISCONTINUED | OUTPATIENT
Start: 2024-07-03 | End: 2024-07-03

## 2024-07-03 RX ORDER — WARFARIN SODIUM 5 MG/1
2.5 TABLET ORAL
Status: COMPLETED | OUTPATIENT
Start: 2024-07-03 | End: 2024-07-03

## 2024-07-03 RX ORDER — SODIUM CHLORIDE 0.9 % (FLUSH) 0.9 %
10 SYRINGE (ML) INJECTION AS NEEDED
Status: DISCONTINUED | OUTPATIENT
Start: 2024-07-03 | End: 2024-07-07 | Stop reason: HOSPADM

## 2024-07-03 RX ORDER — BUDESONIDE 0.5 MG/2ML
1 INHALANT ORAL
Status: DISCONTINUED | OUTPATIENT
Start: 2024-07-03 | End: 2024-07-07 | Stop reason: HOSPADM

## 2024-07-03 RX ORDER — METHYLPREDNISOLONE SODIUM SUCCINATE 40 MG/ML
40 INJECTION, POWDER, LYOPHILIZED, FOR SOLUTION INTRAMUSCULAR; INTRAVENOUS EVERY 8 HOURS
Status: DISCONTINUED | OUTPATIENT
Start: 2024-07-03 | End: 2024-07-05

## 2024-07-03 RX ORDER — GUAIFENESIN/DEXTROMETHORPHAN 100-10MG/5
10 SYRUP ORAL EVERY 4 HOURS PRN
Status: DISCONTINUED | OUTPATIENT
Start: 2024-07-03 | End: 2024-07-07 | Stop reason: HOSPADM

## 2024-07-03 RX ORDER — BISACODYL 10 MG
10 SUPPOSITORY, RECTAL RECTAL DAILY PRN
Status: DISCONTINUED | OUTPATIENT
Start: 2024-07-03 | End: 2024-07-07 | Stop reason: HOSPADM

## 2024-07-03 RX ORDER — IPRATROPIUM BROMIDE AND ALBUTEROL SULFATE 2.5; .5 MG/3ML; MG/3ML
3 SOLUTION RESPIRATORY (INHALATION)
Status: DISCONTINUED | OUTPATIENT
Start: 2024-07-03 | End: 2024-07-06

## 2024-07-03 RX ORDER — LEVOTHYROXINE SODIUM 0.12 MG/1
125 TABLET ORAL
Status: DISCONTINUED | OUTPATIENT
Start: 2024-07-04 | End: 2024-07-07 | Stop reason: HOSPADM

## 2024-07-03 RX ORDER — AMOXICILLIN 250 MG
2 CAPSULE ORAL 2 TIMES DAILY
Status: DISCONTINUED | OUTPATIENT
Start: 2024-07-03 | End: 2024-07-07 | Stop reason: HOSPADM

## 2024-07-03 RX ORDER — DILTIAZEM HYDROCHLORIDE 180 MG/1
180 CAPSULE, COATED, EXTENDED RELEASE ORAL DAILY
Status: DISCONTINUED | OUTPATIENT
Start: 2024-07-04 | End: 2024-07-03

## 2024-07-03 RX ORDER — NITROGLYCERIN 0.4 MG/1
0.4 TABLET SUBLINGUAL
Status: DISCONTINUED | OUTPATIENT
Start: 2024-07-03 | End: 2024-07-07 | Stop reason: HOSPADM

## 2024-07-03 RX ADMIN — IPRATROPIUM BROMIDE AND ALBUTEROL SULFATE 3 ML: .5; 3 SOLUTION RESPIRATORY (INHALATION) at 23:55

## 2024-07-03 RX ADMIN — IPRATROPIUM BROMIDE AND ALBUTEROL SULFATE 3 ML: .5; 3 SOLUTION RESPIRATORY (INHALATION) at 20:43

## 2024-07-03 RX ADMIN — CEFTRIAXONE 2000 MG: 2 INJECTION, POWDER, FOR SOLUTION INTRAMUSCULAR; INTRAVENOUS at 21:51

## 2024-07-03 RX ADMIN — METHYLPREDNISOLONE SODIUM SUCCINATE 40 MG: 40 INJECTION, POWDER, FOR SOLUTION INTRAMUSCULAR; INTRAVENOUS at 15:23

## 2024-07-03 RX ADMIN — IPRATROPIUM BROMIDE AND ALBUTEROL SULFATE 3 ML: .5; 3 SOLUTION RESPIRATORY (INHALATION) at 07:30

## 2024-07-03 RX ADMIN — GUAIFENESIN 1200 MG: 600 TABLET, EXTENDED RELEASE ORAL at 08:10

## 2024-07-03 RX ADMIN — METHYLPREDNISOLONE SODIUM SUCCINATE 40 MG: 40 INJECTION, POWDER, FOR SOLUTION INTRAMUSCULAR; INTRAVENOUS at 08:10

## 2024-07-03 RX ADMIN — SENNOSIDES AND DOCUSATE SODIUM 2 TABLET: 50; 8.6 TABLET ORAL at 08:10

## 2024-07-03 RX ADMIN — BUDESONIDE AND FORMOTEROL FUMARATE DIHYDRATE 2 PUFF: 160; 4.5 AEROSOL RESPIRATORY (INHALATION) at 07:30

## 2024-07-03 RX ADMIN — ALPRAZOLAM 0.5 MG: 0.5 TABLET ORAL at 20:22

## 2024-07-03 RX ADMIN — WARFARIN SODIUM 2.5 MG: 5 TABLET ORAL at 18:49

## 2024-07-03 RX ADMIN — DOXYCYCLINE 100 MG: 100 INJECTION, POWDER, LYOPHILIZED, FOR SOLUTION INTRAVENOUS at 23:13

## 2024-07-03 RX ADMIN — IPRATROPIUM BROMIDE AND ALBUTEROL SULFATE 3 ML: .5; 3 SOLUTION RESPIRATORY (INHALATION) at 03:30

## 2024-07-03 RX ADMIN — DOXYCYCLINE 100 MG: 100 INJECTION, POWDER, LYOPHILIZED, FOR SOLUTION INTRAVENOUS at 13:46

## 2024-07-03 RX ADMIN — SENNOSIDES AND DOCUSATE SODIUM 2 TABLET: 50; 8.6 TABLET ORAL at 20:22

## 2024-07-03 RX ADMIN — Medication 10 ML: at 03:14

## 2024-07-03 RX ADMIN — IPRATROPIUM BROMIDE AND ALBUTEROL SULFATE 3 ML: .5; 3 SOLUTION RESPIRATORY (INHALATION) at 11:04

## 2024-07-03 RX ADMIN — IPRATROPIUM BROMIDE AND ALBUTEROL SULFATE 3 ML: .5; 3 SOLUTION RESPIRATORY (INHALATION) at 15:49

## 2024-07-03 RX ADMIN — GUAIFENESIN 600 MG: 600 TABLET, EXTENDED RELEASE ORAL at 20:22

## 2024-07-03 RX ADMIN — BUDESONIDE 1 MG: 0.5 INHALANT RESPIRATORY (INHALATION) at 20:47

## 2024-07-03 RX ADMIN — Medication 10 ML: at 23:14

## 2024-07-03 RX ADMIN — DILTIAZEM HYDROCHLORIDE 180 MG: 180 CAPSULE, COATED, EXTENDED RELEASE ORAL at 20:22

## 2024-07-03 RX ADMIN — ACETAMINOPHEN 650 MG: 325 TABLET, FILM COATED ORAL at 11:49

## 2024-07-03 RX ADMIN — Medication 10 ML: at 08:10

## 2024-07-04 ENCOUNTER — APPOINTMENT (OUTPATIENT)
Dept: CARDIOLOGY | Facility: HOSPITAL | Age: 79
DRG: 191 | End: 2024-07-04
Payer: MEDICARE

## 2024-07-04 LAB
ANION GAP SERPL CALCULATED.3IONS-SCNC: 14.9 MMOL/L (ref 5–15)
BH CV ECHO LEFT VENTRICLE GLOBAL LONGITUDINAL STRAIN: -20.1 %
BH CV ECHO MEAS - ACS: 2 CM
BH CV ECHO MEAS - AO MAX PG: 12.7 MMHG
BH CV ECHO MEAS - AO MEAN PG: 7 MMHG
BH CV ECHO MEAS - AO ROOT DIAM: 3.5 CM
BH CV ECHO MEAS - AO V2 MAX: 178 CM/SEC
BH CV ECHO MEAS - AO V2 VTI: 31.1 CM
BH CV ECHO MEAS - AVA(I,D): 2.8 CM2
BH CV ECHO MEAS - EDV(CUBED): 85.2 ML
BH CV ECHO MEAS - EDV(MOD-SP4): 72.6 ML
BH CV ECHO MEAS - EF(MOD-BP): 60 %
BH CV ECHO MEAS - EF(MOD-SP4): 60.5 %
BH CV ECHO MEAS - ESV(CUBED): 39.3 ML
BH CV ECHO MEAS - ESV(MOD-SP4): 28.7 ML
BH CV ECHO MEAS - FS: 22.7 %
BH CV ECHO MEAS - IVS/LVPW: 0.85 CM
BH CV ECHO MEAS - IVSD: 1.1 CM
BH CV ECHO MEAS - LA DIMENSION: 4 CM
BH CV ECHO MEAS - LAT PEAK E' VEL: 6.7 CM/SEC
BH CV ECHO MEAS - LV DIASTOLIC VOL/BSA (35-75): 38.3 CM2
BH CV ECHO MEAS - LV MASS(C)D: 191.3 GRAMS
BH CV ECHO MEAS - LV MAX PG: 9.1 MMHG
BH CV ECHO MEAS - LV MEAN PG: 5 MMHG
BH CV ECHO MEAS - LV SYSTOLIC VOL/BSA (12-30): 15.2 CM2
BH CV ECHO MEAS - LV V1 MAX: 151 CM/SEC
BH CV ECHO MEAS - LV V1 VTI: 30.8 CM
BH CV ECHO MEAS - LVIDD: 4.4 CM
BH CV ECHO MEAS - LVIDS: 3.4 CM
BH CV ECHO MEAS - LVOT AREA: 2.8 CM2
BH CV ECHO MEAS - LVOT DIAM: 1.9 CM
BH CV ECHO MEAS - LVPWD: 1.3 CM
BH CV ECHO MEAS - MED PEAK E' VEL: 6.4 CM/SEC
BH CV ECHO MEAS - MV A MAX VEL: 118 CM/SEC
BH CV ECHO MEAS - MV DEC SLOPE: 460 CM/SEC2
BH CV ECHO MEAS - MV DEC TIME: 0.19 SEC
BH CV ECHO MEAS - MV E MAX VEL: 67.6 CM/SEC
BH CV ECHO MEAS - MV E/A: 0.57
BH CV ECHO MEAS - MV MAX PG: 6.8 MMHG
BH CV ECHO MEAS - MV MEAN PG: 3 MMHG
BH CV ECHO MEAS - MV P1/2T: 54 MSEC
BH CV ECHO MEAS - MV V2 VTI: 21.4 CM
BH CV ECHO MEAS - MVA(P1/2T): 4.1 CM2
BH CV ECHO MEAS - MVA(VTI): 4.1 CM2
BH CV ECHO MEAS - PA ACC TIME: 0.09 SEC
BH CV ECHO MEAS - PA V2 MAX: 135 CM/SEC
BH CV ECHO MEAS - PULM A REVS DUR: 0.12 SEC
BH CV ECHO MEAS - PULM A REVS VEL: 33.8 CM/SEC
BH CV ECHO MEAS - PULM DIAS VEL: 41.1 CM/SEC
BH CV ECHO MEAS - PULM S/D: 1.52
BH CV ECHO MEAS - PULM SYS VEL: 62.3 CM/SEC
BH CV ECHO MEAS - RV MAX PG: 3.9 MMHG
BH CV ECHO MEAS - RV V1 MAX: 99 CM/SEC
BH CV ECHO MEAS - RV V1 VTI: 16.6 CM
BH CV ECHO MEAS - SV(LVOT): 87.3 ML
BH CV ECHO MEAS - SV(MOD-SP4): 43.9 ML
BH CV ECHO MEAS - SVI(LVOT): 46.1 ML/M2
BH CV ECHO MEAS - SVI(MOD-SP4): 23.2 ML/M2
BH CV ECHO MEAS - TAPSE (>1.6): 2.5 CM
BH CV ECHO MEASUREMENTS AVERAGE E/E' RATIO: 10.32
BH CV XLRA - RV BASE: 3.1 CM
BH CV XLRA - RV MID: 2.9 CM
BH CV XLRA - TDI S': 21.2 CM/SEC
BUN SERPL-MCNC: 36 MG/DL (ref 8–23)
BUN/CREAT SERPL: 28.6 (ref 7–25)
CALCIUM SPEC-SCNC: 9.6 MG/DL (ref 8.6–10.5)
CHLORIDE SERPL-SCNC: 98 MMOL/L (ref 98–107)
CO2 SERPL-SCNC: 26.1 MMOL/L (ref 22–29)
CREAT SERPL-MCNC: 1.26 MG/DL (ref 0.57–1)
CRP SERPL-MCNC: 0.54 MG/DL (ref 0–0.5)
DEPRECATED RDW RBC AUTO: 51 FL (ref 37–54)
EGFRCR SERPLBLD CKD-EPI 2021: 43.8 ML/MIN/1.73
ERYTHROCYTE [DISTWIDTH] IN BLOOD BY AUTOMATED COUNT: 16.1 % (ref 12.3–15.4)
ERYTHROCYTE [SEDIMENTATION RATE] IN BLOOD: 18 MM/HR (ref 0–30)
GLUCOSE SERPL-MCNC: 188 MG/DL (ref 65–99)
HCT VFR BLD AUTO: 44 % (ref 34–46.6)
HGB BLD-MCNC: 13.9 G/DL (ref 12–15.9)
INR PPP: 2.71 (ref 2–3)
LEFT ATRIUM VOLUME INDEX: 37 ML/M2
LYMPHOCYTES # BLD MANUAL: 0.92 10*3/MM3 (ref 0.7–3.1)
LYMPHOCYTES NFR BLD MANUAL: 1 % (ref 5–12)
MCH RBC QN AUTO: 27.9 PG (ref 26.6–33)
MCHC RBC AUTO-ENTMCNC: 31.6 G/DL (ref 31.5–35.7)
MCV RBC AUTO: 88.4 FL (ref 79–97)
MONOCYTES # BLD: 0.31 10*3/MM3 (ref 0.1–0.9)
NEUTROPHILS # BLD AUTO: 29.49 10*3/MM3 (ref 1.7–7)
NEUTROPHILS NFR BLD MANUAL: 96 % (ref 42.7–76)
PLATELET # BLD AUTO: 180 10*3/MM3 (ref 140–450)
PMV BLD AUTO: 11.1 FL (ref 6–12)
POTASSIUM SERPL-SCNC: 4.1 MMOL/L (ref 3.5–5.2)
PROTHROMBIN TIME: 27.4 SECONDS (ref 19.4–28.5)
RBC # BLD AUTO: 4.98 10*6/MM3 (ref 3.77–5.28)
RBC MORPH BLD: NORMAL
SCAN SLIDE: NORMAL
SINUS: 3 CM
SMALL PLATELETS BLD QL SMEAR: ADEQUATE
SODIUM SERPL-SCNC: 139 MMOL/L (ref 136–145)
VARIANT LYMPHS NFR BLD MANUAL: 3 % (ref 19.6–45.3)
WBC MORPH BLD: NORMAL
WBC NRBC COR # BLD AUTO: 30.72 10*3/MM3 (ref 3.4–10.8)

## 2024-07-04 PROCEDURE — 93356 MYOCRD STRAIN IMG SPCKL TRCK: CPT | Performed by: INTERNAL MEDICINE

## 2024-07-04 PROCEDURE — 85025 COMPLETE CBC W/AUTO DIFF WBC: CPT | Performed by: INTERNAL MEDICINE

## 2024-07-04 PROCEDURE — 85610 PROTHROMBIN TIME: CPT | Performed by: PHYSICIAN ASSISTANT

## 2024-07-04 PROCEDURE — 93306 TTE W/DOPPLER COMPLETE: CPT

## 2024-07-04 PROCEDURE — 86140 C-REACTIVE PROTEIN: CPT | Performed by: INTERNAL MEDICINE

## 2024-07-04 PROCEDURE — 93356 MYOCRD STRAIN IMG SPCKL TRCK: CPT

## 2024-07-04 PROCEDURE — 94799 UNLISTED PULMONARY SVC/PX: CPT

## 2024-07-04 PROCEDURE — 25010000002 CEFTRIAXONE PER 250 MG: Performed by: INTERNAL MEDICINE

## 2024-07-04 PROCEDURE — 85652 RBC SED RATE AUTOMATED: CPT | Performed by: INTERNAL MEDICINE

## 2024-07-04 PROCEDURE — 94664 DEMO&/EVAL PT USE INHALER: CPT

## 2024-07-04 PROCEDURE — 94761 N-INVAS EAR/PLS OXIMETRY MLT: CPT

## 2024-07-04 PROCEDURE — 93306 TTE W/DOPPLER COMPLETE: CPT | Performed by: INTERNAL MEDICINE

## 2024-07-04 PROCEDURE — 80048 BASIC METABOLIC PNL TOTAL CA: CPT | Performed by: INTERNAL MEDICINE

## 2024-07-04 PROCEDURE — 85007 BL SMEAR W/DIFF WBC COUNT: CPT | Performed by: INTERNAL MEDICINE

## 2024-07-04 PROCEDURE — 25010000002 METHYLPREDNISOLONE PER 40 MG: Performed by: PHYSICIAN ASSISTANT

## 2024-07-04 RX ORDER — DOXYCYCLINE 100 MG/1
100 CAPSULE ORAL EVERY 12 HOURS SCHEDULED
Status: DISCONTINUED | OUTPATIENT
Start: 2024-07-04 | End: 2024-07-07 | Stop reason: HOSPADM

## 2024-07-04 RX ORDER — WARFARIN SODIUM 5 MG/1
5 TABLET ORAL
Status: DISCONTINUED | OUTPATIENT
Start: 2024-07-04 | End: 2024-07-06

## 2024-07-04 RX ORDER — HYDROXYZINE HYDROCHLORIDE 25 MG/1
25 TABLET, FILM COATED ORAL ONCE
Status: COMPLETED | OUTPATIENT
Start: 2024-07-04 | End: 2024-07-04

## 2024-07-04 RX ORDER — WARFARIN SODIUM 5 MG/1
2.5 TABLET ORAL
Status: DISCONTINUED | OUTPATIENT
Start: 2024-07-05 | End: 2024-07-05 | Stop reason: ALTCHOICE

## 2024-07-04 RX ORDER — HALOPERIDOL 5 MG/ML
5 INJECTION INTRAMUSCULAR EVERY 6 HOURS PRN
Status: DISCONTINUED | OUTPATIENT
Start: 2024-07-04 | End: 2024-07-07 | Stop reason: HOSPADM

## 2024-07-04 RX ADMIN — IPRATROPIUM BROMIDE AND ALBUTEROL SULFATE 3 ML: .5; 3 SOLUTION RESPIRATORY (INHALATION) at 18:19

## 2024-07-04 RX ADMIN — SENNOSIDES AND DOCUSATE SODIUM 2 TABLET: 50; 8.6 TABLET ORAL at 20:32

## 2024-07-04 RX ADMIN — ACETAMINOPHEN 650 MG: 325 TABLET, FILM COATED ORAL at 16:12

## 2024-07-04 RX ADMIN — SENNOSIDES AND DOCUSATE SODIUM 2 TABLET: 50; 8.6 TABLET ORAL at 08:36

## 2024-07-04 RX ADMIN — SPIRONOLACTONE 25 MG: 25 TABLET ORAL at 08:36

## 2024-07-04 RX ADMIN — METHYLPREDNISOLONE SODIUM SUCCINATE 40 MG: 40 INJECTION, POWDER, FOR SOLUTION INTRAMUSCULAR; INTRAVENOUS at 08:36

## 2024-07-04 RX ADMIN — ATORVASTATIN CALCIUM 40 MG: 40 TABLET, FILM COATED ORAL at 08:36

## 2024-07-04 RX ADMIN — IPRATROPIUM BROMIDE AND ALBUTEROL SULFATE 3 ML: .5; 3 SOLUTION RESPIRATORY (INHALATION) at 11:51

## 2024-07-04 RX ADMIN — DEXTROMETHORPHAN HYDROBROMIDE, GUAIFENESIN 10 ML: 10; 100 LIQUID ORAL at 08:36

## 2024-07-04 RX ADMIN — DOXYCYCLINE 100 MG: 100 INJECTION, POWDER, LYOPHILIZED, FOR SOLUTION INTRAVENOUS at 08:36

## 2024-07-04 RX ADMIN — Medication 10 ML: at 18:01

## 2024-07-04 RX ADMIN — METHYLPREDNISOLONE SODIUM SUCCINATE 40 MG: 40 INJECTION, POWDER, FOR SOLUTION INTRAMUSCULAR; INTRAVENOUS at 00:14

## 2024-07-04 RX ADMIN — GUAIFENESIN 600 MG: 600 TABLET, EXTENDED RELEASE ORAL at 20:31

## 2024-07-04 RX ADMIN — Medication 10 ML: at 20:32

## 2024-07-04 RX ADMIN — BUDESONIDE 0.5 MG: 0.5 INHALANT RESPIRATORY (INHALATION) at 07:25

## 2024-07-04 RX ADMIN — HYDROXYZINE HYDROCHLORIDE 25 MG: 25 TABLET ORAL at 14:06

## 2024-07-04 RX ADMIN — ASPIRIN 81 MG: 81 TABLET, COATED ORAL at 08:36

## 2024-07-04 RX ADMIN — BUDESONIDE 1 MG: 0.5 INHALANT RESPIRATORY (INHALATION) at 18:23

## 2024-07-04 RX ADMIN — GUAIFENESIN 600 MG: 600 TABLET, EXTENDED RELEASE ORAL at 08:36

## 2024-07-04 RX ADMIN — WARFARIN SODIUM 5 MG: 5 TABLET ORAL at 18:01

## 2024-07-04 RX ADMIN — Medication 10 ML: at 00:17

## 2024-07-04 RX ADMIN — Medication 10 ML: at 00:13

## 2024-07-04 RX ADMIN — METHYLPREDNISOLONE SODIUM SUCCINATE 40 MG: 40 INJECTION, POWDER, FOR SOLUTION INTRAMUSCULAR; INTRAVENOUS at 16:12

## 2024-07-04 RX ADMIN — IPRATROPIUM BROMIDE AND ALBUTEROL SULFATE 3 ML: .5; 3 SOLUTION RESPIRATORY (INHALATION) at 03:25

## 2024-07-04 RX ADMIN — IPRATROPIUM BROMIDE AND ALBUTEROL SULFATE 3 ML: .5; 3 SOLUTION RESPIRATORY (INHALATION) at 07:25

## 2024-07-04 RX ADMIN — LEVOTHYROXINE SODIUM 125 MCG: 0.12 TABLET ORAL at 05:12

## 2024-07-04 RX ADMIN — CEFTRIAXONE 2000 MG: 2 INJECTION, POWDER, FOR SOLUTION INTRAMUSCULAR; INTRAVENOUS at 18:01

## 2024-07-04 RX ADMIN — DOXYCYCLINE 100 MG: 100 CAPSULE ORAL at 20:31

## 2024-07-04 RX ADMIN — IPRATROPIUM BROMIDE AND ALBUTEROL SULFATE 3 ML: .5; 3 SOLUTION RESPIRATORY (INHALATION) at 15:50

## 2024-07-04 RX ADMIN — Medication 10 ML: at 16:12

## 2024-07-04 RX ADMIN — Medication 10 ML: at 08:36

## 2024-07-05 LAB
ALBUMIN SERPL-MCNC: 3.6 G/DL (ref 3.5–5.2)
ALBUMIN/GLOB SERPL: 2 G/DL
ALP SERPL-CCNC: 71 U/L (ref 39–117)
ALT SERPL W P-5'-P-CCNC: 18 U/L (ref 1–33)
ANION GAP SERPL CALCULATED.3IONS-SCNC: 11.2 MMOL/L (ref 5–15)
AST SERPL-CCNC: 15 U/L (ref 1–32)
BASOPHILS # BLD AUTO: 0.05 10*3/MM3 (ref 0–0.2)
BASOPHILS NFR BLD AUTO: 0.2 % (ref 0–1.5)
BILIRUB SERPL-MCNC: 0.3 MG/DL (ref 0–1.2)
BUN SERPL-MCNC: 37 MG/DL (ref 8–23)
BUN/CREAT SERPL: 27.2 (ref 7–25)
CALCIUM SPEC-SCNC: 9.3 MG/DL (ref 8.6–10.5)
CHLORIDE SERPL-SCNC: 104 MMOL/L (ref 98–107)
CO2 SERPL-SCNC: 24.8 MMOL/L (ref 22–29)
CREAT SERPL-MCNC: 1.36 MG/DL (ref 0.57–1)
DEPRECATED RDW RBC AUTO: 52.4 FL (ref 37–54)
EGFRCR SERPLBLD CKD-EPI 2021: 40 ML/MIN/1.73
EOSINOPHIL # BLD AUTO: 0 10*3/MM3 (ref 0–0.4)
EOSINOPHIL NFR BLD AUTO: 0 % (ref 0.3–6.2)
ERYTHROCYTE [DISTWIDTH] IN BLOOD BY AUTOMATED COUNT: 16.3 % (ref 12.3–15.4)
GLOBULIN UR ELPH-MCNC: 1.8 GM/DL
GLUCOSE BLDC GLUCOMTR-MCNC: 141 MG/DL (ref 70–105)
GLUCOSE SERPL-MCNC: 198 MG/DL (ref 65–99)
HCT VFR BLD AUTO: 44.5 % (ref 34–46.6)
HGB BLD-MCNC: 13.9 G/DL (ref 12–15.9)
IMM GRANULOCYTES # BLD AUTO: 0.43 10*3/MM3 (ref 0–0.05)
IMM GRANULOCYTES NFR BLD AUTO: 1.8 % (ref 0–0.5)
INR PPP: 3.05 (ref 2–3)
LYMPHOCYTES # BLD AUTO: 0.56 10*3/MM3 (ref 0.7–3.1)
LYMPHOCYTES NFR BLD AUTO: 2.4 % (ref 19.6–45.3)
MAGNESIUM SERPL-MCNC: 2.1 MG/DL (ref 1.6–2.4)
MCH RBC QN AUTO: 27.7 PG (ref 26.6–33)
MCHC RBC AUTO-ENTMCNC: 31.2 G/DL (ref 31.5–35.7)
MCV RBC AUTO: 88.8 FL (ref 79–97)
MONOCYTES # BLD AUTO: 0.57 10*3/MM3 (ref 0.1–0.9)
MONOCYTES NFR BLD AUTO: 2.4 % (ref 5–12)
NEUTROPHILS NFR BLD AUTO: 21.68 10*3/MM3 (ref 1.7–7)
NEUTROPHILS NFR BLD AUTO: 93.2 % (ref 42.7–76)
NRBC BLD AUTO-RTO: 0 /100 WBC (ref 0–0.2)
PLATELET # BLD AUTO: 169 10*3/MM3 (ref 140–450)
PMV BLD AUTO: 11 FL (ref 6–12)
POTASSIUM SERPL-SCNC: 5.2 MMOL/L (ref 3.5–5.2)
PROT SERPL-MCNC: 5.4 G/DL (ref 6–8.5)
PROTHROMBIN TIME: 30.6 SECONDS (ref 19.4–28.5)
RBC # BLD AUTO: 5.01 10*6/MM3 (ref 3.77–5.28)
SODIUM SERPL-SCNC: 140 MMOL/L (ref 136–145)
WBC NRBC COR # BLD AUTO: 23.29 10*3/MM3 (ref 3.4–10.8)

## 2024-07-05 PROCEDURE — 80053 COMPREHEN METABOLIC PANEL: CPT | Performed by: STUDENT IN AN ORGANIZED HEALTH CARE EDUCATION/TRAINING PROGRAM

## 2024-07-05 PROCEDURE — 85025 COMPLETE CBC W/AUTO DIFF WBC: CPT | Performed by: STUDENT IN AN ORGANIZED HEALTH CARE EDUCATION/TRAINING PROGRAM

## 2024-07-05 PROCEDURE — 94761 N-INVAS EAR/PLS OXIMETRY MLT: CPT

## 2024-07-05 PROCEDURE — 97161 PT EVAL LOW COMPLEX 20 MIN: CPT

## 2024-07-05 PROCEDURE — 25010000002 METHYLPREDNISOLONE PER 40 MG: Performed by: PHYSICIAN ASSISTANT

## 2024-07-05 PROCEDURE — 82948 REAGENT STRIP/BLOOD GLUCOSE: CPT

## 2024-07-05 PROCEDURE — 25010000002 CEFTRIAXONE PER 250 MG: Performed by: INTERNAL MEDICINE

## 2024-07-05 PROCEDURE — 85610 PROTHROMBIN TIME: CPT | Performed by: PHYSICIAN ASSISTANT

## 2024-07-05 PROCEDURE — 63710000001 PREDNISONE PER 1 MG: Performed by: INTERNAL MEDICINE

## 2024-07-05 PROCEDURE — 94799 UNLISTED PULMONARY SVC/PX: CPT

## 2024-07-05 PROCEDURE — 83735 ASSAY OF MAGNESIUM: CPT | Performed by: STUDENT IN AN ORGANIZED HEALTH CARE EDUCATION/TRAINING PROGRAM

## 2024-07-05 PROCEDURE — 63710000001 PREDNISONE PER 5 MG: Performed by: INTERNAL MEDICINE

## 2024-07-05 PROCEDURE — 94664 DEMO&/EVAL PT USE INHALER: CPT

## 2024-07-05 RX ORDER — WARFARIN SODIUM 1 MG/1
1 TABLET ORAL
Qty: 1 TABLET | Refills: 0 | Status: COMPLETED | OUTPATIENT
Start: 2024-07-05 | End: 2024-07-05

## 2024-07-05 RX ORDER — PREDNISONE 10 MG/1
10 TABLET ORAL DAILY
Status: DISCONTINUED | OUTPATIENT
Start: 2024-07-09 | End: 2024-07-07 | Stop reason: HOSPADM

## 2024-07-05 RX ORDER — METHYLPREDNISOLONE SODIUM SUCCINATE 40 MG/ML
40 INJECTION, POWDER, LYOPHILIZED, FOR SOLUTION INTRAMUSCULAR; INTRAVENOUS EVERY 12 HOURS
Status: DISCONTINUED | OUTPATIENT
Start: 2024-07-05 | End: 2024-07-05

## 2024-07-05 RX ORDER — FUROSEMIDE 20 MG/1
20 TABLET ORAL DAILY
Status: DISCONTINUED | OUTPATIENT
Start: 2024-07-05 | End: 2024-07-07 | Stop reason: HOSPADM

## 2024-07-05 RX ORDER — PREDNISONE 20 MG/1
20 TABLET ORAL DAILY
Status: DISCONTINUED | OUTPATIENT
Start: 2024-07-07 | End: 2024-07-07 | Stop reason: HOSPADM

## 2024-07-05 RX ADMIN — DILTIAZEM HYDROCHLORIDE 180 MG: 180 CAPSULE, COATED, EXTENDED RELEASE ORAL at 08:22

## 2024-07-05 RX ADMIN — SENNOSIDES AND DOCUSATE SODIUM 2 TABLET: 50; 8.6 TABLET ORAL at 08:22

## 2024-07-05 RX ADMIN — Medication 10 ML: at 20:54

## 2024-07-05 RX ADMIN — IPRATROPIUM BROMIDE AND ALBUTEROL SULFATE 3 ML: .5; 3 SOLUTION RESPIRATORY (INHALATION) at 02:28

## 2024-07-05 RX ADMIN — ATORVASTATIN CALCIUM 40 MG: 40 TABLET, FILM COATED ORAL at 08:22

## 2024-07-05 RX ADMIN — Medication 10 ML: at 08:22

## 2024-07-05 RX ADMIN — METHYLPREDNISOLONE SODIUM SUCCINATE 40 MG: 40 INJECTION, POWDER, FOR SOLUTION INTRAMUSCULAR; INTRAVENOUS at 08:22

## 2024-07-05 RX ADMIN — DOXYCYCLINE 100 MG: 100 CAPSULE ORAL at 20:55

## 2024-07-05 RX ADMIN — DOXYCYCLINE 100 MG: 100 CAPSULE ORAL at 08:22

## 2024-07-05 RX ADMIN — ASPIRIN 81 MG: 81 TABLET, COATED ORAL at 08:22

## 2024-07-05 RX ADMIN — GUAIFENESIN 600 MG: 600 TABLET, EXTENDED RELEASE ORAL at 20:54

## 2024-07-05 RX ADMIN — IPRATROPIUM BROMIDE AND ALBUTEROL SULFATE 3 ML: .5; 3 SOLUTION RESPIRATORY (INHALATION) at 10:39

## 2024-07-05 RX ADMIN — WARFARIN SODIUM 1 MG: 1 TABLET ORAL at 20:54

## 2024-07-05 RX ADMIN — CEFTRIAXONE 2000 MG: 2 INJECTION, POWDER, FOR SOLUTION INTRAMUSCULAR; INTRAVENOUS at 17:42

## 2024-07-05 RX ADMIN — GUAIFENESIN 600 MG: 600 TABLET, EXTENDED RELEASE ORAL at 08:22

## 2024-07-05 RX ADMIN — ACETAMINOPHEN 650 MG: 325 TABLET, FILM COATED ORAL at 20:53

## 2024-07-05 RX ADMIN — IPRATROPIUM BROMIDE AND ALBUTEROL SULFATE 3 ML: .5; 3 SOLUTION RESPIRATORY (INHALATION) at 07:17

## 2024-07-05 RX ADMIN — METHYLPREDNISOLONE SODIUM SUCCINATE 40 MG: 40 INJECTION, POWDER, FOR SOLUTION INTRAMUSCULAR; INTRAVENOUS at 01:22

## 2024-07-05 RX ADMIN — IPRATROPIUM BROMIDE AND ALBUTEROL SULFATE 3 ML: .5; 3 SOLUTION RESPIRATORY (INHALATION) at 15:43

## 2024-07-05 RX ADMIN — FUROSEMIDE 20 MG: 20 TABLET ORAL at 11:48

## 2024-07-05 RX ADMIN — BUDESONIDE 1 MG: 0.5 INHALANT RESPIRATORY (INHALATION) at 20:24

## 2024-07-05 RX ADMIN — IPRATROPIUM BROMIDE AND ALBUTEROL SULFATE 3 ML: .5; 3 SOLUTION RESPIRATORY (INHALATION) at 20:18

## 2024-07-05 RX ADMIN — BUDESONIDE 0.5 MG: 0.5 INHALANT RESPIRATORY (INHALATION) at 07:17

## 2024-07-05 RX ADMIN — PREDNISONE 30 MG: 20 TABLET ORAL at 11:48

## 2024-07-05 RX ADMIN — SPIRONOLACTONE 25 MG: 25 TABLET ORAL at 08:22

## 2024-07-05 RX ADMIN — LEVOTHYROXINE SODIUM 125 MCG: 0.12 TABLET ORAL at 08:22

## 2024-07-06 LAB
ALBUMIN SERPL-MCNC: 3.6 G/DL (ref 3.5–5.2)
ALBUMIN/GLOB SERPL: 1.6 G/DL
ALP SERPL-CCNC: 70 U/L (ref 39–117)
ALT SERPL W P-5'-P-CCNC: 20 U/L (ref 1–33)
ANION GAP SERPL CALCULATED.3IONS-SCNC: 8.4 MMOL/L (ref 5–15)
AST SERPL-CCNC: 16 U/L (ref 1–32)
BASOPHILS # BLD AUTO: 0.04 10*3/MM3 (ref 0–0.2)
BASOPHILS NFR BLD AUTO: 0.2 % (ref 0–1.5)
BILIRUB SERPL-MCNC: 0.3 MG/DL (ref 0–1.2)
BUN SERPL-MCNC: 39 MG/DL (ref 8–23)
BUN/CREAT SERPL: 33.3 (ref 7–25)
CALCIUM SPEC-SCNC: 9.2 MG/DL (ref 8.6–10.5)
CHLORIDE SERPL-SCNC: 104 MMOL/L (ref 98–107)
CO2 SERPL-SCNC: 28.6 MMOL/L (ref 22–29)
CREAT SERPL-MCNC: 1.17 MG/DL (ref 0.57–1)
DEPRECATED RDW RBC AUTO: 54.4 FL (ref 37–54)
EGFRCR SERPLBLD CKD-EPI 2021: 47.9 ML/MIN/1.73
EOSINOPHIL # BLD AUTO: 0 10*3/MM3 (ref 0–0.4)
EOSINOPHIL NFR BLD AUTO: 0 % (ref 0.3–6.2)
ERYTHROCYTE [DISTWIDTH] IN BLOOD BY AUTOMATED COUNT: 16.6 % (ref 12.3–15.4)
GLOBULIN UR ELPH-MCNC: 2.2 GM/DL
GLUCOSE SERPL-MCNC: 144 MG/DL (ref 65–99)
HCT VFR BLD AUTO: 46 % (ref 34–46.6)
HGB BLD-MCNC: 14.3 G/DL (ref 12–15.9)
IMM GRANULOCYTES # BLD AUTO: 0.48 10*3/MM3 (ref 0–0.05)
IMM GRANULOCYTES NFR BLD AUTO: 2.2 % (ref 0–0.5)
INR PPP: 3.15 (ref 2–3)
LYMPHOCYTES # BLD AUTO: 1.27 10*3/MM3 (ref 0.7–3.1)
LYMPHOCYTES NFR BLD AUTO: 5.8 % (ref 19.6–45.3)
MAGNESIUM SERPL-MCNC: 2.2 MG/DL (ref 1.6–2.4)
MCH RBC QN AUTO: 28.3 PG (ref 26.6–33)
MCHC RBC AUTO-ENTMCNC: 31.1 G/DL (ref 31.5–35.7)
MCV RBC AUTO: 90.9 FL (ref 79–97)
MONOCYTES # BLD AUTO: 1.71 10*3/MM3 (ref 0.1–0.9)
MONOCYTES NFR BLD AUTO: 7.8 % (ref 5–12)
NEUTROPHILS NFR BLD AUTO: 18.46 10*3/MM3 (ref 1.7–7)
NEUTROPHILS NFR BLD AUTO: 84 % (ref 42.7–76)
NRBC BLD AUTO-RTO: 0 /100 WBC (ref 0–0.2)
NT-PROBNP SERPL-MCNC: 686 PG/ML (ref 0–1800)
PLATELET # BLD AUTO: 177 10*3/MM3 (ref 140–450)
PMV BLD AUTO: 10.7 FL (ref 6–12)
POTASSIUM SERPL-SCNC: 4.9 MMOL/L (ref 3.5–5.2)
PROT SERPL-MCNC: 5.8 G/DL (ref 6–8.5)
PROTHROMBIN TIME: 31.5 SECONDS (ref 19.4–28.5)
RBC # BLD AUTO: 5.06 10*6/MM3 (ref 3.77–5.28)
SODIUM SERPL-SCNC: 141 MMOL/L (ref 136–145)
WBC NRBC COR # BLD AUTO: 21.96 10*3/MM3 (ref 3.4–10.8)

## 2024-07-06 PROCEDURE — 94761 N-INVAS EAR/PLS OXIMETRY MLT: CPT

## 2024-07-06 PROCEDURE — 80053 COMPREHEN METABOLIC PANEL: CPT | Performed by: STUDENT IN AN ORGANIZED HEALTH CARE EDUCATION/TRAINING PROGRAM

## 2024-07-06 PROCEDURE — 25010000002 FUROSEMIDE PER 20 MG: Performed by: STUDENT IN AN ORGANIZED HEALTH CARE EDUCATION/TRAINING PROGRAM

## 2024-07-06 PROCEDURE — 63710000001 PREDNISONE PER 1 MG: Performed by: INTERNAL MEDICINE

## 2024-07-06 PROCEDURE — 94799 UNLISTED PULMONARY SVC/PX: CPT

## 2024-07-06 PROCEDURE — 94664 DEMO&/EVAL PT USE INHALER: CPT

## 2024-07-06 PROCEDURE — 63710000001 PREDNISONE PER 5 MG: Performed by: INTERNAL MEDICINE

## 2024-07-06 PROCEDURE — 85025 COMPLETE CBC W/AUTO DIFF WBC: CPT | Performed by: STUDENT IN AN ORGANIZED HEALTH CARE EDUCATION/TRAINING PROGRAM

## 2024-07-06 PROCEDURE — 83735 ASSAY OF MAGNESIUM: CPT | Performed by: STUDENT IN AN ORGANIZED HEALTH CARE EDUCATION/TRAINING PROGRAM

## 2024-07-06 PROCEDURE — 85610 PROTHROMBIN TIME: CPT | Performed by: PHYSICIAN ASSISTANT

## 2024-07-06 PROCEDURE — 25010000002 CEFTRIAXONE PER 250 MG: Performed by: INTERNAL MEDICINE

## 2024-07-06 PROCEDURE — 83880 ASSAY OF NATRIURETIC PEPTIDE: CPT | Performed by: INTERNAL MEDICINE

## 2024-07-06 RX ORDER — FUROSEMIDE 10 MG/ML
40 INJECTION INTRAMUSCULAR; INTRAVENOUS ONCE
Status: COMPLETED | OUTPATIENT
Start: 2024-07-06 | End: 2024-07-06

## 2024-07-06 RX ORDER — IPRATROPIUM BROMIDE AND ALBUTEROL SULFATE 2.5; .5 MG/3ML; MG/3ML
3 SOLUTION RESPIRATORY (INHALATION)
Status: DISCONTINUED | OUTPATIENT
Start: 2024-07-07 | End: 2024-07-07 | Stop reason: HOSPADM

## 2024-07-06 RX ADMIN — GUAIFENESIN 600 MG: 600 TABLET, EXTENDED RELEASE ORAL at 08:18

## 2024-07-06 RX ADMIN — DEXTROMETHORPHAN HYDROBROMIDE, GUAIFENESIN 10 ML: 10; 100 LIQUID ORAL at 23:08

## 2024-07-06 RX ADMIN — PREDNISONE 30 MG: 20 TABLET ORAL at 08:18

## 2024-07-06 RX ADMIN — SENNOSIDES AND DOCUSATE SODIUM 2 TABLET: 50; 8.6 TABLET ORAL at 08:18

## 2024-07-06 RX ADMIN — IPRATROPIUM BROMIDE AND ALBUTEROL SULFATE 3 ML: .5; 3 SOLUTION RESPIRATORY (INHALATION) at 02:48

## 2024-07-06 RX ADMIN — LEVOTHYROXINE SODIUM 125 MCG: 0.12 TABLET ORAL at 06:19

## 2024-07-06 RX ADMIN — BUDESONIDE 1 MG: 0.5 INHALANT RESPIRATORY (INHALATION) at 18:39

## 2024-07-06 RX ADMIN — GUAIFENESIN 600 MG: 600 TABLET, EXTENDED RELEASE ORAL at 20:24

## 2024-07-06 RX ADMIN — ASPIRIN 81 MG: 81 TABLET, COATED ORAL at 08:18

## 2024-07-06 RX ADMIN — BUDESONIDE 1 MG: 0.5 INHALANT RESPIRATORY (INHALATION) at 07:23

## 2024-07-06 RX ADMIN — DILTIAZEM HYDROCHLORIDE 180 MG: 180 CAPSULE, COATED, EXTENDED RELEASE ORAL at 08:18

## 2024-07-06 RX ADMIN — SPIRONOLACTONE 25 MG: 25 TABLET ORAL at 08:18

## 2024-07-06 RX ADMIN — IPRATROPIUM BROMIDE AND ALBUTEROL SULFATE 3 ML: .5; 3 SOLUTION RESPIRATORY (INHALATION) at 18:33

## 2024-07-06 RX ADMIN — Medication 10 ML: at 08:17

## 2024-07-06 RX ADMIN — IPRATROPIUM BROMIDE AND ALBUTEROL SULFATE 3 ML: .5; 3 SOLUTION RESPIRATORY (INHALATION) at 15:28

## 2024-07-06 RX ADMIN — DOXYCYCLINE 100 MG: 100 CAPSULE ORAL at 20:24

## 2024-07-06 RX ADMIN — ATORVASTATIN CALCIUM 40 MG: 40 TABLET, FILM COATED ORAL at 08:18

## 2024-07-06 RX ADMIN — IPRATROPIUM BROMIDE AND ALBUTEROL SULFATE 3 ML: .5; 3 SOLUTION RESPIRATORY (INHALATION) at 07:29

## 2024-07-06 RX ADMIN — FUROSEMIDE 20 MG: 20 TABLET ORAL at 08:18

## 2024-07-06 RX ADMIN — CEFTRIAXONE 2000 MG: 2 INJECTION, POWDER, FOR SOLUTION INTRAMUSCULAR; INTRAVENOUS at 18:30

## 2024-07-06 RX ADMIN — FUROSEMIDE 40 MG: 10 INJECTION, SOLUTION INTRAMUSCULAR; INTRAVENOUS at 11:00

## 2024-07-06 RX ADMIN — Medication 10 ML: at 20:25

## 2024-07-06 RX ADMIN — DOXYCYCLINE 100 MG: 100 CAPSULE ORAL at 08:18

## 2024-07-07 ENCOUNTER — READMISSION MANAGEMENT (OUTPATIENT)
Dept: CALL CENTER | Facility: HOSPITAL | Age: 79
End: 2024-07-07
Payer: MEDICARE

## 2024-07-07 VITALS
RESPIRATION RATE: 18 BRPM | DIASTOLIC BLOOD PRESSURE: 68 MMHG | SYSTOLIC BLOOD PRESSURE: 138 MMHG | HEART RATE: 82 BPM | WEIGHT: 207.67 LBS | OXYGEN SATURATION: 96 % | TEMPERATURE: 97.8 F | HEIGHT: 60 IN | BODY MASS INDEX: 40.77 KG/M2

## 2024-07-07 LAB
ALBUMIN SERPL-MCNC: 3.8 G/DL (ref 3.5–5.2)
ALBUMIN/GLOB SERPL: 2 G/DL
ALP SERPL-CCNC: 70 U/L (ref 39–117)
ALT SERPL W P-5'-P-CCNC: 38 U/L (ref 1–33)
ANION GAP SERPL CALCULATED.3IONS-SCNC: 8.9 MMOL/L (ref 5–15)
AST SERPL-CCNC: 23 U/L (ref 1–32)
BILIRUB SERPL-MCNC: 0.2 MG/DL (ref 0–1.2)
BUN SERPL-MCNC: 44 MG/DL (ref 8–23)
BUN/CREAT SERPL: 32.6 (ref 7–25)
CALCIUM SPEC-SCNC: 8.9 MG/DL (ref 8.6–10.5)
CHLORIDE SERPL-SCNC: 105 MMOL/L (ref 98–107)
CO2 SERPL-SCNC: 27.1 MMOL/L (ref 22–29)
CREAT SERPL-MCNC: 1.35 MG/DL (ref 0.57–1)
DEPRECATED RDW RBC AUTO: 55 FL (ref 37–54)
EGFRCR SERPLBLD CKD-EPI 2021: 40.3 ML/MIN/1.73
ERYTHROCYTE [DISTWIDTH] IN BLOOD BY AUTOMATED COUNT: 16.7 % (ref 12.3–15.4)
GLOBULIN UR ELPH-MCNC: 1.9 GM/DL
GLUCOSE SERPL-MCNC: 117 MG/DL (ref 65–99)
HCT VFR BLD AUTO: 46.4 % (ref 34–46.6)
HGB BLD-MCNC: 14.1 G/DL (ref 12–15.9)
INR PPP: 2.24 (ref 2–3)
MAGNESIUM SERPL-MCNC: 2.1 MG/DL (ref 1.6–2.4)
MCH RBC QN AUTO: 27.6 PG (ref 26.6–33)
MCHC RBC AUTO-ENTMCNC: 30.4 G/DL (ref 31.5–35.7)
MCV RBC AUTO: 90.8 FL (ref 79–97)
PLATELET # BLD AUTO: 170 10*3/MM3 (ref 140–450)
PMV BLD AUTO: 10.9 FL (ref 6–12)
POTASSIUM SERPL-SCNC: 4.7 MMOL/L (ref 3.5–5.2)
PROT SERPL-MCNC: 5.7 G/DL (ref 6–8.5)
PROTHROMBIN TIME: 23 SECONDS (ref 19.4–28.5)
RBC # BLD AUTO: 5.11 10*6/MM3 (ref 3.77–5.28)
SODIUM SERPL-SCNC: 141 MMOL/L (ref 136–145)
WBC NRBC COR # BLD AUTO: 17.03 10*3/MM3 (ref 3.4–10.8)

## 2024-07-07 PROCEDURE — 94761 N-INVAS EAR/PLS OXIMETRY MLT: CPT

## 2024-07-07 PROCEDURE — 85610 PROTHROMBIN TIME: CPT | Performed by: PHYSICIAN ASSISTANT

## 2024-07-07 PROCEDURE — 63710000001 PREDNISONE PER 1 MG: Performed by: INTERNAL MEDICINE

## 2024-07-07 PROCEDURE — 94799 UNLISTED PULMONARY SVC/PX: CPT

## 2024-07-07 PROCEDURE — 83735 ASSAY OF MAGNESIUM: CPT | Performed by: STUDENT IN AN ORGANIZED HEALTH CARE EDUCATION/TRAINING PROGRAM

## 2024-07-07 PROCEDURE — 80053 COMPREHEN METABOLIC PANEL: CPT | Performed by: STUDENT IN AN ORGANIZED HEALTH CARE EDUCATION/TRAINING PROGRAM

## 2024-07-07 PROCEDURE — 94664 DEMO&/EVAL PT USE INHALER: CPT

## 2024-07-07 PROCEDURE — 85027 COMPLETE CBC AUTOMATED: CPT | Performed by: STUDENT IN AN ORGANIZED HEALTH CARE EDUCATION/TRAINING PROGRAM

## 2024-07-07 RX ORDER — FUROSEMIDE 40 MG/1
40 TABLET ORAL DAILY
Qty: 7 TABLET | Refills: 0 | Status: SHIPPED | OUTPATIENT
Start: 2024-07-07

## 2024-07-07 RX ORDER — WARFARIN SODIUM 5 MG/1
5 TABLET ORAL
Status: DISCONTINUED | OUTPATIENT
Start: 2024-07-07 | End: 2024-07-07

## 2024-07-07 RX ORDER — WARFARIN SODIUM 5 MG/1
5 TABLET ORAL
Status: DISCONTINUED | OUTPATIENT
Start: 2024-07-09 | End: 2024-07-07 | Stop reason: HOSPADM

## 2024-07-07 RX ORDER — WARFARIN SODIUM 5 MG/1
TABLET ORAL
Qty: 10 TABLET | Refills: 0 | Status: SHIPPED | OUTPATIENT
Start: 2024-07-08 | End: 2024-07-07 | Stop reason: HOSPADM

## 2024-07-07 RX ORDER — PREDNISONE 10 MG/1
TABLET ORAL
Qty: 4 TABLET | Refills: 0 | Status: SHIPPED | OUTPATIENT
Start: 2024-07-08 | End: 2024-07-12

## 2024-07-07 RX ORDER — WARFARIN SODIUM 5 MG/1
2.5 TABLET ORAL
Status: DISCONTINUED | OUTPATIENT
Start: 2024-07-07 | End: 2024-07-07 | Stop reason: HOSPADM

## 2024-07-07 RX ORDER — WARFARIN SODIUM 5 MG/1
2.5 TABLET ORAL
Status: COMPLETED | OUTPATIENT
Start: 2024-07-07 | End: 2024-07-07

## 2024-07-07 RX ORDER — POTASSIUM CHLORIDE 20 MEQ/1
20 TABLET, EXTENDED RELEASE ORAL DAILY
Qty: 7 TABLET | Refills: 0 | Status: SHIPPED | OUTPATIENT
Start: 2024-07-07

## 2024-07-07 RX ORDER — WARFARIN SODIUM 5 MG/1
2.5 TABLET ORAL
Status: DISCONTINUED | OUTPATIENT
Start: 2024-07-07 | End: 2024-07-07

## 2024-07-07 RX ORDER — WARFARIN SODIUM 2.5 MG/1
2.5 TABLET ORAL
Status: DISCONTINUED | OUTPATIENT
Start: 2024-07-09 | End: 2024-07-07

## 2024-07-07 RX ADMIN — WARFARIN SODIUM 2.5 MG: 5 TABLET ORAL at 12:59

## 2024-07-07 RX ADMIN — FUROSEMIDE 20 MG: 20 TABLET ORAL at 08:11

## 2024-07-07 RX ADMIN — PREDNISONE 20 MG: 20 TABLET ORAL at 08:11

## 2024-07-07 RX ADMIN — BUDESONIDE 1 MG: 0.5 INHALANT RESPIRATORY (INHALATION) at 07:50

## 2024-07-07 RX ADMIN — ATORVASTATIN CALCIUM 40 MG: 40 TABLET, FILM COATED ORAL at 08:11

## 2024-07-07 RX ADMIN — GUAIFENESIN 600 MG: 600 TABLET, EXTENDED RELEASE ORAL at 08:11

## 2024-07-07 RX ADMIN — Medication 10 ML: at 08:11

## 2024-07-07 RX ADMIN — SPIRONOLACTONE 25 MG: 25 TABLET ORAL at 08:11

## 2024-07-07 RX ADMIN — IPRATROPIUM BROMIDE AND ALBUTEROL SULFATE 3 ML: .5; 3 SOLUTION RESPIRATORY (INHALATION) at 11:20

## 2024-07-07 RX ADMIN — DILTIAZEM HYDROCHLORIDE 180 MG: 180 CAPSULE, COATED, EXTENDED RELEASE ORAL at 08:10

## 2024-07-07 RX ADMIN — IPRATROPIUM BROMIDE AND ALBUTEROL SULFATE 3 ML: .5; 3 SOLUTION RESPIRATORY (INHALATION) at 07:45

## 2024-07-07 RX ADMIN — SENNOSIDES AND DOCUSATE SODIUM 2 TABLET: 50; 8.6 TABLET ORAL at 08:11

## 2024-07-07 RX ADMIN — LEVOTHYROXINE SODIUM 125 MCG: 0.12 TABLET ORAL at 05:52

## 2024-07-07 RX ADMIN — WARFARIN SODIUM 2.5 MG: 5 TABLET ORAL at 12:58

## 2024-07-07 RX ADMIN — ASPIRIN 81 MG: 81 TABLET, COATED ORAL at 08:11

## 2024-07-07 RX ADMIN — DOXYCYCLINE 100 MG: 100 CAPSULE ORAL at 08:11

## 2024-07-08 ENCOUNTER — ANTICOAGULATION VISIT (OUTPATIENT)
Dept: PHARMACY | Facility: HOSPITAL | Age: 79
End: 2024-07-08
Payer: MEDICARE

## 2024-07-08 ENCOUNTER — TRANSITIONAL CARE MANAGEMENT TELEPHONE ENCOUNTER (OUTPATIENT)
Dept: CALL CENTER | Facility: HOSPITAL | Age: 79
End: 2024-07-08
Payer: MEDICARE

## 2024-07-08 ENCOUNTER — ANTICOAGULATION VISIT (OUTPATIENT)
Dept: FAMILY MEDICINE CLINIC | Facility: CLINIC | Age: 79
End: 2024-07-08
Payer: MEDICARE

## 2024-07-08 ENCOUNTER — TELEPHONE (OUTPATIENT)
Dept: PHARMACY | Facility: HOSPITAL | Age: 79
End: 2024-07-08
Payer: MEDICARE

## 2024-07-08 DIAGNOSIS — E78.2 MIXED HYPERLIPIDEMIA: Chronic | ICD-10-CM

## 2024-07-08 DIAGNOSIS — E03.9 HYPOTHYROIDISM, UNSPECIFIED TYPE: ICD-10-CM

## 2024-07-08 DIAGNOSIS — I26.99 PULMONARY EMBOLISM, UNSPECIFIED CHRONICITY, UNSPECIFIED PULMONARY EMBOLISM TYPE, UNSPECIFIED WHETHER ACUTE COR PULMONALE PRESENT: Primary | ICD-10-CM

## 2024-07-08 PROBLEM — J12.81 PNEUMONIA DUE TO SARS-ASSOCIATED CORONAVIRUS: Status: ACTIVE | Noted: 2024-06-18

## 2024-07-08 PROBLEM — R09.02 HYPOXEMIA: Status: ACTIVE | Noted: 2024-06-18

## 2024-07-08 PROBLEM — K22.4 ESOPHAGEAL MOTILITY DISORDER: Status: ACTIVE | Noted: 2024-06-18

## 2024-07-08 PROBLEM — I48.91 ATRIAL FIBRILLATION WITH RAPID VENTRICULAR RESPONSE: Status: ACTIVE | Noted: 2024-07-08

## 2024-07-08 LAB
ALBUMIN SERPL-MCNC: 3.8 G/DL (ref 3.5–5.2)
ALBUMIN/GLOB SERPL: 1.8 G/DL
ALP SERPL-CCNC: 67 U/L (ref 39–117)
ALT SERPL W P-5'-P-CCNC: 41 U/L (ref 1–33)
ANION GAP SERPL CALCULATED.3IONS-SCNC: 11.2 MMOL/L (ref 5–15)
AST SERPL-CCNC: 26 U/L (ref 1–32)
BACTERIA SPEC AEROBE CULT: NORMAL
BACTERIA SPEC AEROBE CULT: NORMAL
BILIRUB SERPL-MCNC: 0.7 MG/DL (ref 0–1.2)
BUN SERPL-MCNC: 38 MG/DL (ref 8–23)
BUN/CREAT SERPL: 32.2 (ref 7–25)
CALCIUM SPEC-SCNC: 8.8 MG/DL (ref 8.6–10.5)
CHLORIDE SERPL-SCNC: 101 MMOL/L (ref 98–107)
CHOLEST SERPL-MCNC: 138 MG/DL (ref 0–200)
CO2 SERPL-SCNC: 26.8 MMOL/L (ref 22–29)
CREAT SERPL-MCNC: 1.18 MG/DL (ref 0.57–1)
EGFRCR SERPLBLD CKD-EPI 2021: 47.4 ML/MIN/1.73
GLOBULIN UR ELPH-MCNC: 2.1 GM/DL
GLUCOSE SERPL-MCNC: 90 MG/DL (ref 65–99)
HDLC SERPL-MCNC: 69 MG/DL (ref 40–60)
INR PPP: 1.7 (ref 0.9–1.1)
INR PPP: 1.7 (ref 2–3)
LDLC SERPL CALC-MCNC: 46 MG/DL (ref 0–100)
LDLC/HDLC SERPL: 0.62 {RATIO}
POTASSIUM SERPL-SCNC: 4.6 MMOL/L (ref 3.5–5.2)
PROT SERPL-MCNC: 5.9 G/DL (ref 6–8.5)
SODIUM SERPL-SCNC: 139 MMOL/L (ref 136–145)
TRIGL SERPL-MCNC: 132 MG/DL (ref 0–150)
TSH SERPL DL<=0.05 MIU/L-ACNC: 0.91 UIU/ML (ref 0.27–4.2)
VLDLC SERPL-MCNC: 23 MG/DL (ref 5–40)

## 2024-07-08 PROCEDURE — 36415 COLL VENOUS BLD VENIPUNCTURE: CPT | Performed by: PREVENTIVE MEDICINE

## 2024-07-08 PROCEDURE — 80053 COMPREHEN METABOLIC PANEL: CPT | Performed by: PREVENTIVE MEDICINE

## 2024-07-08 PROCEDURE — 85610 PROTHROMBIN TIME: CPT | Performed by: PREVENTIVE MEDICINE

## 2024-07-08 PROCEDURE — 80061 LIPID PANEL: CPT | Performed by: PREVENTIVE MEDICINE

## 2024-07-08 PROCEDURE — 36416 COLLJ CAPILLARY BLOOD SPEC: CPT | Performed by: PREVENTIVE MEDICINE

## 2024-07-08 PROCEDURE — 84443 ASSAY THYROID STIM HORMONE: CPT | Performed by: PREVENTIVE MEDICINE

## 2024-07-08 PROCEDURE — 36415 COLL VENOUS BLD VENIPUNCTURE: CPT

## 2024-07-09 ENCOUNTER — TRANSITIONAL CARE MANAGEMENT TELEPHONE ENCOUNTER (OUTPATIENT)
Dept: CALL CENTER | Facility: HOSPITAL | Age: 79
End: 2024-07-09
Payer: MEDICARE

## 2024-07-09 ENCOUNTER — TELEPHONE (OUTPATIENT)
Dept: FAMILY MEDICINE CLINIC | Facility: CLINIC | Age: 79
End: 2024-07-09

## 2024-07-09 ENCOUNTER — OFFICE VISIT (OUTPATIENT)
Dept: FAMILY MEDICINE CLINIC | Facility: CLINIC | Age: 79
End: 2024-07-09
Payer: MEDICARE

## 2024-07-09 VITALS
DIASTOLIC BLOOD PRESSURE: 80 MMHG | HEART RATE: 65 BPM | HEIGHT: 60 IN | TEMPERATURE: 98 F | BODY MASS INDEX: 39.85 KG/M2 | WEIGHT: 203 LBS | OXYGEN SATURATION: 95 % | SYSTOLIC BLOOD PRESSURE: 132 MMHG

## 2024-07-09 DIAGNOSIS — I10 ESSENTIAL HYPERTENSION: Chronic | ICD-10-CM

## 2024-07-09 DIAGNOSIS — R09.02 HYPOXEMIA: ICD-10-CM

## 2024-07-09 DIAGNOSIS — J44.1 COPD WITH ACUTE EXACERBATION: ICD-10-CM

## 2024-07-09 DIAGNOSIS — I11.9 HYPERTENSIVE HEART DISEASE, UNSPECIFIED WHETHER HEART FAILURE PRESENT: ICD-10-CM

## 2024-07-09 DIAGNOSIS — E66.01 CLASS 2 SEVERE OBESITY DUE TO EXCESS CALORIES WITH SERIOUS COMORBIDITY AND BODY MASS INDEX (BMI) OF 39.0 TO 39.9 IN ADULT: Primary | ICD-10-CM

## 2024-07-09 DIAGNOSIS — I48.91 ATRIAL FIBRILLATION WITH RAPID VENTRICULAR RESPONSE: ICD-10-CM

## 2024-07-09 DIAGNOSIS — I25.10 CORONARY ARTERY DISEASE INVOLVING NATIVE CORONARY ARTERY OF NATIVE HEART WITHOUT ANGINA PECTORIS: ICD-10-CM

## 2024-07-09 DIAGNOSIS — J45.50 SEVERE PERSISTENT ASTHMA WITHOUT COMPLICATION: ICD-10-CM

## 2024-07-09 DIAGNOSIS — R79.1 SUPRATHERAPEUTIC INR: ICD-10-CM

## 2024-07-09 DIAGNOSIS — F33.41 RECURRENT MAJOR DEPRESSIVE DISORDER, IN PARTIAL REMISSION: ICD-10-CM

## 2024-07-09 PROCEDURE — 1126F AMNT PAIN NOTED NONE PRSNT: CPT | Performed by: PREVENTIVE MEDICINE

## 2024-07-09 PROCEDURE — 1160F RVW MEDS BY RX/DR IN RCRD: CPT | Performed by: PREVENTIVE MEDICINE

## 2024-07-09 PROCEDURE — 1159F MED LIST DOCD IN RCRD: CPT | Performed by: PREVENTIVE MEDICINE

## 2024-07-09 PROCEDURE — G2211 COMPLEX E/M VISIT ADD ON: HCPCS | Performed by: PREVENTIVE MEDICINE

## 2024-07-09 PROCEDURE — 99214 OFFICE O/P EST MOD 30 MIN: CPT | Performed by: PREVENTIVE MEDICINE

## 2024-07-09 PROCEDURE — 3079F DIAST BP 80-89 MM HG: CPT | Performed by: PREVENTIVE MEDICINE

## 2024-07-09 PROCEDURE — 3075F SYST BP GE 130 - 139MM HG: CPT | Performed by: PREVENTIVE MEDICINE

## 2024-07-10 LAB
FUNGUS WND CULT: NORMAL
MYCOBACTERIUM SPEC CULT: NORMAL
NIGHT BLUE STAIN TISS: NORMAL

## 2024-07-11 ENCOUNTER — OUTSIDE FACILITY SERVICE (OUTPATIENT)
Dept: FAMILY MEDICINE CLINIC | Facility: CLINIC | Age: 79
End: 2024-07-11
Payer: MEDICARE

## 2024-07-11 ENCOUNTER — ANTICOAGULATION VISIT (OUTPATIENT)
Dept: PHARMACY | Facility: HOSPITAL | Age: 79
End: 2024-07-11
Payer: MEDICARE

## 2024-07-11 DIAGNOSIS — I26.99 PULMONARY EMBOLISM, UNSPECIFIED CHRONICITY, UNSPECIFIED PULMONARY EMBOLISM TYPE, UNSPECIFIED WHETHER ACUTE COR PULMONALE PRESENT: Primary | ICD-10-CM

## 2024-07-11 LAB — INR PPP: 4.4 (ref 2–3)

## 2024-07-11 PROCEDURE — G0180 MD CERTIFICATION HHA PATIENT: HCPCS | Performed by: PREVENTIVE MEDICINE

## 2024-07-12 ENCOUNTER — PATIENT OUTREACH (OUTPATIENT)
Dept: CASE MANAGEMENT | Facility: OTHER | Age: 79
End: 2024-07-12
Payer: MEDICARE

## 2024-07-12 ENCOUNTER — OFFICE VISIT (OUTPATIENT)
Dept: CARDIOLOGY | Facility: CLINIC | Age: 79
End: 2024-07-12
Payer: MEDICARE

## 2024-07-12 VITALS
BODY MASS INDEX: 40.25 KG/M2 | WEIGHT: 205 LBS | RESPIRATION RATE: 16 BRPM | HEIGHT: 60 IN | SYSTOLIC BLOOD PRESSURE: 131 MMHG | HEART RATE: 70 BPM | DIASTOLIC BLOOD PRESSURE: 81 MMHG | OXYGEN SATURATION: 94 %

## 2024-07-12 DIAGNOSIS — E78.2 MIXED HYPERLIPIDEMIA: Chronic | ICD-10-CM

## 2024-07-12 DIAGNOSIS — I48.91 ATRIAL FIBRILLATION WITH RAPID VENTRICULAR RESPONSE: ICD-10-CM

## 2024-07-12 DIAGNOSIS — I10 ESSENTIAL HYPERTENSION: Primary | Chronic | ICD-10-CM

## 2024-07-12 PROCEDURE — 3075F SYST BP GE 130 - 139MM HG: CPT | Performed by: INTERNAL MEDICINE

## 2024-07-12 PROCEDURE — 99214 OFFICE O/P EST MOD 30 MIN: CPT | Performed by: INTERNAL MEDICINE

## 2024-07-12 PROCEDURE — 1160F RVW MEDS BY RX/DR IN RCRD: CPT | Performed by: INTERNAL MEDICINE

## 2024-07-12 PROCEDURE — 93000 ELECTROCARDIOGRAM COMPLETE: CPT | Performed by: INTERNAL MEDICINE

## 2024-07-12 PROCEDURE — 3079F DIAST BP 80-89 MM HG: CPT | Performed by: INTERNAL MEDICINE

## 2024-07-12 PROCEDURE — 1159F MED LIST DOCD IN RCRD: CPT | Performed by: INTERNAL MEDICINE

## 2024-07-15 ENCOUNTER — ANTICOAGULATION VISIT (OUTPATIENT)
Dept: PHARMACY | Facility: HOSPITAL | Age: 79
End: 2024-07-15
Payer: MEDICARE

## 2024-07-15 DIAGNOSIS — I26.99 PULMONARY EMBOLISM, UNSPECIFIED CHRONICITY, UNSPECIFIED PULMONARY EMBOLISM TYPE, UNSPECIFIED WHETHER ACUTE COR PULMONALE PRESENT: Primary | ICD-10-CM

## 2024-07-15 LAB — INR PPP: 2.2 (ref 2–3)

## 2024-07-17 LAB
FUNGUS WND CULT: NORMAL
MYCOBACTERIUM SPEC CULT: NORMAL
NIGHT BLUE STAIN TISS: NORMAL

## 2024-07-23 ENCOUNTER — TELEPHONE (OUTPATIENT)
Dept: PHARMACY | Facility: HOSPITAL | Age: 79
End: 2024-07-23
Payer: MEDICARE

## 2024-07-24 LAB
MYCOBACTERIUM SPEC CULT: NORMAL
NIGHT BLUE STAIN TISS: NORMAL

## 2024-07-31 LAB
MYCOBACTERIUM SPEC CULT: NORMAL
NIGHT BLUE STAIN TISS: NORMAL

## 2024-08-15 ENCOUNTER — TELEPHONE (OUTPATIENT)
Dept: PHARMACY | Facility: HOSPITAL | Age: 79
End: 2024-08-15
Payer: MEDICARE

## 2024-08-15 ENCOUNTER — TELEPHONE (OUTPATIENT)
Dept: FAMILY MEDICINE CLINIC | Facility: CLINIC | Age: 79
End: 2024-08-15
Payer: MEDICARE

## 2024-08-16 ENCOUNTER — TELEPHONE (OUTPATIENT)
Dept: FAMILY MEDICINE CLINIC | Facility: CLINIC | Age: 79
End: 2024-08-16
Payer: MEDICARE

## 2024-08-16 ENCOUNTER — TELEPHONE (OUTPATIENT)
Dept: PHARMACY | Facility: HOSPITAL | Age: 79
End: 2024-08-16
Payer: MEDICARE

## 2024-08-16 DIAGNOSIS — J43.1 PANLOBULAR EMPHYSEMA: Chronic | ICD-10-CM

## 2024-08-16 DIAGNOSIS — N39.3 STRESS INCONTINENCE OF URINE: ICD-10-CM

## 2024-08-16 DIAGNOSIS — M50.90 CERVICAL DISC DISEASE: ICD-10-CM

## 2024-08-16 DIAGNOSIS — I11.9 HYPERTENSIVE HEART DISEASE, UNSPECIFIED WHETHER HEART FAILURE PRESENT: Primary | ICD-10-CM

## 2024-08-16 DIAGNOSIS — N18.31 CHRONIC RENAL IMPAIRMENT, STAGE 3A: ICD-10-CM

## 2024-08-16 DIAGNOSIS — M51.36 DEGENERATION OF INTERVERTEBRAL DISC OF LUMBAR REGION: ICD-10-CM

## 2024-08-16 DIAGNOSIS — M15.9 OSTEOARTHRITIS, GENERALIZED: ICD-10-CM

## 2024-08-16 RX ORDER — SPIRONOLACTONE 25 MG/1
TABLET ORAL
Qty: 90 TABLET | Refills: 0 | Status: SHIPPED | OUTPATIENT
Start: 2024-08-16

## 2024-08-20 ENCOUNTER — ANTICOAGULATION VISIT (OUTPATIENT)
Dept: PHARMACY | Facility: HOSPITAL | Age: 79
End: 2024-08-20
Payer: MEDICARE

## 2024-08-20 ENCOUNTER — PATIENT OUTREACH (OUTPATIENT)
Dept: CASE MANAGEMENT | Facility: OTHER | Age: 79
End: 2024-08-20
Payer: MEDICARE

## 2024-08-20 DIAGNOSIS — I26.99 PULMONARY EMBOLISM, UNSPECIFIED CHRONICITY, UNSPECIFIED PULMONARY EMBOLISM TYPE, UNSPECIFIED WHETHER ACUTE COR PULMONALE PRESENT: Primary | ICD-10-CM

## 2024-08-20 LAB — INR PPP: 1.1 (ref 2–3)

## 2024-08-23 ENCOUNTER — ANTICOAGULATION VISIT (OUTPATIENT)
Dept: PHARMACY | Facility: HOSPITAL | Age: 79
End: 2024-08-23
Payer: MEDICARE

## 2024-08-23 ENCOUNTER — READMISSION MANAGEMENT (OUTPATIENT)
Dept: CALL CENTER | Facility: HOSPITAL | Age: 79
End: 2024-08-23
Payer: MEDICARE

## 2024-08-23 DIAGNOSIS — I26.99 PULMONARY EMBOLISM, UNSPECIFIED CHRONICITY, UNSPECIFIED PULMONARY EMBOLISM TYPE, UNSPECIFIED WHETHER ACUTE COR PULMONALE PRESENT: Primary | ICD-10-CM

## 2024-08-23 LAB — INR PPP: 2.3 (ref 2–3)

## 2024-08-25 PROBLEM — J12.81 PNEUMONIA DUE TO SARS-ASSOCIATED CORONAVIRUS: Status: RESOLVED | Noted: 2024-06-18 | Resolved: 2024-08-25

## 2024-08-27 ENCOUNTER — ANTICOAGULATION VISIT (OUTPATIENT)
Dept: PHARMACY | Facility: HOSPITAL | Age: 79
End: 2024-08-27
Payer: MEDICARE

## 2024-08-27 DIAGNOSIS — I26.01 ACUTE SEPTIC PULMONARY EMBOLISM WITH ACUTE COR PULMONALE: Primary | ICD-10-CM

## 2024-08-27 LAB — INR PPP: 1.2 (ref 2–3)

## 2024-08-27 RX ORDER — SENNOSIDES A AND B 8.6 MG/1
2 TABLET, FILM COATED ORAL EVERY EVENING
Qty: 60 TABLET | Refills: 3 | Status: SHIPPED | OUTPATIENT
Start: 2024-08-27

## 2024-08-29 ENCOUNTER — OFFICE VISIT (OUTPATIENT)
Dept: FAMILY MEDICINE CLINIC | Facility: CLINIC | Age: 79
End: 2024-08-29
Payer: MEDICARE

## 2024-08-29 ENCOUNTER — ANTICOAGULATION VISIT (OUTPATIENT)
Dept: PHARMACY | Facility: HOSPITAL | Age: 79
End: 2024-08-29
Payer: MEDICARE

## 2024-08-29 ENCOUNTER — ANTICOAGULATION VISIT (OUTPATIENT)
Dept: FAMILY MEDICINE CLINIC | Facility: CLINIC | Age: 79
End: 2024-08-29
Payer: MEDICARE

## 2024-08-29 VITALS
TEMPERATURE: 98 F | HEART RATE: 71 BPM | OXYGEN SATURATION: 97 % | SYSTOLIC BLOOD PRESSURE: 104 MMHG | DIASTOLIC BLOOD PRESSURE: 68 MMHG | WEIGHT: 206.4 LBS | BODY MASS INDEX: 40.52 KG/M2 | HEIGHT: 60 IN

## 2024-08-29 DIAGNOSIS — J98.8 CONGESTION OF UPPER AIRWAY: ICD-10-CM

## 2024-08-29 DIAGNOSIS — I05.9 MITRAL VALVE DISORDER: ICD-10-CM

## 2024-08-29 DIAGNOSIS — J45.50 SEVERE PERSISTENT ASTHMA WITHOUT COMPLICATION: ICD-10-CM

## 2024-08-29 DIAGNOSIS — E66.01 CLASS 3 SEVERE OBESITY DUE TO EXCESS CALORIES WITH SERIOUS COMORBIDITY AND BODY MASS INDEX (BMI) OF 40.0 TO 44.9 IN ADULT: ICD-10-CM

## 2024-08-29 DIAGNOSIS — E55.9 VITAMIN D DEFICIENCY: ICD-10-CM

## 2024-08-29 DIAGNOSIS — F33.41 RECURRENT MAJOR DEPRESSIVE DISORDER, IN PARTIAL REMISSION: ICD-10-CM

## 2024-08-29 DIAGNOSIS — I48.91 ATRIAL FIBRILLATION WITH RAPID VENTRICULAR RESPONSE: ICD-10-CM

## 2024-08-29 DIAGNOSIS — E78.2 MIXED HYPERLIPIDEMIA: Primary | Chronic | ICD-10-CM

## 2024-08-29 DIAGNOSIS — I26.01 ACUTE SEPTIC PULMONARY EMBOLISM WITH ACUTE COR PULMONALE: Primary | ICD-10-CM

## 2024-08-29 DIAGNOSIS — L43.9 LICHEN PLANUS: ICD-10-CM

## 2024-08-29 DIAGNOSIS — M81.0 POSTMENOPAUSAL OSTEOPOROSIS: ICD-10-CM

## 2024-08-29 DIAGNOSIS — K22.4 ESOPHAGEAL MOTILITY DISORDER: ICD-10-CM

## 2024-08-29 DIAGNOSIS — J43.1 PANLOBULAR EMPHYSEMA: Chronic | ICD-10-CM

## 2024-08-29 DIAGNOSIS — I26.99 PULMONARY EMBOLISM, UNSPECIFIED CHRONICITY, UNSPECIFIED PULMONARY EMBOLISM TYPE, UNSPECIFIED WHETHER ACUTE COR PULMONALE PRESENT: Primary | ICD-10-CM

## 2024-08-29 DIAGNOSIS — I10 ESSENTIAL HYPERTENSION: Chronic | ICD-10-CM

## 2024-08-29 DIAGNOSIS — T17.800A MULTIPLE TRACHEOBRONCHIAL MUCUS PLUGS: ICD-10-CM

## 2024-08-29 PROBLEM — J45.51 SEVERE PERSISTENT ASTHMA WITH (ACUTE) EXACERBATION: Status: ACTIVE | Noted: 2024-07-16

## 2024-08-29 PROBLEM — Z95.5 PRESENCE OF CORONARY ANGIOPLASTY IMPLANT AND GRAFT: Status: ACTIVE | Noted: 2024-07-16

## 2024-08-29 PROBLEM — I50.32 CHRONIC DIASTOLIC (CONGESTIVE) HEART FAILURE: Status: ACTIVE | Noted: 2024-07-16

## 2024-08-29 PROBLEM — R26.2 DIFFICULTY IN WALKING, NOT ELSEWHERE CLASSIFIED: Status: ACTIVE | Noted: 2024-07-16

## 2024-08-29 PROBLEM — I27.81 COR PULMONALE (CHRONIC): Status: ACTIVE | Noted: 2024-07-16

## 2024-08-29 PROBLEM — E66.813 CLASS 3 SEVERE OBESITY DUE TO EXCESS CALORIES WITH SERIOUS COMORBIDITY AND BODY MASS INDEX (BMI) OF 40.0 TO 44.9 IN ADULT: Status: ACTIVE | Noted: 2019-08-07

## 2024-08-29 LAB
EXPIRATION DATE: NORMAL
FLUAV AG UPPER RESP QL IA.RAPID: NOT DETECTED
FLUBV AG UPPER RESP QL IA.RAPID: NOT DETECTED
INR PPP: 0.9 (ref 0.9–1.1)
INTERNAL CONTROL: NORMAL
Lab: NORMAL
SARS-COV-2 AG UPPER RESP QL IA.RAPID: NOT DETECTED

## 2024-08-29 PROCEDURE — 36416 COLLJ CAPILLARY BLOOD SPEC: CPT | Performed by: PREVENTIVE MEDICINE

## 2024-08-29 PROCEDURE — 85610 PROTHROMBIN TIME: CPT | Performed by: PREVENTIVE MEDICINE

## 2024-08-30 ENCOUNTER — LAB (OUTPATIENT)
Dept: FAMILY MEDICINE CLINIC | Facility: CLINIC | Age: 79
End: 2024-08-30
Payer: MEDICARE

## 2024-08-30 LAB
25(OH)D3 SERPL-MCNC: 35.5 NG/ML (ref 30–100)
ALBUMIN SERPL-MCNC: 4.1 G/DL (ref 3.5–5.2)
ALBUMIN/GLOB SERPL: 1.7 G/DL
ALP SERPL-CCNC: 88 U/L (ref 39–117)
ALT SERPL W P-5'-P-CCNC: 23 U/L (ref 1–33)
ANION GAP SERPL CALCULATED.3IONS-SCNC: 10.8 MMOL/L (ref 5–15)
AST SERPL-CCNC: 25 U/L (ref 1–32)
BASOPHILS # BLD AUTO: 0.07 10*3/MM3 (ref 0–0.2)
BASOPHILS NFR BLD AUTO: 0.7 % (ref 0–1.5)
BILIRUB SERPL-MCNC: 0.9 MG/DL (ref 0–1.2)
BUN SERPL-MCNC: 26 MG/DL (ref 8–23)
BUN/CREAT SERPL: 18.7 (ref 7–25)
CALCIUM SPEC-SCNC: 10.2 MG/DL (ref 8.6–10.5)
CHLORIDE SERPL-SCNC: 99 MMOL/L (ref 98–107)
CHOLEST SERPL-MCNC: 112 MG/DL (ref 0–200)
CO2 SERPL-SCNC: 30.2 MMOL/L (ref 22–29)
CREAT SERPL-MCNC: 1.39 MG/DL (ref 0.57–1)
DEPRECATED RDW RBC AUTO: 45.7 FL (ref 37–54)
EGFRCR SERPLBLD CKD-EPI 2021: 38.7 ML/MIN/1.73
EOSINOPHIL # BLD AUTO: 0.38 10*3/MM3 (ref 0–0.4)
EOSINOPHIL NFR BLD AUTO: 4.1 % (ref 0.3–6.2)
ERYTHROCYTE [DISTWIDTH] IN BLOOD BY AUTOMATED COUNT: 14.3 % (ref 12.3–15.4)
GLOBULIN UR ELPH-MCNC: 2.4 GM/DL
GLUCOSE SERPL-MCNC: 99 MG/DL (ref 65–99)
HCT VFR BLD AUTO: 42.8 % (ref 34–46.6)
HDLC SERPL-MCNC: 33 MG/DL (ref 40–60)
HGB BLD-MCNC: 13.8 G/DL (ref 12–15.9)
IMM GRANULOCYTES # BLD AUTO: 0.04 10*3/MM3 (ref 0–0.05)
IMM GRANULOCYTES NFR BLD AUTO: 0.4 % (ref 0–0.5)
LDLC SERPL CALC-MCNC: 55 MG/DL (ref 0–100)
LDLC/HDLC SERPL: 1.59 {RATIO}
LYMPHOCYTES # BLD AUTO: 2.06 10*3/MM3 (ref 0.7–3.1)
LYMPHOCYTES NFR BLD AUTO: 22 % (ref 19.6–45.3)
MAGNESIUM SERPL-MCNC: 2.2 MG/DL (ref 1.6–2.4)
MCH RBC QN AUTO: 28.6 PG (ref 26.6–33)
MCHC RBC AUTO-ENTMCNC: 32.2 G/DL (ref 31.5–35.7)
MCV RBC AUTO: 88.6 FL (ref 79–97)
MONOCYTES # BLD AUTO: 0.92 10*3/MM3 (ref 0.1–0.9)
MONOCYTES NFR BLD AUTO: 9.8 % (ref 5–12)
NEUTROPHILS NFR BLD AUTO: 5.91 10*3/MM3 (ref 1.7–7)
NEUTROPHILS NFR BLD AUTO: 63 % (ref 42.7–76)
NRBC BLD AUTO-RTO: 0 /100 WBC (ref 0–0.2)
PLATELET # BLD AUTO: 204 10*3/MM3 (ref 140–450)
PMV BLD AUTO: 10.9 FL (ref 6–12)
POTASSIUM SERPL-SCNC: 4.5 MMOL/L (ref 3.5–5.2)
PROT SERPL-MCNC: 6.5 G/DL (ref 6–8.5)
RBC # BLD AUTO: 4.83 10*6/MM3 (ref 3.77–5.28)
SODIUM SERPL-SCNC: 140 MMOL/L (ref 136–145)
T4 FREE SERPL-MCNC: 2.14 NG/DL (ref 0.93–1.7)
TRIGL SERPL-MCNC: 133 MG/DL (ref 0–150)
TSH SERPL DL<=0.05 MIU/L-ACNC: 0.04 UIU/ML (ref 0.27–4.2)
VIT B12 BLD-MCNC: >2000 PG/ML (ref 211–946)
VLDLC SERPL-MCNC: 24 MG/DL (ref 5–40)
WBC NRBC COR # BLD AUTO: 9.38 10*3/MM3 (ref 3.4–10.8)

## 2024-08-30 PROCEDURE — 83735 ASSAY OF MAGNESIUM: CPT | Performed by: PREVENTIVE MEDICINE

## 2024-08-30 PROCEDURE — 80053 COMPREHEN METABOLIC PANEL: CPT | Performed by: PREVENTIVE MEDICINE

## 2024-08-30 PROCEDURE — 84443 ASSAY THYROID STIM HORMONE: CPT | Performed by: PREVENTIVE MEDICINE

## 2024-08-30 PROCEDURE — 80061 LIPID PANEL: CPT | Performed by: PREVENTIVE MEDICINE

## 2024-08-30 PROCEDURE — 36415 COLL VENOUS BLD VENIPUNCTURE: CPT | Performed by: PREVENTIVE MEDICINE

## 2024-08-30 PROCEDURE — 85025 COMPLETE CBC W/AUTO DIFF WBC: CPT | Performed by: PREVENTIVE MEDICINE

## 2024-08-30 PROCEDURE — 84439 ASSAY OF FREE THYROXINE: CPT | Performed by: PREVENTIVE MEDICINE

## 2024-08-30 PROCEDURE — 82607 VITAMIN B-12: CPT | Performed by: PREVENTIVE MEDICINE

## 2024-08-30 PROCEDURE — 82306 VITAMIN D 25 HYDROXY: CPT | Performed by: PREVENTIVE MEDICINE

## 2024-09-02 ENCOUNTER — TELEPHONE (OUTPATIENT)
Dept: FAMILY MEDICINE CLINIC | Facility: CLINIC | Age: 79
End: 2024-09-02
Payer: MEDICARE

## 2024-09-02 DIAGNOSIS — E03.9 HYPOTHYROIDISM, UNSPECIFIED TYPE: Primary | ICD-10-CM

## 2024-09-02 RX ORDER — LEVOTHYROXINE SODIUM 112 UG/1
112 TABLET ORAL DAILY
Qty: 90 TABLET | Refills: 3 | Status: SHIPPED | OUTPATIENT
Start: 2024-09-02

## 2024-09-03 ENCOUNTER — ANTICOAGULATION VISIT (OUTPATIENT)
Dept: PHARMACY | Facility: HOSPITAL | Age: 79
End: 2024-09-03
Payer: MEDICARE

## 2024-09-03 DIAGNOSIS — I26.01 ACUTE SEPTIC PULMONARY EMBOLISM WITH ACUTE COR PULMONALE: Primary | ICD-10-CM

## 2024-09-03 LAB — INR PPP: 2.1 (ref 2–3)

## 2024-09-10 ENCOUNTER — TELEPHONE (OUTPATIENT)
Dept: PHARMACY | Facility: HOSPITAL | Age: 79
End: 2024-09-10
Payer: MEDICARE

## 2024-09-11 ENCOUNTER — INPATIENT HOSPITAL (AMBULATORY)
Age: 79
End: 2024-09-11

## 2024-09-11 ENCOUNTER — INPATIENT HOSPITAL (AMBULATORY)
Dept: RURAL HOSPITAL 2 | Facility: HOSPITAL | Age: 79
End: 2024-09-11

## 2024-09-11 DIAGNOSIS — R13.10 DYSPHAGIA, UNSPECIFIED: ICD-10-CM

## 2024-09-11 DIAGNOSIS — Z79.01 LONG TERM (CURRENT) USE OF ANTICOAGULANTS: ICD-10-CM

## 2024-09-11 DIAGNOSIS — K59.09 OTHER CONSTIPATION: ICD-10-CM

## 2024-09-11 PROCEDURE — 99222 1ST HOSP IP/OBS MODERATE 55: CPT | Performed by: INTERNAL MEDICINE

## 2024-09-19 ENCOUNTER — TELEPHONE (OUTPATIENT)
Dept: PHARMACY | Facility: HOSPITAL | Age: 79
End: 2024-09-19
Payer: MEDICARE

## 2024-09-24 ENCOUNTER — TELEPHONE (OUTPATIENT)
Dept: PHARMACY | Facility: HOSPITAL | Age: 79
End: 2024-09-24
Payer: MEDICARE

## 2024-09-26 ENCOUNTER — TRANSCRIBE ORDERS (OUTPATIENT)
Dept: ADMINISTRATIVE | Facility: HOSPITAL | Age: 79
End: 2024-09-26
Payer: MEDICARE

## 2024-09-26 DIAGNOSIS — S22.080A COMPRESSION FRACTURE OF THORACOLUMBAR VERTEBRA, CLOSED, INITIAL ENCOUNTER: ICD-10-CM

## 2024-09-26 DIAGNOSIS — M54.9 DORSALGIA: ICD-10-CM

## 2024-09-26 DIAGNOSIS — S22.080A T12 COMPRESSION FRACTURE, INITIAL ENCOUNTER: Primary | ICD-10-CM

## 2024-09-26 DIAGNOSIS — S32.010A COMPRESSION FRACTURE OF THORACOLUMBAR VERTEBRA, CLOSED, INITIAL ENCOUNTER: ICD-10-CM

## 2024-09-26 DIAGNOSIS — S32.030A COMPRESSION FRACTURE OF L3 LUMBAR VERTEBRA, CLOSED, INITIAL ENCOUNTER: ICD-10-CM

## 2024-10-01 ENCOUNTER — TELEPHONE (OUTPATIENT)
Dept: PHARMACY | Facility: HOSPITAL | Age: 79
End: 2024-10-01
Payer: MEDICARE

## 2024-10-04 ENCOUNTER — HOSPITAL ENCOUNTER (OUTPATIENT)
Dept: CT IMAGING | Facility: HOSPITAL | Age: 79
Discharge: HOME | End: 2024-10-04
Payer: MEDICARE

## 2024-10-04 DIAGNOSIS — S22.080A T12 COMPRESSION FRACTURE, INITIAL ENCOUNTER: ICD-10-CM

## 2024-10-04 DIAGNOSIS — M54.9 DORSALGIA: ICD-10-CM

## 2024-10-04 DIAGNOSIS — S32.010A COMPRESSION FRACTURE OF THORACOLUMBAR VERTEBRA, CLOSED, INITIAL ENCOUNTER: ICD-10-CM

## 2024-10-04 DIAGNOSIS — S32.030A COMPRESSION FRACTURE OF L3 LUMBAR VERTEBRA, CLOSED, INITIAL ENCOUNTER: ICD-10-CM

## 2024-10-04 DIAGNOSIS — S22.080A COMPRESSION FRACTURE OF THORACOLUMBAR VERTEBRA, CLOSED, INITIAL ENCOUNTER: ICD-10-CM

## 2024-10-04 PROCEDURE — 72128 CT CHEST SPINE W/O DYE: CPT

## 2024-10-04 PROCEDURE — 72131 CT LUMBAR SPINE W/O DYE: CPT

## 2024-10-08 ENCOUNTER — TELEPHONE (OUTPATIENT)
Dept: PHARMACY | Facility: HOSPITAL | Age: 79
End: 2024-10-08
Payer: MEDICARE

## 2024-10-22 ENCOUNTER — TELEPHONE (OUTPATIENT)
Dept: PHARMACY | Facility: HOSPITAL | Age: 79
End: 2024-10-22
Payer: MEDICARE

## 2024-10-31 ENCOUNTER — ANTICOAGULATION VISIT (OUTPATIENT)
Dept: PHARMACY | Facility: HOSPITAL | Age: 79
End: 2024-10-31
Payer: MEDICARE

## 2024-10-31 LAB — INR PPP: 1.4 (ref 2–3)

## 2024-10-31 NOTE — PROGRESS NOTES
Anticoagulation Clinic Progress Note - Home Health INR Testing     INR Goal: 2 - 3  Today's INR: 1.4 (subtherapeutic). INR result was called in today by Sarai ([facility] nurse).   Current warfarin dose: warfarin 3 mg PO daily. Current total weekly dose = 21 mg per week.     Patient discharged from rehab facility 10/24. Regine called clinic today to reestablish warfarin management.     INR History:      Latest Ref Rng & Units 2024     2:52 PM 2024    10:00 AM 2024     2:48 PM 9/3/2024    12:00 AM 9/3/2024     2:04 PM 10/31/2024    12:00 AM 10/31/2024     2:22 PM   Anticoagulation Monitoring   INR  -- -- 0.90  2.10  1.40   INR Date    2024  9/3/2024  10/31/2024   INR Goal  2.0-3.0 2.0-3.0 2.0-3.0  2.0-3.0  2.0-3.0   Trend  Same  Up  Same  Same   Last Week Total  25 mg  22.5 mg  25 mg  25 mg   Next Week Total  17.5 mg  25 mg  25 mg  25 mg   Sun  2.5 mg  2.5 mg  2.5 mg  2.5 mg   Mon  2.5 mg  5 mg  5 mg  5 mg   Tue  2.5 mg  -  2.5 mg  2.5 mg   Wed  2.5 mg  -  5 mg  5 mg   Thu  2.5 mg  2.5 mg  2.5 mg  2.5 mg   Fri  2.5 mg  5 mg  5 mg  5 mg   Sat  2.5 mg  2.5 mg  2.5 mg  2.5 mg   Historical INR 2.00 - 3.00    2.10      1.40        Visit Report   Report Report           This result is from an external source.       Anticoagulation Summary  As of 10/31/2024      INR goal:  2.0-3.0   TTR:  34.6% (11.8 mo)   INR used for dosin.40 (10/31/2024)   Warfarin maintenance plan:  5 mg every Mon, Wed, Fri; 2.5 mg all other days   Weekly warfarin total:  25 mg   Plan last modified:  Jacqui Lagunas, PharmD (2024)   Next INR check:  --   Priority:  High   Target end date:  --    Indications    Pulmonary embolism [I26.99]                 Anticoagulation Episode Summary       INR check location:  --    Preferred lab:  --    Send INR reminders to:   [facility] CLINICAL POOL    Comments:  Bushra -   In-home caregiver, Cassy: [phone]  SisterConcha: [phone]  Potential new  home phone number: [phone]          Anticoagulation Care Providers       Provider Role Specialty Phone number    Bria Matthews MD Referring Family Medicine [phone]            Clinic Interview:   Patient Findings     Positives:  Other complaints    Negatives:  Signs/symptoms of thrombosis, Signs/symptoms of bleeding,   Laboratory test error suspected, Change in health, Change in alcohol use,   Change in activity, Upcoming invasive procedure, Emergency department   visit, Upcoming dental procedure, Missed doses, Extra doses, Change in   medications, Change in diet/appetite, Hospital admission, Bruising    Comments:  Patient discharged from rehab facility 10/24. [facility]   called clinic today to establish warfarin management.       Clinical Outcomes     Negatives:  Major bleeding event, Thromboembolic event,   Anticoagulation-related hospital admission, Anticoagulation-related ED   visit, Anticoagulation-related fatality    Comments:  Patient discharged from rehab facility 10/24. [facility]   called clinic today to establish warfarin management.         Current Medications:   Prior to Admission medications    Medication Sig Start Date End Date Taking? Authorizing Provider   albuterol sulfate  (90 Base) MCG/ACT inhaler Inhale 2 puffs Every 4 (Four) Hours As Needed for Wheezing or Shortness of Air. Indications: Chronic Obstructive Lung Disease    ProviderDiogenes MD   aspirin 81 MG EC tablet Take 1 tablet by mouth Daily. Indications: Venous Thromboembolism    ProviderDiogenes MD   atorvastatin (LIPITOR) 80 MG tablet Take 0.5 tablets by mouth Daily.    ProviderDiogenes MD   budesonide (PULMICORT) 0.5 MG/2ML nebulizer solution Take 2 mL by nebulization 2 (Two) Times a Day. 6/21/24   Tracy Terry MD   dilTIAZem CD (CARDIZEM CD) 180 MG 24 hr capsule Take 1 capsule by mouth Daily. 6/21/24   Tracy Terry MD   Fluticasone Furoate-Vilanterol (Breo Ellipta) 100-25 MCG/ACT  aerosol powder  Inhale 1 puff Daily.    Diogenes Olguin MD   furosemide (LASIX) 40 MG tablet Take 1 tablet by mouth Daily. 7/7/24   Darrion Burch MD   guaiFENesin (MUCINEX) 600 MG 12 hr tablet Take 2 tablets by mouth 2 (Two) Times a Day.    ProviderDiogenes MD   ipratropium-albuterol (DUO-NEB) 0.5-2.5 mg/3 ml nebulizer Take 3 mL by nebulization 4 (Four) Times a Day. Indications: Chronic Obstructive Lung Disease 10/30/23   Bria Matthews MD   levothyroxine (Synthroid) 112 MCG tablet Take 1 tablet by mouth Daily. 9/2/24   Bria Matthews MD   O2 (OXYGEN) Inhale 2 L/min Continuous. Indications: COPD exacerbation, uncomplicated 10/6/23   Arlyn Holland APRN   potassium chloride (KLOR-CON M20) 20 MEQ CR tablet Take 1 tablet by mouth Daily. 7/7/24   Darrion Burch MD   senna 8.6 MG tablet Take 2 tablets by mouth Every Evening. Indications: Constipation 8/27/24   Bria Matthews MD   spironolactone (ALDACTONE) 25 MG tablet TAKE 1 TABLET BY MOUTH DAILY FOR CONGESTIVE HEART FAILURE 8/16/24   Bria Matthews MD   warfarin (COUMADIN) 2.5 MG tablet Take 1 tablet by mouth See Admin Instructions. Take one tablet (2.5mg) 5 days weekly and two tablets (5mg) twice weekly. 6/25/24   Bria Matthews MD   albuterol (ACCUNEB) 1.25 MG/3ML nebulizer solution Inhale. Indications: Spasm of Lung Air Passages 9/27/23   Diogenes Olguin MD   Cholecalciferol 50 MCG (2000 UT) tablet Take 1 tablet by mouth. Indications: Vitamin D Deficiency 8/29/23   Diogenes Olguin MD       Medical history:   Past Medical History:   Diagnosis Date    Adenomatous polyp of colon     Allergic rhinitis     Asthma     Cervical disc disease     Depression     Fracture of thoracic spine 11/16/2011    Lacunar infarction 08/07/2019    Mitral valve disorder     Postmenopausal osteoporosis     Restless leg syndrome        Social history:   Social History     Tobacco Use    Smoking status: Former      Current packs/day: 0.00     Average packs/day: 1 pack/day for 36.0 years (36.0 ttl pk-yrs)     Types: Cigarettes     Start date:      Quit date:      Years since quittin.8     Passive exposure: Never    Smokeless tobacco: Never   Substance Use Topics    Alcohol use: No       Allergies:    Latex and Penicillin g      This patient is managed via a collaborative agreement, pursuant to IC 25-26-16. The signed protocol is kept in the [facility] at [address] [place] [address].    Education: Selina Vazquez has been instructed to call if any changes in medications, doses, concerns, etc. Patient was provided dosing instructions and expressed verbal understanding and has no further questions at this time.    Plan:  1. increase by 14%; warfarin 3 mg PO daily, except warfarin 4.5 mg PO on Thursday and .  New Total weekly dose = 24 mg.   2. INR should be tested weekly and called into clinic.     Counseled patient on increased clotting risk associated with subtherapeutic INR, signs/symptoms to monitor for, and when to seek medical attention.     Jaimie Chamberlain, PharmD  10/31/2024  14:29 EDT

## 2024-11-04 ENCOUNTER — TELEPHONE (OUTPATIENT)
Dept: FAMILY MEDICINE CLINIC | Facility: CLINIC | Age: 79
End: 2024-11-04
Payer: MEDICARE

## 2024-11-04 DIAGNOSIS — I27.81 COR PULMONALE (CHRONIC): Primary | ICD-10-CM

## 2024-11-04 NOTE — TELEPHONE ENCOUNTER
Caller: Selina Vazquez    Relationship: Self    Best call back number: [phone]     Equipment requested: PORTABLE OXYGEN TANK     Reason for the request: PATIENT IS INTERESTED IN GETTING THIS THROUGH INSURANCE IF POSSIBLE IT WOULD HELP HER GET OUT AND BE ABLE TO DO MORE THINGS.    Prescribing Provider:     Additional information or concerns: PLEASE ADVISE

## 2024-11-07 ENCOUNTER — TELEPHONE (OUTPATIENT)
Dept: PHARMACY | Facility: HOSPITAL | Age: 79
End: 2024-11-07
Payer: MEDICARE

## 2024-11-07 ENCOUNTER — ANTICOAGULATION VISIT (OUTPATIENT)
Dept: PHARMACY | Facility: HOSPITAL | Age: 79
End: 2024-11-07
Payer: MEDICARE

## 2024-11-07 DIAGNOSIS — I26.01 ACUTE SEPTIC PULMONARY EMBOLISM WITH ACUTE COR PULMONALE: Primary | ICD-10-CM

## 2024-11-07 LAB — INR PPP: 3.2 (ref 2–3)

## 2024-11-07 NOTE — PROGRESS NOTES
Anticoagulation Clinic Progress Note - Home Health INR Testing     INR Goal: 2 - 3  Today's INR: 3.2 (supratherapeutic). INR result was called in today by Theodore Lan home health nurse.   Current warfarin dose: warfarin 3 mg PO daily, except warfarin 4.5 mg PO on Sunday.  Current total weekly dose = 22.5 mg per week.     -The above the regimen differs then what was discussed at last week.      INR History:      Latest Ref Rng & Units 8/29/2024     2:48 PM 9/3/2024    12:00 AM 9/3/2024     2:04 PM 10/31/2024    12:00 AM 10/31/2024     2:22 PM 11/7/2024    12:00 AM 11/7/2024     1:24 PM   Anticoagulation Monitoring   INR  0.90  2.10  1.40  3.20   INR Date  8/29/2024  9/3/2024  10/31/2024  11/7/2024   INR Goal  2.0-3.0  2.0-3.0  2.0-3.0  2.0-3.0   Trend  Up  Same  Down  Down   Last Week Total  22.5 mg  25 mg  21 mg  22.5 mg   Next Week Total  25 mg  25 mg  24 mg  21 mg   Sun  2.5 mg  2.5 mg  4.5 mg  3 mg   Mon  5 mg  5 mg  3 mg  3 mg   Tue  -  2.5 mg  3 mg  3 mg   Wed  -  5 mg  3 mg  3 mg   Thu  2.5 mg  2.5 mg  4.5 mg  3 mg   Fri  5 mg  5 mg  3 mg  3 mg   Sat  2.5 mg  2.5 mg  3 mg  3 mg   Historical INR 2.00 - 3.00  2.10      1.40      3.20        Visit Report  Report             This result is from an external source.       Anticoagulation Summary  As of 11/7/2024      INR goal:  2.0-3.0   TTR:  35.0% (1 y)   INR used for dosing:  3.20 (11/7/2024)   Warfarin maintenance plan:  3 mg every day   Weekly warfarin total:  21 mg   Plan last modified:  Jacqui Lagunas, PharmD (11/7/2024)   Next INR check:  11/14/2024   Priority:  High   Target end date:  --    Indications    Pulmonary embolism [I26.99]                 Anticoagulation Episode Summary       INR check location:  --    Preferred lab:  --    Send INR reminders to:  [facility] CLINICAL POOL    Comments:  Bushra -   In-home caregiver, Cassy: [phone]  SisterConcha: [phone]  Potential new home phone number: [phone]           Anticoagulation Care Providers       Provider Role Specialty Phone number    Bria Matthews MD Referring Family Medicine [phone]            Clinic Interview:   Patient Findings     Negatives:  Signs/symptoms of thrombosis, Signs/symptoms of bleeding,   Laboratory test error suspected, Change in health, Change in alcohol use,   Change in activity, Upcoming invasive procedure, Emergency department   visit, Upcoming dental procedure, Missed doses, Extra doses, Change in   medications, Change in diet/appetite, Hospital admission, Bruising, Other   complaints      Clinical Outcomes     Negatives:  Major bleeding event, Thromboembolic event,   Anticoagulation-related hospital admission, Anticoagulation-related ED   visit, Anticoagulation-related fatality        Current Medications:   Prior to Admission medications    Medication Sig Start Date End Date Taking? Authorizing Provider   albuterol sulfate  (90 Base) MCG/ACT inhaler Inhale 2 puffs Every 4 (Four) Hours As Needed for Wheezing or Shortness of Air. Indications: Chronic Obstructive Lung Disease    ProviderDiogenes MD   aspirin 81 MG EC tablet Take 1 tablet by mouth Daily. Indications: Venous Thromboembolism    ProviderDiogenes MD   atorvastatin (LIPITOR) 80 MG tablet Take 0.5 tablets by mouth Daily.    ProviderDiogenes MD   budesonide (PULMICORT) 0.5 MG/2ML nebulizer solution Take 2 mL by nebulization 2 (Two) Times a Day. 6/21/24   Tracy Terry MD   dilTIAZem CD (CARDIZEM CD) 180 MG 24 hr capsule Take 1 capsule by mouth Daily. 6/21/24   Tracy Terry MD   Fluticasone Furoate-Vilanterol (Breo Ellipta) 100-25 MCG/ACT aerosol powder  Inhale 1 puff Daily.    ProviderDiogenes MD   furosemide (LASIX) 40 MG tablet Take 1 tablet by mouth Daily. 7/7/24   Darrion Burch MD   guaiFENesin (MUCINEX) 600 MG 12 hr tablet Take 2 tablets by mouth 2 (Two) Times a Day.    ProviderDiogenes MD   ipratropium-albuterol  (DUO-NEB) 0.5-2.5 mg/3 ml nebulizer Take 3 mL by nebulization 4 (Four) Times a Day. Indications: Chronic Obstructive Lung Disease 10/30/23   Bria Matthews MD   levothyroxine (Synthroid) 112 MCG tablet Take 1 tablet by mouth Daily. 24   Bria Matthews MD   O2 (OXYGEN) Inhale 2 L/min Continuous. Indications: COPD exacerbation, uncomplicated 10/6/23   Arlyn Holland APRN   potassium chloride (KLOR-CON M20) 20 MEQ CR tablet Take 1 tablet by mouth Daily. 24   Darrion Burch MD   senna 8.6 MG tablet Take 2 tablets by mouth Every Evening. Indications: Constipation 24   Bria Matthews MD   spironolactone (ALDACTONE) 25 MG tablet TAKE 1 TABLET BY MOUTH DAILY FOR CONGESTIVE HEART FAILURE 24   Bria Matthews MD   warfarin (COUMADIN) 2.5 MG tablet Take 1 tablet by mouth See Admin Instructions. Take one tablet (2.5mg) 5 days weekly and two tablets (5mg) twice weekly. 24   Bria Matthews MD   albuterol (ACCUNEB) 1.25 MG/3ML nebulizer solution Inhale. Indications: Spasm of Lung Air Passages 23   Provider, MD Diogenes   Cholecalciferol 50 MCG (2000 UT) tablet Take 1 tablet by mouth. Indications: Vitamin D Deficiency 23   Provider, MD Diogenes       Medical history:   Past Medical History:   Diagnosis Date    Adenomatous polyp of colon     Allergic rhinitis     Asthma     Cervical disc disease     Depression     Fracture of thoracic spine 2011    Lacunar infarction 2019    Mitral valve disorder     Postmenopausal osteoporosis     Restless leg syndrome        Social history:   Social History     Tobacco Use    Smoking status: Former     Current packs/day: 0.00     Average packs/day: 1 pack/day for 36.0 years (36.0 ttl pk-yrs)     Types: Cigarettes     Start date:      Quit date:      Years since quittin.8     Passive exposure: Never    Smokeless tobacco: Never   Substance Use Topics    Alcohol use: No       Allergies:     Latex and Penicillin g      This patient is managed via a collaborative agreement, pursuant to IC 25-26-16. The signed protocol is kept in the [facility] at [address] [place] [address].    Education: Selina Vazquez has been instructed to call if any changes in medications, doses, concerns, etc. Patient was provided dosing instructions and expressed verbal understanding and has no further questions at this time.    Plan:  1. decrease by 6 %; warfarin 3 mg PO daily NEW Total weekly dose = 21 mg.   2. INR should be tested weekly and called into clinic by [facility].       Yeny Lagunas, PharmD  11/7/2024  13:25 EST

## 2024-11-14 ENCOUNTER — ANTICOAGULATION VISIT (OUTPATIENT)
Dept: PHARMACY | Facility: HOSPITAL | Age: 79
End: 2024-11-14
Payer: MEDICARE

## 2024-11-14 DIAGNOSIS — I26.99 PULMONARY EMBOLISM, UNSPECIFIED CHRONICITY, UNSPECIFIED PULMONARY EMBOLISM TYPE, UNSPECIFIED WHETHER ACUTE COR PULMONALE PRESENT: Primary | ICD-10-CM

## 2024-11-14 LAB — INR PPP: 2.1 (ref 2–3)

## 2024-11-14 NOTE — PROGRESS NOTES
Anticoagulation Clinic Progress Note - Home Health INR Testing     INR Goal: 2 - 3  Today's INR: 2.1 (therapeutic). INR result was called in today by Edyta ([facility] nurse).   Current warfarin dose: warfarin 3 mg PO daily. Current total weekly dose = 21 mg per week.     INR History:      Latest Ref Rng & Units 9/3/2024     2:04 PM 10/31/2024    12:00 AM 10/31/2024     2:22 PM 2024    12:00 AM 2024     1:24 PM 2024    12:00 AM 2024    10:28 AM   Anticoagulation Monitoring   INR  2.10  1.40  3.20  2.10   INR Date  9/3/2024  10/31/2024  2024  2024   INR Goal  2.0-3.0  2.0-3.0  2.0-3.0  2.0-3.0   Trend  Same  Down  Down  Up   Last Week Total  25 mg  21 mg  22.5 mg  21 mg   Next Week Total  25 mg  24 mg  21 mg  22.5 mg   Sun  2.5 mg  4.5 mg  3 mg  3 mg   Mon  5 mg  3 mg  3 mg  3 mg   Tue  2.5 mg  3 mg  3 mg  3 mg   Wed  5 mg  3 mg  3 mg  3 mg   Thu  2.5 mg  4.5 mg  3 mg  3 mg   Fri  5 mg  3 mg  3 mg  3 mg   Sat  2.5 mg  3 mg  3 mg  4.5 mg   Historical INR 2.00 - 3.00  1.40      3.20      2.10            This result is from an external source.       Anticoagulation Summary  As of 2024      INR goal:  2.0-3.0   TTR:  35.9% (1 y)   INR used for dosin.10 (2024)   Warfarin maintenance plan:  4.5 mg every Sat; 3 mg all other days   Weekly warfarin total:  22.5 mg   Plan last modified:  Roselyn Hill, PharmD (2024)   Next INR check:  2024   Priority:  High   Target end date:  --    Indications    Pulmonary embolism [I26.99]                 Anticoagulation Episode Summary       INR check location:  --    Preferred lab:  --    Send INR reminders to:  [facility] CLINIC CLINICAL POOL    Comments:  Bushra -   In-home caregiver, Cassy: [phone]  Sister, Concha: [phone]  Potential new home phone number: [phone]          Anticoagulation Care Providers       Provider Role Specialty Phone number    Bria Matthews MD Referring Family Medicine  [phone]            Clinic Interview:   Patient Findings     Negatives:  Signs/symptoms of thrombosis, Signs/symptoms of bleeding,   Laboratory test error suspected, Change in health, Change in alcohol use,   Change in activity, Upcoming invasive procedure, Emergency department   visit, Upcoming dental procedure, Missed doses, Extra doses, Change in   medications, Change in diet/appetite, Hospital admission, Bruising, Other   complaints      Clinical Outcomes     Negatives:  Major bleeding event, Thromboembolic event,   Anticoagulation-related hospital admission, Anticoagulation-related ED   visit, Anticoagulation-related fatality        Current Medications:   Prior to Admission medications    Medication Sig Start Date End Date Taking? Authorizing Provider   albuterol sulfate  (90 Base) MCG/ACT inhaler Inhale 2 puffs Every 4 (Four) Hours As Needed for Wheezing or Shortness of Air. Indications: Chronic Obstructive Lung Disease    ProviderDiogenes MD   aspirin 81 MG EC tablet Take 1 tablet by mouth Daily. Indications: Venous Thromboembolism    ProviderDiogenes MD   atorvastatin (LIPITOR) 80 MG tablet Take 0.5 tablets by mouth Daily.    ProviderDiogenes MD   budesonide (PULMICORT) 0.5 MG/2ML nebulizer solution Take 2 mL by nebulization 2 (Two) Times a Day. 6/21/24   Tracy Terry MD   dilTIAZem CD (CARDIZEM CD) 180 MG 24 hr capsule Take 1 capsule by mouth Daily. 6/21/24   Tracy Terry MD   Fluticasone Furoate-Vilanterol (Breo Ellipta) 100-25 MCG/ACT aerosol powder  Inhale 1 puff Daily.    ProviderDiogenes MD   furosemide (LASIX) 40 MG tablet Take 1 tablet by mouth Daily. 7/7/24   Darrion Burch MD   guaiFENesin (MUCINEX) 600 MG 12 hr tablet Take 2 tablets by mouth 2 (Two) Times a Day.    ProviderDiogenes MD   ipratropium-albuterol (DUO-NEB) 0.5-2.5 mg/3 ml nebulizer Take 3 mL by nebulization 4 (Four) Times a Day. Indications: Chronic Obstructive Lung Disease 10/30/23    Bria Matthews MD   levothyroxine (Synthroid) 112 MCG tablet Take 1 tablet by mouth Daily. 24   Bria Matthews MD   O2 (OXYGEN) Inhale 2 L/min Continuous. Indications: COPD exacerbation, uncomplicated 10/6/23   Arlyn Holland APRN   potassium chloride (KLOR-CON M20) 20 MEQ CR tablet Take 1 tablet by mouth Daily. 24   Darrion Burch MD   senna 8.6 MG tablet Take 2 tablets by mouth Every Evening. Indications: Constipation 24   Bria Matthews MD   spironolactone (ALDACTONE) 25 MG tablet TAKE 1 TABLET BY MOUTH DAILY FOR CONGESTIVE HEART FAILURE 24   Bria Matthews MD   warfarin (COUMADIN) 2.5 MG tablet Take 1 tablet by mouth See Admin Instructions. Take one tablet (2.5mg) 5 days weekly and two tablets (5mg) twice weekly. 24   Bria Matthews MD   albuterol (ACCUNEB) 1.25 MG/3ML nebulizer solution Inhale. Indications: Spasm of Lung Air Passages 23   ProviderDiogenes MD   Cholecalciferol 50 MCG (2000 UT) tablet Take 1 tablet by mouth. Indications: Vitamin D Deficiency 23   ProviderDiogenes MD       Medical history:   Past Medical History:   Diagnosis Date    Adenomatous polyp of colon     Allergic rhinitis     Asthma     Cervical disc disease     Depression     Fracture of thoracic spine 2011    Lacunar infarction 2019    Mitral valve disorder     Postmenopausal osteoporosis     Restless leg syndrome        Social history:   Social History     Tobacco Use    Smoking status: Former     Current packs/day: 0.00     Average packs/day: 1 pack/day for 36.0 years (36.0 ttl pk-yrs)     Types: Cigarettes     Start date:      Quit date:      Years since quittin.8     Passive exposure: Never    Smokeless tobacco: Never   Substance Use Topics    Alcohol use: No       Allergies:    Latex and Penicillin g      This patient is managed via a collaborative agreement, pursuant to  25-26-16. The signed protocol is kept in the  [facility] at [address] [place] [address].    Education: Selina Vazquez has been instructed to call if any changes in medications, doses, concerns, etc. Patient was provided dosing instructions and expressed verbal understanding and has no further questions at this time.    Plan:  1. increase by 7 % given large decrease in INR (3.2 -> 2.1 in one week); warfarin 3 mg PO daily, except warfarin 4.5 mg PO on Saturday.  New total weekly dose = 22.5 mg.   2. INR should be tested in 1 week by home health and called into clinic.     Roselyn Hill, PharmD  11/14/2024  10:29 EST

## 2024-11-20 ENCOUNTER — ANTICOAGULATION VISIT (OUTPATIENT)
Dept: PHARMACY | Facility: HOSPITAL | Age: 79
End: 2024-11-20
Payer: MEDICARE

## 2024-11-20 LAB — INR PPP: 2.4

## 2024-11-20 RX ORDER — WARFARIN SODIUM 3 MG/1
TABLET ORAL
Qty: 97 TABLET | Refills: 1 | Status: SHIPPED | OUTPATIENT
Start: 2024-11-20

## 2024-11-20 NOTE — PROGRESS NOTES
Anticoagulation Clinic Progress Note - Home Health INR Testing     INR Goal: 2 - 3  Today's INR: 2.4 (therapeutic). [facility]   Current warfarin dose: warfarin 3 mg PO daily, except warfarin 4.5 mg PO on Saturday.  Current total weekly dose = 22.5 mg per week.     Patient started taking Mucinex as needed for cough but this should not affect INR.    INR History:      10/31/2024     2:22 PM 2024    12:00 AM 2024     1:24 PM 2024    12:00 AM 2024    10:28 AM 2024    12:00 AM 2024    11:23 AM   Anticoagulation Monitoring   INR 1.40  3.20  2.10  2.40   INR Date 10/31/2024  2024  2024  2024   INR Goal 2.0-3.0  2.0-3.0  2.0-3.0  2.0-3.0   Trend Down  Down  Up  Same   Last Week Total 21 mg  22.5 mg  21 mg  22.5 mg   Next Week Total 24 mg  21 mg  22.5 mg  22.5 mg   Sun 4.5 mg  3 mg  3 mg  3 mg   Mon 3 mg  3 mg  3 mg  3 mg   Tue 3 mg  3 mg  3 mg  3 mg   Wed 3 mg  3 mg  3 mg  3 mg   Thu 4.5 mg  3 mg  3 mg  3 mg   Fri 3 mg  3 mg  3 mg  3 mg   Sat 3 mg  3 mg  4.5 mg  4.5 mg   Historical INR  3.20      2.10      2.40            This result is from an external source.       Anticoagulation Summary  As of 2024      INR goal:  2.0-3.0   TTR:  37.0% (1 y)   INR used for dosin.40 (2024)   Warfarin maintenance plan:  4.5 mg every Sat; 3 mg all other days   Weekly warfarin total:  22.5 mg   No change documented:  Phoebe Nelson, Pharmacy Intern   Plan last modified:  Roselyn Hill, PharmD (2024)   Next INR check:  2024   Priority:  High   Target end date:  --    Indications    Pulmonary embolism [I26.99]                 Anticoagulation Episode Summary       INR check location:  --    Preferred lab:  --    Send INR reminders to:  [facility] CLINICAL POOL    Comments:  FYNorman -   In-home caregiverCassy: [phone]  SisterConcha: [phone]  Potential new home phone number: [phone]           Anticoagulation Care Providers       Provider Role Specialty Phone number    Bria Matthews MD Referring Family Medicine [phone]            Clinic Interview:   Patient Findings     Positives:  Change in medications (Patient started taking mucinex as   needed for cough (should not affect INR))    Negatives:  Signs/symptoms of thrombosis, Signs/symptoms of bleeding,   Laboratory test error suspected, Change in health, Change in alcohol use,   Change in activity, Upcoming invasive procedure, Emergency department   visit, Upcoming dental procedure, Missed doses, Extra doses, Change in   diet/appetite, Hospital admission, Bruising, Other complaints      Clinical Outcomes     Negatives:  Major bleeding event, Thromboembolic event,   Anticoagulation-related hospital admission, Anticoagulation-related ED   visit, Anticoagulation-related fatality        Current Medications:   Prior to Admission medications    Medication Sig Start Date End Date Taking? Authorizing Provider   albuterol sulfate  (90 Base) MCG/ACT inhaler Inhale 2 puffs Every 4 (Four) Hours As Needed for Wheezing or Shortness of Air. Indications: Chronic Obstructive Lung Disease    ProviderDiogenes MD   aspirin 81 MG EC tablet Take 1 tablet by mouth Daily. Indications: Venous Thromboembolism    ProviderDiogenes MD   atorvastatin (LIPITOR) 80 MG tablet Take 0.5 tablets by mouth Daily.    Provider, MD Diogenes   budesonide (PULMICORT) 0.5 MG/2ML nebulizer solution Take 2 mL by nebulization 2 (Two) Times a Day. 6/21/24   Tracy Terry MD   dilTIAZem CD (CARDIZEM CD) 180 MG 24 hr capsule Take 1 capsule by mouth Daily. 6/21/24   Tracy Terry MD   Fluticasone Furoate-Vilanterol (Breo Ellipta) 100-25 MCG/ACT aerosol powder  Inhale 1 puff Daily.    ProviderDiogenes MD   furosemide (LASIX) 40 MG tablet Take 1 tablet by mouth Daily. 7/7/24   Darrion Burch MD   guaiFENesin (MUCINEX) 600 MG 12 hr tablet Take 2 tablets  by mouth 2 (Two) Times a Day.    Diogenes Olguin MD   ipratropium-albuterol (DUO-NEB) 0.5-2.5 mg/3 ml nebulizer Take 3 mL by nebulization 4 (Four) Times a Day. Indications: Chronic Obstructive Lung Disease 10/30/23   Bria Matthews MD   levothyroxine (Synthroid) 112 MCG tablet Take 1 tablet by mouth Daily. 24   Bria Matthews MD   O2 (OXYGEN) Inhale 2 L/min Continuous. Indications: COPD exacerbation, uncomplicated 10/6/23   Arlyn Holland APRN   potassium chloride (KLOR-CON M20) 20 MEQ CR tablet Take 1 tablet by mouth Daily. 24   Darrion Burch MD   senna 8.6 MG tablet Take 2 tablets by mouth Every Evening. Indications: Constipation 24   Bria Matthews MD   spironolactone (ALDACTONE) 25 MG tablet TAKE 1 TABLET BY MOUTH DAILY FOR CONGESTIVE HEART FAILURE 24   Bria Matthews MD   warfarin (COUMADIN) 2.5 MG tablet Take 1 tablet by mouth See Admin Instructions. Take one tablet (2.5mg) 5 days weekly and two tablets (5mg) twice weekly. 24   Bria Matthews MD   albuterol (ACCUNEB) 1.25 MG/3ML nebulizer solution Inhale. Indications: Spasm of Lung Air Passages 23   Diogenes Olguin MD   Cholecalciferol 50 MCG (2000 UT) tablet Take 1 tablet by mouth. Indications: Vitamin D Deficiency 23   Diogenes Olguin MD       Medical history:   Past Medical History:   Diagnosis Date    Adenomatous polyp of colon     Allergic rhinitis     Asthma     Cervical disc disease     Depression     Fracture of thoracic spine 2011    Lacunar infarction 2019    Mitral valve disorder     Postmenopausal osteoporosis     Restless leg syndrome        Social history:   Social History     Tobacco Use    Smoking status: Former     Current packs/day: 0.00     Average packs/day: 1 pack/day for 36.0 years (36.0 ttl pk-yrs)     Types: Cigarettes     Start date:      Quit date:      Years since quittin.9     Passive exposure: Never    Smokeless  tobacco: Never   Substance Use Topics    Alcohol use: No       Allergies:    Latex and Penicillin g        This patient is managed via a collaborative agreement, pursuant to IC [id number]. The signed protocol is kept in the [facility] at [address] [place] [address].    Education: Selina Vazquez has been instructed to call if any changes in medications, doses, concerns, etc. Patient was provided dosing instructions and expressed verbal understanding and has no further questions at this time.    Plan:  1. no change; warfarin 3 mg PO daily, except warfarin 4.5 mg PO on Saturday.   Total weekly dose = 22.5 mg.   2. INR should be tested one time each week and called into clinic.       Phoebe Nelson, Pharmacy Intern  11/20/2024  11:29 EST

## 2024-11-20 NOTE — PROGRESS NOTES
I have reviewed the notes, assessments, and/or procedures performed by Phoebe Nelson, PharmD Candidate, I concur with her documentation of    Selina Vazquez.

## 2024-11-27 ENCOUNTER — ANTICOAGULATION VISIT (OUTPATIENT)
Dept: PHARMACY | Facility: HOSPITAL | Age: 79
End: 2024-11-27
Payer: MEDICARE

## 2024-11-27 DIAGNOSIS — I26.99 PULMONARY EMBOLISM, UNSPECIFIED CHRONICITY, UNSPECIFIED PULMONARY EMBOLISM TYPE, UNSPECIFIED WHETHER ACUTE COR PULMONALE PRESENT: Primary | ICD-10-CM

## 2024-11-27 LAB
INR PPP: 1.4 (ref 2–3)
INR PPP: 2.1 (ref 2–3)

## 2024-11-27 NOTE — PROGRESS NOTES
Update - spoke with JUAN Goncalves at [facility] and asked her to write on discharge instructions for patient not to take warfarin until she is seen by [facility] on Friday, 11/29.  nurse will give her further instructions at that time (orders as previously noted in this encounter.    Roselyn Hill, AubreeD, BCPS, BCACP  11/27/24 12:46 EST

## 2024-11-27 NOTE — PROGRESS NOTES
Medication Management Clinic: [facility]  Clinical Outreach     Selina Vazquez is a 79 y.o. female and is a patient of the [facility] at [facility] for anticoagulation monitoring.     Contacted [facility] to check on patient as no INR had been called to clinic yet. Per Laura at [facility], patient was admitted to [facility] on  for COPD exacerbation and is likely going to be discharged home today. Medication Management Clinic closed  and  for holiday.     Contacted [facility] and spoke with patient's nurse, Ivanna, who provided the following INRs and dosin/25: INR = 1.4; received warfarin 5 mg dose in the evening  : received warfarin 5 mg PO BID (dose in morning and dose in evening)  : INR = 2.1; received warfarin 5 mg dose in the morning    Per Ivanna, patient is going to be discharged home today. Discharge summary listed both warfarin 3 mg PO daily and warfarin 5 mg PO daily. Also listed new medications of methylprednisolone, cephalexin, and doxycycline. JUAN Goncalves going to contact hospitalist to clarify duplicate warfarin dosing and clarify antimicrobial therapy.    Contacted Laura again at [facility] and gave instructions verbally as well as sent written orders via email to Laura. Instructions/orders are as follows:    : Home health nurse visit but no INR as nurse will not have anywhere to call result to and get dosing instructions. Patient should be assessed for abnormal bleeding or bruising as well as educated on what to monitor for and when to seek medical attention. Patient should be instructed to hold dose of warfarin on  then take warfarin 3 mg PO daily  and .   : Home health nurse visit with INR check called to [facility] where further instructions will be given at that time.    Roselyn Hill, AubreeD  Ambulatory Care Clinical Pharmacist  2024  12:21 EST

## 2024-11-29 ENCOUNTER — APPOINTMENT (OUTPATIENT)
Dept: GENERAL RADIOLOGY | Facility: HOSPITAL | Age: 79
End: 2024-11-29
Payer: MEDICARE

## 2024-11-29 ENCOUNTER — HOSPITAL ENCOUNTER (INPATIENT)
Facility: HOSPITAL | Age: 79
LOS: 4 days | Discharge: NURSING, REHAB OR LONG-TERM CARE | End: 2024-12-04
Attending: EMERGENCY MEDICINE | Admitting: STUDENT IN AN ORGANIZED HEALTH CARE EDUCATION/TRAINING PROGRAM
Payer: MEDICARE

## 2024-11-29 DIAGNOSIS — J18.9 MULTIFOCAL PNEUMONIA: Primary | ICD-10-CM

## 2024-11-29 DIAGNOSIS — J45.901 ACUTE SEVERE EXACERBATION OF ASTHMA: ICD-10-CM

## 2024-11-29 DIAGNOSIS — R09.02 HYPOXEMIA: ICD-10-CM

## 2024-11-29 LAB
ALBUMIN SERPL-MCNC: 3.9 G/DL (ref 3.5–5.2)
ALBUMIN/GLOB SERPL: 1.6 G/DL
ALP SERPL-CCNC: 81 U/L (ref 39–117)
ALT SERPL W P-5'-P-CCNC: 47 U/L (ref 1–33)
ANION GAP SERPL CALCULATED.3IONS-SCNC: 9.9 MMOL/L (ref 5–15)
AST SERPL-CCNC: 43 U/L (ref 1–32)
B PARAPERT DNA SPEC QL NAA+PROBE: NOT DETECTED
B PERT DNA SPEC QL NAA+PROBE: NOT DETECTED
BASOPHILS # BLD AUTO: 0.06 10*3/MM3 (ref 0–0.2)
BASOPHILS NFR BLD AUTO: 0.4 % (ref 0–1.5)
BILIRUB SERPL-MCNC: 0.3 MG/DL (ref 0–1.2)
BILIRUB UR QL STRIP: NEGATIVE
BUN SERPL-MCNC: 29 MG/DL (ref 8–23)
BUN/CREAT SERPL: 25 (ref 7–25)
C PNEUM DNA NPH QL NAA+NON-PROBE: NOT DETECTED
CALCIUM SPEC-SCNC: 9.4 MG/DL (ref 8.6–10.5)
CHLORIDE SERPL-SCNC: 103 MMOL/L (ref 98–107)
CLARITY UR: CLEAR
CO2 SERPL-SCNC: 28.1 MMOL/L (ref 22–29)
COLOR UR: YELLOW
CREAT SERPL-MCNC: 1.16 MG/DL (ref 0.57–1)
D-LACTATE SERPL-SCNC: 1.1 MMOL/L (ref 0.3–2)
DEPRECATED RDW RBC AUTO: 45.5 FL (ref 37–54)
EGFRCR SERPLBLD CKD-EPI 2021: 48.1 ML/MIN/1.73
EOSINOPHIL # BLD AUTO: 0.16 10*3/MM3 (ref 0–0.4)
EOSINOPHIL NFR BLD AUTO: 1.1 % (ref 0.3–6.2)
ERYTHROCYTE [DISTWIDTH] IN BLOOD BY AUTOMATED COUNT: 13.7 % (ref 12.3–15.4)
FLUAV SUBTYP SPEC NAA+PROBE: NOT DETECTED
FLUBV RNA ISLT QL NAA+PROBE: NOT DETECTED
GLOBULIN UR ELPH-MCNC: 2.4 GM/DL
GLUCOSE SERPL-MCNC: 159 MG/DL (ref 65–99)
GLUCOSE UR STRIP-MCNC: NEGATIVE MG/DL
HADV DNA SPEC NAA+PROBE: NOT DETECTED
HCOV 229E RNA SPEC QL NAA+PROBE: NOT DETECTED
HCOV HKU1 RNA SPEC QL NAA+PROBE: NOT DETECTED
HCOV NL63 RNA SPEC QL NAA+PROBE: NOT DETECTED
HCOV OC43 RNA SPEC QL NAA+PROBE: NOT DETECTED
HCT VFR BLD AUTO: 43.4 % (ref 34–46.6)
HGB BLD-MCNC: 13.5 G/DL (ref 12–15.9)
HGB UR QL STRIP.AUTO: NEGATIVE
HMPV RNA NPH QL NAA+NON-PROBE: NOT DETECTED
HPIV1 RNA ISLT QL NAA+PROBE: NOT DETECTED
HPIV2 RNA SPEC QL NAA+PROBE: NOT DETECTED
HPIV3 RNA NPH QL NAA+PROBE: NOT DETECTED
HPIV4 P GENE NPH QL NAA+PROBE: NOT DETECTED
IMM GRANULOCYTES # BLD AUTO: 0.14 10*3/MM3 (ref 0–0.05)
IMM GRANULOCYTES NFR BLD AUTO: 1 % (ref 0–0.5)
INR PPP: 2.15 (ref 2–3)
KETONES UR QL STRIP: NEGATIVE
LEUKOCYTE ESTERASE UR QL STRIP.AUTO: NEGATIVE
LYMPHOCYTES # BLD AUTO: 1.93 10*3/MM3 (ref 0.7–3.1)
LYMPHOCYTES NFR BLD AUTO: 13.2 % (ref 19.6–45.3)
M PNEUMO IGG SER IA-ACNC: NOT DETECTED
MCH RBC QN AUTO: 28.1 PG (ref 26.6–33)
MCHC RBC AUTO-ENTMCNC: 31.1 G/DL (ref 31.5–35.7)
MCV RBC AUTO: 90.4 FL (ref 79–97)
MONOCYTES # BLD AUTO: 1.25 10*3/MM3 (ref 0.1–0.9)
MONOCYTES NFR BLD AUTO: 8.6 % (ref 5–12)
NEUTROPHILS NFR BLD AUTO: 11.06 10*3/MM3 (ref 1.7–7)
NEUTROPHILS NFR BLD AUTO: 75.7 % (ref 42.7–76)
NITRITE UR QL STRIP: NEGATIVE
NRBC BLD AUTO-RTO: 0 /100 WBC (ref 0–0.2)
NT-PROBNP SERPL-MCNC: 115 PG/ML (ref 0–1800)
PH UR STRIP.AUTO: 5.5 [PH] (ref 5–8)
PLATELET # BLD AUTO: 264 10*3/MM3 (ref 140–450)
PMV BLD AUTO: 9.8 FL (ref 6–12)
POTASSIUM SERPL-SCNC: 4.5 MMOL/L (ref 3.5–5.2)
PROT SERPL-MCNC: 6.3 G/DL (ref 6–8.5)
PROT UR QL STRIP: NEGATIVE
PROTHROMBIN TIME: 24.1 SECONDS (ref 19.4–28.5)
QT INTERVAL: 364 MS
QTC INTERVAL: 433 MS
RBC # BLD AUTO: 4.8 10*6/MM3 (ref 3.77–5.28)
RHINOVIRUS RNA SPEC NAA+PROBE: NOT DETECTED
RSV RNA NPH QL NAA+NON-PROBE: NOT DETECTED
SARS-COV-2 RNA NPH QL NAA+NON-PROBE: NOT DETECTED
SODIUM SERPL-SCNC: 141 MMOL/L (ref 136–145)
SP GR UR STRIP: 1.02 (ref 1–1.03)
TROPONIN T SERPL HS-MCNC: 24 NG/L
UROBILINOGEN UR QL STRIP: NORMAL
WBC NRBC COR # BLD AUTO: 14.6 10*3/MM3 (ref 3.4–10.8)

## 2024-11-29 PROCEDURE — 84484 ASSAY OF TROPONIN QUANT: CPT | Performed by: EMERGENCY MEDICINE

## 2024-11-29 PROCEDURE — 71045 X-RAY EXAM CHEST 1 VIEW: CPT

## 2024-11-29 PROCEDURE — 85610 PROTHROMBIN TIME: CPT | Performed by: EMERGENCY MEDICINE

## 2024-11-29 PROCEDURE — 25010000002 CEFTRIAXONE PER 250 MG: Performed by: EMERGENCY MEDICINE

## 2024-11-29 PROCEDURE — 81003 URINALYSIS AUTO W/O SCOPE: CPT | Performed by: EMERGENCY MEDICINE

## 2024-11-29 PROCEDURE — G0378 HOSPITAL OBSERVATION PER HR: HCPCS

## 2024-11-29 PROCEDURE — 83880 ASSAY OF NATRIURETIC PEPTIDE: CPT | Performed by: EMERGENCY MEDICINE

## 2024-11-29 PROCEDURE — 94640 AIRWAY INHALATION TREATMENT: CPT

## 2024-11-29 PROCEDURE — 25010000002 METHYLPREDNISOLONE PER 125 MG: Performed by: EMERGENCY MEDICINE

## 2024-11-29 PROCEDURE — 94799 UNLISTED PULMONARY SVC/PX: CPT

## 2024-11-29 PROCEDURE — 99285 EMERGENCY DEPT VISIT HI MDM: CPT

## 2024-11-29 PROCEDURE — 93005 ELECTROCARDIOGRAM TRACING: CPT | Performed by: EMERGENCY MEDICINE

## 2024-11-29 PROCEDURE — 83605 ASSAY OF LACTIC ACID: CPT

## 2024-11-29 PROCEDURE — 85025 COMPLETE CBC W/AUTO DIFF WBC: CPT | Performed by: EMERGENCY MEDICINE

## 2024-11-29 PROCEDURE — 94761 N-INVAS EAR/PLS OXIMETRY MLT: CPT

## 2024-11-29 PROCEDURE — 36415 COLL VENOUS BLD VENIPUNCTURE: CPT

## 2024-11-29 PROCEDURE — 0202U NFCT DS 22 TRGT SARS-COV-2: CPT | Performed by: EMERGENCY MEDICINE

## 2024-11-29 PROCEDURE — 80053 COMPREHEN METABOLIC PANEL: CPT | Performed by: EMERGENCY MEDICINE

## 2024-11-29 PROCEDURE — 87040 BLOOD CULTURE FOR BACTERIA: CPT | Performed by: EMERGENCY MEDICINE

## 2024-11-29 PROCEDURE — 94664 DEMO&/EVAL PT USE INHALER: CPT

## 2024-11-29 RX ORDER — CODEINE PHOSPHATE AND GUAIFENESIN 10; 100 MG/5ML; MG/5ML
10 SOLUTION ORAL EVERY 6 HOURS PRN
Status: DISCONTINUED | OUTPATIENT
Start: 2024-11-29 | End: 2024-12-04 | Stop reason: HOSPADM

## 2024-11-29 RX ORDER — IPRATROPIUM BROMIDE AND ALBUTEROL SULFATE 2.5; .5 MG/3ML; MG/3ML
3 SOLUTION RESPIRATORY (INHALATION) ONCE
Status: COMPLETED | OUTPATIENT
Start: 2024-11-29 | End: 2024-11-29

## 2024-11-29 RX ORDER — BENZONATATE 100 MG/1
200 CAPSULE ORAL EVERY 4 HOURS PRN
Status: DISCONTINUED | OUTPATIENT
Start: 2024-11-29 | End: 2024-11-29

## 2024-11-29 RX ORDER — IPRATROPIUM BROMIDE AND ALBUTEROL SULFATE 2.5; .5 MG/3ML; MG/3ML
3 SOLUTION RESPIRATORY (INHALATION)
Status: DISCONTINUED | OUTPATIENT
Start: 2024-11-29 | End: 2024-12-04 | Stop reason: HOSPADM

## 2024-11-29 RX ORDER — METHYLPREDNISOLONE SODIUM SUCCINATE 125 MG/2ML
80 INJECTION, POWDER, LYOPHILIZED, FOR SOLUTION INTRAMUSCULAR; INTRAVENOUS ONCE
Status: COMPLETED | OUTPATIENT
Start: 2024-11-29 | End: 2024-11-29

## 2024-11-29 RX ORDER — ALBUTEROL SULFATE 0.83 MG/ML
2.5 SOLUTION RESPIRATORY (INHALATION) ONCE
Status: COMPLETED | OUTPATIENT
Start: 2024-11-29 | End: 2024-11-29

## 2024-11-29 RX ADMIN — BENZONATATE 200 MG: 100 CAPSULE ORAL at 17:11

## 2024-11-29 RX ADMIN — METHYLPREDNISOLONE SODIUM SUCCINATE 80 MG: 125 INJECTION, POWDER, FOR SOLUTION INTRAMUSCULAR; INTRAVENOUS at 12:29

## 2024-11-29 RX ADMIN — CEFTRIAXONE 2000 MG: 2 INJECTION, POWDER, FOR SOLUTION INTRAMUSCULAR; INTRAVENOUS at 14:39

## 2024-11-29 RX ADMIN — ALBUTEROL SULFATE 2.5 MG: 2.5 SOLUTION RESPIRATORY (INHALATION) at 17:15

## 2024-11-29 RX ADMIN — GUAIFENESIN AND CODEINE PHOSPHATE 10 ML: 100; 10 SOLUTION ORAL at 22:14

## 2024-11-29 RX ADMIN — IPRATROPIUM BROMIDE AND ALBUTEROL SULFATE 3 ML: .5; 3 SOLUTION RESPIRATORY (INHALATION) at 19:58

## 2024-11-29 RX ADMIN — IPRATROPIUM BROMIDE AND ALBUTEROL SULFATE 3 ML: .5; 3 SOLUTION RESPIRATORY (INHALATION) at 12:34

## 2024-11-29 NOTE — CASE MANAGEMENT/SOCIAL WORK
Discharge Planning Assessment   Javan     Patient Name: Selina Vazquez  MRN: [id number]  Today's Date: 11/29/2024    Admit Date: 11/29/2024    Plan: Return Home   Discharge Needs Assessment       Row Name 11/29/24 1545       Living Environment    People in Home child(xiomy), dependent    Name(s) of People in Home SonGeoffrey    Current Living Arrangements home    Potentially Unsafe Housing Conditions none    In the past 12 months has the electric, gas, oil, or water company threatened to shut off services in your home? No    Primary Care Provided by self    Provides Primary Care For no one    Family Caregiver if Needed child(xiomy), adult    Quality of Family Relationships helpful    Able to Return to Prior Arrangements yes       Resource/Environmental Concerns    Resource/Environmental Concerns none       Transportation Needs    In the past 12 months, has lack of transportation kept you from medical appointments or from getting medications? no    In the past 12 months, has lack of transportation kept you from meetings, work, or from getting things needed for daily living? No       Food Insecurity    Within the past 12 months, you worried that your food would run out before you got the money to buy more. Never true    Within the past 12 months, the food you bought just didn't last and you didn't have money to get more. Never true       Transition Planning    Patient/Family Anticipates Transition to home with family    Patient/Family Anticipated Services at Transition none    Transportation Anticipated family or friend will provide  son       Discharge Needs Assessment    Readmission Within the Last 30 Days no previous admission in last 30 days    Equipment Currently Used at Home oxygen  Can not remember dme company    Concerns to be Addressed discharge planning    Do you want help finding or keeping work or a job? I do not need or want help    Do you want help with school or training? For example, starting or  completing job training or getting a high school diploma, GED or equivalent No                   Discharge Plan       Row Name 11/29/24 1547       Plan    Plan Return Home    Plan Comments Spoke with patient at bedside, lives in home with son Carolyne. Confirmed PCP and Pharmacy.Denies transportation or medication coverage issues. DC Barrier: IVAB's, IV steroids                  Continued Care and Services - Admitted Since 11/29/2024    No active coordination exists for this encounter.       Selected Continued Care - Episodes Includes continued care and service providers with selected services from the active episodes listed below      High Risk Care Management Episode start date: 5/24/2024   There are no active outsourced providers for this episode.                 Expected Discharge Date and Time       Expected Discharge Date Expected Discharge Time    Nov 30, 2024            Demographic Summary       Row Name 11/29/24 1544       General Information    Admission Type observation    Arrived From home    Referral Source patient    Reason for Consult discharge planning    Preferred Language English                   Functional Status       Row Name 11/29/24 1545       Functional Status    Usual Activity Tolerance moderate    Current Activity Tolerance moderate       Functional Status, IADL    Medications independent    Meal Preparation independent    Laundry assistive equipment    Shopping assistive equipment    If for any reason you need help with day-to-day activities such as bathing, preparing meals, shopping, managing finances, etc., do you get the help you need? I don't need any help       Mental Status    General Appearance WDL WDL           Met with patient in room wearing PPE: mask, face shield/goggles, gloves, gown.      Maintained distance greater than six feet and spent less than 15 minutes in the room.     Trinity Reid RN

## 2024-11-29 NOTE — H&P
[facility] Medicine Services  History & Physical    Patient Name: Selina Vazquez  : 1945  MRN: [id number]  Primary Care Physician:  Bria Matthews MD  Date of admission: 2024  Date and Time of Service: 2024 at 1645    Subjective      Chief Complaint: Shortness of breath    History of Present Illness: Selina Vazquez is a 79 y.o. female with a CMH of atrial fibrillation, COPD, hypertension, hyperlipidemia,mitral valve disorder, pulmonary hypertension, who presented to [facility] on 2024 with complaints of increased shortness of breath and productive cough for the last week.  Patient chronically wears 2 L of O2 at home for her COPD and quit smoking in .  She states that she is often short of breath even after using her DuoNebs at home.  She denies fevers, fatigue, chills.  She denies chest pain, headache, dizziness, leg swelling.  She is allergic to penicillin but does not remember what happens when she takes it.    ED course: Respiratory panel negative, chest x-ray showed streaky bibasilar airspace opacities representing atelectasis or mild pneumonia.  WBC elevated at 14.6.  Patient to be admitted to hospitalist services for IV antibiotics to treat multifocal pneumonia.      Review of Systems   Constitutional:  Negative for chills, fatigue and fever.   Respiratory:  Positive for cough, shortness of breath and wheezing. Negative for chest tightness.    Cardiovascular:  Negative for chest pain, palpitations and leg swelling.   Neurological:  Negative for dizziness and headaches.       Personal History     Past Medical History:   Diagnosis Date    Adenomatous polyp of colon     Allergic rhinitis     Asthma     Cervical disc disease     Depression     Fracture of thoracic spine 2011    Lacunar infarction 2019    Mitral valve disorder     Postmenopausal osteoporosis     Restless leg syndrome        Past Surgical History:   Procedure Laterality Date     APPENDECTOMY OPEN      BRONCHOSCOPY N/A 06/08/2023    Procedure: BRONCHOSCOPY WITH BRONCHIAL WASHING;  Surgeon: Brenda Zuniga MD;  Location: [facility] ENDOSCOPY;  Service: Pulmonary;  Laterality: N/A;  Post: PNEUMONIA    BRONCHOSCOPY N/A 6/19/2024    Procedure: BRONCHOSCOPY WITH RIGHT LOWER LOBE BRONCHOALVEOLAR LAVAGE;  Surgeon: Brenda Zuniga MD;  Location: [facility] ENDOSCOPY;  Service: Pulmonary;  Laterality: N/A;    CARDIAC CATHETERIZATION      CHOLECYSTECTOMY      CORONARY STENT PLACEMENT      THYROIDECTOMY      TOTAL ABDOMINAL HYSTERECTOMY WITH SALPINGO OOPHORECTOMY         Family History: family history includes Diabetes in her mother; Pancreatic cancer in her brother and father. Otherwise pertinent FHx was reviewed and not pertinent to current issue.    Social History:  reports that she quit smoking about 27 years ago. Her smoking use included cigarettes. She started smoking about 63 years ago. She has a 36 pack-year smoking history. She has never been exposed to tobacco smoke. She has never used smokeless tobacco. She reports that she does not drink alcohol and does not use drugs.    Home Medications:  Prior to Admission Medications       Prescriptions Last Dose Informant Patient Reported? Taking?    albuterol sulfate  (90 Base) MCG/ACT inhaler   Yes No    Inhale 2 puffs Every 4 (Four) Hours As Needed for Wheezing or Shortness of Air. Indications: Chronic Obstructive Lung Disease    aspirin 81 MG EC tablet   Yes No    Take 1 tablet by mouth Daily. Indications: Venous Thromboembolism    atorvastatin (LIPITOR) 80 MG tablet   Yes No    Take 0.5 tablets by mouth Daily.    budesonide (PULMICORT) 0.5 MG/2ML nebulizer solution   No No    Take 2 mL by nebulization 2 (Two) Times a Day.    dilTIAZem CD (CARDIZEM CD) 180 MG 24 hr capsule   No No    Take 1 capsule by mouth Daily.    Fluticasone Furoate-Vilanterol (Breo Ellipta) 100-25 MCG/ACT aerosol powder    Yes No    Inhale 1 puff Daily.    furosemide (LASIX) 40  MG tablet   No No    Take 1 tablet by mouth Daily.    guaiFENesin (MUCINEX) 600 MG 12 hr tablet   Yes No    Take 2 tablets by mouth 2 (Two) Times a Day.    ipratropium-albuterol (DUO-NEB) 0.5-2.5 mg/3 ml nebulizer   No No    Take 3 mL by nebulization 4 (Four) Times a Day. Indications: Chronic Obstructive Lung Disease    levothyroxine (Synthroid) 112 MCG tablet   No No    Take 1 tablet by mouth Daily.    O2 (OXYGEN)   Yes No    Inhale 2 L/min Continuous. Indications: COPD exacerbation, uncomplicated    potassium chloride (KLOR-CON M20) 20 MEQ CR tablet   No No    Take 1 tablet by mouth Daily.    senna 8.6 MG tablet   No No    Take 2 tablets by mouth Every Evening. Indications: Constipation    spironolactone (ALDACTONE) 25 MG tablet   No No    TAKE 1 TABLET BY MOUTH DAILY FOR CONGESTIVE HEART FAILURE    warfarin (COUMADIN) 2.5 MG tablet   No No    Take 1 tablet by mouth See Admin Instructions. Take one tablet (2.5mg) 5 days weekly and two tablets (5mg) twice weekly.    warfarin (Coumadin) 3 MG tablet   No No    Take 3 mg (1 tablet) daily, except for 4.5 mg (1 and 1/2 tablet) on Saturday, or as directed by [facility].  Indications: history of DVT/PE              Allergies:  Allergies   Allergen Reactions    Latex Unknown - High Severity     Pt cannot remember at this time    Penicillin G Rash       Objective      Vitals:   Temp:  [98.2 °F (36.8 °C)] 98.2 °F (36.8 °C)  Heart Rate:  [81-88] 88  Resp:  [20-27] 24  BP: (137-156)/(68-84) 142/72  Body mass index is 41.01 kg/m².  Physical Exam  Constitutional:       General: She is not in acute distress.     Appearance: Normal appearance. She is not ill-appearing.   HENT:      Head: Normocephalic and atraumatic.      Nose: Nose normal.      Mouth/Throat:      Mouth: Mucous membranes are moist.   Eyes:      Extraocular Movements: Extraocular movements intact.      Conjunctiva/sclera: Conjunctivae normal.      Pupils: Pupils are equal, round, and reactive to  light.   Cardiovascular:      Rate and Rhythm: Normal rate and regular rhythm.      Pulses: Normal pulses.      Heart sounds: Normal heart sounds.   Pulmonary:      Effort: Pulmonary effort is normal.      Breath sounds: Decreased breath sounds present.   Abdominal:      General: Abdomen is flat. Bowel sounds are normal.      Palpations: Abdomen is soft.   Musculoskeletal:         General: Normal range of motion.      Cervical back: Normal range of motion.   Skin:     General: Skin is warm and dry.   Neurological:      General: No focal deficit present.      Mental Status: She is alert and oriented to person, place, and time. Mental status is at baseline.   Psychiatric:         Mood and Affect: Mood normal.         Behavior: Behavior normal.         Thought Content: Thought content normal.         Judgment: Judgment normal.         Diagnostic Data:  Lab Results (last 24 hours)       Procedure Component Value Units Date/Time    Respiratory Panel PCR w/COVID-19(SARS-CoV-2) RACQUEL/SHELLI/JAMIE/PAD/COR/GAL In-House, NP Swab in UTM/VTM, 2 HR TAT - Swab, Nasopharynx [[id number]]  (Normal) Collected: 11/29/24 1222    Specimen: Swab from Nasopharynx Updated: 11/29/24 1320     ADENOVIRUS, PCR Not Detected     Coronavirus 229E Not Detected     Coronavirus HKU1 Not Detected     Coronavirus NL63 Not Detected     Coronavirus OC43 Not Detected     COVID19 Not Detected     Human Metapneumovirus Not Detected     Human Rhinovirus/Enterovirus Not Detected     Influenza A PCR Not Detected     Influenza B PCR Not Detected     Parainfluenza Virus 1 Not Detected     Parainfluenza Virus 2 Not Detected     Parainfluenza Virus 3 Not Detected     Parainfluenza Virus 4 Not Detected     RSV, PCR Not Detected     Bordetella pertussis pcr Not Detected     Bordetella parapertussis PCR Not Detected     Chlamydophila pneumoniae PCR Not Detected     Mycoplasma pneumo by PCR Not Detected    Narrative:      In the setting of a positive respiratory panel with a  viral infection PLUS a negative procalcitonin without other underlying concern for bacterial infection, consider observing off antibiotics or discontinuation of antibiotics and continue supportive care. If the respiratory panel is positive for atypical bacterial infection (Bordetella pertussis, Chlamydophila pneumoniae, or Mycoplasma pneumoniae), consider antibiotic de-escalation to target atypical bacterial infection.    Comprehensive Metabolic Panel [[id number]]  (Abnormal) Collected: 11/29/24 1220    Specimen: Blood from Arm, Right Updated: 11/29/24 1251     Glucose 159 mg/dL      BUN 29 mg/dL      Creatinine 1.16 mg/dL      Sodium 141 mmol/L      Potassium 4.5 mmol/L      Comment: Slight hemolysis detected by analyzer. Result may be falsely elevated.        Chloride 103 mmol/L      CO2 28.1 mmol/L      Calcium 9.4 mg/dL      Total Protein 6.3 g/dL      Albumin 3.9 g/dL      ALT (SGPT) 47 U/L      AST (SGOT) 43 U/L      Comment: Slight hemolysis detected by analyzer. Result may be falsely elevated.        Alkaline Phosphatase 81 U/L      Total Bilirubin 0.3 mg/dL      Globulin 2.4 gm/dL      A/G Ratio 1.6 g/dL      BUN/Creatinine Ratio 25.0     Anion Gap 9.9 mmol/L      eGFR 48.1 mL/min/1.73     Narrative:      GFR Normal >60  Chronic Kidney Disease <60  Kidney Failure <15    The GFR formula is only valid for adults with stable renal function between ages 18 and 70.    BNP [[id number]]  (Normal) Collected: 11/29/24 1220    Specimen: Blood from Arm, Right Updated: 11/29/24 1250     proBNP 115.0 pg/mL     Narrative:      This assay is used as an aid in the diagnosis of individuals suspected of having heart failure. It can be used as an aid in the diagnosis of acute decompensated heart failure (ADHF) in patients presenting with signs and symptoms of ADHF to the emergency department (ED). In addition, NT-proBNP of <300 pg/mL indicates ADHF is not likely.    Age Range Result Interpretation  NT-proBNP Concentration  (pg/mL:      <50             Positive            >450                   Gray                 300-450                    Negative             <300    50-75           Positive            >900                  Gray                300-900                  Negative            <300      >75             Positive            >1800                  Gray                300-1800                  Negative            <300    Single High Sensitivity Troponin T [[id number]]  (Abnormal) Collected: 11/29/24 1220    Specimen: Blood from Arm, Right Updated: 11/29/24 1250     HS Troponin T 24 ng/L     Narrative:      High Sensitive Troponin T Reference Range:  <14.0 ng/L- Negative Female for AMI  <22.0 ng/L- Negative Male for AMI  >=14 - Abnormal Female indicating possible myocardial injury.  >=22 - Abnormal Male indicating possible myocardial injury.   Clinicians would have to utilize clinical acumen, EKG, Troponin, and serial changes to determine if it is an Acute Myocardial Infarction or myocardial injury due to an underlying chronic condition.         Blood Culture - Blood, Arm, Left [[id number]] Collected: 11/29/24 1242    Specimen: Blood from Arm, Left Updated: 11/29/24 1244    Urinalysis With Culture If Indicated - Urine, Clean Catch [[id number]]  (Normal) Collected: 11/29/24 1231    Specimen: Urine, Clean Catch Updated: 11/29/24 1239     Color, UA Yellow     Appearance, UA Clear     pH, UA 5.5     Specific Gravity, UA 1.024     Glucose, UA Negative     Ketones, UA Negative     Bilirubin, UA Negative     Blood, UA Negative     Protein, UA Negative     Leuk Esterase, UA Negative     Nitrite, UA Negative     Urobilinogen, UA 1.0 E.U./dL    Narrative:      In absence of clinical symptoms, the presence of pyuria, bacteria, and/or nitrites on the urinalysis result does not correlate with infection.  Urine microscopic not indicated.    Protime-INR [[id number]]  (Normal) Collected: 11/29/24 1220    Specimen: Blood from Arm, Right Updated:  11/29/24 1238     Protime 24.1 Seconds      INR 2.15    CBC & Differential [[id number]]  (Abnormal) Collected: 11/29/24 1220    Specimen: Blood from Arm, Right Updated: 11/29/24 1230    Narrative:      The following orders were created for panel order CBC & Differential.  Procedure                               Abnormality         Status                     ---------                               -----------         ------                     CBC Auto Differential[[id number]]        Abnormal            Final result                 Please view results for these tests on the individual orders.    CBC Auto Differential [[id number]]  (Abnormal) Collected: 11/29/24 1220    Specimen: Blood from Arm, Right Updated: 11/29/24 1230     WBC 14.60 10*3/mm3      RBC 4.80 10*6/mm3      Hemoglobin 13.5 g/dL      Hematocrit 43.4 %      MCV 90.4 fL      MCH 28.1 pg      MCHC 31.1 g/dL      RDW 13.7 %      RDW-SD 45.5 fl      MPV 9.8 fL      Platelets 264 10*3/mm3      Neutrophil % 75.7 %      Lymphocyte % 13.2 %      Monocyte % 8.6 %      Eosinophil % 1.1 %      Basophil % 0.4 %      Immature Grans % 1.0 %      Neutrophils, Absolute 11.06 10*3/mm3      Lymphocytes, Absolute 1.93 10*3/mm3      Monocytes, Absolute 1.25 10*3/mm3      Eosinophils, Absolute 0.16 10*3/mm3      Basophils, Absolute 0.06 10*3/mm3      Immature Grans, Absolute 0.14 10*3/mm3      nRBC 0.0 /100 WBC     POC Lactate [[id number]]  (Normal) Collected: 11/29/24 1225    Specimen: Blood Updated: 11/29/24 1226     Lactate 1.1 mmol/L      Comment: Serial Number: [id number]:  [id number]       Blood Culture - Blood, Arm, Right [[id number]] Collected: 11/29/24 1220    Specimen: Blood from Arm, Right Updated: 11/29/24 1226             Imaging Results (Last 24 Hours)       Procedure Component Value Units Date/Time    XR Chest 1 View [[id number]] Collected: 11/29/24 1232     Updated: 11/29/24 1235    Narrative:      XR CHEST 1 VW    Date of Exam: 11/29/2024 12:27 PM  EST    Indication: Shortness of breath, cough, fever    Comparison: Chest radiograph dated 7/2/2024    Findings:  The cardiomediastinal silhouette is within normal limits. There is aortic arch atherosclerotic calcification. There are streaky bibasilar airspace opacity representing atelectasis or mild pneumonia. There is no pleural effusion or pneumothorax. There are   degenerative changes of the thoracic spine. Postsurgical changes of the left shoulder.      Impression:      Impression:  Streaky bibasilar airspace opacities representing atelectasis or mild pneumonia.          Electronically Signed: Ivan Vinayak    11/29/2024 12:33 PM EST    Workstation ID: [id number]              Assessment & Plan        This is a 79 y.o. female with:    Active and Resolved Problems  Active Hospital Problems    Diagnosis  POA    **Multifocal pneumonia [J18.9]  Yes      Resolved Hospital Problems   No resolved problems to display.       Community-acquired pneumonia  COPD  - Respiratory panel negative  - Chest x-ray shows streaky bibasilar opacities representing atelectasis or mild pneumonia  -Continue IV Rocephin, patient reports allergy to penicillin but does not remember what happens, tolerated in ED  - Afebrile  - WBC 14.6, Daily CBCs  - Tessalon Perles started, per RN not effective  - Start codeine cough syrup as needed    Atrial fibrillation  Hypertension  Anemia  Mitral valve disorder  - Continue home warfarin once verified by pharmacy  - Telemetry  - Blood pressure 142/72  - Reports no home antihypertensives    Hypothyroidism  - Continue Synthroid once verified by pharmacy  - TSH 0.045 on 8/30/2024    VTE Prophylaxis:  No VTE prophylaxis order currently exists.        The patient desires to be as follows:    CODE STATUS:                Admission Status:  I believe this patient meets observation status.    Expected Length of Stay: 1 to 2 days    PDMP and Medication Dispenses via Sidebar reviewed and consistent with patient  reported medications.    I discussed the patient's findings and my recommendations with patient and nursing staff.      Signature:     This document has been electronically signed by FELIBERTO Diane on November 29, 2024 16:54 [facility] Team

## 2024-11-29 NOTE — LETTER
EMS Transport Request  For use at [facility], [place], [place], [place], and [place] only   Patient Name: Selina Vazquez : 1945   Weight:95.3 kg (210 lb 1.6 oz) Pick-up Location: [facility] BLS/ALS: BLS/ALS: BLS   Insurance: MEDICARE Auth End Date:    Pre-Cert #: D/C Summary complete:    Destination: Other [facility].  Room 112A   Contact Precautions: Other Fall Risk   Equipment (O2, Fluids, etc.): O2, settings 2L   Arrive By Date/Time: 24 Stretcher/WC: Wheelchair   CM Requesting: Staci Long RN Ext: [phone]   Notes/Medical Necessity: Mod assist for transfers.      ______________________________________________________________________    *Only 2 patient bags OR 1 carry-on size bag are permitted.  Wheelchairs and walkers CANNOT transported with the patient. Acknowledge: Yes

## 2024-11-29 NOTE — ED PROVIDER NOTES
Subjective   History of Present Illness  79-year-old female had the onset of muscle aches and pains yesterday.  Dry hacking cough as the day went on.  She reports she got up this morning is had an extensive cough productive of yellow sputum the patient reports that she has had pleuritic chest pain today.  She reports that she has had no leg pain or swelling.  She denies orthopnea but states she does feel like she breathes better if she is sitting up.  The patient reports no recent antibiotic therapy that she can remember.  She denies recent night sweats or hemoptysis      Review of Systems   Constitutional:  Positive for chills, fatigue and fever.   Respiratory:  Positive for cough, chest tightness and shortness of breath.    Cardiovascular:  Positive for chest pain. Negative for palpitations and leg swelling.   Musculoskeletal:  Positive for arthralgias and myalgias.   Hematological:  Does not bruise/bleed easily.   All other systems reviewed and are negative.      Past Medical History:   Diagnosis Date    Adenomatous polyp of colon     Allergic rhinitis     Asthma     Cervical disc disease     Depression     Fracture of thoracic spine 11/16/2011    Lacunar infarction 08/07/2019    Mitral valve disorder     Postmenopausal osteoporosis     Restless leg syndrome        Allergies   Allergen Reactions    Latex Unknown - High Severity     Pt cannot remember at this time    Penicillin G Rash       Past Surgical History:   Procedure Laterality Date    APPENDECTOMY OPEN      BRONCHOSCOPY N/A 06/08/2023    Procedure: BRONCHOSCOPY WITH BRONCHIAL WASHING;  Surgeon: Brenda Zuniga MD;  Location: [facility] ENDOSCOPY;  Service: Pulmonary;  Laterality: N/A;  Post: PNEUMONIA    BRONCHOSCOPY N/A 6/19/2024    Procedure: BRONCHOSCOPY WITH RIGHT LOWER LOBE BRONCHOALVEOLAR LAVAGE;  Surgeon: Brenda Zuniga MD;  Location: [facility] ENDOSCOPY;  Service: Pulmonary;  Laterality: N/A;    CARDIAC CATHETERIZATION      CHOLECYSTECTOMY      CORONARY  STENT PLACEMENT      THYROIDECTOMY      TOTAL ABDOMINAL HYSTERECTOMY WITH SALPINGO OOPHORECTOMY         Family History   Problem Relation Age of Onset    Diabetes Mother     Pancreatic cancer Father     Pancreatic cancer Brother        Social History     Socioeconomic History    Marital status:    Tobacco Use    Smoking status: Former     Current packs/day: 0.00     Average packs/day: 1 pack/day for 36.0 years (36.0 ttl pk-yrs)     Types: Cigarettes     Start date:      Quit date:      Years since quittin.9     Passive exposure: Never    Smokeless tobacco: Never   Vaping Use    Vaping status: Never Used   Substance and Sexual Activity    Alcohol use: No    Drug use: No    Sexual activity: Defer     Reports no recent unusual food water travel or activity      Objective   Physical Exam  Alert Alison Coma Scale 15   HEENT: Pupils equal and reactive to light. Conjunctivae are not injected. Normal tympanic membranes. Oropharynx and nares are normal.   Neck: Supple. Midline trachea. No JVD. No goiter.   Chest: Scattered Rales and expiratory wheezes are noted bilateral and equal breath sounds bilaterally, regular rate and rhythm without murmur or rub.  No S3 or S4   abdomen: Positive bowel sounds, nontender, nondistended. No rebound or peritoneal signs. No CVA tenderness.   Extremities no clubbing. cyanosis or edema. Motor sensory exam is normal. The full range of motion is intact   Skin: Warm and dry, no rashes or petechia.   Lymphatic: No regional lymphadenopathy. No calf pain, swelling or Homans sign    Procedures           ED Course      Labs Reviewed   COMPREHENSIVE METABOLIC PANEL - Abnormal; Notable for the following components:       Result Value    Glucose 159 (*)     BUN 29 (*)     Creatinine 1.16 (*)     ALT (SGPT) 47 (*)     AST (SGOT) 43 (*)     eGFR 48.1 (*)     All other components within normal limits    Narrative:     GFR Normal >60  Chronic Kidney Disease <60  Kidney Failure  <15    The GFR formula is only valid for adults with stable renal function between ages 18 and 70.   SINGLE HS TROPONIN T - Abnormal; Notable for the following components:    HS Troponin T 24 (*)     All other components within normal limits    Narrative:     High Sensitive Troponin T Reference Range:  <14.0 ng/L- Negative Female for AMI  <22.0 ng/L- Negative Male for AMI  >=14 - Abnormal Female indicating possible myocardial injury.  >=22 - Abnormal Male indicating possible myocardial injury.   Clinicians would have to utilize clinical acumen, EKG, Troponin, and serial changes to determine if it is an Acute Myocardial Infarction or myocardial injury due to an underlying chronic condition.        CBC WITH AUTO DIFFERENTIAL - Abnormal; Notable for the following components:    WBC 14.60 (*)     MCHC 31.1 (*)     Lymphocyte % 13.2 (*)     Immature Grans % 1.0 (*)     Neutrophils, Absolute 11.06 (*)     Monocytes, Absolute 1.25 (*)     Immature Grans, Absolute 0.14 (*)     All other components within normal limits   RESPIRATORY PANEL PCR W/ COVID-19 (SARS-COV-2), NP SWAB IN UTM/VTP, 2 HR TAT - Normal    Narrative:     In the setting of a positive respiratory panel with a viral infection PLUS a negative procalcitonin without other underlying concern for bacterial infection, consider observing off antibiotics or discontinuation of antibiotics and continue supportive care. If the respiratory panel is positive for atypical bacterial infection (Bordetella pertussis, Chlamydophila pneumoniae, or Mycoplasma pneumoniae), consider antibiotic de-escalation to target atypical bacterial infection.   URINALYSIS W/ CULTURE IF INDICATED - Normal    Narrative:     In absence of clinical symptoms, the presence of pyuria, bacteria, and/or nitrites on the urinalysis result does not correlate with infection.  Urine microscopic not indicated.   BNP (IN-HOUSE) - Normal    Narrative:     This assay is used as an aid in the diagnosis of  individuals suspected of having heart failure. It can be used as an aid in the diagnosis of acute decompensated heart failure (ADHF) in patients presenting with signs and symptoms of ADHF to the emergency department (ED). In addition, NT-proBNP of <300 pg/mL indicates ADHF is not likely.    Age Range Result Interpretation  NT-proBNP Concentration (pg/mL:      <50             Positive            >450                   Gray                 300-450                    Negative             <300    50-75           Positive            >900                  Gray                300-900                  Negative            <300      >75             Positive            >1800                  Gray                300-1800                  Negative            <300   PROTIME-INR - Normal   POC LACTATE - Normal   BLOOD CULTURE   BLOOD CULTURE   POC LACTATE   CBC AND DIFFERENTIAL    Narrative:     The following orders were created for panel order CBC & Differential.  Procedure                               Abnormality         Status                     ---------                               -----------         ------                     CBC Auto Differential[[id number]]        Abnormal            Final result                 Please view results for these tests on the individual orders.     .EDS  XR Chest 1 View    Result Date: 11/29/2024  Impression: Streaky bibasilar airspace opacities representing atelectasis or mild pneumonia. Electronically Signed: Ivan Licea  11/29/2024 12:33 PM EST  Workstation ID: [id number]                                                    Medical Decision Making  Stated she felt somewhat better with ER therapy.  O2 saturations improved with supplementation and after treatment.  Cultures were obtained the patient was started on IV ceftriaxone respiratory panel was negative the case was discussed the hospitalist service the patient was agreeable with this plan to treat    Amount and/or Complexity of Data  Reviewed  Labs: ordered.  Radiology: ordered.  ECG/medicine tests: ordered.    Risk  Prescription drug management.        Final diagnoses:   Multifocal pneumonia   Acute severe exacerbation of asthma   Hypoxemia       ED Disposition  ED Disposition       ED Disposition   Decision to Admit    Condition   --    Comment   Level of Care: Telemetry [5]   Diagnosis: Multifocal pneumonia [[id number]]   Admitting Physician: RICHY LENZ [[id number]]   Attending Physician: RICHY LENZ [[id number]]                 No follow-up provider specified.       Medication List      No changes were made to your prescriptions during this visit.            Jonathon Fitzgerald MD  11/29/24 [id number]

## 2024-11-29 NOTE — PLAN OF CARE
Problem: Adult Inpatient Plan of Care  Goal: Plan of Care Review  Outcome: Progressing  Flowsheets (Taken 11/29/2024 1643)  Plan of Care Reviewed With: patient  Goal: Patient-Specific Goal (Individualized)  Outcome: Progressing  Goal: Absence of Hospital-Acquired Illness or Injury  Outcome: Progressing  Intervention: Identify and Manage Fall Risk  Recent Flowsheet Documentation  Taken 11/29/2024 1616 by Kathy Estrella RN  Safety Promotion/Fall Prevention:   fall prevention program maintained   safety round/check completed  Intervention: Prevent Infection  Recent Flowsheet Documentation  Taken 11/29/2024 1616 by Kathy Estrella RN  Infection Prevention: hand hygiene promoted  Goal: Optimal Comfort and Wellbeing  Outcome: Progressing  Intervention: Provide Person-Centered Care  Recent Flowsheet Documentation  Taken 11/29/2024 1616 by Kathy Estrella RN  Trust Relationship/Rapport:   care explained   questions answered  Goal: Readiness for Transition of Care  Outcome: Progressing     Problem: Comorbidity Management  Goal: Maintenance of COPD Symptom Control  Outcome: Progressing     Problem: Skin Injury Risk Increased  Goal: Skin Health and Integrity  Outcome: Progressing  Intervention: Optimize Skin Protection  Recent Flowsheet Documentation  Taken 11/29/2024 1616 by Kathy Estrella RN  Pressure Reduction Techniques: frequent weight shift encouraged   Goal Outcome Evaluation:  Plan of Care Reviewed With: patient

## 2024-11-30 LAB
ALBUMIN SERPL-MCNC: 3.6 G/DL (ref 3.5–5.2)
ALBUMIN/GLOB SERPL: 1.5 G/DL
ALP SERPL-CCNC: 75 U/L (ref 39–117)
ALT SERPL W P-5'-P-CCNC: 64 U/L (ref 1–33)
ANION GAP SERPL CALCULATED.3IONS-SCNC: 11.4 MMOL/L (ref 5–15)
AST SERPL-CCNC: 48 U/L (ref 1–32)
BASOPHILS # BLD AUTO: 0.03 10*3/MM3 (ref 0–0.2)
BASOPHILS NFR BLD AUTO: 0.2 % (ref 0–1.5)
BILIRUB SERPL-MCNC: 0.2 MG/DL (ref 0–1.2)
BUN SERPL-MCNC: 25 MG/DL (ref 8–23)
BUN/CREAT SERPL: 22.5 (ref 7–25)
CALCIUM SPEC-SCNC: 9.1 MG/DL (ref 8.6–10.5)
CHLORIDE SERPL-SCNC: 104 MMOL/L (ref 98–107)
CO2 SERPL-SCNC: 24.6 MMOL/L (ref 22–29)
CREAT SERPL-MCNC: 1.11 MG/DL (ref 0.57–1)
DEPRECATED RDW RBC AUTO: 44.1 FL (ref 37–54)
EGFRCR SERPLBLD CKD-EPI 2021: 50.7 ML/MIN/1.73
EOSINOPHIL # BLD AUTO: 0 10*3/MM3 (ref 0–0.4)
EOSINOPHIL NFR BLD AUTO: 0 % (ref 0.3–6.2)
ERYTHROCYTE [DISTWIDTH] IN BLOOD BY AUTOMATED COUNT: 13.4 % (ref 12.3–15.4)
GLOBULIN UR ELPH-MCNC: 2.4 GM/DL
GLUCOSE SERPL-MCNC: 263 MG/DL (ref 65–99)
HCT VFR BLD AUTO: 40.8 % (ref 34–46.6)
HGB BLD-MCNC: 12.6 G/DL (ref 12–15.9)
IMM GRANULOCYTES # BLD AUTO: 0.14 10*3/MM3 (ref 0–0.05)
IMM GRANULOCYTES NFR BLD AUTO: 1.1 % (ref 0–0.5)
INR PPP: 2.28 (ref 2–3)
LYMPHOCYTES # BLD AUTO: 0.66 10*3/MM3 (ref 0.7–3.1)
LYMPHOCYTES NFR BLD AUTO: 5.3 % (ref 19.6–45.3)
MCH RBC QN AUTO: 27.6 PG (ref 26.6–33)
MCHC RBC AUTO-ENTMCNC: 30.9 G/DL (ref 31.5–35.7)
MCV RBC AUTO: 89.5 FL (ref 79–97)
MONOCYTES # BLD AUTO: 0.23 10*3/MM3 (ref 0.1–0.9)
MONOCYTES NFR BLD AUTO: 1.8 % (ref 5–12)
NEUTROPHILS NFR BLD AUTO: 11.46 10*3/MM3 (ref 1.7–7)
NEUTROPHILS NFR BLD AUTO: 91.6 % (ref 42.7–76)
NRBC BLD AUTO-RTO: 0 /100 WBC (ref 0–0.2)
PLATELET # BLD AUTO: 234 10*3/MM3 (ref 140–450)
PMV BLD AUTO: 10 FL (ref 6–12)
POTASSIUM SERPL-SCNC: 5 MMOL/L (ref 3.5–5.2)
PROT SERPL-MCNC: 6 G/DL (ref 6–8.5)
PROTHROMBIN TIME: 25 SECONDS (ref 19.4–28.5)
RBC # BLD AUTO: 4.56 10*6/MM3 (ref 3.77–5.28)
SODIUM SERPL-SCNC: 140 MMOL/L (ref 136–145)
WBC NRBC COR # BLD AUTO: 12.52 10*3/MM3 (ref 3.4–10.8)

## 2024-11-30 PROCEDURE — 94799 UNLISTED PULMONARY SVC/PX: CPT

## 2024-11-30 PROCEDURE — 94761 N-INVAS EAR/PLS OXIMETRY MLT: CPT

## 2024-11-30 PROCEDURE — 25010000002 CEFTRIAXONE PER 250 MG

## 2024-11-30 PROCEDURE — 85610 PROTHROMBIN TIME: CPT

## 2024-11-30 PROCEDURE — 94664 DEMO&/EVAL PT USE INHALER: CPT

## 2024-11-30 PROCEDURE — 85025 COMPLETE CBC W/AUTO DIFF WBC: CPT | Performed by: INTERNAL MEDICINE

## 2024-11-30 PROCEDURE — 80053 COMPREHEN METABOLIC PANEL: CPT | Performed by: INTERNAL MEDICINE

## 2024-11-30 PROCEDURE — 25010000002 MORPHINE PER 10 MG: Performed by: STUDENT IN AN ORGANIZED HEALTH CARE EDUCATION/TRAINING PROGRAM

## 2024-11-30 RX ORDER — ATORVASTATIN CALCIUM 40 MG/1
40 TABLET, FILM COATED ORAL DAILY
Status: DISCONTINUED | OUTPATIENT
Start: 2024-11-30 | End: 2024-12-04 | Stop reason: HOSPADM

## 2024-11-30 RX ORDER — WARFARIN SODIUM 3 MG/1
3 TABLET ORAL
Status: DISCONTINUED | OUTPATIENT
Start: 2024-11-30 | End: 2024-12-04

## 2024-11-30 RX ORDER — ACETAMINOPHEN 325 MG/1
650 TABLET ORAL EVERY 6 HOURS PRN
Status: DISCONTINUED | OUTPATIENT
Start: 2024-11-30 | End: 2024-12-04 | Stop reason: HOSPADM

## 2024-11-30 RX ORDER — LEVOTHYROXINE SODIUM 112 UG/1
112 TABLET ORAL DAILY
Status: DISCONTINUED | OUTPATIENT
Start: 2024-11-30 | End: 2024-12-04 | Stop reason: HOSPADM

## 2024-11-30 RX ORDER — MORPHINE SULFATE 2 MG/ML
2 INJECTION, SOLUTION INTRAMUSCULAR; INTRAVENOUS EVERY 4 HOURS PRN
Status: DISCONTINUED | OUTPATIENT
Start: 2024-11-30 | End: 2024-12-03

## 2024-11-30 RX ORDER — ASPIRIN 81 MG/1
81 TABLET ORAL DAILY
Status: DISCONTINUED | OUTPATIENT
Start: 2024-11-30 | End: 2024-12-04 | Stop reason: HOSPADM

## 2024-11-30 RX ORDER — BENZONATATE 100 MG/1
200 CAPSULE ORAL 3 TIMES DAILY PRN
Status: DISCONTINUED | OUTPATIENT
Start: 2024-11-30 | End: 2024-12-04 | Stop reason: HOSPADM

## 2024-11-30 RX ORDER — SPIRONOLACTONE 25 MG/1
25 TABLET ORAL DAILY
Status: DISCONTINUED | OUTPATIENT
Start: 2024-11-30 | End: 2024-12-04 | Stop reason: HOSPADM

## 2024-11-30 RX ORDER — FUROSEMIDE 40 MG/1
40 TABLET ORAL DAILY
Status: DISCONTINUED | OUTPATIENT
Start: 2024-11-30 | End: 2024-12-04 | Stop reason: HOSPADM

## 2024-11-30 RX ORDER — POTASSIUM CHLORIDE 1500 MG/1
20 TABLET, EXTENDED RELEASE ORAL DAILY
Status: DISCONTINUED | OUTPATIENT
Start: 2024-11-30 | End: 2024-12-02

## 2024-11-30 RX ORDER — AZITHROMYCIN 250 MG/1
500 TABLET, FILM COATED ORAL
Status: DISCONTINUED | OUTPATIENT
Start: 2024-11-30 | End: 2024-11-30

## 2024-11-30 RX ADMIN — IPRATROPIUM BROMIDE AND ALBUTEROL SULFATE 3 ML: .5; 3 SOLUTION RESPIRATORY (INHALATION) at 06:45

## 2024-11-30 RX ADMIN — GUAIFENESIN AND CODEINE PHOSPHATE 10 ML: 100; 10 SOLUTION ORAL at 05:30

## 2024-11-30 RX ADMIN — LEVOTHYROXINE SODIUM 112 MCG: 0.11 TABLET ORAL at 14:46

## 2024-11-30 RX ADMIN — SPIRONOLACTONE 25 MG: 25 TABLET ORAL at 14:46

## 2024-11-30 RX ADMIN — ASPIRIN 81 MG: 81 TABLET, COATED ORAL at 14:46

## 2024-11-30 RX ADMIN — ATORVASTATIN CALCIUM 40 MG: 40 TABLET, FILM COATED ORAL at 14:46

## 2024-11-30 RX ADMIN — WARFARIN SODIUM 3 MG: 3 TABLET ORAL at 17:49

## 2024-11-30 RX ADMIN — IPRATROPIUM BROMIDE AND ALBUTEROL SULFATE 3 ML: .5; 3 SOLUTION RESPIRATORY (INHALATION) at 11:15

## 2024-11-30 RX ADMIN — IPRATROPIUM BROMIDE AND ALBUTEROL SULFATE 3 ML: .5; 3 SOLUTION RESPIRATORY (INHALATION) at 20:14

## 2024-11-30 RX ADMIN — MORPHINE SULFATE 2 MG: 2 INJECTION, SOLUTION INTRAMUSCULAR; INTRAVENOUS at 14:46

## 2024-11-30 RX ADMIN — IPRATROPIUM BROMIDE AND ALBUTEROL SULFATE 3 ML: .5; 3 SOLUTION RESPIRATORY (INHALATION) at 14:58

## 2024-11-30 RX ADMIN — CEFTRIAXONE 2000 MG: 2 INJECTION, POWDER, FOR SOLUTION INTRAMUSCULAR; INTRAVENOUS at 14:46

## 2024-11-30 RX ADMIN — ACETAMINOPHEN 650 MG: 325 TABLET, FILM COATED ORAL at 09:45

## 2024-11-30 RX ADMIN — POTASSIUM CHLORIDE 20 MEQ: 1500 TABLET, EXTENDED RELEASE ORAL at 14:46

## 2024-11-30 RX ADMIN — GUAIFENESIN AND CODEINE PHOSPHATE 10 ML: 100; 10 SOLUTION ORAL at 17:49

## 2024-11-30 RX ADMIN — FUROSEMIDE 40 MG: 40 TABLET ORAL at 14:46

## 2024-11-30 RX ADMIN — BENZONATATE 200 MG: 100 CAPSULE ORAL at 09:45

## 2024-11-30 NOTE — PROGRESS NOTES
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    INR Goal: 2 - 3  Home medication?: warfarin 3 mg PO daily (note recent admission 11/25-27 at OSH, discharged on methylprednisolone, cephalexin, doxycyline)  Bridge Therapy Present?:  No  Interacting Medications Evaluation (New/Present/Discontinued): ceftriaxone, steroid (both may increase INR)  Additional Contributing Factors: acute illness      Assessment/Plan:    INR therapeutic; continue previous maintenance regimen of 3 mg daily for now.     Continue to monitor and adjust based on INR.         Date 11/29 11/30          INR 2.15 2.28          Dose ? 3 mg              Objective:  [Ht: 152.4 cm (60\"); Wt: 95.3 kg (210 lb); BMI: Body mass index is 41.01 kg/m².]    Lab Results   Component Value Date    ALBUMIN 3.6 11/30/2024     Lab Results   Component Value Date    INR 2.28 11/30/2024    INR 2.15 11/29/2024    INR 2.10 11/27/2024    PROTIME 25.0 11/30/2024    PROTIME 24.1 11/29/2024    PROTIME 23.0 07/07/2024     Lab Results   Component Value Date    HGB 12.6 11/30/2024    HGB 13.5 11/29/2024    HGB 12.1 09/24/2024     Lab Results   Component Value Date    HCT 40.8 11/30/2024    HCT 43.4 11/29/2024    HCT 37.9 09/24/2024       Lena Reese PharmD, BCPS  11/30/24 16:32 EST    "

## 2024-11-30 NOTE — PROGRESS NOTES
[facility] MEDICINE SERVICE  DAILY PROGRESS NOTE    NAME: Selina Vazquez  : 1945  MRN: [id number]      LOS: 0 days     PROVIDER OF SERVICE: Tae Ellis MD    Chief Complaint: Multifocal pneumonia    Subjective:     Interval History:  History taken from: patient    She feels tired.  She is using her baseline 2 L of oxygen.        Review of Systems:   Review of Systems    Objective:     Vital Signs  Temp:  [97.2 °F (36.2 °C)-98 °F (36.7 °C)] 97.2 °F (36.2 °C)  Heart Rate:  [] 74  Resp:  [17-54] 21  BP: (138-160)/() 142/73  Flow (L/min) (Oxygen Therapy):  [2-5] 2   Body mass index is 41.01 kg/m².    Physical Exam  Physical Exam  Constitutional:       Appearance: Normal appearance.   Cardiovascular:      Rate and Rhythm: Normal rate.      Pulses: Normal pulses.      Heart sounds: Normal heart sounds.   Pulmonary:      Effort: Pulmonary effort is normal.      Breath sounds: Normal breath sounds.   Abdominal:      General: Abdomen is flat.      Palpations: Abdomen is soft.   Neurological:      Mental Status: She is alert. Mental status is at baseline.   Psychiatric:         Mood and Affect: Mood normal.            Diagnostic Data    Results from last 7 days   Lab Units 24  0144   WBC 10*3/mm3 12.52*   HEMOGLOBIN g/dL 12.6   HEMATOCRIT % 40.8   PLATELETS 10*3/mm3 234   GLUCOSE mg/dL 263*   CREATININE mg/dL 1.11*   BUN mg/dL 25*   SODIUM mmol/L 140   POTASSIUM mmol/L 5.0   AST (SGOT) U/L 48*   ALT (SGPT) U/L 64*   ALK PHOS U/L 75   BILIRUBIN mg/dL 0.2   ANION GAP mmol/L 11.4       XR Chest 1 View    Result Date: 2024  Impression: Streaky bibasilar airspace opacities representing atelectasis or mild pneumonia. Electronically Signed: Ivan Licea  2024 12:33 PM EST  Workstation ID: [id number]       I reviewed the patient's new clinical results.    Assessment/Plan:     Active and Resolved Problems  Active Hospital Problems    Diagnosis  POA    **Multifocal pneumonia [J18.9]   Yes      Resolved Hospital Problems   No resolved problems to display.         Community-acquired pneumonia  COPD  - Respiratory panel negative  - Chest x-ray shows streaky bibasilar opacities representing atelectasis or mild pneumonia  -Continue IV Rocephin  - Afebrile  - WBC 14.6, Daily CBCs  - Tessalon Perles started, per RN not effective  - Start codeine cough syrup as needed  -Patient is on her baseline amount of oxygen.     Atrial fibrillation  Hypertension  Anemia  Mitral valve disorder  - Continue home warfarin, pharmacy to dose  - Telemetry  - Blood pressure 142/72  - Reports no home antihypertensives     Hypothyroidism  - Continue Synthroid  - TSH 0.045 on 8/30/2024    VTE Prophylaxis:  Pharmacologic VTE prophylaxis orders are present.             Disposition Planning:     Barriers to Discharge: PT eval  Anticipated Date of Discharge: 12/1/2024  Place of Discharge: Return to her facility versus rehab      Time: 50 minutes     There are no questions and answers to display.       Signature: Electronically signed by Tae Ellis MD, 11/30/24, 13:50 EST.  [facility] Hospitalist Team

## 2024-12-01 LAB
INR PPP: 2.14 (ref 2–3)
PROTHROMBIN TIME: 24 SECONDS (ref 19.4–28.5)

## 2024-12-01 PROCEDURE — 97162 PT EVAL MOD COMPLEX 30 MIN: CPT

## 2024-12-01 PROCEDURE — 25010000002 CEFTRIAXONE PER 250 MG

## 2024-12-01 PROCEDURE — 25010000002 MORPHINE PER 10 MG: Performed by: STUDENT IN AN ORGANIZED HEALTH CARE EDUCATION/TRAINING PROGRAM

## 2024-12-01 PROCEDURE — 94799 UNLISTED PULMONARY SVC/PX: CPT

## 2024-12-01 PROCEDURE — 97166 OT EVAL MOD COMPLEX 45 MIN: CPT

## 2024-12-01 PROCEDURE — 94664 DEMO&/EVAL PT USE INHALER: CPT

## 2024-12-01 PROCEDURE — 94761 N-INVAS EAR/PLS OXIMETRY MLT: CPT

## 2024-12-01 PROCEDURE — 85610 PROTHROMBIN TIME: CPT

## 2024-12-01 PROCEDURE — 97530 THERAPEUTIC ACTIVITIES: CPT

## 2024-12-01 RX ORDER — MECLIZINE HYDROCHLORIDE 25 MG/1
25 TABLET ORAL 3 TIMES DAILY PRN
Status: DISCONTINUED | OUTPATIENT
Start: 2024-12-01 | End: 2024-12-04 | Stop reason: HOSPADM

## 2024-12-01 RX ORDER — IBUPROFEN 400 MG/1
400 TABLET, FILM COATED ORAL EVERY 6 HOURS PRN
Status: DISCONTINUED | OUTPATIENT
Start: 2024-12-01 | End: 2024-12-04 | Stop reason: HOSPADM

## 2024-12-01 RX ADMIN — FUROSEMIDE 40 MG: 40 TABLET ORAL at 10:48

## 2024-12-01 RX ADMIN — ACETAMINOPHEN 650 MG: 325 TABLET, FILM COATED ORAL at 13:02

## 2024-12-01 RX ADMIN — IPRATROPIUM BROMIDE AND ALBUTEROL SULFATE 3 ML: .5; 3 SOLUTION RESPIRATORY (INHALATION) at 08:32

## 2024-12-01 RX ADMIN — LEVOTHYROXINE SODIUM 112 MCG: 0.11 TABLET ORAL at 10:48

## 2024-12-01 RX ADMIN — ATORVASTATIN CALCIUM 40 MG: 40 TABLET, FILM COATED ORAL at 10:48

## 2024-12-01 RX ADMIN — SPIRONOLACTONE 25 MG: 25 TABLET ORAL at 10:48

## 2024-12-01 RX ADMIN — CEFTRIAXONE 2000 MG: 2 INJECTION, POWDER, FOR SOLUTION INTRAMUSCULAR; INTRAVENOUS at 15:35

## 2024-12-01 RX ADMIN — ASPIRIN 81 MG: 81 TABLET, COATED ORAL at 10:48

## 2024-12-01 RX ADMIN — MORPHINE SULFATE 2 MG: 2 INJECTION, SOLUTION INTRAMUSCULAR; INTRAVENOUS at 10:48

## 2024-12-01 RX ADMIN — IBUPROFEN 400 MG: 400 TABLET, FILM COATED ORAL at 18:30

## 2024-12-01 RX ADMIN — MORPHINE SULFATE 2 MG: 2 INJECTION, SOLUTION INTRAMUSCULAR; INTRAVENOUS at 20:23

## 2024-12-01 RX ADMIN — POTASSIUM CHLORIDE 20 MEQ: 1500 TABLET, EXTENDED RELEASE ORAL at 10:48

## 2024-12-01 RX ADMIN — IPRATROPIUM BROMIDE AND ALBUTEROL SULFATE 3 ML: .5; 3 SOLUTION RESPIRATORY (INHALATION) at 19:44

## 2024-12-01 RX ADMIN — WARFARIN SODIUM 3 MG: 3 TABLET ORAL at 17:05

## 2024-12-01 RX ADMIN — MORPHINE SULFATE 2 MG: 2 INJECTION, SOLUTION INTRAMUSCULAR; INTRAVENOUS at 03:15

## 2024-12-01 RX ADMIN — IPRATROPIUM BROMIDE AND ALBUTEROL SULFATE 3 ML: .5; 3 SOLUTION RESPIRATORY (INHALATION) at 16:00

## 2024-12-01 RX ADMIN — MECLIZINE HYDROCHLORIDE 25 MG: 25 TABLET ORAL at 11:15

## 2024-12-01 NOTE — DISCHARGE PLACEMENT REQUEST
"Germán Vazquez ROSALINDA (79 y.o. Female)       Date of Birth   1945    Social Security Number       Address   [address] [place] [address] [place] [address]    Home Phone   [phone]    MRN   [id number]       Nondenominational   None    Marital Status                               Admission Date   11/29/24    Admission Type   Emergency    Admitting Provider   Tae Ellis MD    Attending Provider   Tae Ellis MD    Department, Room/Bed   [facility], 249/1       Discharge Date       Discharge Disposition       Discharge Destination                                 Attending Provider: Tae Ellis MD    Allergies: Latex, Penicillin G    Isolation: None   Infection: None   Code Status: Prior    Ht: 152.4 cm (60\")   Wt: 95.3 kg (210 lb)    Admission Cmt: None   Principal Problem: Multifocal pneumonia [J18.9]                   Active Insurance as of 11/29/2024       Primary Coverage       Payor Plan Insurance Group Employer/Plan Group    MEDICARE MEDICARE A & B        Payor Plan Address Payor Plan Phone Number Payor Plan Fax Number Effective Dates    [address] [phone]  2/1/2005 - None Entered    [facility] [address]         Subscriber Name Subscriber Birth Date Member ID       GERMÁN VAZQUEZ 1945 [id number]               Secondary Coverage       Payor Plan Insurance Group Employer/Plan Group    [employer]        Payor Plan Address Payor Plan Phone Number Payor Plan Fax Number Effective Dates    [address] [phone]  1/1/2020 - None Entered    [place] [address]         Subscriber Name Subscriber Birth Date Member ID       GERMÁN VAZQUEZ ROSALINDA 1945 [id number]                     Emergency Contacts        (Rel.) Home Phone Work Phone Mobile Phone    KAYE WHEATLEY (Sister) -- -- [phone]    Regan Vazquez (Grandchild) -- -- [phone]    Td Franco (Grandchild) -- -- [phone]                 Occupational " Therapy Notes (last 24 hours)        Maira Cervantes, OT at 12/01/24 1302          Goal Outcome Evaluation:  Plan of Care Reviewed With: patient           Outcome Evaluation: Pt is a 79 y.o. year old female admitted to [facility] on 11/29/24 with new onset of muscle aches and pains, dry hacking cough progressing to a productive cough with yellow sputum with some pleuritic chest pain. Chest XR positive for mild pneumonia.    Pmhx significant for cad, copd, asthma, psoriasis, spinal stenosis, PE. COPD, pulmonary hypertension, chronic respiratory failure dependent on supplemental oxygen, CHF, atrial fibrillation on warfarin, hypothyroidism, CAD s/p PCI stenting LAD, hypotension, hyperlipidemia, CKD stage 3, history of PE    At baseline, pt lives with family in single level home w/ 4 stairs to enter and B handrails. Pt reports her family is always with her ascending/descending stairs when needed. Pt uses rollator wx around home and is on continuous 2LO2 at home. She uses a motorized scooter for community distances. Pt reports frequent falls when asked about recent hx of falls but wasn't able to quantify how many.     At [facility], pt is A&Ox3 and experiencing severe dizziness. Nursing informed as pt's dizziness is a significant limiting factor at [facility]. Pt reports that she experiences constantly fluctuating intensity of dizziness regularly. Pt was able to stand with Adonay x2 for safety due to severe dizziness and maintain standing xapprox 1-2 minutes. Pt sensitive to even slight perturbations, requiring constant CGA to min A for balance with FWW for support. Returned to sitting following incontinence of bladder while standing, completed standing again to don brief with max A for LB Dressing. OT to follow while inpatient and recommends d/c to SNF at this time unless improvement in dizziness facilitates increased participation in activities.    Anticipated Discharge Disposition (OT): skilled nursing facility                           Electronically signed by Maira Cervantes OT at 24 1302       Maira Cervantes OT at 24 1301          Patient Name: Selina Vazquez  : 1945    MRN: [id number]                              Today's Date: 2024       Admit Date: 2024    Visit Dx:     ICD-10-CM ICD-9-CM   1. Multifocal pneumonia  J18.9 486   2. Acute severe exacerbation of asthma  J45.901 493.92   3. Hypoxemia  R09.02 799.02     Patient Active Problem List   Diagnosis    Adenomatous polyp of colon    Allergic rhinitis    Asthma    Cervical disc disease    Chronic obstructive pulmonary disease    Coronary artery disease involving native coronary artery of native heart without angina pectoris    Degeneration of intervertebral disc of lumbar region    Depression    Mixed hyperlipidemia    Essential hypertension    Hypertensive cardiovascular disease    Lichen planus    Mitral valve disorder    Class 3 severe obesity due to excess calories with serious comorbidity and body mass index (BMI) of 40.0 to 44.9 in adult    Osteoarthritis, generalized    Postmenopausal osteoporosis    Psoriasis    Pulmonary hypertension, primary    Restless leg syndrome    Seborrhea    Spinal stenosis of cervical region    Urinary incontinence    Vaginitis, atrophic    Chronic renal insufficiency, stage III (moderate)    Pelvic pain    Multiple tracheobronchial mucus plugs    Pulmonary embolism    ASVD (arteriosclerotic vascular disease)    Supratherapeutic INR    Leukocytosis    Atrial fibrillation with rapid ventricular response    Esophageal motility disorder    Chronic diastolic (congestive) heart failure    Cor pulmonale (chronic)    Difficulty in walking, not elsewhere classified    Major depressive disorder, recurrent, in partial remission    Panlobular emphysema    Personal history of pneumonia (recurrent)    Presence of coronary angioplasty implant and graft    Severe persistent asthma with (acute) exacerbation    Stress  incontinence (female) (male)    Multifocal pneumonia     Past Medical History:   Diagnosis Date    Adenomatous polyp of colon     Allergic rhinitis     Asthma     Cervical disc disease     Depression     Fracture of thoracic spine 11/16/2011    Lacunar infarction 08/07/2019    Mitral valve disorder     Postmenopausal osteoporosis     Restless leg syndrome      Past Surgical History:   Procedure Laterality Date    APPENDECTOMY OPEN      BRONCHOSCOPY N/A 06/08/2023    Procedure: BRONCHOSCOPY WITH BRONCHIAL WASHING;  Surgeon: Brenda Zuniga MD;  Location: [facility] ENDOSCOPY;  Service: Pulmonary;  Laterality: N/A;  Post: PNEUMONIA    BRONCHOSCOPY N/A 6/19/2024    Procedure: BRONCHOSCOPY WITH RIGHT LOWER LOBE BRONCHOALVEOLAR LAVAGE;  Surgeon: Brenda Zuniga MD;  Location: [facility] ENDOSCOPY;  Service: Pulmonary;  Laterality: N/A;    CARDIAC CATHETERIZATION      CHOLECYSTECTOMY      CORONARY STENT PLACEMENT      THYROIDECTOMY      TOTAL ABDOMINAL HYSTERECTOMY WITH SALPINGO OOPHORECTOMY        General Information       Row Name 12/01/24 1231          OT Time and Intention    Subjective Information complains of;weakness;fatigue;nausea/vomiting;dizziness  -KT     Document Type evaluation  -KT     Mode of Treatment occupational therapy  -KT     Patient Effort good  -KT     Symptoms Noted During/After Treatment nausea;dizziness  -KT       Row Name 12/01/24 1231          General Information    Patient Profile Reviewed yes  -KT     Prior Level of Function min assist:;mod assist:;ADL's;max assist:;home management  -KT     Barriers to Rehab medically complex;previous functional deficit  -KT       Row Name 12/01/24 1231          Occupational Profile    Reason for Services/Referral (Occupational Profile) Pt is a 79 y.o. year old female admitted to [facility] on 11/29/24 with new onset of muscle aches and pains, dry hacking cough progressing to a productive cough with yellow sputum with some pleuritic chest pain. Chest XR positive for mild  pneumonia.    Pmhx significant for cad, copd, asthma, psoriasis, spinal stenosis, PE. COPD, pulmonary hypertension, chronic respiratory failure dependent on supplemental oxygen, CHF, atrial fibrillation on warfarin, hypothyroidism, CAD s/p PCI stenting LAD, hypotension, hyperlipidemia, CKD stage 3, history of PE    At baseline, pt lives with family in single level home w/ 4 stairs to enter and B handrails. Pt reports her family is always with her ascending/descending stairs when needed. Pt uses rollator wx around home and is on continuous 2LO2 at home. She uses a motorized scooter for community distances. Pt reports frequent falls when asked about recent hx of falls but wasn't able to quantify how many.     At [facility], pt is A&Ox3 and experiencing severe dizziness. Nursing informed as pt's dizziness is a significant limiting factor at [facility]. Pt reports that she experiences constantly fluctuating intensity of dizziness regularly. Pt was able to stand with Adonay x2 for safety due to severe dizziness and maintain standing xapprox 1-2 minutes. Pt sensitive to even slight perturbations, requiring constant CGA to min A for balance with FWW for support. Returned to sitting following incontinence of bladder while standing, completed standing again to don brief with max A for LB Dressing. OT to follow while inpatient and recommends d/c to SNF at this time unless improvement in dizziness facilitates increased participation in activities.  -KT       Row Name 12/01/24 1231          Living Environment    People in Home child(xiomy), adult  -KT       Row Name 12/01/24 1231          Home Main Entrance    Number of Stairs, Main Entrance four  -KT     Stair Railings, Main Entrance railings safe and in good condition  -KT       Row Name 12/01/24 1231          Stairs Within Home, Primary    Number of Stairs, Within Home, Primary none  -KT       Row Name 12/01/24 1231          Cognition    Orientation Status (Cognition) oriented x 3  -KT        Row Name 12/01/24 1231          Safety Issues/Impairments Affecting Functional Mobility    Safety Issues Affecting Function (Mobility) awareness of need for assistance;insight into deficits/self-awareness;judgment;safety precaution awareness  -KT     Impairments Affecting Function (Mobility) balance;coordination;endurance/activity tolerance;pain;postural/trunk control;strength;shortness of breath  -KT               User Key  (r) = Recorded By, (t) = Taken By, (c) = Cosigned By      Initials Name Provider Type    Maira Boyer OT Occupational Therapist                     Mobility/ADL's       Row Name 12/01/24 1255          Transfers    Transfers sit-stand transfer;stand-sit transfer  -KT       [facility] Name 12/01/24 1255          Sit-Stand Transfer    Sit-Stand [place] (Transfers) minimum assist (75% patient effort);2 person assist  -KT     Assistive Device (Sit-Stand Transfers) walker, front-wheeled  -KT       [facility] Name 12/01/24 1255          Stand-Sit Transfer    Stand-Sit [place] (Transfers) minimum assist (75% patient effort);2 person assist  -KT     Assistive Device (Stand-Sit Transfers) walker, front-wheeled  -KT       [facility] Name 12/01/24 1255          Functional Mobility    Functional Mobility- Ind. Level unable to perform  -KT       Row Name 12/01/24 1255          Activities of Daily Living    BADL Assessment/Intervention lower body dressing;toileting  -KT       Row Name 12/01/24 1255          Lower Body Dressing Assessment/Training    [place] Level (Lower Body Dressing) lower body dressing skills;dependent (less than 25% patient effort)  -[facility] Name 12/01/24 1255          Toileting Assessment/Training    [place] Level (Toileting) toileting skills;maximum assist (25% patient effort)  -KT               User Key  (r) = Recorded By, (t) = Taken By, (c) = Cosigned By      Initials Name Provider Type    Maira Byoer OT Occupational Therapist                    Obj/Interventions       Row Name 12/01/24 1257          Sensory Assessment (Somatosensory)    Sensory Assessment (Somatosensory) sensation intact  -KT       Row Name 12/01/24 1257          Vision Assessment/Intervention    Visual Impairment/Limitations WNL  -KT       Row Name 12/01/24 1257          Range of Motion Comprehensive    General Range of Motion no range of motion deficits identified  -KT       Row Name 12/01/24 1257          Strength Comprehensive (MMT)    General Manual Muscle Testing (MMT) Assessment upper extremity strength deficits identified  -KT     Comment, General Manual Muscle Testing (MMT) Assessment grossly 3+/5, impacted by nausea  -KT               User Key  (r) = Recorded By, (t) = Taken By, (c) = Cosigned By      Initials Name Provider Type    Maira Boyer OT Occupational Therapist                   Goals/Plan       Row Name 12/01/24 1259          Transfer Goal 1 (OT)    Activity/Assistive Device (Transfer Goal 1, OT) commode, 3-in-1;toilet  -KT     [place] Level/Cues Needed (Transfer Goal 1, OT) moderate assist (50-74% patient effort)  -KT     Time Frame (Transfer Goal 1, OT) long term goal (LTG)  -KT       Row Name 12/01/24 1259          Dressing Goal 1 (OT)    Activity/Device (Dressing Goal 1, OT) lower body dressing  -KT     [place]/Cues Needed (Dressing Goal 1, OT) moderate assist (50-74% patient effort)  -KT     Time Frame (Dressing Goal 1, OT) long term goal (LTG)  -KT       Row Name 12/01/24 1259          Toileting Goal 1 (OT)    Activity/Device (Toileting Goal 1, OT) toileting skills, all  -KT     [place] Level/Cues Needed (Toileting Goal 1, OT) moderate assist (50-74% patient effort)  -KT     Time Frame (Toileting Goal 1, OT) long term goal (LTG)  -KT               User Key  (r) = Recorded By, (t) = Taken By, (c) = Cosigned By      Initials Name Provider Type    Maira Boyer OT Occupational Therapist                   Clinical Impression        Row Name 12/01/24 1258          Pain Assessment    Pretreatment Pain Rating 7/10  -KT     Posttreatment Pain Rating 8/10  -KT     Pain Location head;neck  -KT       Row Name 12/01/24 1258          Plan of Care Review    Plan of Care Reviewed With patient  -KT     Outcome Evaluation Pt is a 79 y.o. year old female admitted to [facility] on 11/29/24 with new onset of muscle aches and pains, dry hacking cough progressing to a productive cough with yellow sputum with some pleuritic chest pain. Chest XR positive for mild pneumonia.    Pmhx significant for cad, copd, asthma, psoriasis, spinal stenosis, PE. COPD, pulmonary hypertension, chronic respiratory failure dependent on supplemental oxygen, CHF, atrial fibrillation on warfarin, hypothyroidism, CAD s/p PCI stenting LAD, hypotension, hyperlipidemia, CKD stage 3, history of PE    At baseline, pt lives with family in single level home w/ 4 stairs to enter and B handrails. Pt reports her family is always with her ascending/descending stairs when needed. Pt uses rollator wx around home and is on continuous 2LO2 at home. She uses a motorized scooter for community distances. Pt reports frequent falls when asked about recent hx of falls but wasn't able to quantify how many.     At [facility], pt is A&Ox3 and experiencing severe dizziness. Nursing informed as pt's dizziness is a significant limiting factor at [facility]. Pt reports that she experiences constantly fluctuating intensity of dizziness regularly. Pt was able to stand with Adonay x2 for safety due to severe dizziness and maintain standing xapprox 1-2 minutes. Pt sensitive to even slight perturbations, requiring constant CGA to min A for balance with FWW for support. Returned to sitting following incontinence of bladder while standing, completed standing again to don brief with max A for LB Dressing. OT to follow while inpatient and recommends d/c to SNF at this time unless improvement in dizziness facilitates increased participation in  activities.  -KT       Row Name 12/01/24 1258          Therapy Assessment/Plan (OT)    Rehab Potential (OT) fair  -KT     Criteria for Skilled Therapeutic Interventions Met (OT) yes;skilled treatment is necessary  -KT     Therapy Frequency (OT) 3 times/wk  -KT     Predicted Duration of Therapy Intervention (OT) until dc  -KT       Row Name 12/01/24 1258          Therapy Plan Review/Discharge Plan (OT)    Anticipated Discharge Disposition (OT) skilled nursing facility  -KT       Row Name 12/01/24 1258          Positioning and Restraints    Pre-Treatment Position sitting in chair/recliner  -KT     Post Treatment Position chair  -KT     In Chair call light within reach;encouraged to call for assist  -KT               User Key  (r) = Recorded By, (t) = Taken By, (c) = Cosigned By      Initials Name Provider Type    Maira Boyer OT Occupational Therapist                   Outcome Measures       Row Name 12/01/24 1259          How much help from another is currently needed...    Putting on and taking off regular lower body clothing? 1  -KT     Bathing (including washing, rinsing, and drying) 2  -KT     Toileting (which includes using toilet bed pan or urinal) 2  -KT     Putting on and taking off regular upper body clothing 2  -KT     Taking care of personal grooming (such as brushing teeth) 3  -KT     Eating meals 4  -KT     AM-PAC 6 Clicks Score (OT) 14  -KT       Row Name 12/01/24 1259          Functional Assessment    Outcome Measure Options AM-PAC 6 Clicks Daily Activity (OT)  -KT               User Key  (r) = Recorded By, (t) = Taken By, (c) = Cosigned By      Initials Name Provider Type    Maira Boyer OT Occupational Therapist                    Occupational Therapy Education        No education to display                  OT Recommendation and Plan  Therapy Frequency (OT): 3 times/wk  Plan of Care Review  Plan of Care Reviewed With: patient  Outcome Evaluation: Pt is a 79 y.o. year old female  admitted to [facility] on 11/29/24 with new onset of muscle aches and pains, dry hacking cough progressing to a productive cough with yellow sputum with some pleuritic chest pain. Chest XR positive for mild pneumonia.    Pmhx significant for cad, copd, asthma, psoriasis, spinal stenosis, PE. COPD, pulmonary hypertension, chronic respiratory failure dependent on supplemental oxygen, CHF, atrial fibrillation on warfarin, hypothyroidism, CAD s/p PCI stenting LAD, hypotension, hyperlipidemia, CKD stage 3, history of PE    At baseline, pt lives with family in single level home w/ 4 stairs to enter and B handrails. Pt reports her family is always with her ascending/descending stairs when needed. Pt uses rollator wx around home and is on continuous 2LO2 at home. She uses a motorized scooter for community distances. Pt reports frequent falls when asked about recent hx of falls but wasn't able to quantify how many.     At [facility], pt is A&Ox3 and experiencing severe dizziness. Nursing informed as pt's dizziness is a significant limiting factor at [facility]. Pt reports that she experiences constantly fluctuating intensity of dizziness regularly. Pt was able to stand with Adonay x2 for safety due to severe dizziness and maintain standing xapprox 1-2 minutes. Pt sensitive to even slight perturbations, requiring constant CGA to min A for balance with FWW for support. Returned to sitting following incontinence of bladder while standing, completed standing again to don brief with max A for LB Dressing. OT to follow while inpatient and recommends d/c to SNF at this time unless improvement in dizziness facilitates increased participation in activities.     Time Calculation:   Evaluation Complexity (OT)  Overall Complexity of Evaluation (OT): moderate complexity     Time Calculation- OT       Row Name 12/01/24 1300             Time Calculation- OT    OT Start Time 1011  -KT      OT Stop Time 1025  -KT      OT Time Calculation (min) 14 min  -KT       OT Received On 12/01/24  -JOSEPHINE      OT - Next Appointment 12/03/24  -JOSEPHINE      OT Goal Re-Cert Due Date 12/15/24  -JOSEPHINE                User Key  (r) = Recorded By, (t) = Taken By, (c) = Cosigned By      Initials Name Provider Type    Maira Boyer OT Occupational Therapist                  Therapy Charges for Today       Code Description Service Date Service Provider Modifiers Qty    [id number] HC OT EVAL MOD COMPLEXITY 3 12/1/2024 Maira Cervantes OT GO 1                 Maira Cervantes OT  12/1/2024    Electronically signed by Maira Cervantes OT at 12/01/24 1302

## 2024-12-01 NOTE — NURSING NOTE
This nurse administered guaifenesin coedine 10mL on 11/30 at 0530 and did not properly document the dose administration. The dose is now properly charted and pharmacy has been notified.

## 2024-12-01 NOTE — PLAN OF CARE
Goal Outcome Evaluation:  Plan of Care Reviewed With: patient           Outcome Evaluation: Pt is a 79 y.o. year old female admitted to [facility] on 11/29/24 with new onset of muscle aches and pains, dry hacking cough progressing to a productive cough with yellow sputum with some pleuritic chest pain. Chest XR positive for mild pneumonia.    Pmhx significant for cad, copd, asthma, psoriasis, spinal stenosis, PE. COPD, pulmonary hypertension, chronic respiratory failure dependent on supplemental oxygen, CHF, atrial fibrillation on warfarin, hypothyroidism, CAD s/p PCI stenting LAD, hypotension, hyperlipidemia, CKD stage 3, history of PE    At baseline, pt lives with family in single level home w/ 4 stairs to enter and B handrails. Pt reports her family is always with her ascending/descending stairs when needed. Pt uses rollator wx around home and is on continuous 2LO2 at home. She uses a motorized scooter for community distances. Pt reports frequent falls when asked about recent hx of falls but wasn't able to quantify how many.     At [facility], pt is A&Ox3 and experiencing severe dizziness. Nursing informed as pt's dizziness is a significant limiting factor at [facility]. Pt reports that she experiences constantly fluctuating intensity of dizziness regularly. Pt was able to stand with Adonay x2 for safety due to severe dizziness and maintain standing xapprox 1-2 minutes. Pt sensitive to even slight perturbations, requiring constant CGA to min A for balance with FWW for support. Returned to sitting following incontinence of bladder while standing, completed standing again to don brief with max A for LB Dressing. OT to follow while inpatient and recommends d/c to SNF at this time unless improvement in dizziness facilitates increased participation in activities.    Anticipated Discharge Disposition (OT): skilled nursing facility

## 2024-12-01 NOTE — PLAN OF CARE
Goal Outcome Evaluation:  Plan of Care Reviewed With: patient           Outcome Evaluation: Pt is a 79 y.o. year old female admitted to [facility] on 11/29/24 with new onset of muscle aches and pains, dry hacking cough progressing to a productive cough with yellow sputum with some pleuritic chest pain. CXR + for  pneumonia. Pmhx significant for cad, copd, asthma, psoriasis, spinal stenosis, PE. COPD, pulmonary hypertension, chronic respiratory failure dependent on supplemental oxygen, CHF, atrial fibrillation on warfarin, hypothyroidism, CAD s/p PCI stenting LAD, hypotension, hyperlipidemia, CKD stage 3, history of PE At baseline, pt lives with family in single level home w/ 4 stairs to enter with HR. Pt reports her family is always with her ascending/descending stairs when needed. Pt uses rollator wx around home and is on continuous  supp. O2 at home. She uses a motorized scooter for community distances. Pt reports frequent falls when asked about recent hx of falls but wasn't able to quantify how many. At [facility], pt is A&Ox3 and experiencing severe dizziness. Nursing informed as pt's dizziness is a significant limiting factor at [facility]. Pt reports that she experiences constantly fluctuating intensity of dizziness regularly (although this not mentioned in prior or current MD notes in EP[facility]). Duration, onset and etiology of vertigo is unclear. She denies hearing changes. Describes as room spinning. No nystagmus is present at rest or with position changes. Modified R Viridiana [facility] [place] was poorly tolerated due to neck pain/ROM limitations.  Pt was able to stand with Adonay x2 for safety due to severe dizziness and maintain standing approx 1-2 minutes. Pt sensitive to even slight perturbations, requiring constant CGA to min A for balance with FWW for support. Returned to sitting following incontinence of bladder while standing. PT to follow while inpatient and recommends d/c to SNF at this time unless improvement in dizziness facilitates  increased participation in activities. Pt is a very high fall risk at this time.    Anticipated Discharge Disposition (PT): skilled nursing facility

## 2024-12-01 NOTE — PLAN OF CARE
Problem: Adult Inpatient Plan of Care  Goal: Plan of Care Review  Outcome: Progressing  Flowsheets (Taken 12/1/2024 1655)  Outcome Evaluation: Pt remains on IV antibiotics.  Goal: Patient-Specific Goal (Individualized)  Outcome: Progressing  Goal: Absence of Hospital-Acquired Illness or Injury  Outcome: Progressing  Intervention: Identify and Manage Fall Risk  Description: Perform standard risk assessment on admission using a validated tool or comprehensive approach appropriate to the patient; reassess fall risk frequently, with change in status or transfer to another level of care.  Communicate risk to interprofessional healthcare team; ensure fall risk visible cue.  Determine need for increased observation, equipment and environmental modification, as well as use of supportive, nonskid footwear.  Adjust safety measures to individual needs and identified risk factors.  Reinforce the importance of active participation with fall risk prevention, safety, and physical activity with the patient and family.  Perform regular intentional rounding to assess need for position change, pain assessment and personal needs, including assistance with toileting.  Recent Flowsheet Documentation  Taken 12/1/2024 1653 by Elly Fall LPN  Safety Promotion/Fall Prevention:   assistive device/personal items within reach   clutter free environment maintained   nonskid shoes/slippers when out of bed   room organization consistent   safety round/check completed  Taken 12/1/2024 1400 by Elly Fall LPN  Safety Promotion/Fall Prevention:   assistive device/personal items within reach   clutter free environment maintained   fall prevention program maintained   nonskid shoes/slippers when out of bed   room organization consistent   safety round/check completed  Taken 12/1/2024 1200 by Elly Fall LPN  Safety Promotion/Fall Prevention:   assistive device/personal items within reach   clutter free environment maintained    nonskid shoes/slippers when out of bed   fall prevention program maintained   room organization consistent   safety round/check completed  Taken 12/1/2024 1048 by Elly Fall LPN  Safety Promotion/Fall Prevention:   assistive device/personal items within reach   clutter free environment maintained   fall prevention program maintained   nonskid shoes/slippers when out of bed   room organization consistent   safety round/check completed  Goal: Optimal Comfort and Wellbeing  Outcome: Progressing  Intervention: Monitor Pain and Promote Comfort  Description: Assess pain level, treatment efficacy and patient response at regular intervals using a consistent pain scale.  Consider the presence and impact of preexisting chronic pain.  Encourage patient and caregiver involvement in pain assessment, interventions and safety measures.  Promote activity; balance with sleep and rest to enhance healing.  Recent Flowsheet Documentation  Taken 12/1/2024 1302 by Elly Flal LPN  Pain Management Interventions: pain medication given  Intervention: Provide Person-Centered Care  Description: Use a family-focused approach to care; encourage support system presence and participation.  Develop trust and rapport by proactively providing information, encouraging questions, addressing concerns and offering reassurance.  Acknowledge emotional response to hospitalization.  Recognize and utilize personal coping strategies and strengths; develop goals via shared decision-making.  Honor spiritual and cultural preferences.  Recent Flowsheet Documentation  Taken 12/1/2024 0812 by Elly Fall LPN  Trust Relationship/Rapport: care explained  Goal: Readiness for Transition of Care  Outcome: Progressing     Problem: Comorbidity Management  Goal: Maintenance of COPD Symptom Control  Outcome: Progressing     Problem: Skin Injury Risk Increased  Goal: Skin Health and Integrity  Outcome: Progressing  Intervention: Optimize Skin  Protection  Description: Perform a full pressure injury risk assessment, as indicated by screening, upon admission to care unit.  Reassess skin (full inspection and injury risk, including skin temperature, consistency and color) frequently (e.g., scheduled interval, with change in condition) to provide optimal early detection and prevention.  Maintain adequate tissue perfusion (e.g., encourage fluid balance; avoid crossing legs, constrictive clothing or devices) to promote tissue oxygenation.  Maintain head of bed at lowest degree of elevation tolerated, considering medical condition and other restrictions. Use positioning supports to prevent sliding and friction. Consider low friction textiles.  Avoid positioning onto an area that remains reddened or on bony prominences.  Minimize incontinence and moisture (e.g., toileting schedule; moisture-wicking pad, diaper or incontinence collection device; skin moisture barrier).  Cleanse skin promptly and gently, when soiled, utilizing a pH-balanced cleanser.  Relieve and redistribute pressure (e.g., scheduled position changes, weight shifts, use of support surface, medical device repositioning, protective dressing application, use of positioning device, microclimate control, use of pressure-injury-monitor  Encourage increased activity, such as sitting in a chair at the bedside or early mobilization, when able to tolerate. Avoid prolonged sitting.  Recent Flowsheet Documentation  Taken 12/1/2024 1200 by Elly Fall LPN  Activity Management: up in chair  Taken 12/1/2024 1048 by Elly Fall LPN  Activity Management: up in chair  Taken 12/1/2024 0812 by Elly Fall LPN  Activity Management: up in chair   Goal Outcome Evaluation:              Outcome Evaluation: Pt remains on IV antibiotics.

## 2024-12-01 NOTE — PROGRESS NOTES
[facility] MEDICINE SERVICE  DAILY PROGRESS NOTE    NAME: Selina Vazquez  : 1945  MRN: [id number]      LOS: 1 day     PROVIDER OF SERVICE: Tae Ellis MD    Chief Complaint: Multifocal pneumonia    Subjective:     Interval History:  History taken from: patient    New complaint today of dizziness while she was getting up working with PT.  Orthostatics negative.  Patient is severely deconditioned.        Review of Systems:   Review of Systems    Objective:     Vital Signs  Temp:  [97.4 °F (36.3 °C)-98 °F (36.7 °C)] 97.4 °F (36.3 °C)  Heart Rate:  [67-98] 76  Resp:  [13-24] 16  BP: (127-147)/(75-79) 127/79  Flow (L/min) (Oxygen Therapy):  [2] 2   Body mass index is 41.01 kg/m².    Physical Exam  Physical Exam  Constitutional:       Appearance: Normal appearance.   Cardiovascular:      Rate and Rhythm: Normal rate.      Pulses: Normal pulses.      Heart sounds: Normal heart sounds.   Pulmonary:      Effort: Pulmonary effort is normal.      Breath sounds: Normal breath sounds.   Abdominal:      General: Abdomen is flat.      Palpations: Abdomen is soft.   Neurological:      Mental Status: She is alert. Mental status is at baseline.   Psychiatric:         Mood and Affect: Mood normal.            Diagnostic Data    Results from last 7 days   Lab Units 24  0144   WBC 10*3/mm3 12.52*   HEMOGLOBIN g/dL 12.6   HEMATOCRIT % 40.8   PLATELETS 10*3/mm3 234   GLUCOSE mg/dL 263*   CREATININE mg/dL 1.11*   BUN mg/dL 25*   SODIUM mmol/L 140   POTASSIUM mmol/L 5.0   AST (SGOT) U/L 48*   ALT (SGPT) U/L 64*   ALK PHOS U/L 75   BILIRUBIN mg/dL 0.2   ANION GAP mmol/L 11.4       XR Chest 1 View    Result Date: 2024  Impression: Streaky bibasilar airspace opacities representing atelectasis or mild pneumonia. Electronically Signed: Ivan Licea  2024 12:33 PM EST  Workstation ID: [id number]       I reviewed the patient's new clinical results.    Assessment/Plan:     Active and Resolved Problems  Active  Hospital Problems    Diagnosis  POA    **Multifocal pneumonia [J18.9]  Yes      Resolved Hospital Problems   No resolved problems to display.     Acute on chronic intermittent dizziness with PT  Orthostats are negative, patient very deconditioned her baseline ambulation is poor.  Neurovascularly intact.  Seems most likely to her acute infectious state.  Trial of meclizine  Might benefit from balance training outside the hospital since this is a chronic issue for her    Community-acquired pneumonia  COPD  Generalized weakness  - Respiratory panel negative  - Chest x-ray shows streaky bibasilar opacities representing atelectasis or mild pneumonia  -Continue IV Rocephin  - Afebrile  - WBC 14.6, Daily CBCs  - Tessalon Perles started, per RN not effective  - Start codeine cough syrup as needed  -Patient is on her baseline amount of oxygen.     Atrial fibrillation  Hypertension  Anemia  Mitral valve disorder  - Continue home warfarin, pharmacy to dose  - Telemetry  - Blood pressure 142/72  - Reports no home antihypertensives     Hypothyroidism  - Continue Synthroid  - TSH 0.045 on 8/30/2024    VTE Prophylaxis:  Pharmacologic VTE prophylaxis orders are present.             Disposition Planning:     Barriers to Discharge: PT eval and recommendations on placement.  Improvement in her pneumonia symptoms  Anticipated Date of Discharge: 12/2/2024  Place of Discharge: Return to her facility versus rehab      Time: 50 minutes     There are no questions and answers to display.       Signature: Electronically signed by Tae Ellis MD, 12/01/24, 12:15 EST.  [facility] Hospitalist Team

## 2024-12-01 NOTE — PROGRESS NOTES
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    INR Goal: 2 - 3  Home medication?: warfarin 3 mg PO daily (note recent admission 11/25-27 at [facility], discharged on methylprednisolone, cephalexin, doxycyline)  Bridge Therapy Present?:  No  Interacting Medications Evaluation (New/Present/Discontinued): ceftriaxone (11/29-current, may increase INR); methylpred 80 mg x 11/29 (may increase INR)  Additional Contributing Factors: acute illness; AST/ALT elevation      Assessment/Plan:    INR therapeutic; continue previous maintenance regimen of 3 mg daily for now.     Continue to monitor and adjust based on INR.         Date 11/29 11/30 12/1         INR 2.15 2.28 2.14         Dose ? 3 mg 3 mg             Objective:  [Ht: 152.4 cm (60\"); Wt: 95.3 kg (210 lb); BMI: Body mass index is 41.01 kg/m².]    Lab Results   Component Value Date    ALBUMIN 3.6 11/30/2024     Lab Results   Component Value Date    INR 2.14 12/01/2024    INR 2.28 11/30/2024    INR 2.15 11/29/2024    PROTIME 24.0 12/01/2024    PROTIME 25.0 11/30/2024    PROTIME 24.1 11/29/2024     Lab Results   Component Value Date    HGB 12.6 11/30/2024    HGB 13.5 11/29/2024    HGB 12.1 09/24/2024     Lab Results   Component Value Date    HCT 40.8 11/30/2024    HCT 43.4 11/29/2024    HCT 37.9 09/24/2024       Lena Reese, PharmD, BCPS  12/01/24 14:27 EST    "

## 2024-12-01 NOTE — THERAPY EVALUATION
Patient Name: Selina Vazquez  : 1945    MRN: [id number]                              Today's Date: 2024       Admit Date: 2024    Visit Dx:     ICD-10-CM ICD-9-CM   1. Multifocal pneumonia  J18.9 486   2. Acute severe exacerbation of asthma  J45.901 493.92   3. Hypoxemia  R09.02 799.02     Patient Active Problem List   Diagnosis    Adenomatous polyp of colon    Allergic rhinitis    Asthma    Cervical disc disease    Chronic obstructive pulmonary disease    Coronary artery disease involving native coronary artery of native heart without angina pectoris    Degeneration of intervertebral disc of lumbar region    Depression    Mixed hyperlipidemia    Essential hypertension    Hypertensive cardiovascular disease    Lichen planus    Mitral valve disorder    Class 3 severe obesity due to excess calories with serious comorbidity and body mass index (BMI) of 40.0 to 44.9 in adult    Osteoarthritis, generalized    Postmenopausal osteoporosis    Psoriasis    Pulmonary hypertension, primary    Restless leg syndrome    Seborrhea    Spinal stenosis of cervical region    Urinary incontinence    Vaginitis, atrophic    Chronic renal insufficiency, stage III (moderate)    Pelvic pain    Multiple tracheobronchial mucus plugs    Pulmonary embolism    ASVD (arteriosclerotic vascular disease)    Supratherapeutic INR    Leukocytosis    Atrial fibrillation with rapid ventricular response    Esophageal motility disorder    Chronic diastolic (congestive) heart failure    Cor pulmonale (chronic)    Difficulty in walking, not elsewhere classified    Major depressive disorder, recurrent, in partial remission    Panlobular emphysema    Personal history of pneumonia (recurrent)    Presence of coronary angioplasty implant and graft    Severe persistent asthma with (acute) exacerbation    Stress incontinence (female) (male)    Multifocal pneumonia     Past Medical History:   Diagnosis Date    Adenomatous polyp of colon      Allergic rhinitis     Asthma     Cervical disc disease     Depression     Fracture of thoracic spine 11/16/2011    Lacunar infarction 08/07/2019    Mitral valve disorder     Postmenopausal osteoporosis     Restless leg syndrome      Past Surgical History:   Procedure Laterality Date    APPENDECTOMY OPEN      BRONCHOSCOPY N/A 06/08/2023    Procedure: BRONCHOSCOPY WITH BRONCHIAL WASHING;  Surgeon: Brenda Zuniga MD;  Location: [facility] ENDOSCOPY;  Service: Pulmonary;  Laterality: N/A;  Post: PNEUMONIA    BRONCHOSCOPY N/A 6/19/2024    Procedure: BRONCHOSCOPY WITH RIGHT LOWER LOBE BRONCHOALVEOLAR LAVAGE;  Surgeon: Brenda Zuniga MD;  Location: [facility] ENDOSCOPY;  Service: Pulmonary;  Laterality: N/A;    CARDIAC CATHETERIZATION      CHOLECYSTECTOMY      CORONARY STENT PLACEMENT      THYROIDECTOMY      TOTAL ABDOMINAL HYSTERECTOMY WITH SALPINGO OOPHORECTOMY        General Information       [facility] Name 12/01/24 1626          Physical Therapy Time and Intention    Document Type evaluation  -     Mode of Treatment physical therapy  -[facility] Name 12/01/24 1626          General Information    Patient Profile Reviewed yes  -     Prior Level of Function --  assist levels vary based on pt's status - fluctuating dizziness. She reports son is helpful. Pt uses a rollator.  -     Existing Precautions/Restrictions fall  -     Barriers to Rehab medically complex;previous functional deficit  -[facility] Name 12/01/24 1626          Living Environment    People in Home child(xiomy), adult  -[facility] Name 12/01/24 1626          Home Main Entrance    Number of Stairs, Main Entrance four  -     Stair Railings, Main Entrance railings safe and in good condition  -[facility] Name 12/01/24 1626          Stairs Within Home, Primary    Number of Stairs, Within Home, Primary none  -[facility] Name 12/01/24 1626          Cognition    Orientation Status (Cognition) oriented x 3  -[facility] Name 12/01/24 1626          Safety  Issues/Impairments Affecting Functional Mobility    Impairments Affecting Function (Mobility) balance;coordination;endurance/activity tolerance;pain;postural/trunk control;strength;shortness of breath  -               User Key  (r) = Recorded By, (t) = Taken By, (c) = Cosigned By      Initials Name Provider Type     Jazmin Terry, PT Physical Therapist                   Mobility       [facility] Name 12/01/24 1629          Bed Mobility    Comment, (Bed Mobility) not tested, pt up in chair.  -[facility] Name 12/01/24 1629          Bed-Chair Transfer    Bed-Chair [place] (Transfers) --  -[facility] Name 12/01/24 1629          Sit-Stand Transfer    Sit-Stand [place] (Transfers) minimum assist (75% patient effort);2 person assist  -     Assistive Device (Sit-Stand Transfers) walker, front-wheeled  -     Comment, (Sit-Stand Transfer) impaired standing balance with rotational postural sway  -[facility] Name 12/01/24 1629          Gait/Stairs (Locomotion)    Patient was able to Ambulate no, other medical factors prevent ambulation  -     Reason Patient was unable to Ambulate Dizziness  nausea, dry heaving, and incontinence.  -               User Key  (r) = Recorded By, (t) = Taken By, (c) = Cosigned By      Initials Name Provider Type     Jazmin Terry, WOJCIECH Physical Therapist                   Obj/Interventions       [facility] Name 12/01/24 1631          Range of Motion Comprehensive    General Range of Motion bilateral lower extremity ROM WFL  -AH       Row Name 12/01/24 1631          Strength Comprehensive (MMT)    General Manual Muscle Testing (MMT) Assessment lower extremity strength deficits identified  -     Comment, General Manual Muscle Testing (MMT) Assessment grossly 3+/5  -[facility] Name 12/01/24 1631          Motor Skills    Motor Skills functional endurance  -     Functional Endurance poor - limited by dizziness and nausea  -AH       Row Name 12/01/24 1631          Balance    Balance  "Assessment sitting static balance;sitting dynamic balance;standing static balance;standing dynamic balance  -     Static Sitting Balance standby assist  -     Dynamic Sitting Balance minimal assist  -     Position, Sitting Balance unsupported;sitting in chair  -     Static Standing Balance minimal assist  -     Position/Device Used, Standing Balance supported;walker, front-wheeled  -       Row Name 12/01/24 1631          Sensory Assessment (Somatosensory)    Sensory Assessment (Somatosensory) other (see comments)  pt with \"room spinning\" dizziness which she reports at rest and with activity. Rates 7/10 at rest and with activity. Pt is able to visually scan in all planes. VOR testing is not tolerated today - pt guarding due to neck pain and with repeated cough.  -               User Key  (r) = Recorded By, (t) = Taken By, (c) = Cosigned By      Initials Name Provider Type     Jazmin Terry, WOJCIECH Physical Therapist                   Goals/Plan       Row Name 12/01/24 1646          Bed Mobility Goal 1 (PT)    Activity/Assistive Device (Bed Mobility Goal 1, PT) bed mobility activities, all  -     [place] Level/Cues Needed (Bed Mobility Goal 1, PT) standby assist  -AH     Time Frame (Bed Mobility Goal 1, PT) long term goal (LTG);2 weeks  -       Row Name 12/01/24 1646          Transfer Goal 1 (PT)    Activity/Assistive Device (Transfer Goal 1, PT) transfers, all;sit-to-stand/stand-to-sit;bed-to-chair/chair-to-bed;toilet;walker, rolling  -AH     [place] Level/Cues Needed (Transfer Goal 1, PT) standby assist  -AH     Time Frame (Transfer Goal 1, PT) long term goal (LTG);2 weeks  -       Row Name 12/01/24 1646          Gait Training Goal 1 (PT)    Activity/Assistive Device (Gait Training Goal 1, PT) gait (walking locomotion);assistive device use;walker, rolling  -AH     [place] Level (Gait Training Goal 1, PT) standby assist  -AH     Time Frame (Gait Training Goal 1, PT) long term goal " (LTG);2 weeks  -[facility] Name 12/01/24 1646          Balance Goal 1 (PT)    Activity/Assistive Device (Balance Goal) standing static balance;standing dynamic balance  -     [place] Level/Cues Needed (Balance Goal 1, PT) supervision required  -     Time Frame (Balance Goal 1, PT) long-term goal (LTG);2 weeks  -[facility] Name 12/01/24 1646          Problem Specific Goal 1 (PT)    Problem Specific Goal 1 (PT) Pt will complete all mobility activites without c/o vertigo/dizziness.  -     Time Frame (Problem Specific Goal 1, PT) long-term goal (LTG);2 weeks  -[facility] Name 12/01/24 1646          Therapy Assessment/Plan (PT)    Planned Therapy Interventions (PT) balance training;bed mobility training;gait training;transfer training;stair training;patient/family education;neuromuscular re-education;vestibular therapy;strengthening;home exercise program  -               User Key  (r) = Recorded By, (t) = Taken By, (c) = Cosigned By      Initials Name Provider Type     Jazmin Terry, PT Physical Therapist                   Clinical Impression       [facility] Name 12/01/24 1635          Pain    Pretreatment Pain Rating 7/10  -     Posttreatment Pain Rating 8/10  -     Pain Location head;neck  -     Response to Pain Interventions activity participation with increased pain  -[facility] Name 12/01/24 1635          Plan of Care Review    Plan of Care Reviewed With patient  -     Outcome Evaluation Pt is a 79 y.o. year old female admitted to [facility] on 11/29/24 with new onset of muscle aches and pains, dry hacking cough progressing to a productive cough with yellow sputum with some pleuritic chest pain. CXR + for  pneumonia. Pmhx significant for cad, copd, asthma, psoriasis, spinal stenosis, PE. COPD, pulmonary hypertension, chronic respiratory failure dependent on supplemental oxygen, CHF, atrial fibrillation on warfarin, hypothyroidism, CAD s/p PCI stenting LAD, hypotension, hyperlipidemia, CKD stage 3,  history of PE At baseline, pt lives with family in single level home w/ 4 stairs to enter with HR. Pt reports her family is always with her ascending/descending stairs when needed. Pt uses rollator wx around home and is on continuous  supp. O2 at home. She uses a motorized scooter for community distances. Pt reports frequent falls when asked about recent hx of falls but wasn't able to quantify how many. At [facility], pt is A&Ox3 and experiencing severe dizziness. Nursing informed as pt's dizziness is a significant limiting factor at [facility]. Pt reports that she experiences constantly fluctuating intensity of dizziness regularly (although this not mentioned in prior or current MD notes in EPIC). Duration, onset and etiology of vertigo is unclear. She denies hearing changes. Describes as room spinning. No nystagmus is present at rest or with position changes. Modified R [place] was poorly tolerated due to neck pain/ROM limitations.  Pt was able to stand with [place] x2 for safety due to severe dizziness and maintain standing approx 1-2 minutes. Pt sensitive to even slight perturbations, requiring constant CGA to min A for balance with FWW for support. Returned to sitting following incontinence of bladder while standing. PT to follow while inpatient and recommends d/c to SNF at this time unless improvement in dizziness facilitates increased participation in activities. Pt is a very high fall risk at this time.  -       Row Name 12/01/24 0322          Therapy Assessment/Plan (PT)    Patient/Family Therapy Goals Statement (PT) no more dizziness.  -     Rehab Potential (PT) good  -     Criteria for Skilled Interventions Met (PT) yes;meets criteria  -     Therapy Frequency (PT) 5 times/wk  -     Predicted Duration of Therapy Intervention (PT) until DC or goals met  -       Row Name 12/01/24 1635          Vital Signs    Pre Systolic BP Rehab 147  -     Pre Treatment Diastolic BP 79  reclined  -     Intra Systolic  BP Rehab 138  -AH     Intra Treatment Diastolic BP 72  -AH     Post Systolic BP Rehab 127  -AH     Post Treatment Diastolic BP 79  -AH     Pretreatment Heart Rate (beats/min) 77  -AH     Intratreatment Heart Rate (beats/min) 75  -AH     Posttreatment Heart Rate (beats/min) 89  -AH     Intra Patient Position Sitting  -AH     Post Patient Position Standing  -AH       Row Name 12/01/24 1635          Positioning and Restraints    Pre-Treatment Position sitting in chair/recliner  -     Post Treatment Position chair  -AH     In Chair notified nsg;reclined;sitting;call light within reach;encouraged to call for assist;exit alarm on;with family/caregiver  -               User Key  (r) = Recorded By, (t) = Taken By, (c) = Cosigned By      Initials Name Provider Type    Jazmin Andersen, WOJCIECH Physical Therapist                   Outcome Measures       Row Name 12/01/24 1647 12/01/24 0812       How much help from another person do you currently need...    Turning from your back to your side while in flat bed without using bedrails? 3  - 3  -EG    Moving from lying on back to sitting on the side of a flat bed without bedrails? 3  - 3  -EG    Moving to and from a bed to a chair (including a wheelchair)? 3  - 3  -EG    Standing up from a chair using your arms (e.g., wheelchair, bedside chair)? 3  - 3  -EG    Climbing 3-5 steps with a railing? 1  - 2  -EG    To walk in hospital room? 1  - 2  -EG    AM-PAC 6 Clicks Score (PT) 14  - 16  -EG    Highest Level of Mobility Goal 4 --> Transfer to chair/commode  - 5 --> Static standing  -EG      Row Name 12/01/24 1647 12/01/24 1259       Functional Assessment    Outcome Measure Options AM-PAC 6 Clicks Basic Mobility (PT)  - AM-PAC 6 Clicks Daily Activity (OT)  -KT              User Key  (r) = Recorded By, (t) = Taken By, (c) = Cosigned By      Initials Name Provider Type    EG Elly Fall LPN Licensed Nurse    Jazmin Andersen, WOJCIECH Physical Therapist    KT  Maira Cervantes, OT Occupational Therapist                                 Physical Therapy Education       Title: PT OT SLP Therapies (Done)       Topic: Physical Therapy (Done)       Point: Mobility training (Done)       Learning Progress Summary            Patient Acceptance, E, VU,NR by  at 12/1/2024 1647                      Point: Home exercise program (Done)       Learning Progress Summary            Patient Acceptance, E, VU,NR by  at 12/1/2024 1647                      Point: Body mechanics (Done)       Learning Progress Summary            Patient Acceptance, E, VU,NR by  at 12/1/2024 1647                      Point: Precautions (Done)       Learning Progress Summary            Patient Acceptance, E, VU,NR by  at 12/1/2024 1647                                      User Key       Initials Effective Dates Name Provider Type Discipline     12/04/23 -  Jazmin Terry, PT Physical Therapist PT                  PT Recommendation and Plan  Planned Therapy Interventions (PT): balance training, bed mobility training, gait training, transfer training, stair training, patient/family education, neuromuscular re-education, vestibular therapy, strengthening, home exercise program  Outcome Evaluation: Pt is a 79 y.o. year old female admitted to [facility] on 11/29/24 with new onset of muscle aches and pains, dry hacking cough progressing to a productive cough with yellow sputum with some pleuritic chest pain. CXR + for  pneumonia. Pmhx significant for cad, copd, asthma, psoriasis, spinal stenosis, PE. COPD, pulmonary hypertension, chronic respiratory failure dependent on supplemental oxygen, CHF, atrial fibrillation on warfarin, hypothyroidism, CAD s/p PCI stenting LAD, hypotension, hyperlipidemia, CKD stage 3, history of PE At baseline, pt lives with family in single level home w/ 4 stairs to enter with HR. Pt reports her family is always with her ascending/descending stairs when needed. Pt uses rollator wx around  home and is on continuous  supp. O2 at home. She uses a motorized scooter for community distances. Pt reports frequent falls when asked about recent hx of falls but wasn't able to quantify how many. At [facility], pt is A&Ox3 and experiencing severe dizziness. Nursing informed as pt's dizziness is a significant limiting factor at [facility]. Pt reports that she experiences constantly fluctuating intensity of dizziness regularly (although this not mentioned in prior or current MD notes in EPIC). Duration, onset and etiology of vertigo is unclear. She denies hearing changes. Describes as room spinning. No nystagmus is present at rest or with position changes. Modified R [place] was poorly tolerated due to neck pain/ROM limitations.  Pt was able to stand with [place] x2 for safety due to severe dizziness and maintain standing approx 1-2 minutes. Pt sensitive to even slight perturbations, requiring constant CGA to min A for balance with FWW for support. Returned to sitting following incontinence of bladder while standing. PT to follow while inpatient and recommends d/c to SNF at this time unless improvement in dizziness facilitates increased participation in activities. Pt is a very high fall risk at this time.     Time Calculation:         PT Charges       Row Name 12/01/24 1647             Time Calculation    Start Time 0951  -      Stop Time 1028  -      Time Calculation (min) 37 min  -      PT Non-Billable Time (min) 0 min  -      PT Received On 12/01/24  -      PT - Next Appointment 12/02/24  -      PT Goal Re-Cert Due Date 12/15/24  -         Time Calculation- PT    Total Timed Code Minutes- PT 10 minute(s)  -                User Key  (r) = Recorded By, (t) = Taken By, (c) = Cosigned By      Initials Name Provider Type     Jazmin Terry PT Physical Therapist                  Therapy Charges for Today       Code Description Service Date Service Provider Modifiers Qty    [id number]  PT [facility] MOD  COMPLEXITY 4 12/1/2024 Jazmin Terry, PT GP 1    [id number] HC PT THERAPEUTIC ACT EA 15 MIN 12/1/2024 Jazmin Terry, PT GP 1            PT G-Codes  Outcome Measure Options: AM-PAC 6 Clicks Basic Mobility (PT)  AM-PAC 6 Clicks Score (PT): 14  AM-PAC 6 Clicks Score (OT): 14  PT Discharge Summary  Anticipated Discharge Disposition (PT): skilled nursing facility    Jazmin Terry PT  12/1/2024

## 2024-12-01 NOTE — THERAPY EVALUATION
Patient Name: Selina Vazquez  : 1945    MRN: [id number]                              Today's Date: 2024       Admit Date: 2024    Visit Dx:     ICD-10-CM ICD-9-CM   1. Multifocal pneumonia  J18.9 486   2. Acute severe exacerbation of asthma  J45.901 493.92   3. Hypoxemia  R09.02 799.02     Patient Active Problem List   Diagnosis    Adenomatous polyp of colon    Allergic rhinitis    Asthma    Cervical disc disease    Chronic obstructive pulmonary disease    Coronary artery disease involving native coronary artery of native heart without angina pectoris    Degeneration of intervertebral disc of lumbar region    Depression    Mixed hyperlipidemia    Essential hypertension    Hypertensive cardiovascular disease    Lichen planus    Mitral valve disorder    Class 3 severe obesity due to excess calories with serious comorbidity and body mass index (BMI) of 40.0 to 44.9 in adult    Osteoarthritis, generalized    Postmenopausal osteoporosis    Psoriasis    Pulmonary hypertension, primary    Restless leg syndrome    Seborrhea    Spinal stenosis of cervical region    Urinary incontinence    Vaginitis, atrophic    Chronic renal insufficiency, stage III (moderate)    Pelvic pain    Multiple tracheobronchial mucus plugs    Pulmonary embolism    ASVD (arteriosclerotic vascular disease)    Supratherapeutic INR    Leukocytosis    Atrial fibrillation with rapid ventricular response    Esophageal motility disorder    Chronic diastolic (congestive) heart failure    Cor pulmonale (chronic)    Difficulty in walking, not elsewhere classified    Major depressive disorder, recurrent, in partial remission    Panlobular emphysema    Personal history of pneumonia (recurrent)    Presence of coronary angioplasty implant and graft    Severe persistent asthma with (acute) exacerbation    Stress incontinence (female) (male)    Multifocal pneumonia     Past Medical History:   Diagnosis Date    Adenomatous polyp of colon      Allergic rhinitis     Asthma     Cervical disc disease     Depression     Fracture of thoracic spine 11/16/2011    Lacunar infarction 08/07/2019    Mitral valve disorder     Postmenopausal osteoporosis     Restless leg syndrome      Past Surgical History:   Procedure Laterality Date    APPENDECTOMY OPEN      BRONCHOSCOPY N/A 06/08/2023    Procedure: BRONCHOSCOPY WITH BRONCHIAL WASHING;  Surgeon: Brenda Zuniga MD;  Location: [facility] ENDOSCOPY;  Service: Pulmonary;  Laterality: N/A;  Post: PNEUMONIA    BRONCHOSCOPY N/A 6/19/2024    Procedure: BRONCHOSCOPY WITH RIGHT LOWER LOBE BRONCHOALVEOLAR LAVAGE;  Surgeon: Brenda Zuniga MD;  Location: [facility] ENDOSCOPY;  Service: Pulmonary;  Laterality: N/A;    CARDIAC CATHETERIZATION      CHOLECYSTECTOMY      CORONARY STENT PLACEMENT      THYROIDECTOMY      TOTAL ABDOMINAL HYSTERECTOMY WITH SALPINGO OOPHORECTOMY        General Information       Row Name 12/01/24 1231          OT Time and Intention    Subjective Information complains of;weakness;fatigue;nausea/vomiting;dizziness  -KT     Document Type evaluation  -KT     Mode of Treatment occupational therapy  -KT     Patient Effort good  -KT     Symptoms Noted During/After Treatment nausea;dizziness  -KT       Row Name 12/01/24 1231          General Information    Patient Profile Reviewed yes  -KT     Prior Level of Function min assist:;mod assist:;ADL's;max assist:;home management  -KT     Barriers to Rehab medically complex;previous functional deficit  -KT       Row Name 12/01/24 1231          Occupational Profile    Reason for Services/Referral (Occupational Profile) Pt is a 79 y.o. year old female admitted to [facility] on 11/29/24 with new onset of muscle aches and pains, dry hacking cough progressing to a productive cough with yellow sputum with some pleuritic chest pain. Chest XR positive for mild pneumonia.    Pmhx significant for cad, copd, asthma, psoriasis, spinal stenosis, PE. COPD, pulmonary hypertension, chronic  respiratory failure dependent on supplemental oxygen, CHF, atrial fibrillation on warfarin, hypothyroidism, CAD s/p PCI stenting LAD, hypotension, hyperlipidemia, CKD stage 3, history of PE    At baseline, pt lives with family in single level home w/ 4 stairs to enter and B handrails. Pt reports her family is always with her ascending/descending stairs when needed. Pt uses rollator wx around home and is on continuous 2LO2 at home. She uses a motorized scooter for community distances. Pt reports frequent falls when asked about recent hx of falls but wasn't able to quantify how many.     At [facility], pt is A&Ox3 and experiencing severe dizziness. Nursing informed as pt's dizziness is a significant limiting factor at [facility]. Pt reports that she experiences constantly fluctuating intensity of dizziness regularly. Pt was able to stand with Adonay x2 for safety due to severe dizziness and maintain standing xapprox 1-2 minutes. Pt sensitive to even slight perturbations, requiring constant CGA to min A for balance with FWW for support. Returned to sitting following incontinence of bladder while standing, completed standing again to don brief with max A for LB Dressing. OT to follow while inpatient and recommends d/c to SNF at this time unless improvement in dizziness facilitates increased participation in activities.  -KT       Row Name 12/01/24 1231          Living Environment    People in Home child(xiomy), adult  -KT       Row Name 12/01/24 1231          Home Main Entrance    Number of Stairs, Main Entrance four  -KT     Stair Railings, Main Entrance railings safe and in good condition  -KT       Row Name 12/01/24 1231          Stairs Within Home, Primary    Number of Stairs, Within Home, Primary none  -KT       Row Name 12/01/24 1231          Cognition    Orientation Status (Cognition) oriented x 3  -KT       Row Name 12/01/24 1231          Safety Issues/Impairments Affecting Functional Mobility    Safety Issues Affecting  Function (Mobility) awareness of need for assistance;insight into deficits/self-awareness;judgment;safety precaution awareness  -KT     Impairments Affecting Function (Mobility) balance;coordination;endurance/activity tolerance;pain;postural/trunk control;strength;shortness of breath  -KT               User Key  (r) = Recorded By, (t) = Taken By, (c) = Cosigned By      Initials Name Provider Type    Maira Boyer OT Occupational Therapist                     Mobility/ADL's       Row Name 12/01/24 1255          Transfers    Transfers sit-stand transfer;stand-sit transfer  -KT       Row Name 12/01/24 1255          Sit-Stand Transfer    Sit-Stand [place] (Transfers) minimum assist (75% patient effort);2 person assist  -KT     Assistive Device (Sit-Stand Transfers) walker, front-wheeled  -KT       Row Name 12/01/24 1255          Stand-Sit Transfer    Stand-Sit [place] (Transfers) minimum assist (75% patient effort);2 person assist  -KT     Assistive Device (Stand-Sit Transfers) walker, front-wheeled  -KT       Row Name 12/01/24 1255          Functional Mobility    Functional Mobility- Ind. Level unable to perform  -[facility] Name 12/01/24 1255          Activities of Daily Living    BADL Assessment/Intervention lower body dressing;toileting  -       Row Name 12/01/24 1255          Lower Body Dressing Assessment/Training    [place] Level (Lower Body Dressing) lower body dressing skills;dependent (less than 25% patient effort)  -[facility] Name 12/01/24 1255          Toileting Assessment/Training    [place] Level (Toileting) toileting skills;maximum assist (25% patient effort)  -KT               User Key  (r) = Recorded By, (t) = Taken By, (c) = Cosigned By      Initials Name Provider Type    Maira Boyer OT Occupational Therapist                   Obj/Interventions       Row Name 12/01/24 1257          Sensory Assessment (Somatosensory)    Sensory Assessment (Somatosensory)  sensation intact  -KT       Row Name 12/01/24 1257          Vision Assessment/Intervention    Visual Impairment/Limitations WNL  -KT       Row Name 12/01/24 1257          Range of Motion Comprehensive    General Range of Motion no range of motion deficits identified  -KT       Row Name 12/01/24 1257          Strength Comprehensive (MMT)    General Manual Muscle Testing (MMT) Assessment upper extremity strength deficits identified  -KT     Comment, General Manual Muscle Testing (MMT) Assessment grossly 3+/5, impacted by nausea  -KT               User Key  (r) = Recorded By, (t) = Taken By, (c) = Cosigned By      Initials Name Provider Type    KT Maira Cervantes OT Occupational Therapist                   Goals/Plan       Row Name 12/01/24 1259          Transfer Goal 1 (OT)    Activity/Assistive Device (Transfer Goal 1, OT) commode, 3-in-1;toilet  -KT     [place] Level/Cues Needed (Transfer Goal 1, OT) moderate assist (50-74% patient effort)  -KT     Time Frame (Transfer Goal 1, OT) long term goal (LTG)  -KT       Row Name 12/01/24 1259          Dressing Goal 1 (OT)    Activity/Device (Dressing Goal 1, OT) lower body dressing  -KT     [place]/Cues Needed (Dressing Goal 1, OT) moderate assist (50-74% patient effort)  -KT     Time Frame (Dressing Goal 1, OT) long term goal (LTG)  -KT       Row Name 12/01/24 1259          Toileting Goal 1 (OT)    Activity/Device (Toileting Goal 1, OT) toileting skills, all  -KT     [place] Level/Cues Needed (Toileting Goal 1, OT) moderate assist (50-74% patient effort)  -KT     Time Frame (Toileting Goal 1, OT) long term goal (LTG)  -KT               User Key  (r) = Recorded By, (t) = Taken By, (c) = Cosigned By      Initials Name Provider Type    Maira Boyer OT Occupational Therapist                   Clinical Impression       Row Name 12/01/24 1258          Pain Assessment    Pretreatment Pain Rating 7/10  -KT     Posttreatment Pain Rating 8/10  -KT      Pain Location head;neck  -KT       Row Name 12/01/24 1258          Plan of Care Review    Plan of Care Reviewed With patient  -KT     Outcome Evaluation Pt is a 79 y.o. year old female admitted to [facility] on 11/29/24 with new onset of muscle aches and pains, dry hacking cough progressing to a productive cough with yellow sputum with some pleuritic chest pain. Chest XR positive for mild pneumonia.    Pmhx significant for cad, copd, asthma, psoriasis, spinal stenosis, PE. COPD, pulmonary hypertension, chronic respiratory failure dependent on supplemental oxygen, CHF, atrial fibrillation on warfarin, hypothyroidism, CAD s/p PCI stenting LAD, hypotension, hyperlipidemia, CKD stage 3, history of PE    At baseline, pt lives with family in single level home w/ 4 stairs to enter and B handrails. Pt reports her family is always with her ascending/descending stairs when needed. Pt uses rollator wx around home and is on continuous 2LO2 at home. She uses a motorized scooter for community distances. Pt reports frequent falls when asked about recent hx of falls but wasn't able to quantify how many.     At [facility], pt is A&Ox3 and experiencing severe dizziness. Nursing informed as pt's dizziness is a significant limiting factor at [facility]. Pt reports that she experiences constantly fluctuating intensity of dizziness regularly. Pt was able to stand with Adonay x2 for safety due to severe dizziness and maintain standing xapprox 1-2 minutes. Pt sensitive to even slight perturbations, requiring constant CGA to min A for balance with FWW for support. Returned to sitting following incontinence of bladder while standing, completed standing again to don brief with max A for LB Dressing. OT to follow while inpatient and recommends d/c to SNF at this time unless improvement in dizziness facilitates increased participation in activities.  -KT       Row Name 12/01/24 1258          Therapy Assessment/Plan (OT)    Rehab Potential (OT) fair  -KT      Criteria for Skilled Therapeutic Interventions Met (OT) yes;skilled treatment is necessary  -KT     Therapy Frequency (OT) 3 times/wk  -KT     Predicted Duration of Therapy Intervention (OT) until dc  -KT       Row Name 12/01/24 1258          Therapy Plan Review/Discharge Plan (OT)    Anticipated Discharge Disposition (OT) skilled nursing facility  -KT       Row Name 12/01/24 1258          Positioning and Restraints    Pre-Treatment Position sitting in chair/recliner  -KT     Post Treatment Position chair  -KT     In Chair call light within reach;encouraged to call for assist  -KT               User Key  (r) = Recorded By, (t) = Taken By, (c) = Cosigned By      Initials Name Provider Type    Maira Boyer OT Occupational Therapist                   Outcome Measures       Row Name 12/01/24 1259          How much help from another is currently needed...    Putting on and taking off regular lower body clothing? 1  -KT     Bathing (including washing, rinsing, and drying) 2  -KT     Toileting (which includes using toilet bed pan or urinal) 2  -KT     Putting on and taking off regular upper body clothing 2  -KT     Taking care of personal grooming (such as brushing teeth) 3  -KT     Eating meals 4  -KT     AM-PAC 6 Clicks Score (OT) 14  -KT       Row Name 12/01/24 1259          Functional Assessment    Outcome Measure Options AM-PAC 6 Clicks Daily Activity (OT)  -KT               User Key  (r) = Recorded By, (t) = Taken By, (c) = Cosigned By      Initials Name Provider Type    Maira Boyer OT Occupational Therapist                    Occupational Therapy Education        No education to display                  OT Recommendation and Plan  Therapy Frequency (OT): 3 times/wk  Plan of Care Review  Plan of Care Reviewed With: patient  Outcome Evaluation: Pt is a 79 y.o. year old female admitted to [facility] on 11/29/24 with new onset of muscle aches and pains, dry hacking cough progressing to a productive cough  with yellow sputum with some pleuritic chest pain. Chest XR positive for mild pneumonia.    Pmhx significant for cad, copd, asthma, psoriasis, spinal stenosis, PE. COPD, pulmonary hypertension, chronic respiratory failure dependent on supplemental oxygen, CHF, atrial fibrillation on warfarin, hypothyroidism, CAD s/p PCI stenting LAD, hypotension, hyperlipidemia, CKD stage 3, history of PE    At baseline, pt lives with family in single level home w/ 4 stairs to enter and B handrails. Pt reports her family is always with her ascending/descending stairs when needed. Pt uses rollator wx around home and is on continuous 2LO2 at home. She uses a motorized scooter for community distances. Pt reports frequent falls when asked about recent hx of falls but wasn't able to quantify how many.     At [facility], pt is A&Ox3 and experiencing severe dizziness. Nursing informed as pt's dizziness is a significant limiting factor at [facility]. Pt reports that she experiences constantly fluctuating intensity of dizziness regularly. Pt was able to stand with Adonay x2 for safety due to severe dizziness and maintain standing xapprox 1-2 minutes. Pt sensitive to even slight perturbations, requiring constant CGA to min A for balance with FWW for support. Returned to sitting following incontinence of bladder while standing, completed standing again to don brief with max A for LB Dressing. OT to follow while inpatient and recommends d/c to SNF at this time unless improvement in dizziness facilitates increased participation in activities.     Time Calculation:   Evaluation Complexity (OT)  Overall Complexity of Evaluation (OT): moderate complexity     Time Calculation- OT       Row Name 12/01/24 1300             Time Calculation- OT    OT Start Time 1011  -KT      OT Stop Time 1025  -KT      OT Time Calculation (min) 14 min  -KT      OT Received On 12/01/24  -KT      OT - Next Appointment 12/03/24  -KT      OT Goal Re-Cert Due Date 12/15/24  -KT                 User Key  (r) = Recorded By, (t) = Taken By, (c) = Cosigned By      Initials Name Provider Type    KT Maira Cervantes, OT Occupational Therapist                  Therapy Charges for Today       Code Description Service Date Service Provider Modifiers Qty    [id number]  OT EVAL MOD COMPLEXITY 3 12/1/2024 Maira Cervantes OT GO 1                 Maira Cervantes OT  12/1/2024

## 2024-12-02 ENCOUNTER — TELEPHONE (OUTPATIENT)
Dept: FAMILY MEDICINE CLINIC | Facility: CLINIC | Age: 79
End: 2024-12-02
Payer: MEDICARE

## 2024-12-02 LAB
ALBUMIN SERPL-MCNC: 3.6 G/DL (ref 3.5–5.2)
ALBUMIN/GLOB SERPL: 1.5 G/DL
ALP SERPL-CCNC: 83 U/L (ref 39–117)
ALT SERPL W P-5'-P-CCNC: 78 U/L (ref 1–33)
ANION GAP SERPL CALCULATED.3IONS-SCNC: 10.2 MMOL/L (ref 5–15)
AST SERPL-CCNC: 42 U/L (ref 1–32)
BILIRUB SERPL-MCNC: <0.2 MG/DL (ref 0–1.2)
BUN SERPL-MCNC: 35 MG/DL (ref 8–23)
BUN/CREAT SERPL: 24.6 (ref 7–25)
CALCIUM SPEC-SCNC: 9 MG/DL (ref 8.6–10.5)
CHLORIDE SERPL-SCNC: 102 MMOL/L (ref 98–107)
CO2 SERPL-SCNC: 29.8 MMOL/L (ref 22–29)
CREAT SERPL-MCNC: 1.42 MG/DL (ref 0.57–1)
DEPRECATED RDW RBC AUTO: 45.2 FL (ref 37–54)
EGFRCR SERPLBLD CKD-EPI 2021: 37.7 ML/MIN/1.73
ERYTHROCYTE [DISTWIDTH] IN BLOOD BY AUTOMATED COUNT: 13.5 % (ref 12.3–15.4)
GLOBULIN UR ELPH-MCNC: 2.4 GM/DL
GLUCOSE SERPL-MCNC: 164 MG/DL (ref 65–99)
HCT VFR BLD AUTO: 46.4 % (ref 34–46.6)
HGB BLD-MCNC: 14.2 G/DL (ref 12–15.9)
INR PPP: 2.17 (ref 2–3)
MCH RBC QN AUTO: 27.6 PG (ref 26.6–33)
MCHC RBC AUTO-ENTMCNC: 30.6 G/DL (ref 31.5–35.7)
MCV RBC AUTO: 90.1 FL (ref 79–97)
PLATELET # BLD AUTO: 268 10*3/MM3 (ref 140–450)
PMV BLD AUTO: 10 FL (ref 6–12)
POTASSIUM SERPL-SCNC: 5.1 MMOL/L (ref 3.5–5.2)
PROT SERPL-MCNC: 6 G/DL (ref 6–8.5)
PROTHROMBIN TIME: 24.2 SECONDS (ref 19.4–28.5)
RBC # BLD AUTO: 5.15 10*6/MM3 (ref 3.77–5.28)
SODIUM SERPL-SCNC: 142 MMOL/L (ref 136–145)
WBC NRBC COR # BLD AUTO: 13.21 10*3/MM3 (ref 3.4–10.8)

## 2024-12-02 PROCEDURE — 94799 UNLISTED PULMONARY SVC/PX: CPT

## 2024-12-02 PROCEDURE — 87205 SMEAR GRAM STAIN: CPT

## 2024-12-02 PROCEDURE — 87070 CULTURE OTHR SPECIMN AEROBIC: CPT

## 2024-12-02 PROCEDURE — 25010000002 CEFTRIAXONE PER 250 MG

## 2024-12-02 PROCEDURE — 85027 COMPLETE CBC AUTOMATED: CPT | Performed by: STUDENT IN AN ORGANIZED HEALTH CARE EDUCATION/TRAINING PROGRAM

## 2024-12-02 PROCEDURE — 80053 COMPREHEN METABOLIC PANEL: CPT | Performed by: STUDENT IN AN ORGANIZED HEALTH CARE EDUCATION/TRAINING PROGRAM

## 2024-12-02 PROCEDURE — 25010000002 MORPHINE PER 10 MG: Performed by: STUDENT IN AN ORGANIZED HEALTH CARE EDUCATION/TRAINING PROGRAM

## 2024-12-02 PROCEDURE — 85610 PROTHROMBIN TIME: CPT

## 2024-12-02 RX ORDER — HYDROXYZINE HYDROCHLORIDE 25 MG/1
50 TABLET, FILM COATED ORAL 3 TIMES DAILY PRN
Status: DISCONTINUED | OUTPATIENT
Start: 2024-12-02 | End: 2024-12-04 | Stop reason: HOSPADM

## 2024-12-02 RX ORDER — AMOXICILLIN 250 MG
1 CAPSULE ORAL 2 TIMES DAILY
Status: DISCONTINUED | OUTPATIENT
Start: 2024-12-02 | End: 2024-12-04 | Stop reason: HOSPADM

## 2024-12-02 RX ORDER — POLYETHYLENE GLYCOL 3350 17 G/17G
17 POWDER, FOR SOLUTION ORAL DAILY
Status: DISCONTINUED | OUTPATIENT
Start: 2024-12-02 | End: 2024-12-04 | Stop reason: HOSPADM

## 2024-12-02 RX ADMIN — SPIRONOLACTONE 25 MG: 25 TABLET ORAL at 08:44

## 2024-12-02 RX ADMIN — ASPIRIN 81 MG: 81 TABLET, COATED ORAL at 08:44

## 2024-12-02 RX ADMIN — CEFTRIAXONE 2000 MG: 2 INJECTION, POWDER, FOR SOLUTION INTRAMUSCULAR; INTRAVENOUS at 16:50

## 2024-12-02 RX ADMIN — POTASSIUM CHLORIDE 20 MEQ: 1500 TABLET, EXTENDED RELEASE ORAL at 08:44

## 2024-12-02 RX ADMIN — WARFARIN SODIUM 3 MG: 3 TABLET ORAL at 18:16

## 2024-12-02 RX ADMIN — MORPHINE SULFATE 2 MG: 2 INJECTION, SOLUTION INTRAMUSCULAR; INTRAVENOUS at 13:01

## 2024-12-02 RX ADMIN — FUROSEMIDE 40 MG: 40 TABLET ORAL at 08:44

## 2024-12-02 RX ADMIN — IPRATROPIUM BROMIDE AND ALBUTEROL SULFATE 3 ML: .5; 3 SOLUTION RESPIRATORY (INHALATION) at 11:22

## 2024-12-02 RX ADMIN — BENZONATATE 200 MG: 100 CAPSULE ORAL at 11:10

## 2024-12-02 RX ADMIN — ATORVASTATIN CALCIUM 40 MG: 40 TABLET, FILM COATED ORAL at 08:44

## 2024-12-02 RX ADMIN — HYDROXYZINE HYDROCHLORIDE 50 MG: 25 TABLET ORAL at 21:21

## 2024-12-02 RX ADMIN — HYDROXYZINE HYDROCHLORIDE 50 MG: 25 TABLET ORAL at 12:29

## 2024-12-02 RX ADMIN — SENNOSIDES AND DOCUSATE SODIUM 1 TABLET: 50; 8.6 TABLET ORAL at 21:21

## 2024-12-02 RX ADMIN — MECLIZINE HYDROCHLORIDE 25 MG: 25 TABLET ORAL at 10:00

## 2024-12-02 RX ADMIN — POLYETHYLENE GLYCOL 3350 17 G: 17 POWDER, FOR SOLUTION ORAL at 21:23

## 2024-12-02 RX ADMIN — IPRATROPIUM BROMIDE AND ALBUTEROL SULFATE 3 ML: .5; 3 SOLUTION RESPIRATORY (INHALATION) at 15:01

## 2024-12-02 RX ADMIN — LEVOTHYROXINE SODIUM 112 MCG: 0.11 TABLET ORAL at 08:44

## 2024-12-02 RX ADMIN — IPRATROPIUM BROMIDE AND ALBUTEROL SULFATE 3 ML: .5; 3 SOLUTION RESPIRATORY (INHALATION) at 19:01

## 2024-12-02 NOTE — PLAN OF CARE
Goal Outcome Evaluation:  Plan of Care Reviewed With: patient        Progress: improving  Outcome Evaluation: Cough freq. lessened through shift. Patient reports dizziness, even in sitting position. 02 in place.

## 2024-12-02 NOTE — CASE MANAGEMENT/SOCIAL WORK
Continued Stay Note  MIRYAM Edouard     Patient Name: Selina Vazquez  MRN: [id number]  Today's Date: 12/1/2024    Admit Date: 11/29/2024    Plan: [facility] referral pending. PASRR needed. Pre-cert not required.   Discharge Plan       Row Name 12/01/24 2056       Plan    Plan [facility] referral pending. PASRR needed. Pre-cert not required.    Plan Comments CM met with patient at bedside regarding SNF choices. Patient prefers to stay in [facility]. Referral sent to [facility]. Liaison notified. PASRR needed. Pre-cert not required.                  Tracy Stewart RN     Office: [phone]  Fax: [phone]

## 2024-12-02 NOTE — DISCHARGE PLACEMENT REQUEST
"Germán Vazquez ROSALINDA (79 y.o. Female)       Date of Birth   1945    Social Security Number       Address   [address] [place] [address] [place] [address]    Home Phone   [phone]    MRN   [id number]       Scientology   None    Marital Status                               Admission Date   11/29/24    Admission Type   Emergency    Admitting Provider   Tae Ellis MD    Attending Provider   Jong Iyer MD    Department, Room/Bed   [facility] NEURO, 249/1       Discharge Date       Discharge Disposition       Discharge Destination                                 Attending Provider: Jong Iyer MD    Allergies: Latex, Penicillin G    Isolation: None   Infection: None   Code Status: Prior    Ht: 152.4 cm (60\")   Wt: 95.3 kg (210 lb 1.6 oz)    Admission Cmt: None   Principal Problem: Multifocal pneumonia [J18.9]                   Active Insurance as of 11/29/2024       Primary Coverage       Payor Plan Insurance Group Employer/Plan Group    MEDICARE MEDICARE A & B        Payor Plan Address Payor Plan Phone Number Payor Plan Fax Number Effective Dates    [address] [phone]  2/1/2005 - None Entered    [facility] [id number]         Subscriber Name Subscriber Birth Date Member ID       GERMÁN VAZQUEZ 1945 [id number]               Secondary Coverage       Payor Plan Insurance Group Employer/Plan Group    [employer]        Payor Plan Address Payor Plan Phone Number Payor Plan Fax Number Effective Dates    [address] [phone]  1/1/2020 - None Entered    [facility] [id number]         Subscriber Name Subscriber Birth Date Member ID       GERMÁN VAZQUEZ ROSALINDA 1945 [id number]                     Emergency Contacts        (Rel.) Home Phone Work Phone Mobile Phone    KAYE WHEATLEY (Sister) -- -- [phone]    Regan Vazquez (Grandchild) -- -- [phone]    Td Franco (Grandchild) -- -- [phone]            "

## 2024-12-02 NOTE — SIGNIFICANT NOTE
12/02/24 1325   OTHER   Discipline physical therapist   Rehab Time/Intention   Session Not Performed other (see comments)  (Nurse requested hold this date. PT to f/u tomorrow as able)   Therapy Assessment/Plan (PT)   Criteria for Skilled Interventions Met (PT) yes   Recommendation   PT - Next Appointment 12/03/24

## 2024-12-02 NOTE — PLAN OF CARE
Problem: Adult Inpatient Plan of Care  Goal: Plan of Care Review  Outcome: Progressing  Goal: Patient-Specific Goal (Individualized)  Outcome: Progressing  Goal: Absence of Hospital-Acquired Illness or Injury  Outcome: Progressing  Intervention: Identify and Manage Fall Risk  Recent Flowsheet Documentation  Taken 12/2/2024 1800 by Tracy Ly RN  Safety Promotion/Fall Prevention: safety round/check completed  Taken 12/2/2024 1600 by Tracy Ly RN  Safety Promotion/Fall Prevention:   assistive device/personal items within reach   clutter free environment maintained   fall prevention program maintained   nonskid shoes/slippers when out of bed   room organization consistent   safety round/check completed  Taken 12/2/2024 1400 by Tracy Ly RN  Safety Promotion/Fall Prevention: safety round/check completed  Taken 12/2/2024 1229 by Tracy Ly RN  Safety Promotion/Fall Prevention:   assistive device/personal items within reach   clutter free environment maintained   fall prevention program maintained   nonskid shoes/slippers when out of bed   room organization consistent   safety round/check completed  Taken 12/2/2024 1200 by Tracy Ly RN  Safety Promotion/Fall Prevention: safety round/check completed  Taken 12/2/2024 1000 by Tracy Ly RN  Safety Promotion/Fall Prevention: safety round/check completed  Taken 12/2/2024 0830 by Tracy Ly RN  Safety Promotion/Fall Prevention:   assistive device/personal items within reach   clutter free environment maintained   fall prevention program maintained   nonskid shoes/slippers when out of bed   room organization consistent   safety round/check completed  Taken 12/2/2024 0800 by Tarcy Ly RN  Safety Promotion/Fall Prevention: safety round/check completed  Intervention: Prevent Skin Injury  Recent Flowsheet Documentation  Taken 12/2/2024 1600 by Tracy Ly RN  Body Position: weight shifting  Taken 12/2/2024  1229 by Tracy Ly RN  Body Position: weight shifting  Taken 12/2/2024 0830 by Tracy Ly RN  Body Position: weight shifting  Intervention: Prevent Infection  Recent Flowsheet Documentation  Taken 12/2/2024 1600 by Tracy Ly RN  Infection Prevention: hand hygiene promoted  Taken 12/2/2024 1229 by Tracy Ly RN  Infection Prevention: hand hygiene promoted  Taken 12/2/2024 0830 by Tracy Ly RN  Infection Prevention: hand hygiene promoted  Goal: Optimal Comfort and Wellbeing  Outcome: Progressing  Intervention: Monitor Pain and Promote Comfort  Recent Flowsheet Documentation  Taken 12/2/2024 1301 by Tracy Ly RN  Pain Management Interventions: pain medication given  Goal: Readiness for Transition of Care  Outcome: Progressing   Goal Outcome Evaluation:      Patient did not have any new tests or procedures. The patient remains on 2L of oxygen. The patient complained of anxiety and a headache and was given PRN medication for both. Will continue to monitor.                                        ER physician

## 2024-12-02 NOTE — PROGRESS NOTES
[facility] MEDICINE SERVICE  DAILY PROGRESS NOTE    NAME: Selina Vazquez  : 1945  MRN: [id number]      LOS: 2 days     PROVIDER OF SERVICE: Jong Iyer MD    Chief Complaint: Multifocal pneumonia    Subjective:     Interval History:  History taken from: patient chart RN    Appears anxious breathing stable       Review of Systems:   Review of Systems  All negative except as above   Objective:     Vital Signs  Temp:  [97.3 °F (36.3 °C)-97.8 °F (36.6 °C)] 97.4 °F (36.3 °C)  Heart Rate:  [70-97] 91  Resp:  [11-23] 13  BP: (122-143)/(72-83) 137/83  Flow (L/min) (Oxygen Therapy):  [2] 2   Body mass index is 41.03 kg/m².    Physical Exam  Physical Exam  AOx3 anxious  RRR S1-S2 audible  Lungs fair air entry  Abdomen soft nontender       Diagnostic Data    Results from last 7 days   Lab Units 24  0028   WBC 10*3/mm3 13.21*   HEMOGLOBIN g/dL 14.2   HEMATOCRIT % 46.4   PLATELETS 10*3/mm3 268   GLUCOSE mg/dL 164*   CREATININE mg/dL 1.42*   BUN mg/dL 35*   SODIUM mmol/L 142   POTASSIUM mmol/L 5.1   AST (SGOT) U/L 42*   ALT (SGPT) U/L 78*   ALK PHOS U/L 83   BILIRUBIN mg/dL <0.2   ANION GAP mmol/L 10.2       No radiology results for the last day      I reviewed the patient's new clinical results.  I reviewed the patient's new imaging results and agree with the interpretation.    Assessment/Plan:     Active and Resolved Problems  Active Hospital Problems    Diagnosis  POA    **Multifocal pneumonia [J18.9]  Yes      Resolved Hospital Problems   No resolved problems to display.       #Acute on chronic vertigo  Orthostatic vitals reportedly negative  Continue meclizine as needed  Outpatient vestibular therapy    #Suspected pneumonia  RVP negative  Chest x-ray reviewed  Continue ceftriaxone day 4/    #A-fib  #Hypertension  Continue Coumadin home dose pharmacy to dose  Rate currently currently controlled does not appear to be on any rate controlling medications at home      Discontinue potassium  supplementation  Resume Aldactone if potassium levels remain stable    #Hypothyroidism  Continue home dose of levothyroxine    #Anxiety  Trial of hydroxyzine    VTE Prophylaxis:  Pharmacologic VTE prophylaxis orders are present.             Disposition Planning:     Barriers to Discharge: Medical workup  Anticipated Date of Discharge: 12/3  Place of Discharge: Rehab      Time: 40 minutes     There are no questions and answers to display.       Signature: Electronically signed by Jong Iyer MD, 12/02/24, 14:01 EST.  [facility] Hospitalist Team

## 2024-12-02 NOTE — TELEPHONE ENCOUNTER
Please call patient and make sure she is doing well after her hospitalization at [facility].  We did cancel her visit for the 26th but did not realize that she had been in the hospital.  If she is having any problems we should recheck her again this week.  Make sure that she does have a scheduled appointment sometime in December.  Call if any other questions or concerns

## 2024-12-02 NOTE — PROGRESS NOTES
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    INR Goal: 2 - 3  Home medication?: warfarin 3 mg PO daily (note recent admission 11/25-27 at OSH, discharged on methylprednisolone, cephalexin, doxycyline)  Bridge Therapy Present?:  No  Interacting Medications Evaluation (New/Present/Discontinued): ceftriaxone (11/29-current, may increase INR); methylpred 80 mg x 11/29 (may increase INR)  Additional Contributing Factors: acute illness; AST/ALT elevation      Assessment/Plan:    INR therapeutic; continue previous maintenance regimen of 3 mg daily for now.     Continue to monitor and adjust based on INR.         Date 11/29 11/30 12/1 12/2        INR 2.15 2.28 2.14 2.17        Dose ? 3 mg 3 mg 3 mg            Objective:  [Ht: 152.4 cm (60\"); Wt: 95.3 kg (210 lb 1.6 oz); BMI: Body mass index is 41.03 kg/m².]    Lab Results   Component Value Date    ALBUMIN 3.6 12/02/2024     Lab Results   Component Value Date    INR 2.17 12/02/2024    INR 2.14 12/01/2024    INR 2.28 11/30/2024    PROTIME 24.2 12/02/2024    PROTIME 24.0 12/01/2024    PROTIME 25.0 11/30/2024     Lab Results   Component Value Date    HGB 14.2 12/02/2024    HGB 12.6 11/30/2024    HGB 13.5 11/29/2024     Lab Results   Component Value Date    HCT 46.4 12/02/2024    HCT 40.8 11/30/2024    HCT 43.4 11/29/2024       Shireen Penn, [facility]  12/02/24 08:30 EST     "

## 2024-12-03 ENCOUNTER — APPOINTMENT (OUTPATIENT)
Dept: GENERAL RADIOLOGY | Facility: HOSPITAL | Age: 79
End: 2024-12-03
Payer: MEDICARE

## 2024-12-03 PROBLEM — J18.9 PNEUMONIA, UNSPECIFIED ORGANISM: Status: ACTIVE | Noted: 2024-12-03

## 2024-12-03 LAB
ALBUMIN SERPL-MCNC: 3.7 G/DL (ref 3.5–5.2)
ALBUMIN/GLOB SERPL: 1.5 G/DL
ALP SERPL-CCNC: 91 U/L (ref 39–117)
ALT SERPL W P-5'-P-CCNC: 89 U/L (ref 1–33)
ANION GAP SERPL CALCULATED.3IONS-SCNC: 7.5 MMOL/L (ref 5–15)
AST SERPL-CCNC: 44 U/L (ref 1–32)
BASOPHILS # BLD AUTO: 0.11 10*3/MM3 (ref 0–0.2)
BASOPHILS NFR BLD AUTO: 0.6 % (ref 0–1.5)
BILIRUB SERPL-MCNC: 0.2 MG/DL (ref 0–1.2)
BUN SERPL-MCNC: 38 MG/DL (ref 8–23)
BUN/CREAT SERPL: 31.9 (ref 7–25)
CALCIUM SPEC-SCNC: 9.4 MG/DL (ref 8.6–10.5)
CHLORIDE SERPL-SCNC: 99 MMOL/L (ref 98–107)
CO2 SERPL-SCNC: 32.5 MMOL/L (ref 22–29)
CREAT SERPL-MCNC: 1.19 MG/DL (ref 0.57–1)
DEPRECATED RDW RBC AUTO: 44.7 FL (ref 37–54)
EGFRCR SERPLBLD CKD-EPI 2021: 46.6 ML/MIN/1.73
EOSINOPHIL # BLD AUTO: 0.41 10*3/MM3 (ref 0–0.4)
EOSINOPHIL NFR BLD AUTO: 2.3 % (ref 0.3–6.2)
ERYTHROCYTE [DISTWIDTH] IN BLOOD BY AUTOMATED COUNT: 13.7 % (ref 12.3–15.4)
GLOBULIN UR ELPH-MCNC: 2.4 GM/DL
GLUCOSE SERPL-MCNC: 136 MG/DL (ref 65–99)
HCT VFR BLD AUTO: 48.2 % (ref 34–46.6)
HGB BLD-MCNC: 14.9 G/DL (ref 12–15.9)
IMM GRANULOCYTES # BLD AUTO: 0.29 10*3/MM3 (ref 0–0.05)
IMM GRANULOCYTES NFR BLD AUTO: 1.6 % (ref 0–0.5)
INR PPP: 2.04 (ref 2–3)
LYMPHOCYTES # BLD AUTO: 2.98 10*3/MM3 (ref 0.7–3.1)
LYMPHOCYTES NFR BLD AUTO: 16.4 % (ref 19.6–45.3)
MAGNESIUM SERPL-MCNC: 2.2 MG/DL (ref 1.6–2.4)
MCH RBC QN AUTO: 27.5 PG (ref 26.6–33)
MCHC RBC AUTO-ENTMCNC: 30.9 G/DL (ref 31.5–35.7)
MCV RBC AUTO: 89.1 FL (ref 79–97)
MONOCYTES # BLD AUTO: 2.06 10*3/MM3 (ref 0.1–0.9)
MONOCYTES NFR BLD AUTO: 11.3 % (ref 5–12)
NEUTROPHILS NFR BLD AUTO: 12.31 10*3/MM3 (ref 1.7–7)
NEUTROPHILS NFR BLD AUTO: 67.8 % (ref 42.7–76)
NRBC BLD AUTO-RTO: 0 /100 WBC (ref 0–0.2)
PHOSPHATE SERPL-MCNC: 4.1 MG/DL (ref 2.5–4.5)
PLATELET # BLD AUTO: 296 10*3/MM3 (ref 140–450)
PMV BLD AUTO: 9.9 FL (ref 6–12)
POTASSIUM SERPL-SCNC: 4.6 MMOL/L (ref 3.5–5.2)
PROT SERPL-MCNC: 6.1 G/DL (ref 6–8.5)
PROTHROMBIN TIME: 23.1 SECONDS (ref 19.4–28.5)
RBC # BLD AUTO: 5.41 10*6/MM3 (ref 3.77–5.28)
SODIUM SERPL-SCNC: 139 MMOL/L (ref 136–145)
WBC NRBC COR # BLD AUTO: 18.16 10*3/MM3 (ref 3.4–10.8)

## 2024-12-03 PROCEDURE — 94761 N-INVAS EAR/PLS OXIMETRY MLT: CPT

## 2024-12-03 PROCEDURE — 97530 THERAPEUTIC ACTIVITIES: CPT

## 2024-12-03 PROCEDURE — 94799 UNLISTED PULMONARY SVC/PX: CPT

## 2024-12-03 PROCEDURE — 97535 SELF CARE MNGMENT TRAINING: CPT

## 2024-12-03 PROCEDURE — 84100 ASSAY OF PHOSPHORUS: CPT | Performed by: HOSPITALIST

## 2024-12-03 PROCEDURE — 25010000002 CEFTRIAXONE PER 250 MG: Performed by: HOSPITALIST

## 2024-12-03 PROCEDURE — 83735 ASSAY OF MAGNESIUM: CPT | Performed by: HOSPITALIST

## 2024-12-03 PROCEDURE — 74018 RADEX ABDOMEN 1 VIEW: CPT

## 2024-12-03 PROCEDURE — 25010000002 MORPHINE PER 10 MG: Performed by: STUDENT IN AN ORGANIZED HEALTH CARE EDUCATION/TRAINING PROGRAM

## 2024-12-03 PROCEDURE — 94664 DEMO&/EVAL PT USE INHALER: CPT

## 2024-12-03 PROCEDURE — 80053 COMPREHEN METABOLIC PANEL: CPT | Performed by: HOSPITALIST

## 2024-12-03 PROCEDURE — 85025 COMPLETE CBC W/AUTO DIFF WBC: CPT | Performed by: HOSPITALIST

## 2024-12-03 PROCEDURE — 63710000001 ONDANSETRON ODT 4 MG TABLET DISPERSIBLE: Performed by: HOSPITALIST

## 2024-12-03 PROCEDURE — 85610 PROTHROMBIN TIME: CPT

## 2024-12-03 RX ORDER — POLYETHYLENE GLYCOL 3350 17 G/17G
17 POWDER, FOR SOLUTION ORAL DAILY
Start: 2024-12-03

## 2024-12-03 RX ORDER — MECLIZINE HYDROCHLORIDE 25 MG/1
25 TABLET ORAL 3 TIMES DAILY PRN
Start: 2024-12-03 | End: 2024-12-04

## 2024-12-03 RX ORDER — BISACODYL 10 MG
10 SUPPOSITORY, RECTAL RECTAL ONCE
Status: COMPLETED | OUTPATIENT
Start: 2024-12-03 | End: 2024-12-03

## 2024-12-03 RX ORDER — BISACODYL 10 MG
10 SUPPOSITORY, RECTAL RECTAL DAILY PRN
Status: DISCONTINUED | OUTPATIENT
Start: 2024-12-03 | End: 2024-12-04 | Stop reason: HOSPADM

## 2024-12-03 RX ORDER — ONDANSETRON 4 MG/1
4 TABLET, ORALLY DISINTEGRATING ORAL EVERY 6 HOURS PRN
Status: DISCONTINUED | OUTPATIENT
Start: 2024-12-03 | End: 2024-12-04 | Stop reason: HOSPADM

## 2024-12-03 RX ORDER — HYDROXYZINE HYDROCHLORIDE 50 MG/1
50 TABLET, FILM COATED ORAL 3 TIMES DAILY PRN
Start: 2024-12-03 | End: 2024-12-04

## 2024-12-03 RX ADMIN — IPRATROPIUM BROMIDE AND ALBUTEROL SULFATE 3 ML: .5; 3 SOLUTION RESPIRATORY (INHALATION) at 08:25

## 2024-12-03 RX ADMIN — BISACODYL 10 MG: 10 SUPPOSITORY RECTAL at 03:27

## 2024-12-03 RX ADMIN — IPRATROPIUM BROMIDE AND ALBUTEROL SULFATE 3 ML: .5; 3 SOLUTION RESPIRATORY (INHALATION) at 15:35

## 2024-12-03 RX ADMIN — SENNOSIDES AND DOCUSATE SODIUM 1 TABLET: 50; 8.6 TABLET ORAL at 21:17

## 2024-12-03 RX ADMIN — WARFARIN SODIUM 3 MG: 3 TABLET ORAL at 18:00

## 2024-12-03 RX ADMIN — FUROSEMIDE 40 MG: 40 TABLET ORAL at 08:35

## 2024-12-03 RX ADMIN — ATORVASTATIN CALCIUM 40 MG: 40 TABLET, FILM COATED ORAL at 08:35

## 2024-12-03 RX ADMIN — HYDROXYZINE HYDROCHLORIDE 50 MG: 25 TABLET ORAL at 21:17

## 2024-12-03 RX ADMIN — LEVOTHYROXINE SODIUM 112 MCG: 0.11 TABLET ORAL at 08:35

## 2024-12-03 RX ADMIN — IPRATROPIUM BROMIDE AND ALBUTEROL SULFATE 3 ML: .5; 3 SOLUTION RESPIRATORY (INHALATION) at 11:00

## 2024-12-03 RX ADMIN — SPIRONOLACTONE 25 MG: 25 TABLET ORAL at 10:05

## 2024-12-03 RX ADMIN — ASPIRIN 81 MG: 81 TABLET, COATED ORAL at 08:35

## 2024-12-03 RX ADMIN — ONDANSETRON 4 MG: 4 TABLET, ORALLY DISINTEGRATING ORAL at 10:37

## 2024-12-03 RX ADMIN — CEFTRIAXONE 2000 MG: 2 INJECTION, POWDER, FOR SOLUTION INTRAMUSCULAR; INTRAVENOUS at 12:57

## 2024-12-03 RX ADMIN — BISACODYL 10 MG: 10 SUPPOSITORY RECTAL at 10:05

## 2024-12-03 RX ADMIN — MORPHINE SULFATE 2 MG: 2 INJECTION, SOLUTION INTRAMUSCULAR; INTRAVENOUS at 03:27

## 2024-12-03 RX ADMIN — SENNOSIDES AND DOCUSATE SODIUM 1 TABLET: 50; 8.6 TABLET ORAL at 08:35

## 2024-12-03 RX ADMIN — BENZONATATE 200 MG: 100 CAPSULE ORAL at 21:23

## 2024-12-03 NOTE — PROGRESS NOTES
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    INR Goal: 2 - 3  Home medication?: warfarin 3 mg PO daily (note recent admission 11/25-27 at OSH, discharged on methylprednisolone, cephalexin, doxycyline)  Bridge Therapy Present?:  No  Interacting Medications Evaluation (New/Present/Discontinued): ceftriaxone (11/29-current, may increase INR); methylpred 80 mg x 11/29 (may increase INR)  Additional Contributing Factors: acute illness; AST/ALT elevation      Assessment/Plan:    INR therapeutic; continue previous maintenance regimen of 3 mg daily for now.     Continue to monitor and adjust based on INR.         Date 11/29 11/30 12/1 12/2 12/3       INR 2.15 2.28 2.14 2.17 2.04       Dose ? 3 mg 3 mg 3 mg 3 mg           Objective:  [Ht: 152.4 cm (60\"); Wt: 95.3 kg (210 lb 1.6 oz); BMI: Body mass index is 41.03 kg/m².]    Lab Results   Component Value Date    ALBUMIN 3.7 12/03/2024     Lab Results   Component Value Date    INR 2.04 12/03/2024    INR 2.17 12/02/2024    INR 2.14 12/01/2024    PROTIME 23.1 12/03/2024    PROTIME 24.2 12/02/2024    PROTIME 24.0 12/01/2024     Lab Results   Component Value Date    HGB 14.9 12/03/2024    HGB 14.2 12/02/2024    HGB 12.6 11/30/2024     Lab Results   Component Value Date    HCT 48.2 (H) 12/03/2024    HCT 46.4 12/02/2024    HCT 40.8 11/30/2024       Lauro Calderon [facility]  12/03/24 10:23 EST       "

## 2024-12-03 NOTE — PLAN OF CARE
Goal Outcome Evaluation:            Selina Vazquez is a 79 y.o. year old female who presented with with new onset of muscle aches and pains, dry hacking cough progressing to a productive cough with yellow sputum with some pleuritic chest pain. CXR + for pneumonia. Pmhx significant for cad, copd, asthma, psoriasis, spinal stenosis, PE. COPD, pulmonary hypertension, chronic respiratory failure dependent on supplemental oxygen, CHF, atrial fibrillation on warfarin, hypothyroidism, CAD s/p PCI stenting LAD, hypotension, hyperlipidemia, CKD stage 3, history of PE. Patient continues to report severe dizziness. This is not consistent with what her presentation was in June and July of this year. Nursing reports this dizziness is being considered chronic for at least one year and is not a concern. However based on her mobility. Dizziness is severely limiting. Pt does not tolerate a vestibular exam well and squeezes eyes shut when trying to observe for nystagmus. No brain imaging performed on this admission. Requires mod A for stand pivot to perform toileting. Needs assist with toileting. Impacted bowels, nursing aware.  Will continue to follow and progress as tolerated.

## 2024-12-03 NOTE — PLAN OF CARE
Problem: Adult Inpatient Plan of Care  Goal: Plan of Care Review  Outcome: Progressing  Goal: Patient-Specific Goal (Individualized)  Outcome: Progressing  Goal: Absence of Hospital-Acquired Illness or Injury  Outcome: Progressing  Intervention: Identify and Manage Fall Risk  Recent Flowsheet Documentation  Taken 12/3/2024 0435 by Jaimie Muniz LPN  Safety Promotion/Fall Prevention: safety round/check completed  Taken 12/3/2024 0200 by Jaimie Muniz LPN  Safety Promotion/Fall Prevention: safety round/check completed  Taken 12/3/2024 0020 by Jaimie Muniz LPN  Safety Promotion/Fall Prevention: safety round/check completed  Intervention: Prevent Skin Injury  Recent Flowsheet Documentation  Taken 12/3/2024 0200 by Jaimie Muniz LPN  Body Position: patient/family refused  Taken 12/3/2024 0020 by Jaimie Muniz LPN  Body Position: patient/family refused  Taken 12/2/2024 2300 by Jaimie Muniz LPN  Body Position: (Pt. refusing to use the bed. Sitting up in chair.) patient/family refused  Intervention: Prevent Infection  Recent Flowsheet Documentation  Taken 12/3/2024 0435 by Jaimie Muniz LPN  Infection Prevention:   environmental surveillance performed   equipment surfaces disinfected  Taken 12/3/2024 0200 by Jaimie Muniz LPN  Infection Prevention:   environmental surveillance performed   equipment surfaces disinfected  Taken 12/3/2024 0020 by Jaimie Muniz LPN  Infection Prevention:   environmental surveillance performed   equipment surfaces disinfected  Goal: Optimal Comfort and Wellbeing  Outcome: Progressing  Intervention: Provide Person-Centered Care  Recent Flowsheet Documentation  Taken 12/3/2024 0020 by Jaimie Muniz LPN  Trust Relationship/Rapport:   thoughts/feelings acknowledged   empathic listening provided  Goal: Readiness for Transition of Care  Outcome: Progressing     Problem: Comorbidity Management  Goal: Maintenance of COPD Symptom Control  Outcome:  Progressing  Intervention: Maintain COPD (Chronic Obstructive Pulmonary Disease) Symptom Control  Recent Flowsheet Documentation  Taken 12/3/2024 0435 by Jamiie Muniz LPN  Medication Review/Management: medications reviewed  Taken 12/3/2024 0200 by Jaimie Muniz LPN  Medication Review/Management: medications reviewed  Taken 12/3/2024 0020 by Jaimie Muniz LPN  Medication Review/Management: medications reviewed     Problem: Skin Injury Risk Increased  Goal: Skin Health and Integrity  Outcome: Progressing  Intervention: Optimize Skin Protection  Recent Flowsheet Documentation  Taken 12/3/2024 0020 by Jaimie Muniz LPN  Activity Management:   up in chair   patient refuses activity     Problem: Sepsis/Septic Shock  Goal: Optimal Coping  Outcome: Progressing  Intervention: Support Patient and Family Response  Recent Flowsheet Documentation  Taken 12/3/2024 0020 by Jaimie Muniz LPN  Family/Support System Care: self-care encouraged  Goal: Absence of Bleeding  Outcome: Progressing  Goal: Blood Glucose Level Within Target Range  Outcome: Progressing  Goal: Absence of Infection Signs and Symptoms  Outcome: Progressing  Intervention: Initiate Sepsis Management  Recent Flowsheet Documentation  Taken 12/3/2024 0435 by Jaimie Muniz LPN  Infection Prevention:   environmental surveillance performed   equipment surfaces disinfected  Isolation Precautions: precautions maintained  Taken 12/3/2024 0200 by Jaimie Muniz LPN  Infection Prevention:   environmental surveillance performed   equipment surfaces disinfected  Isolation Precautions: precautions maintained  Taken 12/3/2024 0020 by Jaimie Muniz LPN  Infection Prevention:   environmental surveillance performed   equipment surfaces disinfected  Isolation Precautions: precautions maintained  Intervention: Promote Recovery  Recent Flowsheet Documentation  Taken 12/3/2024 0020 by Jaimie Muniz LPN  Activity Management:   up in chair   patient refuses  activity  Goal: Optimal Nutrition Delivery  Outcome: Progressing   Goal Outcome Evaluation:

## 2024-12-03 NOTE — THERAPY TREATMENT NOTE
Subjective: Pt agreeable to therapeutic plan of care. Reports severe dizziness, nursing reports MD is not concerned because it is chronic however there are 2 PT encounters from earlier this year- June and July and the patient was not dizzy at that time.   Pt not able to describe how long this has been going on. She is an unclear historian.     Objective:     Precautions - severe dizziness    Bed mobility - in chair at time of arrival   Transfers - Mod-A and with rolling walker  [facility] [place]  Performed toileting  Needed assist for pooja care  Increased time for clean up due to bladder incontinence during transfer.   Pt noted to have bowel impaction, nursing aware.   Ambulation - 0 feet N/A or Not attempted.  Pt cannot tolerate standing to ambulate due to severe dizziness       Vitals: WNL    Pain: 0 VAS   Location: n/a  Intervention for pain: N/A    Education: Provided education on the importance of mobility in the acute care setting    Assessment: Selina Vazquez is a 79 y.o. year old female who presented with with new onset of muscle aches and pains, dry hacking cough progressing to a productive cough with yellow sputum with some pleuritic chest pain. CXR + for pneumonia. Pmhx significant for cad, copd, asthma, psoriasis, spinal stenosis, PE. COPD, pulmonary hypertension, chronic respiratory failure dependent on supplemental oxygen, CHF, atrial fibrillation on warfarin, hypothyroidism, CAD s/p PCI stenting LAD, hypotension, hyperlipidemia, CKD stage 3, history of PE. Patient continues to report severe dizziness. This is not consistent with what her presentation was in June and July of this year. Nursing reports this dizziness is being considered chronic for at least one year and is not a concern. However based on her mobility. Dizziness is severely limiting. Pt does not tolerate a vestibular exam well and squeezes eyes shut when trying to observe for nystagmus. No brain imaging performed on this admission.  "Requires mod A for stand pivot to perform toileting. Needs assist with toileting. Impacted bowels, nursing aware.  Will continue to follow and progress as tolerated.     Plan/Recommendations:   If medically appropriate, Moderate Intensity Therapy recommended post-acute care. This is recommended as therapy feels the patient would require 3-4 days per week and wouldn't tolerate \"3 hour daily\" rehab intensity. SNF would be the preferred choice. If the patient does not agree to SNF, arrange HH or OP depending on home bound status. If patient is medically complex, consider LTACH. Pt requires no DME at discharge.     Pt desires Skilled Rehab placement at discharge. Pt cooperative; agreeable to therapeutic recommendations and plan of care.           Modified [place]: 5 = Severe disability (Requires constant nursing care and attention, bedridden, incontinent)    Post-Tx Position: Up in Chair, Alarms activated, and Call light and personal items within reach  PPE: gloves    Therapy Charges for Today       Code Description Service Date Service Provider Modifiers Qty    [id number]  PT THERAPEUTIC ACT EA 15 MIN 12/3/2024 Jaimie Madrid, PT GP 1    [id number]  PT SELF CARE/MGMT/TRAIN EA 15 MIN 12/3/2024 Jaimie Madrid, PT GP 1           PT Charges       Row Name 12/03/24 1242             Time Calculation    Start Time 1221  -      Stop Time 1240  -      Time Calculation (min) 19 min  -      PT Received On 12/03/24  -      PT - Next Appointment 12/04/24  -         Time Calculation- PT    Total Timed Code Minutes- PT 19 minute(s)  -                User Key  (r) = Recorded By, (t) = Taken By, (c) = Cosigned By      Initials Name Provider Type     Jaimie Madrid PT Physical Therapist                    "

## 2024-12-03 NOTE — DISCHARGE SUMMARY
[facility] Medicine Services  Discharge Summary    Date of Service: 2024  Patient Name: Selina Vazquez  : 1945  MRN: [id number]    Date of Admission: 2024  Discharge Diagnosis: #Acute on chronic vertigo, pneumonia, anxiety  Date of Discharge: 2024  Primary Care Physician: Bria Matthews MD      Presenting Problem:   Hypoxemia [R09.02]  Acute severe exacerbation of asthma [J45.901]  Multifocal pneumonia [J18.9]    Active and Resolved Hospital Problems:  Active Hospital Problems    Diagnosis POA    **Multifocal pneumonia [J18.9] Yes    Pneumonia, unspecified organism [J18.9] Yes      Resolved Hospital Problems   No resolved problems to display.         Hospital Course     HPI:  Admitting team HPI   Selina Vazquez is a 79 y.o. female with a CMH of atrial fibrillation, COPD, hypertension, hyperlipidemia,mitral valve disorder, pulmonary hypertension, who presented to [facility] on 2024 with complaints of increased shortness of breath and productive cough for the last week.  Patient chronically wears 2 L of O2 at home for her COPD and quit smoking in .  She states that she is often short of breath even after using her DuoNebs at home.  She denies fevers, fatigue, chills.  She denies chest pain, headache, dizziness, leg swelling.  She is allergic to penicillin but does not remember what happens when she takes it.     Hospital Course:  #Acute on chronic vertigo  Orthostatic vitals reportedly negative  Continue meclizine as needed  Consider Outpatient vestibular therapy     #Suspected pneumonia  RVP negative  Chest x-ray reviewed  Finished 5 days of ceftriaxone   currently at baseline 2 L supplemental oxygen     #A-fib  #Hypertension  Continue Coumadin home  Continue home dose of Cardizem     #Hypothyroidism  Continue home dose of levothyroxine     #Anxiety  Hydroxyzine as needed    #Constipation  Status post aggressive bowel regimen currently having  regular bowel movements        DISCHARGE Follow Up Recommendations for labs and diagnostics: Consider outpatient vestibular therapy for vertigo        Day of Discharge     Vital Signs:  Temp:  [97.5 °F (36.4 °C)-98.4 °F (36.9 °C)] 98.4 °F (36.9 °C)  Heart Rate:  [79-97] 97  Resp:  [15-20] 17  BP: (131-147)/(64-87) 142/80  Flow (L/min) (Oxygen Therapy):  [2] 2    Physical Exam:  Physical Exam   AOx3 NAD  RRR S1-S2 audible  Lungs fair air entry  Abdomen soft nontender      Pertinent  and/or Most Recent Results     LAB RESULTS:      Lab 12/04/24 0406 12/03/24  0029 12/02/24  0028 12/01/24  0245 11/30/24  0927 11/30/24  0144 11/29/24  1225 11/29/24  1220   WBC 14.44* 18.16* 13.21*  --   --  12.52*  --  14.60*   HEMOGLOBIN 13.8 14.9 14.2  --   --  12.6  --  13.5   HEMATOCRIT 45.4 48.2* 46.4  --   --  40.8  --  43.4   PLATELETS 256 296 268  --   --  234  --  264   NEUTROS ABS 9.65* 12.31*  --   --   --  11.46*  --  11.06*   IMMATURE GRANS (ABS) 0.23* 0.29*  --   --   --  0.14*  --  0.14*   LYMPHS ABS 2.34 2.98  --   --   --  0.66*  --  1.93   MONOS ABS 1.84* 2.06*  --   --   --  0.23  --  1.25*   EOS ABS 0.31 0.41*  --   --   --  0.00  --  0.16   MCV 91.2 89.1 90.1  --   --  89.5  --  90.4   LACTATE  --   --   --   --   --   --  1.1  --    PROTIME 21.0 23.1 24.2 24.0 25.0  --   --  24.1         Lab 12/04/24  0406 12/03/24  0029 12/02/24  0028 11/30/24  0144 11/29/24  1220   SODIUM 138 139 142 140 141   POTASSIUM 5.1 4.6 5.1 5.0 4.5   CHLORIDE 102 99 102 104 103   CO2 25.9 32.5* 29.8* 24.6 28.1   ANION GAP 10.1 7.5 10.2 11.4 9.9   BUN 34* 38* 35* 25* 29*   CREATININE 1.25* 1.19* 1.42* 1.11* 1.16*   EGFR 43.9* 46.6* 37.7* 50.7* 48.1*   GLUCOSE 134* 136* 164* 263* 159*   CALCIUM 8.9 9.4 9.0 9.1 9.4   MAGNESIUM 2.4 2.2  --   --   --    PHOSPHORUS 3.6 4.1  --   --   --          Lab 12/04/24  0406 12/03/24  0029 12/02/24  0028 11/30/24  0144 11/29/24  1220   TOTAL PROTEIN 5.8* 6.1 6.0 6.0 6.3   ALBUMIN 3.4* 3.7 3.6 3.6 3.9    GLOBULIN 2.4 2.4 2.4 2.4 2.4   ALT (SGPT) 69* 89* 78* 64* 47*   AST (SGOT) 34* 44* 42* 48* 43*   BILIRUBIN 0.2 0.2 <0.2 0.2 0.3   ALK PHOS 86 91 83 75 81         Lab 12/04/24  0406 12/03/24  0029 12/02/24  0028 12/01/24  0245 11/30/24  0927 11/29/24  1220   PROBNP  --   --   --   --   --  115.0   HSTROP T  --   --   --   --   --  24*   PROTIME 21.0 23.1 24.2 24.0 25.0 24.1   INR 1.82* 2.04 2.17 2.14 2.28 2.15                 Brief Urine Lab Results  (Last result in the past 365 days)        Color   Clarity   Blood   Leuk Est   Nitrite   Protein   CREAT   Urine HCG        11/29/24 1231 Yellow   Clear   Negative   Negative   Negative   Negative                 Microbiology Results (last 10 days)       Procedure Component Value - Date/Time    Respiratory Culture - Sputum, NT Suction [[id number]] Collected: 12/02/24 1113    Lab Status: Final result Specimen: Sputum from NT Suction Updated: 12/04/24 0840     Respiratory Culture Heavy growth (4+) Normal respiratory vidya. No S. aureus or Pseudomonas aeruginosa detected. Final report.     Gram Stain Many (4+) WBCs per low power field      Rare (1+) Epithelial cells per low power field      Many (4+) Mixed bacterial morphotypes seen on Gram Stain    Blood Culture - Blood, Arm, Left [[id number]]  (Normal) Collected: 11/29/24 1242    Lab Status: Preliminary result Specimen: Blood from Arm, Left Updated: 12/03/24 1245     Blood Culture No growth at 4 days    Respiratory Panel PCR w/COVID-19(SARS-CoV-2) RACQUEL/SHELLI/JAMIE/PAD/COR/GAL In-House, NP Swab in UTM/VTM, 2 HR TAT - Swab, Nasopharynx [[id number]]  (Normal) Collected: 11/29/24 1222    Lab Status: Final result Specimen: Swab from Nasopharynx Updated: 11/29/24 1320     ADENOVIRUS, PCR Not Detected     Coronavirus 229E Not Detected     Coronavirus HKU1 Not Detected     Coronavirus NL63 Not Detected     Coronavirus OC43 Not Detected     COVID19 Not Detected     Human Metapneumovirus Not Detected     Human Rhinovirus/Enterovirus Not  Detected     Influenza A PCR Not Detected     Influenza B PCR Not Detected     Parainfluenza Virus 1 Not Detected     Parainfluenza Virus 2 Not Detected     Parainfluenza Virus 3 Not Detected     Parainfluenza Virus 4 Not Detected     RSV, PCR Not Detected     Bordetella pertussis pcr Not Detected     Bordetella parapertussis PCR Not Detected     Chlamydophila pneumoniae PCR Not Detected     Mycoplasma pneumo by PCR Not Detected    Narrative:      In the setting of a positive respiratory panel with a viral infection PLUS a negative procalcitonin without other underlying concern for bacterial infection, consider observing off antibiotics or discontinuation of antibiotics and continue supportive care. If the respiratory panel is positive for atypical bacterial infection (Bordetella pertussis, Chlamydophila pneumoniae, or Mycoplasma pneumoniae), consider antibiotic de-escalation to target atypical bacterial infection.    Blood Culture - Blood, Arm, Right [[id number]]  (Normal) Collected: 11/29/24 1220    Lab Status: Preliminary result Specimen: Blood from Arm, Right Updated: 12/03/24 1230     Blood Culture No growth at 4 days            XR Abdomen KUB    Result Date: 12/3/2024  Impression: Impression: Mild colonic fecal retention, similar to the prior study. Electronically Signed: Eugene Ricardo MD  12/3/2024 4:12 AM EST  Workstation ID: [id number]    XR Chest 1 View    Result Date: 11/29/2024  Impression: Impression: Streaky bibasilar airspace opacities representing atelectasis or mild pneumonia. Electronically Signed: Ivan Licea  11/29/2024 12:33 PM EST  Workstation ID: [id number]             Results for orders placed during the hospital encounter of 07/02/24    Adult Transthoracic Echo Complete W/ Cont if Necessary Per Protocol    Interpretation Summary    Left ventricular systolic function is normal. Calculated left ventricular EF = 60%    Left ventricular wall thickness is consistent with mild to moderate  concentric hypertrophy.    Left ventricular diastolic function is consistent with (grade Ia w/high LAP) impaired relaxation.    The left atrial cavity is moderate to severely dilated.    The right atrial cavity is mild to moderately  dilated.    There is moderate calcification of the aortic valve.    There is a trivial pericardial effusion.    Transthoracic echocardiography reveals mild to moderate LVH with EF of 60%.  Moderate to severe left atrial enlargement.  Aortic valve calcified without aortic stenosis.  Trivial effusion noted.    Electronically signed by Maverick Osorio MD, 07/04/24, 1:51 PM EDT.      Labs Pending at Discharge:  Pending Results       None            Procedures Performed           Consults:   Consults       No orders found from 10/31/2024 to 11/30/2024.              Discharge Details        Discharge Medications        New Medications        Instructions Start Date   hydrOXYzine 50 MG tablet  Commonly known as: ATARAX   50 mg, Oral, 3 Times Daily PRN      meclizine 25 MG tablet  Commonly known as: ANTIVERT   25 mg, Oral, 3 Times Daily PRN      polyethylene glycol 17 g packet  Commonly known as: MIRALAX   17 g, Oral, Daily             Continue These Medications        Instructions Start Date   albuterol sulfate  (90 Base) MCG/ACT inhaler  Commonly known as: PROVENTIL HFA;VENTOLIN HFA;PROAIR HFA   2 puffs, Inhalation, Every 4 Hours PRN      aspirin 81 MG EC tablet   1 tablet, Oral, Daily      atorvastatin 80 MG tablet  Commonly known as: LIPITOR   40 mg, Oral, Daily      Breo Ellipta 100-25 MCG/ACT aerosol powder   Generic drug: Fluticasone Furoate-Vilanterol   1 puff, Inhalation, Daily - RT      budesonide 0.5 MG/2ML nebulizer solution  Commonly known as: PULMICORT   0.5 mg, Nebulization, 2 Times Daily - RT      dilTIAZem  MG 24 hr capsule  Commonly known as: CARDIZEM CD   180 mg, Oral, Daily      furosemide 40 MG tablet  Commonly known as: LASIX   40 mg, Oral, Daily       guaiFENesin 600 MG 12 hr tablet  Commonly known as: MUCINEX   1,200 mg, Oral, 2 Times Daily      ipratropium-albuterol 0.5-2.5 mg/3 ml nebulizer  Commonly known as: DUO-NEB   3 mL, Nebulization, 4 Times Daily      levothyroxine 112 MCG tablet  Commonly known as: Synthroid   112 mcg, Oral, Daily      O2  Commonly known as: OXYGEN   2 L/min, Inhalation, Continuous      potassium chloride 20 MEQ CR tablet  Commonly known as: KLOR-CON M20   20 mEq, Oral, Daily      senna 8.6 MG tablet  Commonly known as: SENOKOT   2 tablets, Oral, Every Evening      spironolactone 25 MG tablet  Commonly known as: ALDACTONE   TAKE 1 TABLET BY MOUTH DAILY FOR CONGESTIVE HEART FAILURE      warfarin 2.5 MG tablet  Commonly known as: COUMADIN   2.5 mg, Oral, See Admin Instructions, Take one tablet (2.5mg) 5 days weekly and two tablets (5mg) twice weekly.      warfarin 3 MG tablet  Commonly known as: Coumadin   Take 3 mg (1 tablet) daily, except for 4.5 mg (1 and 1/2 tablet) on Saturday, or as directed by [facility].               Allergies   Allergen Reactions    Latex Unknown - High Severity     Pt cannot remember at this time    Penicillin G Rash         Discharge Disposition:   Skilled Nursing Facility (DC - External)    Diet:  Hospital:  Diet Order   Procedures    Diet: Cardiac; Healthy Heart (2-3 Na+); Fluid Consistency: Thin (IDDSI 0)         Discharge Activity:         CODE STATUS:  There are no questions and answers to display.         Future Appointments   Date Time Provider Department Center   4/11/2025 11:30 AM Jong Vega MD [facility]           Time spent on Discharge including face to face service:  45 minutes    Signature: Electronically signed by Jong Iyer MD, 12/04/24, 11:34 EST.  [facility] Hospitalist Team

## 2024-12-03 NOTE — PLAN OF CARE
Problem: Adult Inpatient Plan of Care  Goal: Plan of Care Review  Outcome: Progressing  Goal: Patient-Specific Goal (Individualized)  Outcome: Progressing  Goal: Absence of Hospital-Acquired Illness or Injury  Outcome: Progressing  Intervention: Identify and Manage Fall Risk  Recent Flowsheet Documentation  Taken 12/3/2024 1600 by Tracy Ly RN  Safety Promotion/Fall Prevention:   assistive device/personal items within reach   clutter free environment maintained   fall prevention program maintained   nonskid shoes/slippers when out of bed   room organization consistent   safety round/check completed  Taken 12/3/2024 1400 by Tracy Ly RN  Safety Promotion/Fall Prevention: safety round/check completed  Taken 12/3/2024 1230 by Tracy Ly RN  Safety Promotion/Fall Prevention:   assistive device/personal items within reach   clutter free environment maintained   fall prevention program maintained   nonskid shoes/slippers when out of bed   room organization consistent   safety round/check completed  Taken 12/3/2024 1200 by Tracy yL RN  Safety Promotion/Fall Prevention: safety round/check completed  Taken 12/3/2024 1000 by Tracy Ly RN  Safety Promotion/Fall Prevention: safety round/check completed  Taken 12/3/2024 0830 by Tracy Ly RN  Safety Promotion/Fall Prevention:   assistive device/personal items within reach   clutter free environment maintained   fall prevention program maintained   nonskid shoes/slippers when out of bed   room organization consistent   safety round/check completed  Taken 12/3/2024 0800 by Tracy Ly RN  Safety Promotion/Fall Prevention: safety round/check completed  Intervention: Prevent Skin Injury  Recent Flowsheet Documentation  Taken 12/3/2024 1600 by Tracy Ly RN  Body Position: weight shifting  Skin Protection: incontinence pads utilized  Taken 12/3/2024 1230 by Tracy Ly RN  Body Position: weight  shifting  Skin Protection: incontinence pads utilized  Taken 12/3/2024 0830 by Tracy Ly RN  Body Position: weight shifting  Skin Protection: incontinence pads utilized  Intervention: Prevent Infection  Recent Flowsheet Documentation  Taken 12/3/2024 1600 by Tracy Ly RN  Infection Prevention: hand hygiene promoted  Taken 12/3/2024 1230 by Tracy Ly RN  Infection Prevention: hand hygiene promoted  Goal: Optimal Comfort and Wellbeing  Outcome: Progressing  Goal: Readiness for Transition of Care  Outcome: Progressing   Goal Outcome Evaluation:         Patient did not have any new tests or procedures today. The patient remains on 2L of oxygen. The patient complained of being constipated and was given PRN medication. See MAR. Will continue to monitor.

## 2024-12-04 VITALS
OXYGEN SATURATION: 97 % | SYSTOLIC BLOOD PRESSURE: 135 MMHG | WEIGHT: 210.1 LBS | BODY MASS INDEX: 41.25 KG/M2 | DIASTOLIC BLOOD PRESSURE: 73 MMHG | HEART RATE: 84 BPM | RESPIRATION RATE: 20 BRPM | TEMPERATURE: 97.8 F | HEIGHT: 60 IN

## 2024-12-04 LAB
ALBUMIN SERPL-MCNC: 3.4 G/DL (ref 3.5–5.2)
ALBUMIN/GLOB SERPL: 1.4 G/DL
ALP SERPL-CCNC: 86 U/L (ref 39–117)
ALT SERPL W P-5'-P-CCNC: 69 U/L (ref 1–33)
ANION GAP SERPL CALCULATED.3IONS-SCNC: 10.1 MMOL/L (ref 5–15)
AST SERPL-CCNC: 34 U/L (ref 1–32)
BACTERIA SPEC AEROBE CULT: NORMAL
BACTERIA SPEC AEROBE CULT: NORMAL
BACTERIA SPEC RESP CULT: NORMAL
BASOPHILS # BLD AUTO: 0.07 10*3/MM3 (ref 0–0.2)
BASOPHILS NFR BLD AUTO: 0.5 % (ref 0–1.5)
BILIRUB SERPL-MCNC: 0.2 MG/DL (ref 0–1.2)
BUN SERPL-MCNC: 34 MG/DL (ref 8–23)
BUN/CREAT SERPL: 27.2 (ref 7–25)
CALCIUM SPEC-SCNC: 8.9 MG/DL (ref 8.6–10.5)
CHLORIDE SERPL-SCNC: 102 MMOL/L (ref 98–107)
CO2 SERPL-SCNC: 25.9 MMOL/L (ref 22–29)
CREAT SERPL-MCNC: 1.25 MG/DL (ref 0.57–1)
DEPRECATED RDW RBC AUTO: 46.5 FL (ref 37–54)
EGFRCR SERPLBLD CKD-EPI 2021: 43.9 ML/MIN/1.73
EOSINOPHIL # BLD AUTO: 0.31 10*3/MM3 (ref 0–0.4)
EOSINOPHIL NFR BLD AUTO: 2.1 % (ref 0.3–6.2)
ERYTHROCYTE [DISTWIDTH] IN BLOOD BY AUTOMATED COUNT: 13.8 % (ref 12.3–15.4)
GLOBULIN UR ELPH-MCNC: 2.4 GM/DL
GLUCOSE SERPL-MCNC: 134 MG/DL (ref 65–99)
GRAM STN SPEC: NORMAL
HCT VFR BLD AUTO: 45.4 % (ref 34–46.6)
HGB BLD-MCNC: 13.8 G/DL (ref 12–15.9)
IMM GRANULOCYTES # BLD AUTO: 0.23 10*3/MM3 (ref 0–0.05)
IMM GRANULOCYTES NFR BLD AUTO: 1.6 % (ref 0–0.5)
INR PPP: 1.82 (ref 2–3)
LYMPHOCYTES # BLD AUTO: 2.34 10*3/MM3 (ref 0.7–3.1)
LYMPHOCYTES NFR BLD AUTO: 16.2 % (ref 19.6–45.3)
MAGNESIUM SERPL-MCNC: 2.4 MG/DL (ref 1.6–2.4)
MCH RBC QN AUTO: 27.7 PG (ref 26.6–33)
MCHC RBC AUTO-ENTMCNC: 30.4 G/DL (ref 31.5–35.7)
MCV RBC AUTO: 91.2 FL (ref 79–97)
MONOCYTES # BLD AUTO: 1.84 10*3/MM3 (ref 0.1–0.9)
MONOCYTES NFR BLD AUTO: 12.7 % (ref 5–12)
NEUTROPHILS NFR BLD AUTO: 66.9 % (ref 42.7–76)
NEUTROPHILS NFR BLD AUTO: 9.65 10*3/MM3 (ref 1.7–7)
NRBC BLD AUTO-RTO: 0 /100 WBC (ref 0–0.2)
PHOSPHATE SERPL-MCNC: 3.6 MG/DL (ref 2.5–4.5)
PLATELET # BLD AUTO: 256 10*3/MM3 (ref 140–450)
PMV BLD AUTO: 10.3 FL (ref 6–12)
POTASSIUM SERPL-SCNC: 5.1 MMOL/L (ref 3.5–5.2)
PROT SERPL-MCNC: 5.8 G/DL (ref 6–8.5)
PROTHROMBIN TIME: 21 SECONDS (ref 19.4–28.5)
RBC # BLD AUTO: 4.98 10*6/MM3 (ref 3.77–5.28)
SODIUM SERPL-SCNC: 138 MMOL/L (ref 136–145)
WBC NRBC COR # BLD AUTO: 14.44 10*3/MM3 (ref 3.4–10.8)

## 2024-12-04 PROCEDURE — 94664 DEMO&/EVAL PT USE INHALER: CPT

## 2024-12-04 PROCEDURE — 83735 ASSAY OF MAGNESIUM: CPT | Performed by: HOSPITALIST

## 2024-12-04 PROCEDURE — 94761 N-INVAS EAR/PLS OXIMETRY MLT: CPT

## 2024-12-04 PROCEDURE — 97535 SELF CARE MNGMENT TRAINING: CPT | Performed by: OCCUPATIONAL THERAPIST

## 2024-12-04 PROCEDURE — 97116 GAIT TRAINING THERAPY: CPT

## 2024-12-04 PROCEDURE — 84100 ASSAY OF PHOSPHORUS: CPT | Performed by: HOSPITALIST

## 2024-12-04 PROCEDURE — 80053 COMPREHEN METABOLIC PANEL: CPT | Performed by: HOSPITALIST

## 2024-12-04 PROCEDURE — 85610 PROTHROMBIN TIME: CPT

## 2024-12-04 PROCEDURE — 94799 UNLISTED PULMONARY SVC/PX: CPT

## 2024-12-04 PROCEDURE — 85025 COMPLETE CBC W/AUTO DIFF WBC: CPT | Performed by: HOSPITALIST

## 2024-12-04 RX ORDER — HYDROXYZINE HYDROCHLORIDE 50 MG/1
50 TABLET, FILM COATED ORAL 3 TIMES DAILY PRN
Qty: 9 TABLET | Refills: 0 | Status: SHIPPED | OUTPATIENT
Start: 2024-12-04 | End: 2024-12-07

## 2024-12-04 RX ORDER — MECLIZINE HYDROCHLORIDE 25 MG/1
25 TABLET ORAL 3 TIMES DAILY PRN
Qty: 9 TABLET | Refills: 0 | Status: SHIPPED | OUTPATIENT
Start: 2024-12-04 | End: 2024-12-07

## 2024-12-04 RX ORDER — WARFARIN SODIUM 5 MG/1
5 TABLET ORAL
Status: DISCONTINUED | OUTPATIENT
Start: 2024-12-04 | End: 2024-12-04 | Stop reason: HOSPADM

## 2024-12-04 RX ADMIN — IPRATROPIUM BROMIDE AND ALBUTEROL SULFATE 3 ML: .5; 3 SOLUTION RESPIRATORY (INHALATION) at 07:46

## 2024-12-04 RX ADMIN — SPIRONOLACTONE 25 MG: 25 TABLET ORAL at 08:59

## 2024-12-04 RX ADMIN — ASPIRIN 81 MG: 81 TABLET, COATED ORAL at 08:59

## 2024-12-04 RX ADMIN — POLYETHYLENE GLYCOL 3350 17 G: 17 POWDER, FOR SOLUTION ORAL at 08:59

## 2024-12-04 RX ADMIN — IPRATROPIUM BROMIDE AND ALBUTEROL SULFATE 3 ML: .5; 3 SOLUTION RESPIRATORY (INHALATION) at 11:50

## 2024-12-04 RX ADMIN — FUROSEMIDE 40 MG: 40 TABLET ORAL at 08:59

## 2024-12-04 RX ADMIN — ATORVASTATIN CALCIUM 40 MG: 40 TABLET, FILM COATED ORAL at 08:59

## 2024-12-04 RX ADMIN — LEVOTHYROXINE SODIUM 112 MCG: 0.11 TABLET ORAL at 08:59

## 2024-12-04 RX ADMIN — SENNOSIDES AND DOCUSATE SODIUM 1 TABLET: 50; 8.6 TABLET ORAL at 08:59

## 2024-12-04 NOTE — CASE MANAGEMENT/SOCIAL WORK
Continued Stay Note  MIRYAM Edouard     Patient Name: Selina Vazquez  MRN: [id number]  Today's Date: 12/4/2024    Admit Date: 11/29/2024    Plan: D/C Plan: [facility].  PASRR approved; No precert required; Needs W/C van transport   Discharge Plan       Row Name 12/04/24 1133       Plan    Plan D/C Plan: [facility].  PASRR approved; No precert required; Needs W/C van transport    Plan Comments D/C Barrier: Needs BM and accepting facility.                 Expected Discharge Date and Time       Expected Discharge Date Expected Discharge Time    Dec 4, 2024               Staci Long RN  RN/.  Office Ph. [phone]  Cell Ph.  [phone]

## 2024-12-04 NOTE — PLAN OF CARE
Problem: Adult Inpatient Plan of Care  Goal: Plan of Care Review  Outcome: Progressing  Flowsheets (Taken 12/2/2024 0200 by Viviana Tafoya RN)  Outcome Evaluation: Cough freq. lessened through shift. Patient reports dizziness, even in sitting position. 02 in place.  Goal: Patient-Specific Goal (Individualized)  Outcome: Progressing  Goal: Absence of Hospital-Acquired Illness or Injury  Outcome: Progressing  Intervention: Identify and Manage Fall Risk  Recent Flowsheet Documentation  Taken 12/4/2024 0200 by Mersmann, Urszula, LPN  Safety Promotion/Fall Prevention: safety round/check completed  Taken 12/3/2024 2015 by Mersmann, Urszula, LPN  Safety Promotion/Fall Prevention:   safety round/check completed   nonskid shoes/slippers when out of bed   lighting adjusted  Intervention: Prevent Skin Injury  Recent Flowsheet Documentation  Taken 12/3/2024 2015 by Urszula Lua LPN  Body Position: weight shifting  Skin Protection: incontinence pads utilized  Intervention: Prevent Infection  Recent Flowsheet Documentation  Taken 12/3/2024 2015 by Urszula Lua LPN  Infection Prevention: environmental surveillance performed  Goal: Optimal Comfort and Wellbeing  Outcome: Progressing  Goal: Readiness for Transition of Care  Outcome: Progressing     Problem: Comorbidity Management  Goal: Maintenance of COPD Symptom Control  Outcome: Progressing     Problem: Skin Injury Risk Increased  Goal: Skin Health and Integrity  Outcome: Progressing  Intervention: Optimize Skin Protection  Recent Flowsheet Documentation  Taken 12/3/2024 2100 by Urszula Lua LPN  Activity Management:   activity encouraged   up in chair  Taken 12/3/2024 2015 by Urszula Lua LPN  Activity Management:   activity encouraged   up in chair  Pressure Reduction Techniques:   frequent weight shift encouraged   weight shift assistance provided  Pressure Reduction Devices: pressure-redistributing mattress utilized  Skin Protection:  incontinence pads utilized     Problem: Sepsis/Septic Shock  Goal: Optimal Coping  Outcome: Progressing  Goal: Absence of Bleeding  Outcome: Progressing  Intervention: Monitor and Manage Bleeding  Recent Flowsheet Documentation  Taken 12/3/2024 2100 by Urszula Lua LPN  Bleeding Precautions: blood pressure closely monitored  Taken 12/3/2024 2015 by Urszula Lua LPN  Bleeding Precautions: blood pressure closely monitored  Goal: Blood Glucose Level Within Target Range  Outcome: Progressing  Goal: Absence of Infection Signs and Symptoms  Outcome: Progressing  Intervention: Initiate Sepsis Management  Recent Flowsheet Documentation  Taken 12/3/2024 2015 by Urszula Lua LPN  Infection Prevention: environmental surveillance performed  Isolation Precautions: precautions maintained  Intervention: Promote Recovery  Recent Flowsheet Documentation  Taken 12/3/2024 2100 by Urszula Lua LPN  Activity Management:   activity encouraged   up in chair  Taken 12/3/2024 2015 by Urszula Lua LPN  Activity Management:   activity encouraged   up in chair  Goal: Optimal Nutrition Delivery  Outcome: Progressing   Goal Outcome Evaluation:

## 2024-12-04 NOTE — THERAPY TREATMENT NOTE
"Subjective: Pt agreeable to therapeutic plan of care. Reports dizziness is less today but still present.     Objective:         Transfers - Min-A and with rolling walker  Ambulation - 15 feet Min-A, Mod-A, and with rolling walker  Pt with knee buckling and requiring mod A to recover multiple times during bout of ambulation. She was determined to get back in chair and not agreeable to sit edge of bed. Able to safely return to chair but did have multiple episodes of what appeared to be presyncopal buckling        Vitals: WNL  Pt standing BP and HR WNL    Pain: 3 VAS   Location: general  Intervention for pain: Repositioned    Education: Provided education on the importance of mobility in the acute care setting    Assessment: Selina Vazquez  is a 79 y.o. year old female who presented with with new onset of muscle aches and pains, dry hacking cough progressing to a productive cough with yellow sputum with some pleuritic chest pain. CXR + for pneumonia. Pmhx significant for cad, copd, asthma, psoriasis, spinal stenosis, PE. COPD, pulmonary hypertension, chronic respiratory failure dependent on supplemental oxygen, CHF, atrial fibrillation on warfarin, hypothyroidism, CAD s/p PCI stenting LAD, hypotension, hyperlipidemia, CKD stage 3, history of PE. Dizziness is less but still present today. Pt had multiple episodes of presyncopal appearing knee buckling. However BP was WNL as was HR. Pt appears functionally weak and deconditioned with poor standing tolerance leading to high falls risk. Will require SNF to work toward improved safety with ambulation.  Will continue to follow and progress as tolerated.     Plan/Recommendations:   If medically appropriate, Moderate Intensity Therapy recommended post-acute care. This is recommended as therapy feels the patient would require 3-4 days per week and wouldn't tolerate \"3 hour daily\" rehab intensity. SNF would be the preferred choice. If the patient does not agree to SNF, arrange " HH or OP depending on home bound status. If patient is medically complex, consider LTACH. Pt requires no DME at discharge.     Pt desires Skilled Rehab placement at discharge. Pt cooperative; agreeable to therapeutic recommendations and plan of care.             Post-Tx Position: Up in Chair, Alarms activated, and Call light and personal items within reach  PPE: gloves    Therapy Charges for Today       Code Description Service Date Service Provider Modifiers Qty    [id number]  PT THERAPEUTIC ACT EA 15 MIN 12/3/2024 Jaimie Madrid, PT GP 1    [id number]  PT SELF CARE/MGMT/TRAIN EA 15 MIN 12/3/2024 Jaimie Madrid, PT GP 1    [id number] HC GAIT TRAINING EA 15 MIN 12/4/2024 Jaimie Madrid, PT GP 1           PT Charges       Row Name 12/04/24 1233             Time Calculation    Start Time 0929  -      Stop Time 0940  -      Time Calculation (min) 11 min  -      PT Received On 12/04/24  -      PT - Next Appointment 12/05/24  -         Time Calculation- PT    Total Timed Code Minutes- PT 11 minute(s)  -                User Key  (r) = Recorded By, (t) = Taken By, (c) = Cosigned By      Initials Name Provider Type    SS Jaimie Madrid PT Physical Therapist

## 2024-12-04 NOTE — PLAN OF CARE
"Goal Outcome Evaluation:         Assessment: Selina Vazquez presents with ADL impairments affecting function including balance, endurance / activity tolerance, and shortness of breath. Pt stating dizziness not as bad today. She was able to participate with LB ADLS with assist. During room mobility her legs buckled X2 requiring assist to regain balance, posture. Demonstrated functioning below baseline abilities indicate the need for continued skilled intervention while inpatient. Tolerating session today without incident. Will continue to follow and progress as tolerated.     Plan/Recommendations:   Moderate Intensity Therapy recommended post-acute care. This is recommended as therapy feels the patient would require 3-4 days per week and wouldn't tolerate \"3 hour daily\" rehab intensity. SNF would be the preferred choice. If the patient does not agree to SNF, arrange HH or OP depending on home bound status. If patient is medically complex, consider LTACH.. Pt requires no DME at discharge.     Pt desires Skilled Rehab placement at discharge. Pt cooperative; agreeable to therapeutic recommendations and plan of care.                                      "

## 2024-12-04 NOTE — PLAN OF CARE
Problem: Adult Inpatient Plan of Care  Goal: Plan of Care Review  Outcome: Met  Goal: Patient-Specific Goal (Individualized)  Outcome: Met  Goal: Absence of Hospital-Acquired Illness or Injury  Outcome: Met  Intervention: Identify and Manage Fall Risk  Recent Flowsheet Documentation  Taken 12/4/2024 1000 by Christie Ly RN  Safety Promotion/Fall Prevention:   activity supervised   assistive device/personal items within reach   clutter free environment maintained   fall prevention program maintained   nonskid shoes/slippers when out of bed   room organization consistent   safety round/check completed  Taken 12/4/2024 0820 by Christie Ly RN  Safety Promotion/Fall Prevention:   assistive device/personal items within reach   activity supervised   clutter free environment maintained   fall prevention program maintained   nonskid shoes/slippers when out of bed   room organization consistent   safety round/check completed  Intervention: Prevent Skin Injury  Recent Flowsheet Documentation  Taken 12/4/2024 1000 by Christie Ly RN  Body Position: legs elevated  Taken 12/4/2024 0820 by Christie Ly RN  Body Position: weight shifting  Skin Protection: transparent dressing maintained  Intervention: Prevent Infection  Recent Flowsheet Documentation  Taken 12/4/2024 1000 by Christie Ly RN  Infection Prevention:   equipment surfaces disinfected   environmental surveillance performed   hand hygiene promoted   personal protective equipment utilized   single patient room provided  Taken 12/4/2024 0820 by Christie Ly RN  Infection Prevention:   environmental surveillance performed   equipment surfaces disinfected   hand hygiene promoted   personal protective equipment utilized   single patient room provided  Goal: Optimal Comfort and Wellbeing  Outcome: Met  Intervention: Monitor Pain and Promote Comfort  Recent Flowsheet Documentation  Taken 12/4/2024 0820 by Christie Ly RN  Pain Management  Interventions:   care clustered   pillow support provided   position adjusted  Intervention: Provide Person-Centered Care  Recent Flowsheet Documentation  Taken 12/4/2024 0820 by Christie Ly, RN  Trust Relationship/Rapport:   care explained   choices provided   emotional support provided   empathic listening provided   questions answered   thoughts/feelings acknowledged  Goal: Readiness for Transition of Care  Outcome: Met     Problem: Comorbidity Management  Goal: Maintenance of COPD Symptom Control  Outcome: Met  Intervention: Maintain COPD (Chronic Obstructive Pulmonary Disease) Symptom Control  Recent Flowsheet Documentation  Taken 12/4/2024 0820 by Christie Ly, RN  Breathing Techniques/Airway Clearance: deep/controlled cough encouraged  Medication Review/Management: medications reviewed     Problem: Skin Injury Risk Increased  Goal: Skin Health and Integrity  Outcome: Met  Intervention: Optimize Skin Protection  Recent Flowsheet Documentation  Taken 12/4/2024 1000 by Christie Ly, RN  Activity Management: up in chair  Taken 12/4/2024 0820 by Christie Ly, RN  Activity Management: up in chair  Pressure Reduction Techniques:   frequent weight shift encouraged   heels elevated off bed   weight shift assistance provided  Pressure Reduction Devices: pressure-redistributing mattress utilized  Skin Protection: transparent dressing maintained   Goal Outcome Evaluation:

## 2024-12-04 NOTE — PLAN OF CARE
Goal Outcome Evaluation:         Selina Vazquez  is a 79 y.o. year old female who presented with with new onset of muscle aches and pains, dry hacking cough progressing to a productive cough with yellow sputum with some pleuritic chest pain. CXR + for pneumonia. Pmhx significant for cad, copd, asthma, psoriasis, spinal stenosis, PE. COPD, pulmonary hypertension, chronic respiratory failure dependent on supplemental oxygen, CHF, atrial fibrillation on warfarin, hypothyroidism, CAD s/p PCI stenting LAD, hypotension, hyperlipidemia, CKD stage 3, history of PE. Dizziness is less but still present today. Pt had multiple episodes of presyncopal appearing knee buckling. However BP was WNL as was HR. Pt appears functionally weak and deconditioned with poor standing tolerance leading to high falls risk. Will require SNF to work toward improved safety with ambulation.  Will continue to follow and progress as tolerated.

## 2024-12-04 NOTE — PROGRESS NOTES
[facility] MEDICINE SERVICE  DAILY PROGRESS NOTE    NAME: Selina Vazquez  : 1945  MRN: [id number]      LOS: 4 days     PROVIDER OF SERVICE: Jong Iyer MD    Chief Complaint: Multifocal pneumonia    Subjective:     Interval History:  History taken from: patient chart RN    Nausea controled       Review of Systems:   Review of Systems  All negative except as above   Objective:     Vital Signs  Temp:  [97.5 °F (36.4 °C)-98.4 °F (36.9 °C)] 98.4 °F (36.9 °C)  Heart Rate:  [79-97] 97  Resp:  [15-20] 17  BP: (131-147)/(64-87) 142/80  Flow (L/min) (Oxygen Therapy):  [2] 2   Body mass index is 41.03 kg/m².    Physical Exam  Physical Exam  AOx3 NAD  RRR S1-S2 audible  Lungs fair air entry  Abdomen soft nontender     Diagnostic Data    Results from last 7 days   Lab Units 24  0406   WBC 10*3/mm3 14.44*   HEMOGLOBIN g/dL 13.8   HEMATOCRIT % 45.4   PLATELETS 10*3/mm3 256   GLUCOSE mg/dL 134*   CREATININE mg/dL 1.25*   BUN mg/dL 34*   SODIUM mmol/L 138   POTASSIUM mmol/L 5.1   AST (SGOT) U/L 34*   ALT (SGPT) U/L 69*   ALK PHOS U/L 86   BILIRUBIN mg/dL 0.2   ANION GAP mmol/L 10.1       XR Abdomen KUB    Result Date: 12/3/2024  Impression: Mild colonic fecal retention, similar to the prior study. Electronically Signed: Eugene Ricardo MD  12/3/2024 4:12 AM EST  Workstation ID: [id number]       I reviewed the patient's new clinical results.  I reviewed the patient's new imaging results and agree with the interpretation.    Assessment/Plan:     Active and Resolved Problems  Active Hospital Problems    Diagnosis  POA    **Multifocal pneumonia [J18.9]  Yes    Pneumonia, unspecified organism [J18.9]  Yes      Resolved Hospital Problems   No resolved problems to display.       #Acute on chronic vertigo  Orthostatic vitals reportedly negative  Continue meclizine as needed  Consider Outpatient vestibular therapy     #Suspected pneumonia  RVP negative  Chest x-ray reviewed  Finished 5 days of ceftriaxone    currently at baseline 2 L supplemental oxygen     #A-fib  #Hypertension  Continue Coumadin home  Continue home dose of Cardizem     #Hypothyroidism  Continue home dose of levothyroxine     #Anxiety  Hydroxyzine as needed     #Constipation  Status post aggressive bowel regimen currently having regular bowel movements          VTE Prophylaxis:  Pharmacologic VTE prophylaxis orders are present.             Disposition Planning:     Barriers to Discharge:discharge held to due logistical reasons   Anticipated Date of Discharge: 12/4  Place of Discharge: rehab      Time: 38 minutes     There are no questions and answers to display.       Signature: Electronically signed by Jong Iyer MD, 12/04/24, 11:32 EST.  [facility] Hospitalist Team

## 2024-12-04 NOTE — PROGRESS NOTES
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    INR Goal: 2 - 3  Home medication?: warfarin 3 mg PO daily (note recent admission 11/25-27 at OSH, discharged on methylprednisolone, cephalexin, doxycyline)  Bridge Therapy Present?:  No  Interacting Medications Evaluation (New/Present/Discontinued): ceftriaxone (11/29-current, may increase INR); methylpred 80 mg x 11/29 (may increase INR)  Additional Contributing Factors: acute illness; AST/ALT elevation      Assessment/Plan:    INR subtherapeutic today, will give patient a 1x boost dose of 5 mg today.     Continue to monitor and adjust based on INR.         Date 11/29 11/30 12/1 12/2 12/3 12/4      INR 2.15 2.28 2.14 2.17 2.04 1.82      Dose ? 3 mg 3 mg 3 mg 3 mg 5 mg          Objective:  [Ht: 152.4 cm (60\"); Wt: 95.3 kg (210 lb 1.6 oz); BMI: Body mass index is 41.03 kg/m².]    Lab Results   Component Value Date    ALBUMIN 3.4 (L) 12/04/2024     Lab Results   Component Value Date    INR 1.82 (L) 12/04/2024    INR 2.04 12/03/2024    INR 2.17 12/02/2024    PROTIME 21.0 12/04/2024    PROTIME 23.1 12/03/2024    PROTIME 24.2 12/02/2024     Lab Results   Component Value Date    HGB 13.8 12/04/2024    HGB 14.9 12/03/2024    HGB 14.2 12/02/2024     Lab Results   Component Value Date    HCT 45.4 12/04/2024    HCT 48.2 (H) 12/03/2024    HCT 46.4 12/02/2024       Lauro Calderon [facility]  12/04/24 09:44 EST         "

## 2024-12-05 ENCOUNTER — ANTICOAGULATION VISIT (OUTPATIENT)
Dept: PHARMACY | Facility: HOSPITAL | Age: 79
End: 2024-12-05
Payer: MEDICARE

## 2024-12-05 DIAGNOSIS — I26.99 PULMONARY EMBOLISM, UNSPECIFIED CHRONICITY, UNSPECIFIED PULMONARY EMBOLISM TYPE, UNSPECIFIED WHETHER ACUTE COR PULMONALE PRESENT: Primary | ICD-10-CM

## 2024-12-05 NOTE — CASE MANAGEMENT/SOCIAL WORK
Case Management Discharge Note      Final Note: [facility]         Selected Continued Care - Discharged on 12/4/2024 Admission date: 11/29/2024 - Discharge disposition: Skilled Nursing Facility (DC - External)      Destination Coordination complete.      Service Provider Services Address Phone Fax Patient Preferred    [facility] [address] [place] [address] [phone] --                     Transportation Services  W/C Van: Mekhi Lira    Final Discharge Disposition Code: 03 - skilled nursing facility (SNF)

## 2024-12-09 ENCOUNTER — ANTICOAGULATION VISIT (OUTPATIENT)
Dept: PHARMACY | Facility: HOSPITAL | Age: 79
End: 2024-12-09
Payer: MEDICARE

## 2024-12-09 DIAGNOSIS — I26.01 ACUTE SEPTIC PULMONARY EMBOLISM WITH ACUTE COR PULMONALE: Primary | ICD-10-CM

## 2024-12-09 LAB — INR PPP: 1.2 (ref 2–3)

## 2024-12-09 NOTE — PROGRESS NOTES
"Enter Query Response Below      Query Response: Bacterial multifocal pneumonia unspecified              If applicable, please update the problem list.   If you have any questions about this query contact me at: [email or web address]     Dr. Iyer,    79 year old female admitted with pneumonia has a history per H&P of COPD, asthma, CAD and CVA.    Risk Factors: WBC 14.60  11/29 CXR-\"Streaky bibasilar airspace opacities representing atelectasis or mild pneumonia\".  Respiratory Culture-Heavy growth (4+) Normal respiratory vidya. No S. aureus or Pseudomonas aeruginosa detected.  Respiratory viral panel-negative  H&P and 11/30 through 12/3 Hospitalist progress notes list \"Multifocal pneumonia\".      Treatment-IV Rocephin 11/29, 11/30, 12/1, 12/2, 12/3, nebulizers, serial labs, cultures and supplemental O2.    Please clarify the type of pneumonia the patient was treated/monitored for:    Gram negative pneumonia (excluding Haemophilus influenzae)  Bacterial multifocal pneumonia unspecified  Other- specify______    By submitting this query, we are merely seeking further clarification of documentation to accurately reflect all conditions that you are monitoring, evaluating, treating or that extend the hospitalization or utilize additional resources of care. Please utilize your independent clinical judgment when addressing the question(s) above.     This query and your response, once completed, will be entered into the legal medical record.    Sincerely,  Adelaida Blackmon RN  Clinical Documentation Integrity Program     "

## 2024-12-09 NOTE — PROGRESS NOTES
Anticoagulation Clinic Progress Note -  Outside  INR Testing     INR Goal: 2 - 3  Today's INR: 1.2 (subtherapeutic). INR result was called in today by JUAN Wu from [facility]  Current warfarin dose: warfarin 3.5 mg PO daily, except warfarin 4 mg PO on Saturday.  Current total weekly dose = 25 mg per week.     -Jazmin stated she can only account for dosing since last Wednesday when she was admitted to [facility].  She also asked if they can do the INR and call the results to our clinic to dose and manage.      INR History:      Latest Ref Rng & Units 2024     2:45 AM 2024    12:28 AM 12/3/2024    12:29 AM 2024     4:06 AM 2024     8:38 AM 2024    12:00 AM 2024     2:50 PM   Anticoagulation Monitoring   INR      --  1.20   INR Date        2024   INR Goal      2.0-3.0  2.0-3.0   Trend      Same  Same   Last Week Total      18 mg  22.5 mg   Next Week Total      22.5 mg  25.5 mg   Sun      3 mg  -   Mon      3 mg  4 mg ()   Tue      3 mg  4 mg (12/10)   Wed      3 mg  4 mg ()   Thu      3 mg  -   Fri      3 mg  -   Sat      4.5 mg  -   Historical INR 2.00 - 3.00 2.14  2.17  2.04  1.82   1.20            This result is from an external source.       Anticoagulation Summary  As of 2024      INR goal:  2.0-3.0   TTR:  37.4% (1 y)   INR used for dosin.20 (2024)   Warfarin maintenance plan:  4.5 mg every Sat; 3 mg all other days   Weekly warfarin total:  22.5 mg   Plan last modified:  Roselyn Hill, PharmD (2024)   Next INR check:  2024   Priority:  High   Target end date:  --    Indications    Pulmonary embolism [I26.99]                 Anticoagulation Episode Summary       INR check location:  --    Preferred lab:  --    Send INR reminders to:  [facility] CLINIC CLINICAL POOL    Comments:  Bush[place] -   In-home caregiverCassy: [phone]  SisterConcha: [phone]  Potential new home phone number: [phone]           Anticoagulation Care Providers       Provider Role Specialty Phone number    MegancallieBria renee MD Referring Family Medicine [phone]            Clinic Interview:   Patient Findings     Positives:  Hospital admission    Negatives:  Signs/symptoms of thrombosis, Signs/symptoms of bleeding,   Laboratory test error suspected, Change in health, Change in alcohol use,   Change in activity, Upcoming invasive procedure, Emergency department   visit, Upcoming dental procedure, Missed doses, Extra doses, Change in   medications, Change in diet/appetite, Bruising, Other complaints      Clinical Outcomes     Negatives:  Major bleeding event, Thromboembolic event,   Anticoagulation-related hospital admission, Anticoagulation-related ED   visit, Anticoagulation-related fatality        Current Medications:   Prior to Admission medications    Medication Sig Start Date End Date Taking? Authorizing Provider   albuterol sulfate  (90 Base) MCG/ACT inhaler Inhale 2 puffs Every 4 (Four) Hours As Needed for Wheezing or Shortness of Air. Indications: Chronic Obstructive Lung Disease    ProviderDiogenes MD   aspirin 81 MG EC tablet Take 1 tablet by mouth Daily. Indications: Venous Thromboembolism    ProviderDiogenes MD   atorvastatin (LIPITOR) 80 MG tablet Take 0.5 tablets by mouth Daily.    ProviderDiogenes MD   budesonide (PULMICORT) 0.5 MG/2ML nebulizer solution Take 2 mL by nebulization 2 (Two) Times a Day. 6/21/24   Tracy Terry MD   dilTIAZem CD (CARDIZEM CD) 180 MG 24 hr capsule Take 1 capsule by mouth Daily. 6/21/24   Tracy Terry MD   Fluticasone Furoate-Vilanterol (Breo Ellipta) 100-25 MCG/ACT aerosol powder  Inhale 1 puff Daily.    ProviderDiogenes MD   furosemide (LASIX) 40 MG tablet Take 1 tablet by mouth Daily. 7/7/24   Darrion Burch MD   guaiFENesin (MUCINEX) 600 MG 12 hr tablet Take 2 tablets by mouth 2 (Two) Times a Day.    Diogenes Olguin MD    ipratropium-albuterol (DUO-NEB) 0.5-2.5 mg/3 ml nebulizer Take 3 mL by nebulization 4 (Four) Times a Day. Indications: Chronic Obstructive Lung Disease 10/30/23   Bria Matthews MD   levothyroxine (Synthroid) 112 MCG tablet Take 1 tablet by mouth Daily. 9/2/24   Bria Matthews MD   O2 (OXYGEN) Inhale 2 L/min Continuous. Indications: COPD exacerbation, uncomplicated 10/6/23   Arlyn Holland APRN   polyethylene glycol (MIRALAX) 17 g packet Take 17 g by mouth Daily. 12/3/24   Jong Iyer MD   potassium chloride (KLOR-CON M20) 20 MEQ CR tablet Take 1 tablet by mouth Daily. 7/7/24   Darrion Burch MD   senna 8.6 MG tablet Take 2 tablets by mouth Every Evening. Indications: Constipation 8/27/24   Bria Matthews MD   spironolactone (ALDACTONE) 25 MG tablet TAKE 1 TABLET BY MOUTH DAILY FOR CONGESTIVE HEART FAILURE 8/16/24   Bria Matthews MD   warfarin (COUMADIN) 2.5 MG tablet Take 1 tablet by mouth See Admin Instructions. Take one tablet (2.5mg) 5 days weekly and two tablets (5mg) twice weekly. 6/25/24   Bria Matthews MD   warfarin (Coumadin) 3 MG tablet Take 3 mg (1 tablet) daily, except for 4.5 mg (1 and 1/2 tablet) on Saturday, or as directed by Medication Management Clinic.  Indications: history of DVT/PE 11/20/24   Bria Matthews MD   albuterol (ACCUNEB) 1.25 MG/3ML nebulizer solution Inhale. Indications: Spasm of Lung Air Passages 9/27/23   Diogenes Olguin MD   Cholecalciferol 50 MCG (2000 UT) tablet Take 1 tablet by mouth. Indications: Vitamin D Deficiency 8/29/23   Diogenes Olguin MD       Medical history:   Past Medical History:   Diagnosis Date    Adenomatous polyp of colon     Allergic rhinitis     Asthma     Cervical disc disease     Depression     Fracture of thoracic spine 11/16/2011    Lacunar infarction 08/07/2019    Mitral valve disorder     Postmenopausal osteoporosis     Restless leg syndrome        Social history:   Social  History     Tobacco Use    Smoking status: Former     Current packs/day: 0.00     Average packs/day: 1 pack/day for 36.0 years (36.0 ttl pk-yrs)     Types: Cigarettes     Start date:      Quit date:      Years since quittin.9     Passive exposure: Never    Smokeless tobacco: Never   Substance Use Topics    Alcohol use: No       Allergies:    Latex and Penicillin g      This patient is managed via a collaborative agreement, pursuant to IC [id number]. The signed protocol is kept in the [facility] at [address] [place] [address].    Education: Selina Vazquez has been instructed to call if any changes in medications, doses, concerns, etc. Patient was provided dosing instructions and expressed verbal understanding and has no further questions at this time.    Plan:  1. warfarin 4 mg PO daily Last 7 days = 26.5 mg (6% increase)  2. INR should be tested Thursday and called into clinic by [facility] and rehab.       Counseled patient on increased clotting risk associated with subtherapeutic INR, signs/symptoms to monitor for, and when to seek medical attention.     Yeny Lagunas, PharmD  2024  15:38 EST

## 2024-12-12 ENCOUNTER — PATIENT OUTREACH (OUTPATIENT)
Dept: CASE MANAGEMENT | Facility: OTHER | Age: 79
End: 2024-12-12
Payer: MEDICARE

## 2024-12-12 NOTE — OUTREACH NOTE
AMBULATORY CASE MANAGEMENT NOTE    Names and Relationships of Patient/Support Persons: Contact: Selina Vazquez; Relationship: Self -     SNF Follow-up    Questions/Answers      Flowsheet Row Responses   Acute Facility Discharged From [facility]   Acute Discharge Date 12/05/24   Name of the Skilled Nursing Facility? [facility]   Purpose of SNF Admission PT, OT, SN   Estimated length of stay for the patient? TBD        RN-ACM outreach call made to [facility]. Staff reports pt is still there. SW dept unavailable for additional information. RN-ACM will continue to monitor for discharge.     Heron SHARMA  Ambulatory Case Management    12/12/2024, 08:55 EST

## 2024-12-19 ENCOUNTER — TELEPHONE (OUTPATIENT)
Dept: PHARMACY | Facility: HOSPITAL | Age: 79
End: 2024-12-19
Payer: MEDICARE

## 2024-12-19 ENCOUNTER — PATIENT OUTREACH (OUTPATIENT)
Dept: CASE MANAGEMENT | Facility: OTHER | Age: 79
End: 2024-12-19
Payer: MEDICARE

## 2024-12-19 NOTE — TELEPHONE ENCOUNTER
Medication Management Clinic: [facility]  Clinical Outreach     Selina Vazquez is a 79 y.o. female and is a patient of the Medication Management Clinic at [facility] for anticoagulation monitoring.     Attempted to contact JUAN Wu at [facility] regarding patient's INR being overdue.  personnel unable to reach her or patient's floor nurse. Left message with callback number to be reached at.    Roselyn Hill PharmD  Ambulatory Care Clinical Pharmacist  12/19/2024  12:09 EST

## 2024-12-19 NOTE — OUTREACH NOTE
AMBULATORY CASE MANAGEMENT NOTE    Names and Relationships of Patient/Support Persons: Contact: Selina Vazquez; Relationship: Self -     SNF Follow-up    Questions/Answers      Flowsheet Row Responses   Acute Facility Discharged From [facility]   Acute Discharge Date 12/05/24   Name of the Skilled Nursing Facility? [facility]    Purpose of SNF Admission PT, OT, SN   Estimated length of stay for the patient? TBD   Who is the insurance provider or payor of patient stay? Medicare        RN[facility]ACM [facility] Staff reports pt is still there. RN-ACM will continue to monitor for discharge.     Heron SHARMA  Ambulatory Case Management    12/19/2024, 09:22 EST

## 2024-12-20 ENCOUNTER — TELEPHONE (OUTPATIENT)
Dept: PHARMACY | Facility: HOSPITAL | Age: 79
End: 2024-12-20
Payer: MEDICARE

## 2024-12-20 NOTE — TELEPHONE ENCOUNTER
Medication Management Clinic: [facility]  Clinical Outreach     Selina Vazquez is a 79 y.o. female and is a patient of the Medication Management Clinic at [facility] for anticoagulation monitoring.     Attempted yet again to contact [facility] to obtain recent INR results following 12/9 request from facility nurse, Jazmin, that INR be collected there and called in to clinic for management. No INR has been called in to clinic since 12/9 and numerous attempts have been made to speak to patient's nurse at facility since then. Clinic has not been able to connect to patient's nurse and has never received a call back despite requests. Will continue to confirm that patient is in facility so that management may be picked up once she is discharged, but will leave warfarin management up to facility unless clinic receives call requesting otherwise.     Jaimie Chamberlain, PharmD  Ambulatory Care Clinical Pharmacist  12/20/2024  11:48 EST

## 2024-12-23 ENCOUNTER — ANTICOAGULATION VISIT (OUTPATIENT)
Dept: PHARMACY | Facility: HOSPITAL | Age: 79
End: 2024-12-23
Payer: MEDICARE

## 2024-12-23 LAB — INR PPP: 1.4 (ref 2–3)

## 2024-12-23 NOTE — PROGRESS NOTES
Anticoagulation Clinic Progress Note - Outside Lab INR Testing     INR Goal: 2 - 3  Today's INR: 1.4 (subtherapeutic). INR result was called in today by Jazmin (nurse at [facility]).   Current warfarin dose: warfarin 4 mg PO daily, except warfarin 4.5 mg PO on Tuesday, Thursday, and Saturday.  Current total weekly dose = 29.5 mg per week.     Spoke with unit manager, Jazmin, today when she called the INR in and explained that the clinic had tried to contact the facility several times since last talking to them on  to have INR reported and have not been able to speak to a nurse or receive a call back. Jazmin confirms that they do want us to continue to dose warfarin and provided her direct contact number for any time needed in the future. Phone number is [phone].    INR History:      Latest Ref Rng & Units 12/3/2024    12:29 AM 2024     4:06 AM 2024     8:38 AM 2024    12:00 AM 2024     2:50 PM 2024    12:00 AM 2024    12:26 PM   Anticoagulation Monitoring   INR    --  1.20  1.40   INR Date      2024   INR Goal    2.0-3.0  2.0-3.0  2.0-3.0   Trend    Same  Same  Up   Last Week Total    18 mg  23.5 mg  29.5 mg   Next Week Total    22.5 mg  25.5 mg  35 mg   Sun    3 mg  -  5 mg   Mon    3 mg  4 mg ()  5 mg   Tue    3 mg  4 mg (12/10)  5 mg   Wed    3 mg  4 mg ()  5 mg   Thu    3 mg  -  5 mg   Fri    3 mg  -  5 mg   Sat    4.5 mg  -  5 mg   Historical INR 2.00 - 3.00 2.04  1.82   1.20      1.40            This result is from an external source.       Anticoagulation Summary  As of 2024      INR goal:  2.0-3.0   TTR:  36.1% (1.1 y)   INR used for dosin.40 (2024)   Warfarin maintenance plan:  5 mg every day   Weekly warfarin total:  35 mg   Plan last modified:  Jaimie Chamberlain PharmD (2024)   Next INR check:  2024   Priority:  High   Target end date:  --    Indications    Pulmonary embolism [I26.99]                  Anticoagulation Episode Summary       INR check location:  --    Preferred lab:  --    Send INR reminders to:   [facility] CLINICAL POOL    Comments:  FYNorman -   In-home caregiver, Cassy: [phone]  Sister, Concha: [phone]  Potential new home phone number: [phone]          Anticoagulation Care Providers       Provider Role Specialty Phone number    Bria Matthews MD Referring Family Medicine [phone]            Clinic Interview:   Patient Findings     Negatives:  Signs/symptoms of thrombosis, Signs/symptoms of bleeding,   Laboratory test error suspected, Change in health, Change in alcohol use,   Change in activity, Upcoming invasive procedure, Emergency department   visit, Upcoming dental procedure, Missed doses, Extra doses, Change in   medications, Change in diet/appetite, Hospital admission, Bruising, Other   complaints      Clinical Outcomes     Negatives:  Major bleeding event, Thromboembolic event,   Anticoagulation-related hospital admission, Anticoagulation-related ED   visit, Anticoagulation-related fatality        Current Medications:   Prior to Admission medications    Medication Sig Start Date End Date Taking? Authorizing Provider   albuterol sulfate  (90 Base) MCG/ACT inhaler Inhale 2 puffs Every 4 (Four) Hours As Needed for Wheezing or Shortness of Air. Indications: Chronic Obstructive Lung Disease    ProviderDiogenes MD   aspirin 81 MG EC tablet Take 1 tablet by mouth Daily. Indications: Venous Thromboembolism    ProviderDiogenes MD   atorvastatin (LIPITOR) 80 MG tablet Take 0.5 tablets by mouth Daily.    ProviderDiogenes MD   budesonide (PULMICORT) 0.5 MG/2ML nebulizer solution Take 2 mL by nebulization 2 (Two) Times a Day. 6/21/24   Tracy Terry MD   dilTIAZem CD (CARDIZEM CD) 180 MG 24 hr capsule Take 1 capsule by mouth Daily. 6/21/24   Tracy Terry MD   Fluticasone Furoate-Vilanterol (Breo Ellipta) 100-25 MCG/ACT aerosol  powder  Inhale 1 puff Daily.    ProviderDiogenes MD   furosemide (LASIX) 40 MG tablet Take 1 tablet by mouth Daily. 7/7/24   Darrion Burch MD   guaiFENesin (MUCINEX) 600 MG 12 hr tablet Take 2 tablets by mouth 2 (Two) Times a Day.    ProviderDiogenes MD   ipratropium-albuterol (DUO-NEB) 0.5-2.5 mg/3 ml nebulizer Take 3 mL by nebulization 4 (Four) Times a Day. Indications: Chronic Obstructive Lung Disease 10/30/23   Bria Matthews MD   levothyroxine (Synthroid) 112 MCG tablet Take 1 tablet by mouth Daily. 9/2/24   Bria Matthews MD   O2 (OXYGEN) Inhale 2 L/min Continuous. Indications: COPD exacerbation, uncomplicated 10/6/23   Arlyn Holland APRN   polyethylene glycol (MIRALAX) 17 g packet Take 17 g by mouth Daily. 12/3/24   Jong Iyer MD   potassium chloride (KLOR-CON M20) 20 MEQ CR tablet Take 1 tablet by mouth Daily. 7/7/24   Darrion Burch MD   senna 8.6 MG tablet Take 2 tablets by mouth Every Evening. Indications: Constipation 8/27/24   Bria Matthews MD   spironolactone (ALDACTONE) 25 MG tablet TAKE 1 TABLET BY MOUTH DAILY FOR CONGESTIVE HEART FAILURE 8/16/24   Bria Matthews MD   warfarin (COUMADIN) 2.5 MG tablet Take 1 tablet by mouth See Admin Instructions. Take one tablet (2.5mg) 5 days weekly and two tablets (5mg) twice weekly. 6/25/24   Bria Matthews MD   warfarin (Coumadin) 3 MG tablet Take 3 mg (1 tablet) daily, except for 4.5 mg (1 and 1/2 tablet) on Saturday, or as directed by [facility].  Indications: history of DVT/PE 11/20/24   Bria Matthews MD   albuterol (ACCUNEB) 1.25 MG/3ML nebulizer solution Inhale. Indications: Spasm of Lung Air Passages 9/27/23   Diogenes Olguin MD   Cholecalciferol 50 MCG (2000 UT) tablet Take 1 tablet by mouth. Indications: Vitamin D Deficiency 8/29/23   Provider, Historical, MD       Medical history:   Past Medical History:   Diagnosis Date    Adenomatous polyp of  colon     Allergic rhinitis     Asthma     Cervical disc disease     Depression     Fracture of thoracic spine 2011    Lacunar infarction 2019    Mitral valve disorder     Postmenopausal osteoporosis     Restless leg syndrome        Social history:   Social History     Tobacco Use    Smoking status: Former     Current packs/day: 0.00     Average packs/day: 1 pack/day for 36.0 years (36.0 ttl pk-yrs)     Types: Cigarettes     Start date:      Quit date:      Years since quittin.9     Passive exposure: Never    Smokeless tobacco: Never   Substance Use Topics    Alcohol use: No       Allergies:    Latex and Penicillin g    This patient is managed via a collaborative agreement, pursuant to IC 25-26-16. The signed protocol is kept in the [facility] at [address] [place] [address].    Education: Selina Vazquez has been instructed to call if any changes in medications, doses, concerns, etc. Patient was provided dosing instructions and expressed verbal understanding and has no further questions at this time.    Plan:  1. increase by 18 %; warfarin 5 mg PO daily. New total weekly dose = 35 mg.   2. INR should be tested in one week and called into clinic.     Counseled patient on increased clotting risk associated with subtherapeutic INR, signs/symptoms to monitor for, and when to seek medical attention.     Jaimie Chamberlain, PharmD  2024  12:28 EST

## 2024-12-26 ENCOUNTER — PATIENT OUTREACH (OUTPATIENT)
Dept: CASE MANAGEMENT | Facility: OTHER | Age: 79
End: 2024-12-26
Payer: MEDICARE

## 2024-12-26 NOTE — OUTREACH NOTE
AMBULATORY CASE MANAGEMENT NOTE    Names and Relationships of Patient/Support Persons: Contact: Selina Vazquez; Relationship: Self -     SNF Follow-up    Questions/Answers      Flowsheet Row Responses   Acute Facility Discharged From [facility]   Acute Discharge Date 12/05/24   Name of the Skilled Nursing Facility? [facility]   Purpose of SNF Admission PT, OT, SN   Estimated length of stay for the patient? TBD   Who is the insurance provider or payor of patient stay? Medicare        RN-ACM outreach call [facility] to [facility]. Staff reports pt is still there.  dept unavailable for additional information. RN-ACM will continue to monitor for discharge.         Heron SHARMA  Ambulatory Case Management    12/26/2024, 10:48 EST

## 2024-12-31 ENCOUNTER — ANTICOAGULATION VISIT (OUTPATIENT)
Dept: PHARMACY | Facility: HOSPITAL | Age: 79
End: 2024-12-31
Payer: MEDICARE

## 2024-12-31 DIAGNOSIS — I26.99 PULMONARY EMBOLISM, UNSPECIFIED CHRONICITY, UNSPECIFIED PULMONARY EMBOLISM TYPE, UNSPECIFIED WHETHER ACUTE COR PULMONALE PRESENT: Primary | ICD-10-CM

## 2024-12-31 LAB — INR PPP: 1.9 (ref 2–3)

## 2024-12-31 NOTE — PROGRESS NOTES
Anticoagulation Clinic Progress Note - Outside Lab INR Testing     INR Goal: 2 - 3  Today's INR: 1.9 (subtherapeutic). INR result was called in today by Jazmin (RN at [facility]).   Current warfarin dose: warfarin 5 mg PO daily. Current total weekly dose = 35 mg per week.     INR History:      Latest Ref Rng & Units 2024     8:38 AM 2024    12:00 AM 2024     2:50 PM 2024    12:00 AM 2024    12:26 PM 2024    12:00 AM 2024    11:47 AM   Anticoagulation Monitoring   INR  --  1.20  1.40  1.90   INR Date    2024   INR Goal  2.0-3.0  2.0-3.0  2.0-3.0  2.0-3.0   Trend  Same  Same  Up  Down   Last Week Total  18 mg  23.5 mg  29.5 mg  35 mg   Next Week Total  22.5 mg  25.5 mg  35 mg  34 mg   Sun  3 mg  -  5 mg  5 mg   Mon  3 mg  4 mg ()  5 mg  5 mg   Tue  3 mg  4 mg (12/10)  5 mg  4 mg ()   Wed  3 mg  4 mg ()  5 mg  5 mg   Thu  3 mg  -  5 mg  5 mg   Fri  3 mg  -  5 mg  5 mg   Sat  4.5 mg  -  5 mg  5 mg   Historical INR 2.00 - 3.00  1.20      1.40      1.90            This result is from an external source.       Anticoagulation Summary  As of 2024      INR goal:  2.0-3.0   TTR:  35.4% (1.1 y)   INR used for dosin.90 (2024)   Warfarin maintenance plan:  4 mg every Tue; 5 mg all other days   Weekly warfarin total:  34 mg   Plan last modified:  Roselyn Hill, PharmD (2024)   Next INR check:  2025   Priority:  High   Target end date:  --    Indications    Pulmonary embolism [I26.99]                 Anticoagulation Episode Summary       INR check location:  --    Preferred lab:  --    Send INR reminders to:   [facility] CLINICAL POOL    Comments:  [place] -   In-home caregiver, Cassy: [phone]  SisterConcha: [phone]  Potential new home phone number: [phone]  Jazmin (unit manager at [facility]: [phone]          Anticoagulation Care Providers       Provider Role Specialty Phone number     Bria Matthews MD Referring [facility] Medicine [phone]            Clinic Interview:   Patient Findings     Positives:  Change in health (COVID-19 + 12/30 (acute illness may   increase INR)), Change in medications (Initiated vitamin C (may decrease   INR), vitamin D (no effect on INR), and zinc (no effect on INR))    Negatives:  Signs/symptoms of thrombosis, Signs/symptoms of bleeding,   Laboratory test error suspected, Change in alcohol use, Change in   activity, Upcoming invasive procedure, Emergency department visit,   Upcoming dental procedure, Missed doses, Extra doses, Change in   diet/appetite, Hospital admission, Bruising, Other complaints      Clinical Outcomes     Negatives:  Major bleeding event, Thromboembolic event,   Anticoagulation-related hospital admission, Anticoagulation-related ED   visit, Anticoagulation-related fatality        Current Medications:   Prior to Admission medications    Medication Sig Start Date End Date Taking? Authorizing Provider   albuterol sulfate  (90 Base) MCG/ACT inhaler Inhale 2 puffs Every 4 (Four) Hours As Needed for Wheezing or Shortness of Air. Indications: Chronic Obstructive Lung Disease    Provider, MD Diogenes   aspirin 81 MG EC tablet Take 1 tablet by mouth Daily. Indications: Venous Thromboembolism    Provider, MD Diogenes   atorvastatin (LIPITOR) 80 MG tablet Take 0.5 tablets by mouth Daily.    Provider, MD Diogenes   budesonide (PULMICORT) 0.5 MG/2ML nebulizer solution Take 2 mL by nebulization 2 (Two) Times a Day. 6/21/24   Tracy Terry MD   dilTIAZem CD (CARDIZEM CD) 180 MG 24 hr capsule Take 1 capsule by mouth Daily. 6/21/24   Tracy Terry MD   Fluticasone Furoate-Vilanterol (Breo Ellipta) 100-25 MCG/ACT aerosol powder  Inhale 1 puff Daily.    Provider, MD Diogenes   furosemide (LASIX) 40 MG tablet Take 1 tablet by mouth Daily. 7/7/24   Darrion Burch MD   guaiFENesin (MUCINEX) 600 MG 12 hr tablet Take 2 tablets by  mouth 2 (Two) Times a Day.    ProviderDiogenes MD   ipratropium-albuterol (DUO-NEB) 0.5-2.5 mg/3 ml nebulizer Take 3 mL by nebulization 4 (Four) Times a Day. Indications: Chronic Obstructive Lung Disease 10/30/23   Bria Matthews MD   levothyroxine (Synthroid) 112 MCG tablet Take 1 tablet by mouth Daily. 9/2/24   Bria Matthews MD   O2 (OXYGEN) Inhale 2 L/min Continuous. Indications: COPD exacerbation, uncomplicated 10/6/23   Arlyn Holland APRN   polyethylene glycol (MIRALAX) 17 g packet Take 17 g by mouth Daily. 12/3/24   Jong Iyer MD   potassium chloride (KLOR-CON M20) 20 MEQ CR tablet Take 1 tablet by mouth Daily. 7/7/24   Darrion Burch MD   senna 8.6 MG tablet Take 2 tablets by mouth Every Evening. Indications: Constipation 8/27/24   Bria Matthews MD   spironolactone (ALDACTONE) 25 MG tablet TAKE 1 TABLET BY MOUTH DAILY FOR CONGESTIVE HEART FAILURE 8/16/24   Bria Matthews MD   warfarin (COUMADIN) 2.5 MG tablet Take 1 tablet by mouth See Admin Instructions. Take one tablet (2.5mg) 5 days weekly and two tablets (5mg) twice weekly. 6/25/24   Bria Matthews MD   warfarin (Coumadin) 3 MG tablet Take 3 mg (1 tablet) daily, except for 4.5 mg (1 and 1/2 tablet) on Saturday, or as directed by [facility].  Indications: history of DVT/PE 11/20/24   Bria Matthews MD   albuterol (ACCUNEB) 1.25 MG/3ML nebulizer solution Inhale. Indications: Spasm of Lung Air Passages 9/27/23   Diogenes Olguin MD   Cholecalciferol 50 MCG (2000 UT) tablet Take 1 tablet by mouth. Indications: Vitamin D Deficiency 8/29/23   Diogenes Olguin MD       Medical history:   Past Medical History:   Diagnosis Date    Adenomatous polyp of colon     Allergic rhinitis     Asthma     Cervical disc disease     Depression     Fracture of thoracic spine 11/16/2011    Lacunar infarction 08/07/2019    Mitral valve disorder     Postmenopausal osteoporosis      Restless leg syndrome        Social history:   Social History     Tobacco Use    Smoking status: Former     Current packs/day: 0.00     Average packs/day: 1 pack/day for 36.0 years (36.0 ttl pk-yrs)     Types: Cigarettes     Start date:      Quit date:      Years since quittin.0     Passive exposure: Never    Smokeless tobacco: Never   Substance Use Topics    Alcohol use: No       Allergies:    Latex and Penicillin g      This patient is managed via a collaborative agreement, pursuant to IC 25-26-16. The signed protocol is kept in the [facility] at [address] [place] [address].    Education: Selina Vazquez has been instructed to call if any changes in medications, doses, concerns, etc. Patient was provided dosing instructions and expressed verbal understanding and has no further questions at this time.    Plan:  1. Given current acute illness and large increase in INR in one week (1.4 -> 1.9), will decrease by 3 %; warfarin 5 mg PO daily, except warfarin 4 mg PO on Tuesday.  New total weekly dose = 34 mg.   2. INR should be tested in 1 week and called into clinic.     - Instructed Jazmin to contact [facility] if patient notices abnormal bleeding or bruising.    Roselyn Hill, PharmD  2024  11:49 EST

## 2025-01-03 ENCOUNTER — PATIENT OUTREACH (OUTPATIENT)
Dept: CASE MANAGEMENT | Facility: OTHER | Age: 80
End: 2025-01-03
Payer: MEDICARE

## 2025-01-03 NOTE — OUTREACH NOTE
AMBULATORY CASE MANAGEMENT NOTE    Names and Relationships of Patient/Support Persons: Contact: Selina Vazquez; Relationship: Self -     SNF Follow-up    Questions/Answers      Flowsheet Row Responses   Acute Facility Discharged From [facility]   Acute Discharge Date 12/05/24   Name of the Skilled Nursing Facility? [facility]   Purpose of SNF Admission PT, OT, SN   Estimated length of stay for the patient? TBD   Who is the insurance provider or payor of patient stay? Medicare        RN-ACM outreach call made to [facility] & . Staff reports pt is still there.  dept unavailable for additional information. RN-ACM will continue to monitor for discharge.       Heron SHARMA  Ambulatory Case Management    1/3/2025, 08:06 EST

## 2025-01-09 ENCOUNTER — PATIENT OUTREACH (OUTPATIENT)
Dept: CASE MANAGEMENT | Facility: OTHER | Age: 80
End: 2025-01-09
Payer: MEDICARE

## 2025-01-09 ENCOUNTER — TELEPHONE (OUTPATIENT)
Dept: PHARMACY | Facility: HOSPITAL | Age: 80
End: 2025-01-09
Payer: MEDICARE

## 2025-01-09 NOTE — TELEPHONE ENCOUNTER
[facility]  Clinical Outreach     Selina Vazquez is a 79 y.o. female and is a patient of the [facility] for [facility]agulation monitoring.     Called [facility] as patient's INR for this week has not yet been called in as instructed last week. Per unit nurse manager, Jazmin, patient was discharged 1/6. As [facility] and [facility] stay, clinic reached out to [facility] to see if they would be resuming services. [facility] was not made aware that patient had been discharged home. [facility] reports that they will update patient's status and work to reinstate services. Will follow up with home health Monday, 1/13 to find out when they will be able to check INR.     Jaimie Chamberlain, Ella  Ambulatory Care Clinical Pharmacist  1/9/2025  14:08 EST

## 2025-01-09 NOTE — OUTREACH NOTE
AMBULATORY CASE MANAGEMENT NOTE    Names and Relationships of Patient/Support Persons: Contact: Selina Vazquez; Relationship: Self -     SNF Follow-up    Questions/Answers      Flowsheet Row Responses   Acute Facility Discharged From [facility]   Acute Discharge Date 12/05/24   Name of the Skilled Nursing Facility? [facility]   Purpose of SNF Admission PT, OT, SN   Estimated length of stay for the patient? Pt. departed on 1/7/25 (unplanned discharge).   Who is the insurance provider or payor of patient stay? Medicare   Skilled Nursing Discharge Date? 01/07/25   Where was the patient discharged to? Home        RN-ACM outreach call made to [facility]. Staff reports Pt's son (Marcus) took pt home on 1/7/25. Unplanned discharge. RN-ACM will complete outreach to support patient..       Heron SHARMA  Ambulatory Case Management    1/9/2025, 11:03 EST

## 2025-01-13 NOTE — TELEPHONE ENCOUNTER
Medication Management Clinic: [facility]  Clinical Outreach     Selina Vazquez is a 79 y.o. female and is a patient of the [facility] Management Clinic at [facility] for anticoagulation monitoring.     Contacted [facility] to check on the status of patient reinitiation of care. Per Lore, they are waiting for clinical notes from [facility] before they can resume care. Requested that once appointment is scheduled that someone from [facility] contact [facility] so order for INR can be given. Provided clinic phone and fax numbers.     Also attempted to contact nurse manager at [facility], Jazmin, to speed process of clinical notes getting to [facility] along but she did not answer. Left message requesting call back.    Roselyn Hill, AubreeD  Ambulatory Care Clinical Pharmacist  1/13/2025  12:26 EST

## 2025-01-14 ENCOUNTER — HOSPITAL ENCOUNTER (OUTPATIENT)
Dept: GENERAL RADIOLOGY | Facility: HOSPITAL | Age: 80
Discharge: HOME | End: 2025-01-14
Admitting: PREVENTIVE MEDICINE
Payer: MEDICARE

## 2025-01-14 ENCOUNTER — LAB (OUTPATIENT)
Dept: FAMILY MEDICINE CLINIC | Facility: CLINIC | Age: 80
End: 2025-01-14
Payer: MEDICARE

## 2025-01-14 ENCOUNTER — OFFICE VISIT (OUTPATIENT)
Dept: FAMILY MEDICINE CLINIC | Facility: CLINIC | Age: 80
End: 2025-01-14
Payer: MEDICARE

## 2025-01-14 ENCOUNTER — TELEPHONE (OUTPATIENT)
Dept: FAMILY MEDICINE CLINIC | Facility: CLINIC | Age: 80
End: 2025-01-14

## 2025-01-14 VITALS
TEMPERATURE: 97 F | WEIGHT: 215 LBS | HEART RATE: 75 BPM | SYSTOLIC BLOOD PRESSURE: 119 MMHG | DIASTOLIC BLOOD PRESSURE: 80 MMHG | BODY MASS INDEX: 42.21 KG/M2 | HEIGHT: 60 IN | OXYGEN SATURATION: 96 %

## 2025-01-14 DIAGNOSIS — Z78.0 POST-MENOPAUSAL: ICD-10-CM

## 2025-01-14 DIAGNOSIS — J45.50 SEVERE PERSISTENT ASTHMA WITHOUT COMPLICATION: ICD-10-CM

## 2025-01-14 DIAGNOSIS — Z00.01 ENCOUNTER FOR ANNUAL GENERAL MEDICAL EXAMINATION WITH ABNORMAL FINDINGS IN ADULT: Primary | ICD-10-CM

## 2025-01-14 DIAGNOSIS — I10 ESSENTIAL HYPERTENSION: Chronic | ICD-10-CM

## 2025-01-14 DIAGNOSIS — E78.2 MIXED HYPERLIPIDEMIA: Chronic | ICD-10-CM

## 2025-01-14 DIAGNOSIS — L43.9 LICHEN PLANUS: ICD-10-CM

## 2025-01-14 DIAGNOSIS — E03.9 HYPOTHYROIDISM, UNSPECIFIED TYPE: ICD-10-CM

## 2025-01-14 DIAGNOSIS — K22.4 ESOPHAGEAL MOTILITY DISORDER: ICD-10-CM

## 2025-01-14 DIAGNOSIS — F33.41 MAJOR DEPRESSIVE DISORDER, RECURRENT, IN PARTIAL REMISSION: ICD-10-CM

## 2025-01-14 DIAGNOSIS — J43.1 PANLOBULAR EMPHYSEMA: Chronic | ICD-10-CM

## 2025-01-14 DIAGNOSIS — J18.9 MULTIFOCAL PNEUMONIA: ICD-10-CM

## 2025-01-14 DIAGNOSIS — R41.841 COGNITIVE COMMUNICATION DEFICIT: ICD-10-CM

## 2025-01-14 DIAGNOSIS — R05.9 COUGH, UNSPECIFIED TYPE: ICD-10-CM

## 2025-01-14 DIAGNOSIS — I50.32 CHRONIC DIASTOLIC (CONGESTIVE) HEART FAILURE: ICD-10-CM

## 2025-01-14 DIAGNOSIS — I26.99 PULMONARY EMBOLISM, UNSPECIFIED CHRONICITY, UNSPECIFIED PULMONARY EMBOLISM TYPE, UNSPECIFIED WHETHER ACUTE COR PULMONALE PRESENT: ICD-10-CM

## 2025-01-14 DIAGNOSIS — E66.01 CLASS 3 SEVERE OBESITY DUE TO EXCESS CALORIES WITH SERIOUS COMORBIDITY AND BODY MASS INDEX (BMI) OF 40.0 TO 44.9 IN ADULT: ICD-10-CM

## 2025-01-14 DIAGNOSIS — S22.080D WEDGE COMPRESSION FRACTURE OF T11-T12 VERTEBRA, SUBSEQUENT ENCOUNTER FOR FRACTURE WITH ROUTINE HEALING: ICD-10-CM

## 2025-01-14 DIAGNOSIS — Z00.01 ENCOUNTER FOR ANNUAL GENERAL MEDICAL EXAMINATION WITH ABNORMAL FINDINGS IN ADULT: ICD-10-CM

## 2025-01-14 DIAGNOSIS — E66.813 CLASS 3 SEVERE OBESITY DUE TO EXCESS CALORIES WITH SERIOUS COMORBIDITY AND BODY MASS INDEX (BMI) OF 40.0 TO 44.9 IN ADULT: ICD-10-CM

## 2025-01-14 DIAGNOSIS — I48.91 ATRIAL FIBRILLATION WITH RAPID VENTRICULAR RESPONSE: ICD-10-CM

## 2025-01-14 PROBLEM — H81.4 VERTIGO OF CENTRAL ORIGIN: Status: ACTIVE | Noted: 2024-12-04

## 2025-01-14 PROBLEM — R13.13 DYSPHAGIA, PHARYNGEAL PHASE: Status: ACTIVE | Noted: 2024-09-17

## 2025-01-14 PROBLEM — Z74.09 OTHER REDUCED MOBILITY: Status: ACTIVE | Noted: 2024-12-04

## 2025-01-14 PROBLEM — R27.9 UNSPECIFIED LACK OF COORDINATION: Status: ACTIVE | Noted: 2024-09-17

## 2025-01-14 PROBLEM — E46 UNSPECIFIED PROTEIN-CALORIE MALNUTRITION: Status: ACTIVE | Noted: 2024-09-19

## 2025-01-14 LAB
ALBUMIN SERPL-MCNC: 3.6 G/DL (ref 3.5–5.2)
ALBUMIN/GLOB SERPL: 1.1 G/DL
ALP SERPL-CCNC: 97 U/L (ref 39–117)
ALT SERPL W P-5'-P-CCNC: 10 U/L (ref 1–33)
ANION GAP SERPL CALCULATED.3IONS-SCNC: 8.5 MMOL/L (ref 5–15)
AST SERPL-CCNC: 17 U/L (ref 1–32)
BASOPHILS # BLD AUTO: 0.07 10*3/MM3 (ref 0–0.2)
BASOPHILS NFR BLD AUTO: 0.7 % (ref 0–1.5)
BILIRUB SERPL-MCNC: 0.6 MG/DL (ref 0–1.2)
BUN SERPL-MCNC: 13 MG/DL (ref 8–23)
BUN/CREAT SERPL: 11.1 (ref 7–25)
CALCIUM SPEC-SCNC: 9.5 MG/DL (ref 8.6–10.5)
CHLORIDE SERPL-SCNC: 107 MMOL/L (ref 98–107)
CHOLEST SERPL-MCNC: 100 MG/DL (ref 0–200)
CO2 SERPL-SCNC: 27.5 MMOL/L (ref 22–29)
CREAT SERPL-MCNC: 1.17 MG/DL (ref 0.57–1)
DEPRECATED RDW RBC AUTO: 40.9 FL (ref 37–54)
EGFRCR SERPLBLD CKD-EPI 2021: 47.6 ML/MIN/1.73
EOSINOPHIL # BLD AUTO: 0.23 10*3/MM3 (ref 0–0.4)
EOSINOPHIL NFR BLD AUTO: 2.3 % (ref 0.3–6.2)
ERYTHROCYTE [DISTWIDTH] IN BLOOD BY AUTOMATED COUNT: 13.3 % (ref 12.3–15.4)
EXPIRATION DATE: NORMAL
FLUAV AG UPPER RESP QL IA.RAPID: NOT DETECTED
FLUBV AG UPPER RESP QL IA.RAPID: NOT DETECTED
GLOBULIN UR ELPH-MCNC: 3.2 GM/DL
GLUCOSE SERPL-MCNC: 105 MG/DL (ref 65–99)
HCT VFR BLD AUTO: 42.8 % (ref 34–46.6)
HDLC SERPL-MCNC: 35 MG/DL (ref 40–60)
HGB BLD-MCNC: 13.2 G/DL (ref 12–15.9)
IMM GRANULOCYTES # BLD AUTO: 0.05 10*3/MM3 (ref 0–0.05)
IMM GRANULOCYTES NFR BLD AUTO: 0.5 % (ref 0–0.5)
INTERNAL CONTROL: NORMAL
LDLC SERPL CALC-MCNC: 45 MG/DL (ref 0–100)
LDLC/HDLC SERPL: 1.26 {RATIO}
LYMPHOCYTES # BLD AUTO: 1.68 10*3/MM3 (ref 0.7–3.1)
LYMPHOCYTES NFR BLD AUTO: 17.1 % (ref 19.6–45.3)
Lab: NORMAL
MAGNESIUM SERPL-MCNC: 1.8 MG/DL (ref 1.6–2.4)
MCH RBC QN AUTO: 26.3 PG (ref 26.6–33)
MCHC RBC AUTO-ENTMCNC: 30.8 G/DL (ref 31.5–35.7)
MCV RBC AUTO: 85.3 FL (ref 79–97)
MONOCYTES # BLD AUTO: 0.78 10*3/MM3 (ref 0.1–0.9)
MONOCYTES NFR BLD AUTO: 8 % (ref 5–12)
NEUTROPHILS NFR BLD AUTO: 6.99 10*3/MM3 (ref 1.7–7)
NEUTROPHILS NFR BLD AUTO: 71.4 % (ref 42.7–76)
NRBC BLD AUTO-RTO: 0 /100 WBC (ref 0–0.2)
PLATELET # BLD AUTO: 246 10*3/MM3 (ref 140–450)
PMV BLD AUTO: 10.3 FL (ref 6–12)
POTASSIUM SERPL-SCNC: 4.3 MMOL/L (ref 3.5–5.2)
PROT SERPL-MCNC: 6.8 G/DL (ref 6–8.5)
RBC # BLD AUTO: 5.02 10*6/MM3 (ref 3.77–5.28)
SARS-COV-2 AG UPPER RESP QL IA.RAPID: NOT DETECTED
SODIUM SERPL-SCNC: 143 MMOL/L (ref 136–145)
TRIGL SERPL-MCNC: 105 MG/DL (ref 0–150)
TSH SERPL DL<=0.05 MIU/L-ACNC: 1.59 UIU/ML (ref 0.27–4.2)
VIT B12 BLD-MCNC: 1099 PG/ML (ref 211–946)
VLDLC SERPL-MCNC: 20 MG/DL (ref 5–40)
WBC NRBC COR # BLD AUTO: 9.8 10*3/MM3 (ref 3.4–10.8)

## 2025-01-14 PROCEDURE — 80053 COMPREHEN METABOLIC PANEL: CPT | Performed by: PREVENTIVE MEDICINE

## 2025-01-14 PROCEDURE — 80061 LIPID PANEL: CPT | Performed by: PREVENTIVE MEDICINE

## 2025-01-14 PROCEDURE — 1159F MED LIST DOCD IN RCRD: CPT | Performed by: PREVENTIVE MEDICINE

## 2025-01-14 PROCEDURE — 1126F AMNT PAIN NOTED NONE PRSNT: CPT | Performed by: PREVENTIVE MEDICINE

## 2025-01-14 PROCEDURE — 99214 OFFICE O/P EST MOD 30 MIN: CPT | Performed by: PREVENTIVE MEDICINE

## 2025-01-14 PROCEDURE — 1160F RVW MEDS BY RX/DR IN RCRD: CPT | Performed by: PREVENTIVE MEDICINE

## 2025-01-14 PROCEDURE — 87428 SARSCOV & INF VIR A&B AG IA: CPT | Performed by: PREVENTIVE MEDICINE

## 2025-01-14 PROCEDURE — 3074F SYST BP LT 130 MM HG: CPT | Performed by: PREVENTIVE MEDICINE

## 2025-01-14 PROCEDURE — 85025 COMPLETE CBC W/AUTO DIFF WBC: CPT | Performed by: PREVENTIVE MEDICINE

## 2025-01-14 PROCEDURE — 71046 X-RAY EXAM CHEST 2 VIEWS: CPT

## 2025-01-14 PROCEDURE — G0439 PPPS, SUBSEQ VISIT: HCPCS | Performed by: PREVENTIVE MEDICINE

## 2025-01-14 PROCEDURE — 82607 VITAMIN B-12: CPT | Performed by: PREVENTIVE MEDICINE

## 2025-01-14 PROCEDURE — 83735 ASSAY OF MAGNESIUM: CPT | Performed by: PREVENTIVE MEDICINE

## 2025-01-14 PROCEDURE — 3078F DIAST BP <80 MM HG: CPT | Performed by: PREVENTIVE MEDICINE

## 2025-01-14 PROCEDURE — 36415 COLL VENOUS BLD VENIPUNCTURE: CPT

## 2025-01-14 PROCEDURE — 1170F FXNL STATUS ASSESSED: CPT | Performed by: PREVENTIVE MEDICINE

## 2025-01-14 PROCEDURE — 84443 ASSAY THYROID STIM HORMONE: CPT | Performed by: PREVENTIVE MEDICINE

## 2025-01-14 RX ORDER — DOXYCYCLINE 100 MG/1
100 CAPSULE ORAL 2 TIMES DAILY
Qty: 20 CAPSULE | Refills: 0 | Status: CANCELLED | OUTPATIENT
Start: 2025-01-14

## 2025-01-14 RX ORDER — AZITHROMYCIN 250 MG/1
TABLET, FILM COATED ORAL
Qty: 6 TABLET | Refills: 0 | Status: CANCELLED | OUTPATIENT
Start: 2025-01-14

## 2025-01-14 RX ORDER — BENZONATATE 100 MG/1
100 CAPSULE ORAL 3 TIMES DAILY PRN
Qty: 30 CAPSULE | Refills: 0 | Status: SHIPPED | OUTPATIENT
Start: 2025-01-14

## 2025-01-14 RX ORDER — CLINDAMYCIN HYDROCHLORIDE 300 MG/1
300 CAPSULE ORAL 3 TIMES DAILY
Qty: 30 CAPSULE | Refills: 0 | Status: SHIPPED | OUTPATIENT
Start: 2025-01-14

## 2025-01-14 RX ORDER — PREDNISONE 10 MG/1
10 TABLET ORAL DAILY
Qty: 5 TABLET | Refills: 0 | Status: SHIPPED | OUTPATIENT
Start: 2025-01-14

## 2025-01-14 NOTE — PATIENT INSTRUCTIONS
Health Maintenance Due   Topic Date Due    DIABETIC EYE EXAM  Never done    URINE MICROALBUMIN  Never done    ZOSTER VACCINE (1 of 2) Never done    DXA SCAN  05/29/2017    RSV Vaccine - Adults (1 - 1-dose 75+ series) Never done    TDAP/TD VACCINES (2 - Td or Tdap) 12/05/2022    COVID-19 Vaccine (4 - 2024-25 season) 10/03/2024    ANNUAL WELLNESS VISIT  12/04/2024    HEMOGLOBIN A1C  12/19/2024    Patient to visit [employer] website (https://www.ibanet.org/) for information reguarding advanced directive and living will.  Advance Directive       Advance directives are legal documents that let you make choices ahead of time about your health care and medical treatment in case you become unable to communicate for yourself. Advance directives are a way for you to communicate your wishes to family, friends, and health care providers. This can help convey your decisions about end-of-life care if you become unable to communicate.  Discussing and writing advance directives should happen over time rather than all at once. Advance directives can be changed depending on your situation and what you want, even after you have signed the advance directives.  If you do not have an advance directive, some states assign family decision makers to act on your behalf based on how closely you are related to them. Each state has its own laws regarding advance directives. You may want to check with your health care provider, , or state representative about the laws in your state. There are different types of advance directives, such as:  Medical power of .  Living will.  Do not resuscitate (DNR) or do not attempt resuscitation (DNAR) order.  Health care proxy and medical power of   A health care proxy, also called a health care agent, is a person who is appointed to make medical decisions for you in cases in which you are unable to make the decisions yourself. Generally, people choose someone they know  well and trust to represent their preferences. Make sure to ask this person for an agreement to act as your proxy. A proxy may have to exercise judgment in the event of a medical decision for which your wishes are not known.  A medical power of  is a legal document that names your health care proxy. Depending on the laws in your state, after the document is written, it may also need to be:  Signed.  Notarized.  Dated.  Copied.  Witnessed.  Incorporated into your medical record.  You may also want to appoint someone to manage your financial affairs in a situation in which you are unable to do so. This is called a durable power of  for finances. It is a separate legal document from the durable power of  for health care. You may choose the same person or someone different from your health care proxy to act as your agent in financial matters.  If you do not appoint a proxy, or if there is a concern that the proxy is not acting in your best interests, a court-appointed guardian may be designated to act on your behalf.  Living will  A living will is a set of instructions documenting your wishes about medical care when you cannot express them yourself. Health care providers should keep a copy of your living will in your medical record. You may want to give a copy to family members or friends. To alert caregivers in case of an emergency, you can place a card in your wallet to let them know that you have a living will and where they can find it. A living will is used if you become:  Terminally ill.  Incapacitated.  Unable to communicate or make decisions.  Items to consider in your living will include:  The use or non-use of life-sustaining equipment, such as dialysis machines and breathing machines (ventilators).  A DNR or DNAR order, which is the instruction not to use cardiopulmonary resuscitation (CPR) if breathing or heartbeat stops.  The use or non-use of tube feeding.  Withholding of food and  fluids.  Comfort (palliative) care when the goal becomes comfort rather than a cure.  Organ and tissue donation.  A living will does not give instructions for distributing your money and property if you should pass away. It is recommended that you seek the advice of a  when writing a will. Decisions about taxes, beneficiaries, and asset distribution will be legally binding. This process can relieve your family and friends of any concerns surrounding disputes or questions that may come up about the distribution of your assets.  DNR or DNAR  A DNR or DNAR order is a request not to have CPR in the event that your heart stops beating or you stop breathing. If a DNR or DNAR order has not been made and shared, a health care provider will try to help any patient whose heart has stopped or who has stopped breathing. If you plan to have surgery, talk with your health care provider about how your DNR or DNAR order will be followed if problems occur.  Summary  Advance directives are the legal documents that allow you to make choices ahead of time about your health care and medical treatment in case you become unable to communicate for yourself.  The process of discussing and writing advance directives should happen over time. You can change the advance directives, even after you have signed them.  Advance directives include DNR or DNAR orders, living bui, and designating an agent as your medical power of .  This information is not intended to replace advice given to you by your health care provider. Make sure you discuss any questions you have with your health care provider.  Document Released: 03/26/2009 Document Revised: 11/06/2017 Document Reviewed: 11/06/2017  [employer] Patient Education © 2019 [employer]  Rest and drink plenty of fluids-take antibiotics till gone.  Call Friday to report progress.    If oxygen below 90-call 911 and get her to ER

## 2025-01-14 NOTE — ASSESSMENT & PLAN NOTE
Patient's (There is no height or weight on file to calculate BMI.) indicates that they are morbidly/severely obese (BMI > 40 or > 35 with obesity - related health condition) with health conditions that include hypertension, coronary heart disease, diabetes mellitus, dyslipidemias, and GERD . Weight is unchanged. BMI  is above average; BMI management plan is completed. We discussed portion control and increasing exercise.

## 2025-01-14 NOTE — TELEPHONE ENCOUNTER
Detailed VM left with Providers comment. Instructed patient to call with any questions or concerns.    Minoo Portillo MA  01/14/25

## 2025-01-14 NOTE — PROGRESS NOTES
Subjective   The ABCs of the Annual Wellness Visit  Medicare Wellness Visit      Selina Vazquez is a 79 y.o. patient who presents for a Medicare Wellness Visit.    The following portions of the patient's history were reviewed and   updated as appropriate: allergies, current medications, past family history, past medical history, past social history, past surgical history, and problem list.    Compared to one year ago, the patient's physical   health is the same.  Compared to one year ago, the patient's mental   health is the same.    Recent Hospitalizations:  This patient has had a [facility] [place] admission record on file within the last 365 days.  Current Medical Providers:  Patient Care Team:  Bria Matthews MD as PCP - General (Family Medicine)  Michael Green MD as Consulting Physician (Pulmonary Disease)  Jong Vega MD as Consulting Physician (Cardiology)  Duarte Washington MD as Consulting Physician (Nephrology)  Akash Polo DO as Consulting Physician (Internal Medicine)  Heron Venegas RN as Ambulatory  ([facility])    Outpatient Medications Prior to Visit   Medication Sig Dispense Refill    albuterol sulfate  (90 Base) MCG/ACT inhaler Inhale 2 puffs Every 4 (Four) Hours As Needed for Wheezing or Shortness of Air. Indications: Chronic Obstructive Lung Disease      aspirin 81 MG EC tablet Take 1 tablet by mouth Daily. Indications: Venous Thromboembolism      atorvastatin (LIPITOR) 80 MG tablet Take 0.5 tablets by mouth Daily.      budesonide (PULMICORT) 0.5 MG/2ML nebulizer solution Take 2 mL by nebulization 2 (Two) Times a Day. 1 each 0    dilTIAZem CD (CARDIZEM CD) 180 MG 24 hr capsule Take 1 capsule by mouth Daily. 90 capsule 0    Fluticasone Furoate-Vilanterol (Breo Ellipta) 100-25 MCG/ACT aerosol powder  Inhale 1 puff Daily.      furosemide (LASIX) 40 MG tablet Take 1 tablet by mouth Daily. 7 tablet 0    guaiFENesin (MUCINEX) 600 MG 12 hr tablet Take 2 tablets by  mouth 2 (Two) Times a Day.      ipratropium-albuterol (DUO-NEB) 0.5-2.5 mg/3 ml nebulizer Take 3 mL by nebulization 4 (Four) Times a Day. Indications: Chronic Obstructive Lung Disease 360 mL 0    levothyroxine (Synthroid) 112 MCG tablet Take 1 tablet by mouth Daily. 90 tablet 3    O2 (OXYGEN) Inhale 2 L/min Continuous. Indications: COPD exacerbation, uncomplicated      polyethylene glycol (MIRALAX) 17 g packet Take 17 g by mouth Daily.      potassium chloride (KLOR-CON M20) 20 MEQ CR tablet Take 1 tablet by mouth Daily. 7 tablet 0    senna 8.6 MG tablet Take 2 tablets by mouth Every Evening. Indications: Constipation 60 tablet 3    spironolactone (ALDACTONE) 25 MG tablet TAKE 1 TABLET BY MOUTH DAILY FOR CONGESTIVE HEART FAILURE 90 tablet 0    warfarin (COUMADIN) 2.5 MG tablet Take 1 tablet by mouth See Admin Instructions. Take one tablet (2.5mg) 5 days weekly and two tablets (5mg) twice weekly. 108 tablet 3    warfarin (Coumadin) 3 MG tablet Take 3 mg (1 tablet) daily, except for 4.5 mg (1 and 1/2 tablet) on Saturday, or as directed by [facility].  Indications: history of DVT/PE 97 tablet 1     No facility-administered medications prior to visit.     No opioid medication identified on active medication list. I have reviewed chart for other potential  high risk medication/s and harmful drug interactions in the elderly.      Aspirin is on active medication list. Aspirin use is indicated based on review of current medical condition/s. Pros and cons of this therapy have been discussed today. Benefits of this medication outweigh potential harm.  Patient has been encouraged to continue taking this medication.  .      Patient Active Problem List   Diagnosis    Adenomatous polyp of colon    Allergic rhinitis    Asthma    Cervical disc disease    Chronic obstructive pulmonary disease    Coronary artery disease involving native coronary artery of native heart without angina pectoris    Degeneration of  intervertebral disc of lumbar region    Mixed hyperlipidemia    Essential hypertension    Hypertensive cardiovascular disease    Lichen planus    Mitral valve disorder    Class 3 severe obesity due to excess calories with serious comorbidity and body mass index (BMI) of 40.0 to 44.9 in adult    Osteoarthritis, generalized    Postmenopausal osteoporosis    Psoriasis    Pulmonary hypertension, primary    Restless leg syndrome    Seborrhea    Spinal stenosis of cervical region    Urinary incontinence    Vaginitis, atrophic    Chronic renal insufficiency, stage III (moderate)    Pelvic pain    Multiple tracheobronchial mucus plugs    Pulmonary embolism    ASVD (arteriosclerotic vascular disease)    Supratherapeutic INR    Leukocytosis    Atrial fibrillation with rapid ventricular response    Esophageal motility disorder    Chronic diastolic (congestive) heart failure    Cor pulmonale (chronic)    Difficulty in walking, not elsewhere classified    Major depressive disorder, recurrent, in partial remission    Panlobular emphysema    Presence of coronary angioplasty implant and graft    Stress incontinence (female) (male)    Multifocal pneumonia    Cognitive communication deficit    Dysphagia, pharyngeal phase    Unspecified lack of coordination    Unspecified protein-calorie malnutrition    Wedge compression fracture of t11-T12 vertebra, subsequent encounter for fracture with routine healing    Other reduced mobility    Vertigo of central origin     Advance Care Planning Advance Directive is not on file.  ACP discussion was held with the patient during this visit. Patient does not have an advance directive, information provided.            Objective   Vitals:    01/14/25 1004 01/14/25 1006   BP: 135/79 119/80   BP Location: Right arm Left arm   Patient Position: Sitting Sitting   Cuff Size: Adult Adult   Pulse: 77 75   Temp: 97 °F (36.1 °C)    TempSrc: Temporal    SpO2: 96%    Weight: 97.5 kg (215 lb)    Height: 152.4 cm  "(60\")    PainSc:  0-No pain       Estimated body mass index is 41.99 kg/m² as calculated from the following:    Height as of this encounter: 152.4 cm (60\").    Weight as of this encounter: 97.5 kg (215 lb).                Does the patient have evidence of cognitive impairment? No                                                                                                Health  Risk Assessment    Smoking Status:  Social History     Tobacco Use   Smoking Status Former    Current packs/day: 0.00    Average packs/day: 1 pack/day for 36.0 years (36.0 ttl pk-yrs)    Types: Cigarettes    Start date:     Quit date:     Years since quittin.0    Passive exposure: Never   Smokeless Tobacco Never     Alcohol Consumption:  Social History     Substance and Sexual Activity   Alcohol Use No       Fall Risk Screen  STEADI Fall Risk Assessment was completed, and patient is at LOW risk for falls.Assessment completed on:2025    Depression Screening   Little interest or pleasure in doing things? Several days   Feeling down, depressed, or hopeless? Several days   PHQ-2 Total Score 2   Trouble falling or staying asleep, or sleeping too much? Several days   Feeling tired or having little energy? Several days   Poor appetite or overeating? Not at all   Feeling bad about yourself - or that you are a failure or have let yourself or your family down? Not at all   Trouble concentrating on things, such as reading the newspaper or watching television? Several days   Moving or speaking so slowly that other people could have noticed? Or the opposite - being so fidgety or restless that you have been moving around a lot more than usual? Not at all   Thoughts that you would be better off dead, or of hurting yourself in some way? Not at all   PHQ-9 Total Score 5   If you checked off any problems, how difficult have these problems made it for you to do your work, take care of things at home, or get along with other people? Not " difficult at all      Health Habits and Functional and Cognitive Screenin/14/2025    10:09 AM   Functional & Cognitive Status   Do you have difficulty preparing food and eating? No   Do you have difficulty bathing yourself, getting dressed or grooming yourself? No   Do you have difficulty using the toilet? No   Do you have difficulty moving around from place to place? No   Do you have trouble with steps or getting out of a bed or a chair? No   Current Diet Unhealthy Diet   Dental Exam Up to date   Eye Exam Up to date   Exercise (times per week) 0 times per week   Current Exercises Include No Regular Exercise   Do you need help using the phone?  No   Are you deaf or do you have serious difficulty hearing?  No   Do you need help to go to places out of walking distance? Yes   Do you need help shopping? Yes   Do you need help preparing meals?  Yes   Do you need help with housework?  Yes   Do you need help with laundry? Yes   Do you need help taking your medications? Yes   Do you need help managing money? Yes   Do you ever drive or ride in a car without wearing a seat belt? No   Have you felt unusual stress, anger or loneliness in the last month? No   Who do you live with? Other   If you need help, do you have trouble finding someone available to you? No   Have you been bothered in the last four weeks by sexual problems? No   Do you have difficulty concentrating, remembering or making decisions? No           Age-appropriate Screening Schedule:  Refer to the list below for future screening recommendations based on patient's age, sex and/or medical conditions. Orders for these recommended tests are listed in the plan section. The patient has been provided with a written plan.    Health Maintenance List  Health Maintenance   Topic Date Due    DIABETIC EYE EXAM  Never done    URINE MICROALBUMIN  Never done    ZOSTER VACCINE (1 of 2) Never done    DXA SCAN  2017    RSV Vaccine - Adults (1 - 1-dose 75+ series)  Never done    TDAP/TD VACCINES (2 - Td or Tdap) 12/05/2022    COVID-19 Vaccine (4 - 2024-25 season) 10/03/2024    HEMOGLOBIN A1C  12/19/2024    LIPID PANEL  08/30/2025    ANNUAL WELLNESS VISIT  01/14/2026    BMI FOLLOWUP  01/14/2026    HEPATITIS C SCREENING  Completed    INFLUENZA VACCINE  Completed    Pneumococcal Vaccine 65+  Completed    COLORECTAL CANCER SCREENING  Discontinued                                                                                                                                                CMS Preventative Services Quick Reference  Risk Factors Identified During Encounter  Fall Risk-High or Moderate: Discussed Fall Prevention in the home    The above risks/problems have been discussed with the patient.  Pertinent information has been shared with the patient in the After Visit Summary.  An After Visit Summary and PPPS were made available to the patient.    Follow Up:   Next Medicare Wellness visit to be scheduled in 1 year.         Additional E&M Note during same encounter follows:  Patient has additional, significant, and separately identifiable condition(s)/problem(s) that require work above and beyond the Medicare Wellness Visit     Chief Complaint  Medicare Wellness-subsequent (Patient is fasting), Cough (On going for several weeks), and Hyperlipidemia    Subjective    HPI    Patient's daughter-in-law who is the caretaker was allowed to stay throughout the exam.    Patient is complaining of a severe cough that started 2 weeks ago when she got out of the nursing home she is bringing up green sputum intermittently is complaining of severe cough but no fever and oxygen saturation has been good at 96.     The patient is a 79-year-old female who is here today for her annual wellness exam for Medicare. She has a history of postmenopausal status, multifocal pneumonia, major depressive disorder, cognitive communication deficit, chronic diastolic congestive heart failure, pulmonary  embolism, mixed hyperlipidemia, lichen planus, hypertension, esophageal motility disorder, class III severe obesity due to excess calories with serious comorbidities and a body mass index of 41, panlobular emphysema, atrial fibrillation, severe persistent asthma without complication, wedge compression fracture of T11 and T12, and an unspecified cough.    She was diagnosed with multifocal pneumonia on 11/29/2024 during an emergency room visit, which necessitated a brief hospital stay followed by transfer to a nursing home for antibiotic therapy. She has been at home for approximately 2 weeks now. She reports a persistent cough that has been present since her discharge from the nursing home. The cough is productive, yielding green sputum, but she reports no associated fever. She has been tested negative for COVID-19.    Her physical and mental health status remains unchanged compared to the previous year. She is currently on aspirin therapy. She does not have an advanced directive in place. She has an upcoming appointment with Dr. Vega, cardiologist, on 04/11/2025. She reports no shortness of breath or symptoms suggestive of a recurrent blood clot. She maintains a diet low in saturated fats and reports no vaginal rash. She does not have any difficulty swallowing. She is able to ambulate without significant shortness of breath. She reports no palpitations or recurrence of atrial fibrillation. She experiences mild wheezing. Her back pain has improved. She is uncertain about her last ophthalmology and dental visits. She has difficulty swallowing large pills, necessitating them to be halved. She reports no issues with swallowing or indigestion. Her bladder function is normal, and she reports regular bowel movements. She does not have any sores on her legs or buttocks. She does not have diabetes. She sleeps well at night. She has been fasting in preparation for today's lab work.    Her mood fluctuates, but she reports no  "homicidal or suicidal ideation.    She has been on 2 liters of oxygen therapy consistently. It has been a significant amount of time since her last steroid treatment, which she reports as beneficial. She has a pulse oximeter at home.    ALLERGIES  The patient is allergic to PENICILLIN.    MEDICATIONS  Aspirin.  Review of Systems   Constitutional:  Positive for fatigue. Negative for fever.   Respiratory:  Positive for cough and shortness of breath.    Cardiovascular:  Negative for palpitations.   Neurological:  Negative for confusion.   Psychiatric/Behavioral:  Negative for dysphoric mood.           Objective   Vital Signs:  /80 (BP Location: Left arm, Patient Position: Sitting, Cuff Size: Adult)   Pulse 75   Temp 97 °F (36.1 °C) (Temporal)   Ht 152.4 cm (60\")   Wt 97.5 kg (215 lb)   SpO2 96%   BMI 41.99 kg/m²   Physical Exam  Vitals reviewed.   Constitutional:       General: She is not in acute distress.     Appearance: She is well-developed. She is obese. She is not ill-appearing or toxic-appearing.   HENT:      Head: Normocephalic and atraumatic.      Right Ear: Tympanic membrane, ear canal and external ear normal.      Left Ear: Tympanic membrane, ear canal and external ear normal.      Nose: Nose normal.      Mouth/Throat:      Mouth: Mucous membranes are moist.      Pharynx: No posterior oropharyngeal erythema.   Eyes:      Extraocular Movements: Extraocular movements intact.      Conjunctiva/sclera: Conjunctivae normal.      Pupils: Pupils are equal, round, and reactive to light.   Neck:      Vascular: No carotid bruit.   Cardiovascular:      Rate and Rhythm: Normal rate and regular rhythm.      Heart sounds: Normal heart sounds.   Pulmonary:      Effort: Pulmonary effort is normal.      Breath sounds: Rhonchi present. No rales.      Comments: Decreased breath sounds bilaterally and wheezing right upper lobe area  Abdominal:      General: Bowel sounds are normal. There is no distension.      " Palpations: Abdomen is soft. There is no mass.      Tenderness: There is no abdominal tenderness. There is no right CVA tenderness or left CVA tenderness.   Musculoskeletal:         General: Normal range of motion.      Cervical back: Neck supple. No tenderness.      Right lower leg: No edema.      Left lower leg: No edema.   Lymphadenopathy:      Cervical: No cervical adenopathy.   Skin:     General: Skin is warm.   Neurological:      General: No focal deficit present.      Mental Status: She is alert and oriented to person, place, and time.   Psychiatric:         Mood and Affect: Mood normal.         Behavior: Behavior normal.           Ears were examined. Throat appears normal.  Wheezing noted in the lungs.    Vital Signs  Oxygen saturation is at 96 percent.            Results                Assessment and Plan        1. Multifocal pneumonia.  She was diagnosed with multifocal pneumonia on 11/29/2024 and was treated with antibiotics. She has been experiencing a cough and rib pain when coughing for the past couple of weeks since returning home from the nursing home. She reports coughing up green phlegm but has not had a fever. A chest x-ray will be ordered today to check for recurrent pneumonia. If the x-ray confirms pneumonia, she will be prescribed doxycycline and erythromycin. She is advised to monitor her oxygen levels at home and call 911 if it drops below 90. She is also advised to rest and stay hydrated. A prescription for prednisone and cough medication will be sent to [facility] in [place].    2. Major depressive disorder.  She reports that her mental health is about the same as last year. She does not feel homicidal or suicidal.    3. Chronic diastolic congestive heart failure.  She has an appointment with her cardiologist, Dr. Vega, on 04/11/2025.    4. Severe persistent asthma without complication.  She reports a little wheezing.    5. Class III severe obesity.  She is advised to watch her portion  sizes and continue monitoring her diet.    6. Hyperlipidemia.  She is advised to continue watching her saturated fat intake.    7. Atrial fibrillation.  She reports no recurrence of atrial fibrillation.    8. Cognitive communication deficit.  She is listed as having a cognitive communication deficit but appears to speak well.    9. Health maintenance.  She will be provided with information regarding advanced directives. She is advised to schedule an appointment with her ophthalmologist and dentist. Fasting labs will be conducted today.    Follow-up  The patient will follow up in 3 months.            Follow Up   Return in about 3 months (around 4/14/2025).  Patient was given instructions and counseling regarding her condition or for health maintenance advice. Please see specific information pulled into the AVS if appropriate.  Patient or patient representative verbalized consent for the use of Ambient Listening during the visit with  Bria Matthews MD for chart documentation. 1/14/2025  11:39 EST

## 2025-01-14 NOTE — TELEPHONE ENCOUNTER
Please call patient and advise that I had to change the antibiotic due to the fact that she was on the Coumadin.     Per Dr. Matthews   0 = understands/communicates without difficulty

## 2025-01-15 ENCOUNTER — TELEPHONE (OUTPATIENT)
Dept: FAMILY MEDICINE CLINIC | Facility: CLINIC | Age: 80
End: 2025-01-15
Payer: MEDICARE

## 2025-01-15 NOTE — PROGRESS NOTES
Kidney function has improved slightly from 44-48 but in order to prolong the kidney function you should consider a medicine like Jardiance or Farxiga if you agree I will send 1 of those to your pharmacy if it is too expensive then we will send the other 1.  Blood sugar was 105 so watch her carbs and keep up your exercise vitamin B12 is elevated so if you are taking by mouth you can decrease the dose a couple of call if any other questions or concerns and let us know about the above

## 2025-01-15 NOTE — TELEPHONE ENCOUNTER
HUB TO RELAY:  ----- Message from Bria Matthews sent at 1/15/2025 11:32 AM EST -----  Kidney function has improved slightly from 44-48 but in order to prolong the kidney function you should consider a medicine like Jardiance or Farxiga if you agree I will send 1 of those to your pharmacy if it is too expensive then we will send the other 1.  Blood sugar was 105 so watch her carbs and keep up your exercise vitamin B12 is elevated so if you are taking by mouth you can decrease the dose a couple of call if any other questions or concerns and let us know about the above

## 2025-01-16 ENCOUNTER — TELEPHONE (OUTPATIENT)
Dept: FAMILY MEDICINE CLINIC | Facility: CLINIC | Age: 80
End: 2025-01-16
Payer: MEDICARE

## 2025-01-16 RX ORDER — DAPAGLIFLOZIN 5 MG/1
5 TABLET, FILM COATED ORAL DAILY
Qty: 30 TABLET | Refills: 1 | Status: SHIPPED | OUTPATIENT
Start: 2025-01-16 | End: 2025-01-17 | Stop reason: SDUPTHER

## 2025-01-16 NOTE — TELEPHONE ENCOUNTER
Sent Farxiga to her pharmacy let us know if your insurance does not pay or if it is unaffordable we will send the Jardiance.

## 2025-01-16 NOTE — TELEPHONE ENCOUNTER
HUB TO RELAY:  ----- Message from Bria Matthews sent at 1/15/2025 11:32 AM EST -----  Kidney function has improved slightly from 44-48 but in order to prolong the kidney function you should consider a medicine like Jardiance or Farxiga if you agree I will send 1 of those to your pharmacy if it is too expensive then we will send the other 1.  Blood sugar was 105 so watch her carbs and keep up your exercise vitamin B12 is elevated so if you are taking by mouth you can decrease the dose a couple of call if any other questions or concerns and let us know about the above    ----- Message from Bria Matthews sent at 1/16/2025  7:47 AM EST -----  There is no definite signs of pneumonia on this x-ray and no signs of any heart failure.  Please finish the antibiotic till gone and keep us posted as to your condition.

## 2025-01-16 NOTE — PROGRESS NOTES
There is no definite signs of pneumonia on this x-ray and no signs of any heart failure.  Please finish the antibiotic till gone and keep us posted as to your condition.

## 2025-01-16 NOTE — TELEPHONE ENCOUNTER
Caller: GRACIELA (SAHIL)    Best call back number: [phone]    What was the call regarding: GRACIELA DID A START OF CARE ON PT TODAY. STATES PT IS ON WARFARIN AND WANTS TO KNOW WHEN THEY SHOULD DO PT'S INR. PLEASE ADVISE.

## 2025-01-16 NOTE — TELEPHONE ENCOUNTER
HUB TO RELAY:  ----- Message from Bria Matthews sent at 1/16/2025  7:47 AM EST -----  There is no definite signs of pneumonia on this x-ray and no signs of any heart failure.  Please finish the antibiotic till gone and keep us posted as to your condition.

## 2025-01-17 ENCOUNTER — TELEPHONE (OUTPATIENT)
Dept: PHARMACY | Facility: HOSPITAL | Age: 80
End: 2025-01-17
Payer: MEDICARE

## 2025-01-17 RX ORDER — DAPAGLIFLOZIN 5 MG/1
5 TABLET, FILM COATED ORAL DAILY
Qty: 30 TABLET | Refills: 0 | Status: SHIPPED | OUTPATIENT
Start: 2025-01-17

## 2025-01-17 NOTE — TELEPHONE ENCOUNTER
Medication Management Clinic: [facility]  Clinical Outreach     Selina Vazquez is a 79 y.o. female and is a patient of the [facility] Management [facility] at [facility] for anticoagulation monitoring.     Rosalind with [facility] called to report that patient enrolled back in their home health services as of 1/16. They are to see Selina on Monday, 1/20 for INR check and will call result in to [facility] for dosing.     Jaimie Chamberlain, PharmD  Ambulatory Care Clinical Pharmacist  1/17/2025  10:33 EST

## 2025-01-17 NOTE — TELEPHONE ENCOUNTER
Spoke to Rosalind and let her know that INR's are managed by the [facility] at [facility].

## 2025-01-20 ENCOUNTER — ANTICOAGULATION VISIT (OUTPATIENT)
Dept: PHARMACY | Facility: HOSPITAL | Age: 80
End: 2025-01-20
Payer: MEDICARE

## 2025-01-20 DIAGNOSIS — I26.99 PULMONARY EMBOLISM, UNSPECIFIED CHRONICITY, UNSPECIFIED PULMONARY EMBOLISM TYPE, UNSPECIFIED WHETHER ACUTE COR PULMONALE PRESENT: Primary | ICD-10-CM

## 2025-01-20 LAB — INR PPP: 1.7 (ref 2–3)

## 2025-01-20 NOTE — PROGRESS NOTES
Anticoagulation Clinic Progress Note - Home Health INR Testing     INR Goal: 2 - 3  Today's INR: 1.7 (subtherapeutic). INR result was called in today by Sarai ([facility] nurse).   Current warfarin dose: warfarin 3 mg PO daily, except warfarin 4.5 mg PO on Saturday.  Current total weekly dose = 22.5 mg per week.     Patient was discharged from [facility] on 25 and [facility] has been unable to get discharge dosing instructions from facility (see multiple telephone encounters documenting efforts). Sarai reports that patient's grandson says patient has been taking warfarin 3 mg PO daily except 4.5 mg PO on Saturday as noted on most recent prescription bottle (prescription sent 24).     INR History:      Latest Ref Rng & Units 2024     2:50 PM 2024    12:00 AM 2024    12:26 PM 2024    12:00 AM 2024    11:47 AM 2025    12:00 AM 2025    10:51 AM   Anticoagulation Monitoring   INR  1.20  1.40  1.90  1.70   INR Date  2024   INR Goal  2.0-3.0  2.0-3.0  2.0-3.0  2.0-3.0   Trend  Same  Up  Down  Down   Last Week Total  23.5 mg  29.5 mg  35 mg  22.5 mg   Next Week Total  25.5 mg  35 mg  34 mg  24 mg   Sun  -  5 mg  5 mg  3 mg   Mon  4 mg ()  5 mg  5 mg  4.5 mg   Tue  4 mg (12/10)  5 mg  4 mg ()  3 mg   Wed  4 mg ()  5 mg  5 mg  3 mg   Thu  -  5 mg  5 mg  3 mg   Fri  -  5 mg  5 mg  3 mg   Sat  -  5 mg  5 mg  4.5 mg   Historical INR 2.00 - 3.00  1.40      1.90      1.70            This result is from an external source.       Anticoagulation Summary  As of 2025      INR goal:  2.0-3.0   TTR:  33.8% (1.2 y)   INR used for dosin.70 (2025)   Warfarin maintenance plan:  4.5 mg every Mon, Sat; 3 mg all other days   Weekly warfarin total:  24 mg   Plan last modified:  Roselyn Hill, PharmD (2025)   Next INR check:  2025   Priority:  High   Target end date:  --     Indications    Pulmonary embolism [I26.99]                 Anticoagulation Episode Summary       INR check location:  --    Preferred lab:  --    Send INR reminders to:  [facility] CLINICAL POOL    Comments:  FYNorman -   In-home caregiverCassy: [phone]  SisterConcha: [phone]  Potential new home phone number: [phone]  Jazmin (unit manager at [facility]: [phone]          Anticoagulation Care Providers       Provider Role Specialty Phone number    Bria Matthews MD Referring Family Medicine [phone]            Clinic Interview:   Patient Findings     Negatives:  Signs/symptoms of thrombosis, Signs/symptoms of bleeding,   Laboratory test error suspected, Change in health, Change in alcohol use,   Change in activity, Upcoming invasive procedure, Emergency department   visit, Upcoming dental procedure, Missed doses, Extra doses, Change in   medications, Change in diet/appetite, Hospital admission, Bruising, Other   complaints      Clinical Outcomes     Negatives:  Major bleeding event, Thromboembolic event,   Anticoagulation-related hospital admission, Anticoagulation-related ED   visit, Anticoagulation-related fatality        Current Medications:   Prior to Admission medications    Medication Sig Start Date End Date Taking? Authorizing Provider   albuterol sulfate  (90 Base) MCG/ACT inhaler Inhale 2 puffs Every 4 (Four) Hours As Needed for Wheezing or Shortness of Air. Indications: Chronic Obstructive Lung Disease    ProviderDiogenes MD   aspirin 81 MG EC tablet Take 1 tablet by mouth Daily. Indications: Venous Thromboembolism    ProviderDiogenes MD   atorvastatin (LIPITOR) 80 MG tablet Take 0.5 tablets by mouth Daily.    ProviderDiogenes MD   benzonatate (Tessalon Perles) 100 MG capsule Take 1 capsule by mouth 3 (Three) Times a Day As Needed for Cough. 1/14/25   Bria Matthews MD   budesonide (PULMICORT) 0.5 MG/2ML nebulizer solution Take 2 mL  by nebulization 2 (Two) Times a Day. 6/21/24   Tracy Terry MD   clindamycin (Cleocin) 300 MG capsule Take 1 capsule by mouth 3 (Three) Times a Day. 1/14/25   Bria Matthews MD   dapagliflozin (Farxiga) 5 MG tablet tablet Take 1 tablet by mouth Daily. 1/17/25   Bria Matthews MD   dilTIAZem CD (CARDIZEM CD) 180 MG 24 hr capsule Take 1 capsule by mouth Daily. 6/21/24   Tracy Terry MD   Fluticasone Furoate-Vilanterol (Breo Ellipta) 100-25 MCG/ACT aerosol powder  Inhale 1 puff Daily.    ProviderDiogenes MD   furosemide (LASIX) 40 MG tablet Take 1 tablet by mouth Daily. 7/7/24   Darrion Burch MD   guaiFENesin (MUCINEX) 600 MG 12 hr tablet Take 2 tablets by mouth 2 (Two) Times a Day.    ProviderDiogenes MD   ipratropium-albuterol (DUO-NEB) 0.5-2.5 mg/3 ml nebulizer Take 3 mL by nebulization 4 (Four) Times a Day. Indications: Chronic Obstructive Lung Disease 10/30/23   Bria Matthews MD   levothyroxine (Synthroid) 112 MCG tablet Take 1 tablet by mouth Daily. 9/2/24   Bria Matthews MD   O2 (OXYGEN) Inhale 2 L/min Continuous. Indications: COPD exacerbation, uncomplicated 10/6/23   Arlyn Holland APRN   polyethylene glycol (MIRALAX) 17 g packet Take 17 g by mouth Daily. 12/3/24   Jong Iyer MD   potassium chloride (KLOR-CON M20) 20 MEQ CR tablet Take 1 tablet by mouth Daily. 7/7/24   Darrion Burch MD   predniSONE (DELTASONE) 10 MG tablet Take 1 tablet by mouth Daily. 1/14/25   Bria Matthews MD   senna 8.6 MG tablet Take 2 tablets by mouth Every Evening. Indications: Constipation 8/27/24   Bria Matthews MD   spironolactone (ALDACTONE) 25 MG tablet TAKE 1 TABLET BY MOUTH DAILY FOR CONGESTIVE HEART FAILURE 8/16/24   Bria Matthews MD   warfarin (COUMADIN) 2.5 MG tablet Take 1 tablet by mouth See Admin Instructions. Take one tablet (2.5mg) 5 days weekly and two tablets (5mg) twice weekly. 6/25/24   Bria Matthews MD    warfarin (Coumadin) 3 MG tablet Take 3 mg (1 tablet) daily, except for 4.5 mg (1 and 1/2 tablet) on Saturday, or as directed by [facility].  Indications: history of DVT/PE 24   Bria Matthews MD   albuterol (ACCUNEB) 1.25 MG/3ML nebulizer solution Inhale. Indications: Spasm of Lung Air Passages 23   Provider, MD Diogenes   Cholecalciferol 50 MCG ( UT) tablet Take 1 tablet by mouth. Indications: Vitamin D Deficiency 23   Provider, MD Diogenes       Medical history:   Past Medical History:   Diagnosis Date    Adenomatous polyp of colon     Allergic rhinitis     Asthma     Cervical disc disease     Fracture of thoracic spine 2011    Lacunar infarction 2019    Mitral valve disorder     Postmenopausal osteoporosis     Restless leg syndrome        Social history:   Social History     Tobacco Use    Smoking status: Former     Current packs/day: 0.00     Average packs/day: 1 pack/day for 36.0 years (36.0 ttl pk-yrs)     Types: Cigarettes     Start date:      Quit date:      Years since quittin.0     Passive exposure: Never    Smokeless tobacco: Never   Substance Use Topics    Alcohol use: No       Allergies:    Latex and Penicillin g      This patient is managed via a collaborative agreement, pursuant to IC 25-26-16. The signed protocol is kept in the [facility] at [address] [place] [address].    Education: Selina Vazquez has been instructed to call if any changes in medications, doses, concerns, etc. Patient was provided dosing instructions and expressed verbal understanding and has no further questions at this time.    Plan:  1. increase by 7%; warfarin 3 mg PO daily, except warfarin 4.5 mg PO on Monday and Saturday.  New total weekly dose = 24 mg.   2. INR should be tested in 1 week by home health and called into clinic.     Roselyn Hill, PharmD  2025  10:59 EST

## 2025-01-22 ENCOUNTER — PATIENT OUTREACH (OUTPATIENT)
Dept: CASE MANAGEMENT | Facility: OTHER | Age: 80
End: 2025-01-22
Payer: MEDICARE

## 2025-01-22 NOTE — OUTREACH NOTE
AMBULATORY CASE MANAGEMENT NOTE    Names and Relationships of Patient/Support Persons: Contact: Selina Vazquez; Relationship: Self -     ACM completed outreach to patient. Pt. reported that she did leave [facility] to go back home. Pt. reports that her health is up/down. [facility] requested ot continue ot support patient, but Pt. reports that she does not need anything at all at this time and if any needs arise she will contact [facility]. [facility] provided all contact information for all future needs/inquiries.       Heron SHARMA  Ambulatory Case Management    1/22/2025, 11:02 EST

## 2025-01-28 ENCOUNTER — ANTICOAGULATION VISIT (OUTPATIENT)
Dept: PHARMACY | Facility: HOSPITAL | Age: 80
End: 2025-01-28
Payer: MEDICARE

## 2025-01-28 LAB — INR PPP: 2.6 (ref 2–3)

## 2025-01-28 NOTE — PROGRESS NOTES
Anticoagulation Clinic Progress Note - Home Health INR Testing     INR Goal: 2 - 3  Today's INR: 2.6 (therapeutic). INR result was called in today by Sarai ([facility]).   Current warfarin dose: warfarin 3 mg PO daily, except warfarin 4.5 mg PO on Monday and Saturday.  Current total weekly dose = 24 mg per week.     INR History:      Latest Ref Rng & Units 2024    12:26 PM 2024    12:00 AM 2024    11:47 AM 2025    12:00 AM 2025    10:51 AM 2025    12:00 AM 2025     9:53 AM   Anticoagulation Monitoring   INR  1.40  1.90  1.70  2.60   INR Date  2024   INR Goal  2.0-3.0  2.0-3.0  2.0-3.0  2.0-3.0   Trend  Up  Down  Down  Same   Last Week Total  29.5 mg  35 mg  22.5 mg  24 mg   Next Week Total  35 mg  34 mg  24 mg  24 mg   Sun  5 mg  5 mg  3 mg  3 mg   Mon  5 mg  5 mg  4.5 mg  4.5 mg   Tue  5 mg  4 mg ()  3 mg  3 mg   Wed  5 mg  5 mg  3 mg  3 mg   Thu  5 mg  5 mg  3 mg  3 mg   Fri  5 mg  5 mg  3 mg  3 mg   Sat  5 mg  5 mg  4.5 mg  4.5 mg   Historical INR 2.00 - 3.00  1.90      1.70      2.60            This result is from an external source.       Anticoagulation Summary  As of 2025      INR goal:  2.0-3.0   TTR:  34.4% (1.2 y)   INR used for dosin.60 (2025)   Warfarin maintenance plan:  4.5 mg every Mon, Sat; 3 mg all other days   Weekly warfarin total:  24 mg   No change documented:  Jaimie Chamberlain, PharmD   Plan last modified:  Roselyn Hill, PharmD (2025)   Next INR check:  2025   Priority:  High   Target end date:  --    Indications    Pulmonary embolism [I26.99]                 Anticoagulation Episode Summary       INR check location:  --    Preferred lab:  --    Send INR reminders to:  [facility] CLINICAL POOL    Comments:  FYNorman -   In-home caregiverCassy: [phone]  SisterConcha: [phone]  Potential new home phone number: [phone]  Jazmin (unit manager at [facility]: [phone]          Anticoagulation Care Providers       Provider Role Specialty Phone number    Bria Matthews MD Referring Family Medicine [phone]            Clinic Interview:   Patient Findings     Negatives:  Signs/symptoms of thrombosis, Signs/symptoms of bleeding,   Laboratory test error suspected, Change in health, Change in alcohol use,   Change in activity, Upcoming invasive procedure, Emergency department   visit, Upcoming dental procedure, Missed doses, Extra doses, Change in   medications, Change in diet/appetite, Hospital admission, Bruising, Other   complaints      Clinical Outcomes     Negatives:  Major bleeding event, Thromboembolic event,   Anticoagulation-related hospital admission, Anticoagulation-related ED   visit, Anticoagulation-related fatality        Current Medications:   Prior to Admission medications    Medication Sig Start Date End Date Taking? Authorizing Provider   albuterol sulfate  (90 Base) MCG/ACT inhaler Inhale 2 puffs Every 4 (Four) Hours As Needed for Wheezing or Shortness of Air. Indications: Chronic Obstructive Lung Disease    ProviderDiogenes MD   aspirin 81 MG EC tablet Take 1 tablet by mouth Daily. Indications: Venous Thromboembolism    ProviderDiogenes MD   atorvastatin (LIPITOR) 80 MG tablet Take 0.5 tablets by mouth Daily.    ProviderDiogenes MD   benzonatate (Tessalon Perles) 100 MG capsule Take 1 capsule by mouth 3 (Three) Times a Day As Needed for Cough. 1/14/25   Bria Matthews MD   budesonide (PULMICORT) 0.5 MG/2ML nebulizer solution Take 2 mL by nebulization 2 (Two) Times a Day. 6/21/24   Tracy Terry MD   clindamycin (Cleocin) 300 MG capsule Take 1 capsule by mouth 3 (Three) Times a Day. 1/14/25   Bria Matthews MD   dapagliflozin (Farxiga) 5 MG tablet tablet Take 1 tablet by mouth Daily. 1/17/25   Bria Matthews MD   dilTIAZem CD (CARDIZEM CD) 180 MG 24 hr capsule Take 1 capsule by mouth Daily.  6/21/24   Tracy Terry MD   Fluticasone Furoate-Vilanterol (Breo Ellipta) 100-25 MCG/ACT aerosol powder  Inhale 1 puff Daily.    Provider, MD Diogenes   furosemide (LASIX) 40 MG tablet Take 1 tablet by mouth Daily. 7/7/24   Darrion Burch MD   guaiFENesin (MUCINEX) 600 MG 12 hr tablet Take 2 tablets by mouth 2 (Two) Times a Day.    Provider, MD Diogenes   ipratropium-albuterol (DUO-NEB) 0.5-2.5 mg/3 ml nebulizer Take 3 mL by nebulization 4 (Four) Times a Day. Indications: Chronic Obstructive Lung Disease 10/30/23   Bria Matthews MD   levothyroxine (Synthroid) 112 MCG tablet Take 1 tablet by mouth Daily. 9/2/24   Bria Matthews MD   O2 (OXYGEN) Inhale 2 L/min Continuous. Indications: COPD exacerbation, uncomplicated 10/6/23   Arlyn Holland APRN   polyethylene glycol (MIRALAX) 17 g packet Take 17 g by mouth Daily. 12/3/24   Jong Iyer MD   potassium chloride (KLOR-CON M20) 20 MEQ CR tablet Take 1 tablet by mouth Daily. 7/7/24   Darrion Burch MD   predniSONE (DELTASONE) 10 MG tablet Take 1 tablet by mouth Daily. 1/14/25   Bria Matthews MD   senna 8.6 MG tablet Take 2 tablets by mouth Every Evening. Indications: Constipation 8/27/24   Bria Matthews MD   spironolactone (ALDACTONE) 25 MG tablet TAKE 1 TABLET BY MOUTH DAILY FOR CONGESTIVE HEART FAILURE 8/16/24   Bria Matthews MD   warfarin (COUMADIN) 2.5 MG tablet Take 1 tablet by mouth See Admin Instructions. Take one tablet (2.5mg) 5 days weekly and two tablets (5mg) twice weekly. 6/25/24   Bria Matthews MD   warfarin (Coumadin) 3 MG tablet Take 3 mg (1 tablet) daily, except for 4.5 mg (1 and 1/2 tablet) on Saturday, or as directed by [facility].  Indications: history of DVT/PE 11/20/24   Bria Matthews MD   albuterol (ACCUNEB) 1.25 MG/3ML nebulizer solution Inhale. Indications: Spasm of Lung Air Passages 9/27/23   Provider, MD Diogenes   Cholecalciferol 50  MCG (2000 UT) tablet Take 1 tablet by mouth. Indications: Vitamin D Deficiency 23   Provider, MD Diogenes       Medical history:   Past Medical History:   Diagnosis Date    Adenomatous polyp of colon     Allergic rhinitis     Asthma     Cervical disc disease     Fracture of thoracic spine 2011    Lacunar infarction 2019    Mitral valve disorder     Postmenopausal osteoporosis     Restless leg syndrome        Social history:   Social History     Tobacco Use    Smoking status: Former     Current packs/day: 0.00     Average packs/day: 1 pack/day for 36.0 years (36.0 ttl pk-yrs)     Types: Cigarettes     Start date:      Quit date:      Years since quittin.0     Passive exposure: Never    Smokeless tobacco: Never   Substance Use Topics    Alcohol use: No       Allergies:    Latex and Penicillin g    This patient is managed via a collaborative agreement, pursuant to IC 25-26-16. The signed protocol is kept in the [facility] at [address] [place] [facility].    Education: Selina Vazquez has been instructed to call if any changes in medications, doses, concerns, etc. Patient was provided dosing instructions and expressed verbal understanding and has no further questions at this time.    Plan:  1. no change; warfarin 3 mg PO daily, except warfarin 4.5 mg PO on Monday and Saturday.  Total weekly dose = 24 mg.   2. INR should be tested weekly and called into clinic.     Jaimie Chamberlain, PharmD  2025  09:54 EST

## 2025-01-30 RX ORDER — PANTOPRAZOLE SODIUM 40 MG/1
40 TABLET, DELAYED RELEASE ORAL DAILY
Qty: 30 TABLET | Refills: 2 | Status: SHIPPED | OUTPATIENT
Start: 2025-01-30

## 2025-01-30 NOTE — TELEPHONE ENCOUNTER
Rx Refill Note  Requested Prescriptions     Pending Prescriptions Disp Refills    pantoprazole (PROTONIX) 40 MG EC tablet [Pharmacy Med Name: PANTOPRAZOLE 40MG TABLETS] 30 tablet      Sig: TAKE 1 TABLET BY MOUTH DAILY.      Last office visit with prescribing clinician: 1/14/2025   Last telemedicine visit with prescribing clinician: Visit date not found   Next office visit with prescribing clinician: 4/14/2025                         Would you like a call back once the refill request has been completed: [] Yes [] No    If the office needs to give you a call back, can they leave a voicemail: [] Yes [] No    Gillian Fisher MA  01/30/25, 13:19 EST

## 2025-02-04 ENCOUNTER — ANTICOAGULATION VISIT (OUTPATIENT)
Dept: PHARMACY | Facility: HOSPITAL | Age: 80
End: 2025-02-04
Payer: MEDICARE

## 2025-02-04 DIAGNOSIS — I26.01 ACUTE SEPTIC PULMONARY EMBOLISM WITH ACUTE COR PULMONALE: Primary | ICD-10-CM

## 2025-02-04 LAB — INR PPP: 2.5 (ref 2–3)

## 2025-02-04 NOTE — PROGRESS NOTES
Anticoagulation Clinic Progress Note - Home Health INR Testing     INR Goal: 2 - 3  Today's INR: 2.5 (therapeutic). INR result was called in today by Regine Moon.   Current warfarin dose: warfarin 3 mg PO daily, except warfarin 4.5 mg PO on Monday and .  Current total weekly dose = 24 mg per week.     INR History:      Latest Ref Rng & Units 2024    11:47 AM 2025    12:00 AM 2025    10:51 AM 2025    12:00 AM 2025     9:53 AM 2025    12:00 AM 2025     8:59 AM   Anticoagulation Monitoring   INR  1.90  1.70  2.60  2.50   INR Date  2024   INR Goal  2.0-3.0  2.0-3.0  2.0-3.0  2.0-3.0   Trend  Down  Down  Same  Same   Last Week Total  35 mg  22.5 mg  24 mg  24 mg   Next Week Total  34 mg  24 mg  24 mg  24 mg   Sun  5 mg  3 mg  3 mg  3 mg   Mon  5 mg  4.5 mg  4.5 mg  4.5 mg   Tue  4 mg ()  3 mg  3 mg  3 mg   Wed  5 mg  3 mg  3 mg  3 mg   Thu  5 mg  3 mg  3 mg  3 mg   Fri  5 mg  3 mg  3 mg  3 mg   Sat  5 mg  4.5 mg  4.5 mg  4.5 mg   Historical INR 2.00 - 3.00  1.70      2.60      2.50            This result is from an external source.       Anticoagulation Summary  As of 2025      INR goal:  2.0-3.0   TTR:  35.4% (1.2 y)   INR used for dosin.50 (2025)   Warfarin maintenance plan:  4.5 mg every Mon, Sat; 3 mg all other days   Weekly warfarin total:  24 mg   No change documented:  Jacqui Lagunas, PharmD   Plan last modified:  Roselyn Hill, PharmD (2025)   Next INR check:  2025   Priority:  High   Target end date:  --    Indications    Pulmonary embolism [I26.99]                 Anticoagulation Episode Summary       INR check location:  --    Preferred lab:  --    Send INR reminders to:  [facility] CLINICAL POOL    Comments:  FYNorman -   In-home caregiverCassy: [phone]  SisterConcha: [phone]  Potential new home phone number: [phone]  Jazmin (unit manager at [facility]:  [phone]          Anticoagulation Care Providers       Provider Role Specialty Phone number    Bria Matthews MD Referring Family Medicine [phone]            Clinic Interview:   Patient Findings     Negatives:  Signs/symptoms of thrombosis, Signs/symptoms of bleeding,   Laboratory test error suspected, Change in health, Change in alcohol use,   Change in activity, Upcoming invasive procedure, Emergency department   visit, Upcoming dental procedure, Missed doses, Extra doses, Change in   medications, Change in diet/appetite, Hospital admission, Bruising, Other   complaints      Clinical Outcomes     Negatives:  Major bleeding event, Thromboembolic event,   Anticoagulation-related hospital admission, Anticoagulation-related ED   visit, Anticoagulation-related fatality        Current Medications:   Prior to Admission medications    Medication Sig Start Date End Date Taking? Authorizing Provider   albuterol sulfate  (90 Base) MCG/ACT inhaler Inhale 2 puffs Every 4 (Four) Hours As Needed for Wheezing or Shortness of Air. Indications: Chronic Obstructive Lung Disease    ProviderDiogenes MD   aspirin 81 MG EC tablet Take 1 tablet by mouth Daily. Indications: Venous Thromboembolism    Provider, MD Diogenes   atorvastatin (LIPITOR) 80 MG tablet Take 0.5 tablets by mouth Daily.    Provider, MD Diogenes   benzonatate (Tessalon Perles) 100 MG capsule Take 1 capsule by mouth 3 (Three) Times a Day As Needed for Cough. 1/14/25   Bria Matthews MD   budesonide (PULMICORT) 0.5 MG/2ML nebulizer solution Take 2 mL by nebulization 2 (Two) Times a Day. 6/21/24   Tracy Terry MD   clindamycin (Cleocin) 300 MG capsule Take 1 capsule by mouth 3 (Three) Times a Day. 1/14/25   Bria Matthews MD   dapagliflozin (Farxiga) 5 MG tablet tablet Take 1 tablet by mouth Daily. 1/17/25   Bria Matthews MD   dilTIAZem CD (CARDIZEM CD) 180 MG 24 hr capsule Take 1 capsule by mouth Daily. 6/21/24    Tracy Terry MD   Fluticasone Furoate-Vilanterol (Breo Ellipta) 100-25 MCG/ACT aerosol powder  Inhale 1 puff Daily.    Provider, MD Diogenes   furosemide (LASIX) 40 MG tablet Take 1 tablet by mouth Daily. 7/7/24   Darrion Burch MD   guaiFENesin (MUCINEX) 600 MG 12 hr tablet Take 2 tablets by mouth 2 (Two) Times a Day.    Provider, MD Diogenes   ipratropium-albuterol (DUO-NEB) 0.5-2.5 mg/3 ml nebulizer Take 3 mL by nebulization 4 (Four) Times a Day. Indications: Chronic Obstructive Lung Disease 10/30/23   Bria Matthews MD   levothyroxine (Synthroid) 112 MCG tablet Take 1 tablet by mouth Daily. 9/2/24   Bria Matthews MD   O2 (OXYGEN) Inhale 2 L/min Continuous. Indications: COPD exacerbation, uncomplicated 10/6/23   Arlyn Holland APRN   pantoprazole (PROTONIX) 40 MG EC tablet TAKE 1 TABLET BY MOUTH DAILY. 1/30/25   Bria Matthews MD   polyethylene glycol (MIRALAX) 17 g packet Take 17 g by mouth Daily. 12/3/24   Jong Iyer MD   potassium chloride (KLOR-CON M20) 20 MEQ CR tablet Take 1 tablet by mouth Daily. 7/7/24   Darrion Burch MD   predniSONE (DELTASONE) 10 MG tablet Take 1 tablet by mouth Daily. 1/14/25   Bria Matthews MD   senna 8.6 MG tablet Take 2 tablets by mouth Every Evening. Indications: Constipation 8/27/24   Bria Matthews MD   spironolactone (ALDACTONE) 25 MG tablet TAKE 1 TABLET BY MOUTH DAILY FOR CONGESTIVE HEART FAILURE 8/16/24   Bria Matthews MD   warfarin (COUMADIN) 2.5 MG tablet Take 1 tablet by mouth See Admin Instructions. Take one tablet (2.5mg) 5 days weekly and two tablets (5mg) twice weekly. 6/25/24   Bria Matthews MD   warfarin (Coumadin) 3 MG tablet Take 3 mg (1 tablet) daily, except for 4.5 mg (1 and 1/2 tablet) on Saturday, or as directed by [facility].  Indications: history of DVT/PE 11/20/24   Bria Matthews MD   albuterol (ACCUNEB) 1.25 MG/3ML nebulizer solution Inhale.  Indications: Spasm of Lung Air Passages 23   Provider, MD Diogenes   Cholecalciferol 50 MCG ( UT) tablet Take 1 tablet by mouth. Indications: Vitamin D Deficiency 23   Provider, MD Diogenes       Medical history:   Past Medical History:   Diagnosis Date    Adenomatous polyp of colon     Allergic rhinitis     Asthma     Cervical disc disease     Fracture of thoracic spine 2011    Lacunar infarction 2019    Mitral valve disorder     Postmenopausal osteoporosis     Restless leg syndrome        Social history:   Social History     Tobacco Use    Smoking status: Former     Current packs/day: 0.00     Average packs/day: 1 pack/day for 36.0 years (36.0 ttl pk-yrs)     Types: Cigarettes     Start date:      Quit date:      Years since quittin.1     Passive exposure: Never    Smokeless tobacco: Never   Substance Use Topics    Alcohol use: No       Allergies:    Latex and Penicillin g      This patient is managed via a collaborative agreement, pursuant to IC 25-26-16. The signed protocol is kept in the [facility] at [address] [place] [facility].    Education: Selina Vazquez has been instructed to call if any changes in medications, doses, concerns, etc. Patient was provided dosing instructions and expressed verbal understanding and has no further questions at this time.    Plan:  1. no change; warfarin 3 mg PO daily, except warfarin 4.5 mg PO on Monday and .   Total weekly dose = 24 mg.   2. INR should be tested weekly and called into clinic by [facility].       Yeny Lagunas, PharmD  2025  09:00 EST

## 2025-02-07 RX ORDER — ATORVASTATIN CALCIUM 40 MG/1
40 TABLET, FILM COATED ORAL EVERY MORNING
Qty: 90 TABLET | Refills: 1 | OUTPATIENT
Start: 2025-02-07

## 2025-02-11 ENCOUNTER — ANTICOAGULATION VISIT (OUTPATIENT)
Dept: PHARMACY | Facility: HOSPITAL | Age: 80
End: 2025-02-11
Payer: MEDICARE

## 2025-02-11 LAB — INR PPP: 1.6 (ref 2–3)

## 2025-02-11 NOTE — PROGRESS NOTES
Anticoagulation Clinic Progress Note - Home Health INR Testing     INR Goal: 2 - 3  Today's INR: 1.6 (subtherapeutic). INR result was called in today by Sole ([facility] nurse).   Current warfarin dose: warfarin 3 mg PO daily, except warfarin 4.5 mg PO on Monday and Saturday.  Current total weekly dose = 24 mg per week.   NOTE: Home health nurse reports she is not able to tell if patient has missed doses this week or not. Patient's son usually puts her medications together for her and patient reports she just takes what is there for her and is unsure if her warfarin was updated or not.     INR History:      Latest Ref Rng & Units 2025    10:51 AM 2025    12:00 AM 2025     9:53 AM 2025    12:00 AM 2025     8:59 AM 2025    12:00 AM 2025     1:45 PM   Anticoagulation Monitoring   INR  1.70  2.60  2.50  1.60   INR Date  2025   INR Goal  2.0-3.0  2.0-3.0  2.0-3.0  2.0-3.0   Trend  Down  Same  Same  Up   Last Week Total  22.5 mg  24 mg  24 mg  24 mg   Next Week Total  24 mg  24 mg  24 mg  27 mg   Sun  3 mg  3 mg  3 mg  3 mg   Mon  4.5 mg  4.5 mg  4.5 mg  4.5 mg   Tue  3 mg  3 mg  3 mg  4.5 mg ()   Wed  3 mg  3 mg  3 mg  4.5 mg   Thu  3 mg  3 mg  3 mg  3 mg   Fri  3 mg  3 mg  3 mg  3 mg   Sat  4.5 mg  4.5 mg  4.5 mg  4.5 mg   Historical INR 2.00 - 3.00  2.60      2.50      1.60            This result is from an external source.       Anticoagulation Summary  As of 2025      INR goal:  2.0-3.0   TTR:  35.7% (1.2 y)   INR used for dosin.60 (2025)   Warfarin maintenance plan:  4.5 mg every Mon, Wed, Sat; 3 mg all other days   Weekly warfarin total:  25.5 mg   Plan last modified:  Jaimie Chamberlain, PharmD (2025)   Next INR check:  --   Priority:  High   Target end date:  --    Indications    Pulmonary embolism [I26.99]                 Anticoagulation Episode Summary       INR check location:  --    Preferred lab:  --     Send INR reminders to:  [facility] CLINICAL POOL    Comments:  FYIs -   In-home caregiver, Cassy: [phone]  Sister, Concha: [phone]  Potential new home phone number: [phone]  Jazmin (unit manager at [facility]: [phone]          Anticoagulation Care Providers       Provider Role Specialty Phone number    Bria Matthews MD Referring Family Medicine [phone]            Clinic Interview:   Patient Findings     Positives:  Other complaints    Negatives:  Signs/symptoms of thrombosis, Signs/symptoms of bleeding,   Laboratory test error suspected, Change in health, Change in alcohol use,   Change in activity, Upcoming invasive procedure, Emergency department   visit, Upcoming dental procedure, Missed doses, Extra doses, Change in   medications, Change in diet/appetite, Hospital admission, Bruising    Comments:  Home health nurse reports she is not able to tell if patient   has missed doses this week or not. Patient's son usually puts her   medications together for her and patient reports she just takes what is   there for her and is unsure if her warfarin was updated or not.       Clinical Outcomes     Negatives:  Major bleeding event, Thromboembolic event,   Anticoagulation-related hospital admission, Anticoagulation-related ED   visit, Anticoagulation-related fatality    Comments:  Home health nurse reports she is not able to tell if patient   has missed doses this week or not. Patient's son usually puts her   medications together for her and patient reports she just takes what is   there for her and is unsure if her warfarin was updated or not.         Current Medications:   Prior to Admission medications    Medication Sig Start Date End Date Taking? Authorizing Provider   albuterol sulfate  (90 Base) MCG/ACT inhaler Inhale 2 puffs Every 4 (Four) Hours As Needed for Wheezing or Shortness of Air. Indications: Chronic Obstructive Lung Disease    Provider, Historical,  MD   aspirin 81 MG EC tablet Take 1 tablet by mouth Daily. Indications: Venous Thromboembolism    Diogenes Olguin MD   atorvastatin (LIPITOR) 80 MG tablet Take 0.5 tablets by mouth Daily.    Diogenes Olguin MD   benzonatate (Tessalon Perles) 100 MG capsule Take 1 capsule by mouth 3 (Three) Times a Day As Needed for Cough. 1/14/25   Bria Matthews MD   budesonide (PULMICORT) 0.5 MG/2ML nebulizer solution Take 2 mL by nebulization 2 (Two) Times a Day. 6/21/24   Tracy Terry MD   clindamycin (Cleocin) 300 MG capsule Take 1 capsule by mouth 3 (Three) Times a Day. 1/14/25   Bria Matthews MD   dapagliflozin (Farxiga) 5 MG tablet tablet Take 1 tablet by mouth Daily. 1/17/25   Bria Matthews MD   dilTIAZem CD (CARDIZEM CD) 180 MG 24 hr capsule Take 1 capsule by mouth Daily. 6/21/24   Tracy Terry MD   Fluticasone Furoate-Vilanterol (Breo Ellipta) 100-25 MCG/ACT aerosol powder  Inhale 1 puff Daily.    Diogenes Olguin MD   furosemide (LASIX) 40 MG tablet Take 1 tablet by mouth Daily. 7/7/24   Darrion Burch MD   guaiFENesin (MUCINEX) 600 MG 12 hr tablet Take 2 tablets by mouth 2 (Two) Times a Day.    Diogenes Olguin MD   ipratropium-albuterol (DUO-NEB) 0.5-2.5 mg/3 ml nebulizer Take 3 mL by nebulization 4 (Four) Times a Day. Indications: Chronic Obstructive Lung Disease 10/30/23   Bria Matthews MD   levothyroxine (Synthroid) 112 MCG tablet Take 1 tablet by mouth Daily. 9/2/24   Bria Matthews MD   O2 (OXYGEN) Inhale 2 L/min Continuous. Indications: COPD exacerbation, uncomplicated 10/6/23   Arlyn Holland APRN   pantoprazole (PROTONIX) 40 MG EC tablet TAKE 1 TABLET BY MOUTH DAILY. 1/30/25   Bria Matthews MD   polyethylene glycol (MIRALAX) 17 g packet Take 17 g by mouth Daily. 12/3/24   Jong Iyer MD   potassium chloride (KLOR-CON M20) 20 MEQ CR tablet Take 1 tablet by mouth Daily. 7/7/24   Darrion Burch MD   predniSONE  (DELTASONE) 10 MG tablet Take 1 tablet by mouth Daily. 25   Bria Matthews MD   senna 8.6 MG tablet Take 2 tablets by mouth Every Evening. Indications: Constipation 24   Bria Matthews MD   spironolactone (ALDACTONE) 25 MG tablet TAKE 1 TABLET BY MOUTH DAILY FOR CONGESTIVE HEART FAILURE 24   Bria Matthews MD   warfarin (COUMADIN) 2.5 MG tablet Take 1 tablet by mouth See Admin Instructions. Take one tablet (2.5mg) 5 days weekly and two tablets (5mg) twice weekly. 24   Bria Matthews MD   warfarin (Coumadin) 3 MG tablet Take 3 mg (1 tablet) daily, except for 4.5 mg (1 and 1/2 tablet) on Saturday, or as directed by [facility].  Indications: history of DVT/PE 24   Bria Matthews MD   albuterol (ACCUNEB) 1.25 MG/3ML nebulizer solution Inhale. Indications: Spasm of Lung Air Passages 23   ProviderDiogenes MD   Cholecalciferol 50 MCG (2000 UT) tablet Take 1 tablet by mouth. Indications: Vitamin D Deficiency 23   ProviderDiogenes MD       Medical history:   Past Medical History:   Diagnosis Date    Adenomatous polyp of colon     Allergic rhinitis     Asthma     Cervical disc disease     Fracture of thoracic spine 2011    Lacunar infarction 2019    Mitral valve disorder     Postmenopausal osteoporosis     Restless leg syndrome        Social history:   Social History     Tobacco Use    Smoking status: Former     Current packs/day: 0.00     Average packs/day: 1 pack/day for 36.0 years (36.0 ttl pk-yrs)     Types: Cigarettes     Start date:      Quit date:      Years since quittin.1     Passive exposure: Never    Smokeless tobacco: Never   Substance Use Topics    Alcohol use: No       Allergies:    Latex and Penicillin g      This patient is managed via a collaborative agreement, pursuant to IC [id number]. The signed protocol is kept in the [facility] at [address] [place] [address].    Education: Selina Vazquez has been instructed to call if any changes in medications, doses, concerns, etc. Patient was provided dosing instructions and expressed verbal understanding and has no further questions at this time.    Plan:  1. Give one time increased dose today of 4.5 mg, then initiate 6% increase; warfarin 3 mg PO daily, except warfarin 4.5 mg PO on Monday, Wednesday, and Saturday.  New total weekly dose = 25.5 mg (27 mg over next  days- 12.5% increase).   2. INR should be tested weekly and called into clinic.     Counseled patient on increased clotting risk associated with subtherapeutic INR, signs/symptoms to monitor for, and when to seek medical attention.     Jaimie Chamberlain, PharmD  2/11/2025  13:50 EST

## 2025-02-18 ENCOUNTER — TELEPHONE (OUTPATIENT)
Dept: FAMILY MEDICINE CLINIC | Facility: CLINIC | Age: 80
End: 2025-02-18
Payer: MEDICARE

## 2025-02-20 ENCOUNTER — TELEPHONE (OUTPATIENT)
Dept: PHARMACY | Facility: HOSPITAL | Age: 80
End: 2025-02-20
Payer: MEDICARE

## 2025-02-20 ENCOUNTER — TELEPHONE (OUTPATIENT)
Dept: FAMILY MEDICINE CLINIC | Facility: CLINIC | Age: 80
End: 2025-02-20
Payer: MEDICARE

## 2025-02-20 RX ORDER — PANTOPRAZOLE SODIUM 40 MG/1
40 TABLET, DELAYED RELEASE ORAL DAILY
Qty: 90 TABLET | Refills: 1 | Status: SHIPPED | OUTPATIENT
Start: 2025-02-20

## 2025-02-20 NOTE — TELEPHONE ENCOUNTER
Medication Management Clinic: [facility]  Clinical Outreach     Selina Vazquez is a 79 y.o. female and is a patient of the Medication Management Clinic at [facility] for anticoagulation monitoring.     [facility] nurse, Sarai, called clinic today to report that she was getting error messages every time she tried to check patient's INR today. However, she also had concerns that patient may not be getting the correct dose as patient's caretaker seemed to be struggling with accurately maintaining patient's pill boxes. She reported she would be planning to discharge patient from their services today and recommend to Dr. Matthews that patient be switched to alternate anticoagulant. Per review of telephone call documentation from [facility] to Dr. Matthews today, Dr. Matthews reported that change in anticoagulation would need to be determined by Dr. Vega.    Clinic pharmacist called [facility] to confirm that Dr. Matthews was aware that patient was planned to be discharged from home health services as telephone documentation did not mention this and no plan in place for anticoagulation monitoring moving forward. Ashley at [facility] reported she would contact home health nurse, Sarai, to ensure that Dr. Matthews's office was made aware of home health services ending.    Attempted to contact patient twice today to discuss with her and see if she would plan to resume having INR done at Dr. Matthews's office and be called into this clinic. No answer and no ability to leave voicemail. Will continue to attempt to follow up.     Jaimie Chamberlain, PharmD  Ambulatory Care Clinical Pharmacist  2/20/2025  15:16 EST

## 2025-02-20 NOTE — TELEPHONE ENCOUNTER
Dr. Vega the cardiologist would be the person to switch her blood thinner.  Pantoprazole has been sent.

## 2025-02-20 NOTE — TELEPHONE ENCOUNTER
Rec'd call from [facility] requesting to switch PT to another anticoagulant like Eloquis, current medication difficult for PT's caretaker to administer. Also requested medication refill for pantoprazole.

## 2025-02-24 ENCOUNTER — TELEPHONE (OUTPATIENT)
Dept: PHARMACY | Facility: HOSPITAL | Age: 80
End: 2025-02-24
Payer: MEDICARE

## 2025-02-24 NOTE — TELEPHONE ENCOUNTER
Medication Management Clinic: [facility]  Clinical Outreach     Selina Vazquez is a 79 y.o. female and is a patient of the Medication Management Clinic at [facility] for anticoagulation monitoring.     -Attempted to contact patient to follow up on plan on INRs since [facility] discharged patient from their services.      Did a test claim on both Xarelto and Eliquis and both are > $500 co pay.      Sent secure chat to Dr. Matthews to let her know.      Left Voicemail to contact clinic back to discuss future INRs.      Yeny Lagunas, PharmD  Ambulatory Care Clinical Pharmacist  2/24/2025  15:52 EST

## 2025-02-25 NOTE — TELEPHONE ENCOUNTER
Second attempt to contact patient to discuss INR monitoring now that patient has been discharged from home health services. No answer, left message requesting call back.    Roselyn Hill PharmD, BCPS, BCACP  02/25/25 15:37 EST

## 2025-02-26 ENCOUNTER — TELEPHONE (OUTPATIENT)
Dept: PHARMACY | Facility: HOSPITAL | Age: 80
End: 2025-02-26
Payer: MEDICARE

## 2025-02-26 DIAGNOSIS — J18.9 PNEUMONIA OF BOTH LUNGS DUE TO INFECTIOUS ORGANISM, UNSPECIFIED PART OF LUNG: Primary | ICD-10-CM

## 2025-02-26 DIAGNOSIS — I48.91 ATRIAL FIBRILLATION WITH RAPID VENTRICULAR RESPONSE: ICD-10-CM

## 2025-02-26 NOTE — TELEPHONE ENCOUNTER
Medication Management Clinic: [facility]  Clinical Outreach     Selina Vazquez is a 79 y.o. female and is a patient of the Medication Management Clinic at [facility] for anticoagulation monitoring.     Contacted patient this morning to ask her if she has been discharged from [facility].  She stated she thought so.  I asked her if she had a plan on future INR monitoring for her warfarin therapy.  She stated she was unsure.  I asked her if she could go to Dr. Matthews's office as she has done previously to have her INR checked there.  She stated she could try if she has transportation.  I asked if she could check on transportation and let us know.  Will contact Dr. Matthews's office to let them know.      She is not able to afford switching to a DOAC due to co-pay at this point.           Yeny Lagunas, PharmD  Ambulatory Care Clinical Pharmacist  2/26/2025  09:15 EST

## 2025-02-28 ENCOUNTER — TELEPHONE (OUTPATIENT)
Dept: FAMILY MEDICINE CLINIC | Facility: CLINIC | Age: 80
End: 2025-02-28
Payer: MEDICARE

## 2025-02-28 DIAGNOSIS — I48.91 ATRIAL FIBRILLATION WITH RAPID VENTRICULAR RESPONSE: Primary | ICD-10-CM

## 2025-02-28 NOTE — TELEPHONE ENCOUNTER
Rec'd call from Laura hilario [facility], verified PT name and . She wanted to confirm if PT can resume treatment starting . Advised her that I would sent a note to the provider to confirm and call back.

## 2025-03-03 ENCOUNTER — TELEPHONE (OUTPATIENT)
Dept: PHARMACY | Facility: HOSPITAL | Age: 80
End: 2025-03-03
Payer: MEDICARE

## 2025-03-03 NOTE — TELEPHONE ENCOUNTER
Medication Management Clinic: [facility]  Clinical Outreach     Selina Vazquez is a 79 y.o. female and is a patient of the Medication Management Clinic at [facility] for anticoagulation monitoring.     Spoke with Ashley at [facility] who reports patient is still being reviewed for home jerome services, but last note to Dr. Matthews on 2/28 reports that patient's first visit may be 3/5. Left note with Ashley to have home health nurse check INR and call result into clinic while still with the patient so that warfarin dosing can be completed over the phone. Will follow for first home health appointment.     Jaimie Chamberlain, PharmD  Ambulatory Care Clinical Pharmacist  3/3/2025  11:42 EST

## 2025-03-04 ENCOUNTER — TELEPHONE (OUTPATIENT)
Dept: CARDIOLOGY | Facility: CLINIC | Age: 80
End: 2025-03-04

## 2025-03-04 RX ORDER — DILTIAZEM HYDROCHLORIDE 180 MG/1
CAPSULE, COATED, EXTENDED RELEASE ORAL
Qty: 90 CAPSULE | Refills: 0 | Status: SHIPPED | OUTPATIENT
Start: 2025-03-04

## 2025-03-04 NOTE — TELEPHONE ENCOUNTER
Caller: MANUEL    Relationship: Home Health    Best call back number: [phone]    Which medication are you concerned about: WARFARIN     Who prescribed you this medication: WARFARIN 3  MG    What are your concerns: MANUEL IS CALLING TO SEE IF WE CAN SWITCH THE PT'S WARFARIN TO ELIQUIS. SHE SAID THAT THE PT'S SON'S GIRLFRIEND IS HAVING TROUBLE KEEPING UP WITH GIVING THE MEDICATION TO THE PT. SHE ASKED IF WE COULD CALL HER BACK      How long have you had these concerns: TODAY

## 2025-03-05 ENCOUNTER — ANTICOAGULATION VISIT (OUTPATIENT)
Dept: PHARMACY | Facility: HOSPITAL | Age: 80
End: 2025-03-05
Payer: MEDICARE

## 2025-03-05 DIAGNOSIS — I26.99 PULMONARY EMBOLISM, UNSPECIFIED CHRONICITY, UNSPECIFIED PULMONARY EMBOLISM TYPE, UNSPECIFIED WHETHER ACUTE COR PULMONALE PRESENT: Primary | ICD-10-CM

## 2025-03-05 LAB — INR PPP: 4.5 (ref 2–3)

## 2025-03-05 NOTE — PROGRESS NOTES
Anticoagulation Clinic Progress Note - Home Health INR Testing     INR Goal: 2 - 3  Today's INR: 4.5 (supratherapeutic). INR result was called in today by Sarai ([facility]  nurse).   Current warfarin dose: warfarin 3 mg PO QAM and warfarin 3 mg PO QPM. Uncertain when patient started taking this dose but Sarai reports that pill organizer had 3 mg tablets in each morning and evening slot. Total weekly dose of past 7 days is presumed to be 42 mg (65% unintentional increase from most recently documented dose adjustment on 2/11/25).     Of note, patient recently discharged from  services at end of February but  services reordered by PCP due to patient's inability to transport herself to PCP's office for INR checks or to properly manage her warfarin doses. Multiple messages in patient's chart from  nurse to PCP and cardiologist asking if patient could be switched to an alternate anticoagulant. Per previous documentation from clinic pharmacistYeny, test claims revealed copays of >$500 for either apixaban or rivaroxaban. Both PCP and cardiologist office aware.    Sarai reports that patient is not experiencing any unusual bleeding or bruising at this time.    INR History:      Latest Ref Rng & Units 1/28/2025     9:53 AM 2/4/2025    12:00 AM 2/4/2025     8:59 AM 2/11/2025    12:00 AM 2/11/2025     1:45 PM 3/5/2025    12:00 AM 3/5/2025     1:55 PM   Anticoagulation Monitoring   INR  2.60  2.50  1.60  4.50   INR Date  1/28/2025  2/4/2025  2/11/2025  3/5/2025   INR Goal  2.0-3.0  2.0-3.0  2.0-3.0  2.0-3.0   Trend  Same  Same  Up  Same   Last Week Total  24 mg  24 mg  24 mg  42 mg   Next Week Total  24 mg  24 mg  27 mg  19.5 mg   Sun  3 mg  3 mg  3 mg  3 mg   Mon  4.5 mg  4.5 mg  4.5 mg  -   Tue  3 mg  3 mg  4.5 mg (2/11)  -   Wed  3 mg  3 mg  4.5 mg  3 mg (3/5)   Thu  3 mg  3 mg  3 mg  Hold (3/6)   Fri  3 mg  3 mg  3 mg  3 mg   Sat  4.5 mg  4.5 mg  4.5 mg  3 mg (3/8)   Historical INR 2.00 - 3.00  2.50      1.60       4.50            This result is from an external source.       Anticoagulation Summary  As of 3/5/2025      INR goal:  2.0-3.0   TTR:  35.7% (1.3 y)   INR used for dosin.50 (3/5/2025)   Warfarin maintenance plan:  4.5 mg every Mon, Wed, Sat; 3 mg all other days   Weekly warfarin total:  25.5 mg   Plan last modified:  Jaimie Chamberlain, PharmD (2025)   Next INR check:  3/10/2025   Priority:  High   Target end date:  --    Indications    Pulmonary embolism [I26.99]                 Anticoagulation Episode Summary       INR check location:  --    Preferred lab:  --    Send INR reminders to:  [facility] CLINICAL POOL    Comments:  FYIs -   In-home caregiverCassy: [phone]  SisterConcha: [phone]  Potential new home phone number: [phone]  Jazmin unit manager at [facility]: [phone]          Anticoagulation Care Providers       Provider Role Specialty Phone number    Bria Matthews MD Referring Family Medicine [phone]            Clinic Interview:   Patient Findings     Positives:  Extra doses ( nurse reports that pill organizer had   warfarin 3 mg doses in the morning and evening slots (total of 6 mg each   day))    Negatives:  Signs/symptoms of thrombosis, Signs/symptoms of bleeding,   Laboratory test error suspected, Change in health, Change in alcohol use,   Change in activity, Upcoming invasive procedure, Emergency department   visit, Upcoming dental procedure, Missed doses, Change in medications,   Change in diet/appetite, Hospital admission, Bruising, Other complaints      Clinical Outcomes     Negatives:  Major bleeding event, Thromboembolic event,   Anticoagulation-related hospital admission, Anticoagulation-related ED   visit, Anticoagulation-related fatality        Current Medications:   Prior to Admission medications    Medication Sig Start Date End Date Taking? Authorizing Provider   albuterol sulfate  (90 Base) MCG/ACT inhaler Inhale 2 puffs  Every 4 (Four) Hours As Needed for Wheezing or Shortness of Air. Indications: Chronic Obstructive Lung Disease    Diogenes Olguin MD   aspirin 81 MG EC tablet Take 1 tablet by mouth Daily. Indications: Venous Thromboembolism    Diogenes Olguin MD   atorvastatin (LIPITOR) 80 MG tablet Take 0.5 tablets by mouth Daily.    Diogenes Olguin MD   benzonatate (Tessalon Perles) 100 MG capsule Take 1 capsule by mouth 3 (Three) Times a Day As Needed for Cough. 1/14/25   Bria Matthews MD   budesonide (PULMICORT) 0.5 MG/2ML nebulizer solution Take 2 mL by nebulization 2 (Two) Times a Day. 6/21/24   Tracy Terry MD   clindamycin (Cleocin) 300 MG capsule Take 1 capsule by mouth 3 (Three) Times a Day. 1/14/25   Bria Matthews MD   dapagliflozin (Farxiga) 5 MG tablet tablet Take 1 tablet by mouth Daily. 1/17/25   Bria Matthews MD   dilTIAZem CD (CARDIZEM CD) 180 MG 24 hr capsule TAKE 1 CAPSULE BY MOUTH DAILY FOR HIGH BLOOD PRESSURE 3/4/25   Bria Matthews MD   Fluticasone Furoate-Vilanterol (Breo Ellipta) 100-25 MCG/ACT aerosol powder  Inhale 1 puff Daily.    ProviderDiogenes MD   furosemide (LASIX) 40 MG tablet Take 1 tablet by mouth Daily. 7/7/24   Darrion Burch MD   guaiFENesin (MUCINEX) 600 MG 12 hr tablet Take 2 tablets by mouth 2 (Two) Times a Day.    ProviderDiogenes MD   ipratropium-albuterol (DUO-NEB) 0.5-2.5 mg/3 ml nebulizer Take 3 mL by nebulization 4 (Four) Times a Day. Indications: Chronic Obstructive Lung Disease 10/30/23   Bria Matthews MD   levothyroxine (Synthroid) 112 MCG tablet Take 1 tablet by mouth Daily. 9/2/24   Bria Matthews MD   O2 (OXYGEN) Inhale 2 L/min Continuous. Indications: COPD exacerbation, uncomplicated 10/6/23   Arlyn Holland APRN   pantoprazole (PROTONIX) 40 MG EC tablet Take 1 tablet by mouth Daily. 2/20/25   Bria Matthews MD   polyethylene glycol (MIRALAX) 17 g packet Take 17 g by mouth Daily.  12/3/24   Jong Iyer MD   potassium chloride (KLOR-CON M20) 20 MEQ CR tablet Take 1 tablet by mouth Daily. 24   Darrion Burch MD   predniSONE (DELTASONE) 10 MG tablet Take 1 tablet by mouth Daily. 25   Bria Matthews MD   senna 8.6 MG tablet Take 2 tablets by mouth Every Evening. Indications: Constipation 24   Bria Matthews MD   spironolactone (ALDACTONE) 25 MG tablet TAKE 1 TABLET BY MOUTH DAILY FOR CONGESTIVE HEART FAILURE 24   Bria Matthews MD   warfarin (COUMADIN) 2.5 MG tablet Take 1 tablet by mouth See Admin Instructions. Take one tablet (2.5mg) 5 days weekly and two tablets (5mg) twice weekly. 24   Bria Matthews MD   warfarin (Coumadin) 3 MG tablet Take 3 mg (1 tablet) daily, except for 4.5 mg (1 and 1/2 tablet) on Saturday, or as directed by [facility] Management Clinic.  Indications: history of DVT/PE 24   Brai Matthews MD   albuterol (ACCUNEB) 1.25 MG/3ML nebulizer solution Inhale. Indications: Spasm of Lung Air Passages 23   ProviderDiogenes MD   Cholecalciferol 50 MCG ( UT) tablet Take 1 tablet by mouth. Indications: Vitamin D Deficiency 23   ProviderDiogenes MD       Medical history:   Past Medical History:   Diagnosis Date    Adenomatous polyp of colon     Allergic rhinitis     Asthma     Cervical disc disease     Fracture of thoracic spine 2011    Lacunar infarction 2019    Mitral valve disorder     Postmenopausal osteoporosis     Restless leg syndrome        Social history:   Social History     Tobacco Use    Smoking status: Former     Current packs/day: 0.00     Average packs/day: 1 pack/day for 36.0 years (36.0 ttl pk-yrs)     Types: Cigarettes     Start date:      Quit date:      Years since quittin.1     Passive exposure: Never    Smokeless tobacco: Never   Substance Use Topics    Alcohol use: No       Allergies:    Latex and Penicillin g    [id number] SCORE    [id number] Score for Atrial Fibrillation Stroke Risk  Age in Years: 75+  Sex: Female  CHF History: Yes  Hypertension History: Yes  Stroke/TIA/Thromboembolism History: Yes  Vascular Disease History: Yes  Diabetes History: No  [id number] Score: 8  Stroke risks -: 8 Points. Risk of ischemic stroke: 10.8%. Risk of stroke/TIA/embolism: 15.2%      This patient is managed via a collaborative agreement, pursuant to IC 25-26-16. The signed protocol is kept in the [facility] at [address] [place] [address].    Education: Selina Vazquez has been instructed to call if any changes in medications, doses, concerns, etc. Patient was provided dosing instructions and expressed verbal understanding and has no further questions at this time.    Plan:  1. Instructed Sarai to remove evening 3 mg dose from pill organizer today, to remove both doses of 3 mg in pill organizer for tomorrow, and then initiate warfarin 3 mg PO every evening starting 3/7/25.   2. INR should be tested in 5 days by home health nurse and called into clinic.     Instructed  nurse to  patient on increased bleeding risk associated with elevated INR, signs/symptoms to monitor for, and when to seek medical attention    Roselyn Hill, PharmD  3/5/2025  13:58 EST

## 2025-03-05 NOTE — PROGRESS NOTES
Received call back from  nurse, Sarai, stating that she was not going to admit patient to their services as she didn't truly qualify and would have to be discharged in three weeks anyway. Sarai said she emphasized with patient's son, Marcus, importance of monitoring and doing warfarin dosing exactly as they are instructed. Marcus states he is able to take patient to PCP's office once a week for monitoring. Sarai already called PCP's office and made appointment for INR check on Monday, 3/10, at 1130. Plan to request that Marcus bring patient's warfarin supply and pill organizer to appointments so that nurse in PCP's office can fill up pill organizer each week with correct dose of warfarin.     Roselyn Hill, PharmD, BCPS, BCACP  03/05/25 15:13 EST

## 2025-03-10 ENCOUNTER — ANTICOAGULATION VISIT (OUTPATIENT)
Dept: FAMILY MEDICINE CLINIC | Facility: CLINIC | Age: 80
End: 2025-03-10
Payer: MEDICARE

## 2025-03-10 ENCOUNTER — ANTICOAGULATION VISIT (OUTPATIENT)
Dept: PHARMACY | Facility: HOSPITAL | Age: 80
End: 2025-03-10
Payer: MEDICARE

## 2025-03-10 DIAGNOSIS — I26.99 PULMONARY EMBOLISM, UNSPECIFIED CHRONICITY, UNSPECIFIED PULMONARY EMBOLISM TYPE, UNSPECIFIED WHETHER ACUTE COR PULMONALE PRESENT: Primary | ICD-10-CM

## 2025-03-10 LAB — INR PPP: 2.3 (ref 2–3)

## 2025-03-10 NOTE — PROGRESS NOTES
Anticoagulation Clinic Progress Note - Outside Lab INR Testing     INR Goal: 2 - 3  Today's INR: 2.3 (therapeutic). INR result was collected at Dr. Matthews's office. Appointment conducted telephonically today with patient in their office.  Current warfarin dose:  Warfarin doses of past 7 days as follows: 3/3: 6 mg, 3/4: 6 mg, 3: 3 mg, 3: 0 mg, 3/7: 3 mg, 3/8: 3 mg, and 3/9: 3 mg. Total of past 7 days= 24 mg  . INR was supratherapeutic at 4.5 on 3/5.    Note: Patient did not have anyone present with her at her appointment today and she does not know what dose of medication she has been receiving. Can only go by what was reported from patient's son last week.     INR History:      Latest Ref Rng & Units 2025     8:59 AM 2025    12:00 AM 2025     1:45 PM 3/5/2025    12:00 AM 3/5/2025     1:55 PM 3/10/2025    12:00 AM 3/10/2025     2:24 PM   Anticoagulation Monitoring   INR  2.50  1.60  4.50  2.30   INR Date  2025  2025  3/5/2025  3/10/2025   INR Goal  2.0-3.0  2.0-3.0  2.0-3.0  2.0-3.0   Trend  Same  Up  Same  Down   Last Week Total  24 mg  24 mg  42 mg  24 mg   Next Week Total  24 mg  27 mg  19.5 mg  21 mg   Sun  3 mg  3 mg  3 mg  3 mg   Mon  4.5 mg  4.5 mg  -  3 mg   Tue  3 mg  4.5 mg ()  -  3 mg   Wed  3 mg  4.5 mg  3 mg (3/5)  3 mg   Thu  3 mg  3 mg  Hold (3/6)  3 mg   Fri  3 mg  3 mg  3 mg  3 mg   Sat  4.5 mg  4.5 mg  3 mg (3/8)  3 mg   Historical INR 2.00 - 3.00  1.60      4.50      2.30            This result is from an external source.       Anticoagulation Summary  As of 3/10/2025      INR goal:  2.0-3.0   TTR:  35.6% (1.3 y)   INR used for dosin.30 (3/10/2025)   Warfarin maintenance plan:  3 mg every day   Weekly warfarin total:  21 mg   Plan last modified:  Jaimie Chamberlain, PharmD (3/10/2025)   Next INR check:  3/17/2025   Priority:  High   Target end date:  --    Indications    Pulmonary embolism [I26.99]                 Anticoagulation Episode Summary       INR check  location:  --    Preferred lab:  --    Send INR reminders to:  [facility] CLINIC CLINICAL POOL    Comments:  FYIs -   In-home caregiver, Cassy: [phone]  Sister, Concha: [phone]  Potential new home phone number: [phone] (Marcus, patient's son)  Jazmin (unit manager at [facility] [facility] [employer]: [phone]          Anticoagulation Care Providers       Provider Role Specialty Phone number    Bria Matthews MD Referring Family Medicine [phone]            Clinic Interview:   Patient Findings     Negatives:  Signs/symptoms of thrombosis, Signs/symptoms of bleeding,   Laboratory test error suspected, Change in health, Change in alcohol use,   Change in activity, Upcoming invasive procedure, Emergency department   visit, Upcoming dental procedure, Missed doses, Extra doses, Change in   medications, Change in diet/appetite, Hospital admission, Bruising, Other   complaints      Clinical Outcomes     Negatives:  Major bleeding event, Thromboembolic event,   Anticoagulation-related hospital admission, Anticoagulation-related ED   visit, Anticoagulation-related fatality        Current Medications:   Prior to Admission medications    Medication Sig Start Date End Date Taking? Authorizing Provider   albuterol sulfate  (90 Base) MCG/ACT inhaler Inhale 2 puffs Every 4 (Four) Hours As Needed for Wheezing or Shortness of Air. Indications: Chronic Obstructive Lung Disease    ProviderDiogenes MD   aspirin 81 MG EC tablet Take 1 tablet by mouth Daily. Indications: Venous Thromboembolism    ProviderDiogenes MD   atorvastatin (LIPITOR) 80 MG tablet Take 0.5 tablets by mouth Daily.    ProviderDiogenes MD   benzonatate (Tessalon Perles) 100 MG capsule Take 1 capsule by mouth 3 (Three) Times a Day As Needed for Cough. 1/14/25   Bria Matthews MD   budesonide (PULMICORT) 0.5 MG/2ML nebulizer solution Take 2 mL by nebulization 2 (Two) Times a Day. 6/21/24   Tracy Terry MD   clindamycin  (Cleocin) 300 MG capsule Take 1 capsule by mouth 3 (Three) Times a Day. 1/14/25   Bria Matthews MD   dapagliflozin (Farxiga) 5 MG tablet tablet Take 1 tablet by mouth Daily. 1/17/25   Bria Matthews MD   dilTIAZem CD (CARDIZEM CD) 180 MG 24 hr capsule TAKE 1 CAPSULE BY MOUTH DAILY FOR HIGH BLOOD PRESSURE 3/4/25   Bria Matthews MD   Fluticasone Furoate-Vilanterol (Breo Ellipta) 100-25 MCG/ACT aerosol powder  Inhale 1 puff Daily.    Provider, MD Diogenes   furosemide (LASIX) 40 MG tablet Take 1 tablet by mouth Daily. 7/7/24   Darrion Burch MD   guaiFENesin (MUCINEX) 600 MG 12 hr tablet Take 2 tablets by mouth 2 (Two) Times a Day.    Provider, MD Diogenes   ipratropium-albuterol (DUO-NEB) 0.5-2.5 mg/3 ml nebulizer Take 3 mL by nebulization 4 (Four) Times a Day. Indications: Chronic Obstructive Lung Disease 10/30/23   Bria Matthews MD   levothyroxine (Synthroid) 112 MCG tablet Take 1 tablet by mouth Daily. 9/2/24   Bria Matthews MD   O2 (OXYGEN) Inhale 2 L/min Continuous. Indications: COPD exacerbation, uncomplicated 10/6/23   Arlyn Holland APRN   pantoprazole (PROTONIX) 40 MG EC tablet Take 1 tablet by mouth Daily. 2/20/25   Bria Matthews MD   polyethylene glycol (MIRALAX) 17 g packet Take 17 g by mouth Daily. 12/3/24   Jong Iyer MD   potassium chloride (KLOR-CON M20) 20 MEQ CR tablet Take 1 tablet by mouth Daily. 7/7/24   Darrion Burch MD   predniSONE (DELTASONE) 10 MG tablet Take 1 tablet by mouth Daily. 1/14/25   Bria Matthews MD   senna 8.6 MG tablet Take 2 tablets by mouth Every Evening. Indications: Constipation 8/27/24   Bria Matthews MD   spironolactone (ALDACTONE) 25 MG tablet TAKE 1 TABLET BY MOUTH DAILY FOR CONGESTIVE HEART FAILURE 8/16/24   Bria Matthews MD   warfarin (COUMADIN) 2.5 MG tablet Take 1 tablet by mouth See Admin Instructions. Take one tablet (2.5mg) 5 days weekly and two tablets (5mg)  twice weekly. 24   Bria Matthews MD   warfarin (Coumadin) 3 MG tablet Take 3 mg (1 tablet) daily, except for 4.5 mg (1 and 1/2 tablet) on Saturday, or as directed by [facility].  Indications: history of DVT/PE 24   Bria Matthews MD   albuterol (ACCUNEB) 1.25 MG/3ML nebulizer solution Inhale. Indications: Spasm of Lung Air Passages 23   Provider, MD Diogenes   Cholecalciferol 50 MCG (2000) tablet Take 1 tablet by mouth. Indications: Vitamin D Deficiency 23   Provider, MD Diogenes       Medical history:   Past Medical History:   Diagnosis Date    Adenomatous polyp of colon     Allergic rhinitis     Asthma     Cervical disc disease     Fracture of thoracic spine 2011    Lacunar infarction 2019    Mitral valve disorder     Postmenopausal osteoporosis     Restless leg syndrome        Social history:   Social History     Tobacco Use    Smoking status: Former     Current packs/day: 0.00     Average packs/day: 1 pack/day for 36.0 years (36.0 ttl pk-yrs)     Types: Cigarettes     Start date:      Quit date:      Years since quittin.2     Passive exposure: Never    Smokeless tobacco: Never   Substance Use Topics    Alcohol use: No       Allergies:    Latex and Penicillin g    This patient is managed via a collaborative agreement, pursuant to IC [id number]. The signed protocol is kept in the [facility] at [address] [place] [address].    Education: Selina Vazquez has been instructed to call if any changes in medications, doses, concerns, etc. Patient was provided dosing instructions and expressed verbal understanding and has no further questions at this time.    Plan:  1. INR decreased rapidly following held dose and decreased regimen from 3/5. Will continue with  warfarin 3 mg PO daily for now. New total weekly dose = 21 mg. Suspect patient may need a combination of 3 mg and 4.5 mg doses.  2. INR should be  tested in one week at Dr. Matthews's office and called into clinic. Note: Patient's son reports he can only take her for testing once weekly. Patient did not have anyone present with her at appointment, so nurse unable to confirm her warfarin dose and arrange her pill box as had been requested of patient's son last week. Clinic pharmacist left voicemail for patient's son to call clinic back to go over warfarin instructions.       Jaimie Chamberlain PharmD  3/10/2025  14:39 EDT

## 2025-03-17 ENCOUNTER — DISEASE STATE MANAGEMENT VISIT (OUTPATIENT)
Dept: PHARMACY | Facility: HOSPITAL | Age: 80
End: 2025-03-17
Payer: MEDICARE

## 2025-03-17 ENCOUNTER — ANTICOAGULATION VISIT (OUTPATIENT)
Dept: FAMILY MEDICINE CLINIC | Facility: CLINIC | Age: 80
End: 2025-03-17
Payer: MEDICARE

## 2025-03-17 ENCOUNTER — ANTICOAGULATION VISIT (OUTPATIENT)
Dept: PHARMACY | Facility: HOSPITAL | Age: 80
End: 2025-03-17
Payer: MEDICARE

## 2025-03-17 DIAGNOSIS — I26.99 PULMONARY EMBOLISM, UNSPECIFIED CHRONICITY, UNSPECIFIED PULMONARY EMBOLISM TYPE, UNSPECIFIED WHETHER ACUTE COR PULMONALE PRESENT: Primary | ICD-10-CM

## 2025-03-17 DIAGNOSIS — I26.01 ACUTE SEPTIC PULMONARY EMBOLISM WITH ACUTE COR PULMONALE: Primary | ICD-10-CM

## 2025-03-17 LAB — INR PPP: 2.1 (ref 0.9–1.1)

## 2025-03-17 PROCEDURE — 36416 COLLJ CAPILLARY BLOOD SPEC: CPT | Performed by: PREVENTIVE MEDICINE

## 2025-03-17 PROCEDURE — 85610 PROTHROMBIN TIME: CPT | Performed by: PREVENTIVE MEDICINE

## 2025-03-17 NOTE — PROGRESS NOTES
Capillary Blood Specimen Collection  Capillary blood collection performed in left middle finger by Minoo Portillo MA. Patient tolerated the procedure well without complications. INR   03/17/25   Minoo Portillo MA

## 2025-03-17 NOTE — PROGRESS NOTES
Anticoagulation Clinic Progress Note -  Dr. Matthews's office  INR Testing     INR Goal: 2 - 3  Today's INR: 2.1 (therapeutic). INR result was called in today by Dr. Matthews's office.  Appointment conducted telephonically.  Patient did not have anyone with her so couldn't confirm dosing.     Current warfarin dose: warfarin 3 mg PO daily Current total weekly dose = 21 mg per week.     INR History:      Latest Ref Rng & Units 3/5/2025    12:00 AM 3/5/2025     1:55 PM 3/10/2025    12:00 AM 3/10/2025    11:30 AM 3/10/2025     2:24 PM 3/17/2025    10:30 AM 3/17/2025    11:01 AM   Anticoagulation Monitoring   INR   4.50  -- 2.30 -- 2.10   INR Date   3/5/2025   3/10/2025  3/17/2025   INR Goal   2.0-3.0  2.0-3.0 2.0-3.0 2.0-3.0 2.0-3.0   Trend   Same   Down  Same   Last Week Total   42 mg   24 mg  21 mg   Next Week Total   19.5 mg   21 mg  21 mg   Sun   3 mg   3 mg  3 mg   Mon   -   3 mg  3 mg   Tue   -   3 mg  3 mg   Wed   3 mg (3/5)   3 mg  3 mg   Thu   Hold (3/6)   3 mg  3 mg   Fri   3 mg   3 mg  3 mg   Sat   3 mg (3/8)   3 mg  3 mg   Historical INR 2.00 - 3.00 4.50      2.30               This result is from an external source.       Anticoagulation Summary  As of 3/17/2025      INR goal:  2.0-3.0   TTR:  36.6% (1.3 y)   INR used for dosin.10 (3/17/2025)   Warfarin maintenance plan:  3 mg every day   Weekly warfarin total:  21 mg   No change documented:  Jacqui Lagunas, PharmD   Plan last modified:  Jaimie Chamberlain, Ella (3/10/2025)   Next INR check:  3/24/2025   Priority:  High   Target end date:  --    Indications    Pulmonary embolism [I26.99]                 Anticoagulation Episode Summary       INR check location:  --    Preferred lab:  --    Send INR reminders to:  [facility] CLINIC CLINICAL POOL    Comments:  FYIs -   In-home caregiver, Cassy: [phone]  Sister, Concha: [phone]  Potential new home phone number: [phone] (Marcus, patient's son)  Jazmin (unit manager at [employer]:  [phone]          Anticoagulation Care Providers       Provider Role Specialty Phone number    Bria Matthews MD Referring Family Medicine [phone]            Clinic Interview:   Patient Findings     Negatives:  Signs/symptoms of thrombosis, Signs/symptoms of bleeding,   Laboratory test error suspected, Change in health, Change in alcohol use,   Change in activity, Upcoming invasive procedure, Emergency department   visit, Upcoming dental procedure, Missed doses, Extra doses, Change in   medications, Change in diet/appetite, Hospital admission, Bruising, Other   complaints      Clinical Outcomes     Negatives:  Major bleeding event, Thromboembolic event,   Anticoagulation-related hospital admission, Anticoagulation-related ED   visit, Anticoagulation-related fatality        Current Medications:   Prior to Admission medications    Medication Sig Start Date End Date Taking? Authorizing Provider   albuterol sulfate  (90 Base) MCG/ACT inhaler Inhale 2 puffs Every 4 (Four) Hours As Needed for Wheezing or Shortness of Air. Indications: Chronic Obstructive Lung Disease    ProviderDiogenes MD   aspirin 81 MG EC tablet Take 1 tablet by mouth Daily. Indications: Venous Thromboembolism    ProviderDiogenes MD   atorvastatin (LIPITOR) 80 MG tablet Take 0.5 tablets by mouth Daily.    Provider, MD Diogenes   benzonatate (Tessalon Perles) 100 MG capsule Take 1 capsule by mouth 3 (Three) Times a Day As Needed for Cough. 1/14/25   Bria Matthews MD   budesonide (PULMICORT) 0.5 MG/2ML nebulizer solution Take 2 mL by nebulization 2 (Two) Times a Day. 6/21/24   Tracy Trery MD   clindamycin (Cleocin) 300 MG capsule Take 1 capsule by mouth 3 (Three) Times a Day. 1/14/25   Bria Matthews MD   dapagliflozin (Farxiga) 5 MG tablet tablet Take 1 tablet by mouth Daily. 1/17/25   Bria Matthews MD   dilTIAZem CD (CARDIZEM CD) 180 MG 24 hr capsule TAKE 1 CAPSULE BY MOUTH DAILY FOR HIGH  BLOOD PRESSURE 3/4/25   Bria Matthews MD   Fluticasone Furoate-Vilanterol (Breo Ellipta) 100-25 MCG/ACT aerosol powder  Inhale 1 puff Daily.    Provider, MD Diogenes   furosemide (LASIX) 40 MG tablet Take 1 tablet by mouth Daily. 7/7/24   Darrion Burch MD   guaiFENesin (MUCINEX) 600 MG 12 hr tablet Take 2 tablets by mouth 2 (Two) Times a Day.    Provider, MD Diogenes   ipratropium-albuterol (DUO-NEB) 0.5-2.5 mg/3 ml nebulizer Take 3 mL by nebulization 4 (Four) Times a Day. Indications: Chronic Obstructive Lung Disease 10/30/23   Bria Matthews MD   levothyroxine (Synthroid) 112 MCG tablet Take 1 tablet by mouth Daily. 9/2/24   Bria Matthews MD   O2 (OXYGEN) Inhale 2 L/min Continuous. Indications: COPD exacerbation, uncomplicated 10/6/23   Arlyn Holland APRN   pantoprazole (PROTONIX) 40 MG EC tablet Take 1 tablet by mouth Daily. 2/20/25   Bria Matthews MD   polyethylene glycol (MIRALAX) 17 g packet Take 17 g by mouth Daily. 12/3/24   Jong Iyer MD   potassium chloride (KLOR-CON M20) 20 MEQ CR tablet Take 1 tablet by mouth Daily. 7/7/24   Darrion Burch MD   predniSONE (DELTASONE) 10 MG tablet Take 1 tablet by mouth Daily. 1/14/25   Bria Matthews MD   senna 8.6 MG tablet Take 2 tablets by mouth Every Evening. Indications: Constipation 8/27/24   Bria Matthews MD   spironolactone (ALDACTONE) 25 MG tablet TAKE 1 TABLET BY MOUTH DAILY FOR CONGESTIVE HEART FAILURE 8/16/24   Bria Matthews MD   warfarin (COUMADIN) 2.5 MG tablet Take 1 tablet by mouth See Admin Instructions. Take one tablet (2.5mg) 5 days weekly and two tablets (5mg) twice weekly. 6/25/24   Bria Matthews MD   warfarin (Coumadin) 3 MG tablet Take 3 mg (1 tablet) daily, except for 4.5 mg (1 and 1/2 tablet) on Saturday, or as directed by [facility].  Indications: history of DVT/PE 11/20/24   Bria Matthews MD   albuterol (ACCUNEB) 1.25  MG/3ML nebulizer solution Inhale. Indications: Spasm of Lung Air Passages 23   Provider, MD Diogenes   Cholecalciferol 50 MCG ( UT) tablet Take 1 tablet by mouth. Indications: Vitamin D Deficiency 23   Provider, MD Diogenes       Medical history:   Past Medical History:   Diagnosis Date    Adenomatous polyp of colon     Allergic rhinitis     Asthma     Cervical disc disease     Fracture of thoracic spine 2011    Lacunar infarction 2019    Mitral valve disorder     Postmenopausal osteoporosis     Restless leg syndrome        Social history:   Social History     Tobacco Use    Smoking status: Former     Current packs/day: 0.00     Average packs/day: 1 pack/day for 36.0 years (36.0 ttl pk-yrs)     Types: Cigarettes     Start date:      Quit date:      Years since quittin.2     Passive exposure: Never    Smokeless tobacco: Never   Substance Use Topics    Alcohol use: No       Allergies:    Latex and Penicillin g      This patient is managed via a collaborative agreement, pursuant to IC [id number]. The signed protocol is kept in the [facility] at [address] [place] [facility].    Education: Selina Vazquez has been instructed to call if any changes in medications, doses, concerns, etc. Patient was provided dosing instructions and expressed verbal understanding and has no further questions at this time.    Plan:  1. no change; warfarin 3 mg PO daily  Total weekly dose = 21 mg.   2. INR should be tested weekly and called into clinic.     Yeny Lagunas, PharmD  3/17/2025  11:02 EDT

## 2025-03-24 ENCOUNTER — ANTICOAGULATION VISIT (OUTPATIENT)
Dept: FAMILY MEDICINE CLINIC | Facility: CLINIC | Age: 80
End: 2025-03-24
Payer: MEDICARE

## 2025-03-24 ENCOUNTER — ANTICOAGULATION VISIT (OUTPATIENT)
Dept: PHARMACY | Facility: HOSPITAL | Age: 80
End: 2025-03-24
Payer: MEDICARE

## 2025-03-24 DIAGNOSIS — I26.99 PULMONARY EMBOLISM, UNSPECIFIED CHRONICITY, UNSPECIFIED PULMONARY EMBOLISM TYPE, UNSPECIFIED WHETHER ACUTE COR PULMONALE PRESENT: Primary | ICD-10-CM

## 2025-03-24 LAB — INR PPP: 1.6 (ref 0.9–1.1)

## 2025-03-24 PROCEDURE — 85610 PROTHROMBIN TIME: CPT | Performed by: PREVENTIVE MEDICINE

## 2025-03-24 PROCEDURE — 36416 COLLJ CAPILLARY BLOOD SPEC: CPT | Performed by: PREVENTIVE MEDICINE

## 2025-03-24 NOTE — PROGRESS NOTES
Capillary Blood Specimen Collection  Capillary blood collection performed in left middle finger by Minoo Portillo MA. Patient tolerated the procedure well without complications.  INR  03/24/25   Minoo Portillo MA

## 2025-03-24 NOTE — PROGRESS NOTES
I have reviewed the notes, assessments, and/or procedures performed by Mac Perea, PharmD Candidate, I concur with her documentation of Selina Vazquez.

## 2025-03-24 NOTE — PROGRESS NOTES
Anticoagulation Clinic Progress Note - Home Health INR Testing     INR Goal: 2 - 3  Today's INR: 1.6 (subtherapeutic). INR result was called in today by Regine MURRAY.   Current warfarin dose: warfarin 3 mg PO daily Current total weekly dose = 21 mg per week.     INR History:      Latest Ref Rng & Units 3/10/2025    12:00 AM 3/10/2025    11:30 AM 3/10/2025     2:24 PM 3/17/2025    10:30 AM 3/17/2025    11:01 AM 3/24/2025    10:15 AM 3/24/2025    10:46 AM   Anticoagulation Monitoring   INR   -- 2.30 -- 2.10 -- 1.60   INR Date    3/10/2025  3/17/2025  3/24/2025   INR Goal   2.0-3.0 2.0-3.0 2.0-3.0 2.0-3.0 2.0-3.0 2.0-3.0   Trend    Down  Same  Up   Last Week Total    24 mg  21 mg  21 mg   Next Week Total    21 mg  21 mg  22.5 mg   Sun    3 mg  3 mg  3 mg   Mon    3 mg  3 mg  4.5 mg   Tue    3 mg  3 mg  3 mg   Wed    3 mg  3 mg  3 mg   Thu    3 mg  3 mg  3 mg   Fri    3 mg  3 mg  3 mg   Sat    3 mg  3 mg  3 mg   Historical INR 2.00 - 3.00 2.30                 This result is from an external source.       Anticoagulation Summary  As of 3/24/2025      INR goal:  2.0-3.0   TTR:  36.4% (1.3 y)   INR used for dosin.60 (3/24/2025)   Warfarin maintenance plan:  4.5 mg every Mon; 3 mg all other days   Weekly warfarin total:  22.5 mg   Plan last modified:  Mac Perea, Pharmacy Intern (3/24/2025)   Next INR check:  3/31/2025   Priority:  High   Target end date:  --    Indications    Pulmonary embolism [I26.99]                 Anticoagulation Episode Summary       INR check location:  --    Preferred lab:  --    Send INR reminders to:   [facility] CLINIC CLINICAL POOL    Comments:  FYIs -   In-home caregiverCassy: [phone]  SisterConcha: [phone]  Potential new home phone number: [phone] (Marcus, patient's son)  Jazmin (unit manager at [employer]: [phone]          Anticoagulation Care Providers       Provider Role Specialty Phone number    Bria Matthews MD Referring Family Medicine  [phone]            Clinic Interview:   Patient Findings     Negatives:  Signs/symptoms of thrombosis, Signs/symptoms of bleeding,   Laboratory test error suspected, Change in health, Change in alcohol use,   Change in activity, Upcoming invasive procedure, Emergency department   visit, Upcoming dental procedure, Missed doses, Extra doses, Change in   medications, Change in diet/appetite, Hospital admission, Bruising, Other   complaints      Clinical Outcomes     Negatives:  Major bleeding event, Thromboembolic event,   Anticoagulation-related hospital admission, Anticoagulation-related ED   visit, Anticoagulation-related fatality        Current Medications:   Prior to Admission medications    Medication Sig Start Date End Date Taking? Authorizing Provider   albuterol sulfate  (90 Base) MCG/ACT inhaler Inhale 2 puffs Every 4 (Four) Hours As Needed for Wheezing or Shortness of Air. Indications: Chronic Obstructive Lung Disease    ProviderDiogenes MD   aspirin 81 MG EC tablet Take 1 tablet by mouth Daily. Indications: Venous Thromboembolism    ProviderDiogenes MD   atorvastatin (LIPITOR) 80 MG tablet Take 0.5 tablets by mouth Daily.    ProviderDiogenes MD   benzonatate (Tessalon Perles) 100 MG capsule Take 1 capsule by mouth 3 (Three) Times a Day As Needed for Cough. 1/14/25   Bria Matthews MD   budesonide (PULMICORT) 0.5 MG/2ML nebulizer solution Take 2 mL by nebulization 2 (Two) Times a Day. 6/21/24   Tracy Terry MD   clindamycin (Cleocin) 300 MG capsule Take 1 capsule by mouth 3 (Three) Times a Day. 1/14/25   Bria Matthews MD   dapagliflozin (Farxiga) 5 MG tablet tablet Take 1 tablet by mouth Daily. 1/17/25   Bria Matthews MD   dilTIAZem CD (CARDIZEM CD) 180 MG 24 hr capsule TAKE 1 CAPSULE BY MOUTH DAILY FOR HIGH BLOOD PRESSURE 3/4/25   Bria Matthews MD   Fluticasone Furoate-Vilanterol (Breo Ellipta) 100-25 MCG/ACT aerosol powder  Inhale 1 puff Daily.     Provider, MD Diogenes   furosemide (LASIX) 40 MG tablet Take 1 tablet by mouth Daily. 7/7/24   Darrion Burch MD   guaiFENesin (MUCINEX) 600 MG 12 hr tablet Take 2 tablets by mouth 2 (Two) Times a Day.    ProviderDiogenes MD   ipratropium-albuterol (DUO-NEB) 0.5-2.5 mg/3 ml nebulizer Take 3 mL by nebulization 4 (Four) Times a Day. Indications: Chronic Obstructive Lung Disease 10/30/23   Bria Matthews MD   levothyroxine (Synthroid) 112 MCG tablet Take 1 tablet by mouth Daily. 9/2/24   Bria Matthews MD   O2 (OXYGEN) Inhale 2 L/min Continuous. Indications: COPD exacerbation, uncomplicated 10/6/23   Arlyn Holland APRN   pantoprazole (PROTONIX) 40 MG EC tablet Take 1 tablet by mouth Daily. 2/20/25   Bria Matthews MD   polyethylene glycol (MIRALAX) 17 g packet Take 17 g by mouth Daily. 12/3/24   Jong Iyer MD   potassium chloride (KLOR-CON M20) 20 MEQ CR tablet Take 1 tablet by mouth Daily. 7/7/24   Darrion Burch MD   predniSONE (DELTASONE) 10 MG tablet Take 1 tablet by mouth Daily. 1/14/25   Bria Matthews MD   senna 8.6 MG tablet Take 2 tablets by mouth Every Evening. Indications: Constipation 8/27/24   Bria Matthews MD   spironolactone (ALDACTONE) 25 MG tablet TAKE 1 TABLET BY MOUTH DAILY FOR CONGESTIVE HEART FAILURE 8/16/24   Bria Matthews MD   warfarin (COUMADIN) 2.5 MG tablet Take 1 tablet by mouth See Admin Instructions. Take one tablet (2.5mg) 5 days weekly and two tablets (5mg) twice weekly. 6/25/24   Bria Matthews MD   warfarin (Coumadin) 3 MG tablet Take 3 mg (1 tablet) daily, except for 4.5 mg (1 and 1/2 tablet) on Saturday, or as directed by [facility].  Indications: history of DVT/PE 11/20/24   Bria Matthews MD   albuterol (ACCUNEB) 1.25 MG/3ML nebulizer solution Inhale. Indications: Spasm of Lung Air Passages 9/27/23   Provider, MD Diogenes   Cholecalciferol 50 MCG (2000 UT) tablet Take  1 tablet by mouth. Indications: Vitamin D Deficiency 23   Provider, MD Diogenes       Medical history:   Past Medical History:   Diagnosis Date    Adenomatous polyp of colon     Allergic rhinitis     Asthma     Cervical disc disease     Fracture of thoracic spine 2011    Lacunar infarction 2019    Mitral valve disorder     Postmenopausal osteoporosis     Restless leg syndrome        Social history:   Social History     Tobacco Use    Smoking status: Former     Current packs/day: 0.00     Average packs/day: 1 pack/day for 36.0 years (36.0 ttl pk-yrs)     Types: Cigarettes     Start date:      Quit date:      Years since quittin.2     Passive exposure: Never    Smokeless tobacco: Never   Substance Use Topics    Alcohol use: No       Allergies:    Latex and Penicillin g    This patient is managed via a collaborative agreement, pursuant to IC 25-26-16. The signed protocol is kept in the [facility] at [address] [place] [facility].    Education: Selina Vazquez has been instructed to call if any changes in medications, doses, concerns, etc. Patient was provided dosing instructions and expressed verbal understanding and has no further questions at this time.    Plan:  1. increase by 7.1 %; warfarin 3 mg PO daily, except warfarin 4.5 mg PO on Monday.  New Total weekly dose = 22.5 mg.   2. INR should be tested weekly at Dr. Matthews's office and called into clinic.       Mac Perea, Pharmacy Intern  3/24/2025  10:47 EDT

## 2025-03-31 ENCOUNTER — ANTICOAGULATION VISIT (OUTPATIENT)
Dept: PHARMACY | Facility: HOSPITAL | Age: 80
End: 2025-03-31
Payer: MEDICARE

## 2025-03-31 ENCOUNTER — ANTICOAGULATION VISIT (OUTPATIENT)
Dept: FAMILY MEDICINE CLINIC | Facility: CLINIC | Age: 80
End: 2025-03-31
Payer: MEDICARE

## 2025-03-31 DIAGNOSIS — I26.99 PULMONARY EMBOLISM, UNSPECIFIED CHRONICITY, UNSPECIFIED PULMONARY EMBOLISM TYPE, UNSPECIFIED WHETHER ACUTE COR PULMONALE PRESENT: Primary | ICD-10-CM

## 2025-03-31 LAB
INR PPP: 2.1 (ref 0.9–1.1)
INR PPP: 2.1 (ref 2–3)

## 2025-03-31 NOTE — PROGRESS NOTES
Anticoagulation Clinic Progress Note - Outside Lab INR Testing     INR Goal: 2 - 3  Today's INR: 2.1 (therapeutic). INR result was called in today by FOREIGN Hennessy (PCP's office). Patient present during telephonic appointment.   Current warfarin dose: warfarin 3 mg PO daily, except warfarin 4.5 mg PO on Monday.  Current total weekly dose = 22.5 mg per week.     INR History:      Latest Ref Rng & Units 3/10/2025     2:24 PM 3/17/2025    10:30 AM 3/17/2025    11:01 AM 3/24/2025    10:15 AM 3/24/2025    10:46 AM 3/31/2025    12:00 AM 3/31/2025    10:09 AM   Anticoagulation Monitoring   INR  2.30 -- 2.10 -- 1.60  2.10   INR Date  3/10/2025  3/17/2025  3/24/2025  3/31/2025   INR Goal  2.0-3.0 2.0-3.0 2.0-3.0 2.0-3.0 2.0-3.0  2.0-3.0   Trend  Down  Same  Up  Same   Last Week Total  24 mg  21 mg  21 mg  22.5 mg   Next Week Total  21 mg  21 mg  22.5 mg  22.5 mg   Sun  3 mg  3 mg  3 mg  3 mg   Mon  3 mg  3 mg  4.5 mg  4.5 mg   Tue  3 mg  3 mg  3 mg  3 mg   Wed  3 mg  3 mg  3 mg  3 mg   Thu  3 mg  3 mg  3 mg  3 mg   Fri  3 mg  3 mg  3 mg  3 mg   Sat  3 mg  3 mg  3 mg  3 mg   Historical INR 2.00 - 3.00      2.10            This result is from an external source.       Anticoagulation Summary  As of 3/31/2025      INR goal:  2.0-3.0   TTR:  36.2% (1.4 y)   INR used for dosin.10 (3/31/2025)   Warfarin maintenance plan:  4.5 mg every Mon; 3 mg all other days   Weekly warfarin total:  22.5 mg   No change documented:  Roselyn Hill, PharmD   Plan last modified:  Mac Perea, Pharmacy Intern (3/24/2025)   Next INR check:  2025   Priority:  High   Target end date:  --    Indications    Pulmonary embolism [I26.99]                 Anticoagulation Episode Summary       INR check location:  --    Preferred lab:  --    Send INR reminders to:  MIRYAM AYALA MTROSALINDA [facility] CLINICAL POOL    Comments:  FYIs -   In-home caregiver, Cassy: [phone]  SisterConcha: [phone]  Potential new home phone number: [phone] Ritchie  patient's son)  Jazmin (unit manager at [employer] [facility]: [phone]          Anticoagulation Care Providers       Provider Role Specialty Phone number    Bria Matthews MD Referring Family Medicine [phone]            Clinic Interview:   Patient Findings     Negatives:  Signs/symptoms of thrombosis, Signs/symptoms of bleeding,   Laboratory test error suspected, Change in health, Change in alcohol use,   Change in activity, Upcoming invasive procedure, Emergency department   visit, Upcoming dental procedure, Missed doses, Extra doses, Change in   medications, Change in diet/appetite, Hospital admission, Bruising, Other   complaints      Clinical Outcomes     Negatives:  Major bleeding event, Thromboembolic event,   Anticoagulation-related hospital admission, Anticoagulation-related ED   visit, Anticoagulation-related fatality        Current Medications:   Prior to Admission medications    Medication Sig Start Date End Date Taking? Authorizing Provider   albuterol sulfate  (90 Base) MCG/ACT inhaler Inhale 2 puffs Every 4 (Four) Hours As Needed for Wheezing or Shortness of Air. Indications: Chronic Obstructive Lung Disease    ProviderDiogenes MD   aspirin 81 MG EC tablet Take 1 tablet by mouth Daily. Indications: Venous Thromboembolism    ProviderDiogenes MD   atorvastatin (LIPITOR) 80 MG tablet Take 0.5 tablets by mouth Daily.    ProviderDiogenes MD   benzonatate (Tessalon Perles) 100 MG capsule Take 1 capsule by mouth 3 (Three) Times a Day As Needed for Cough. 1/14/25   Bria Matthews MD   budesonide (PULMICORT) 0.5 MG/2ML nebulizer solution Take 2 mL by nebulization 2 (Two) Times a Day. 6/21/24   Tracy Terry MD   clindamycin (Cleocin) 300 MG capsule Take 1 capsule by mouth 3 (Three) Times a Day. 1/14/25   Bria Matthews MD   dapagliflozin (Farxiga) 5 MG tablet tablet Take 1 tablet by mouth Daily. 1/17/25   Bria Matthews MD   dilTIAZem CD (CARDIZEM CD) 180  MG 24 hr capsule TAKE 1 CAPSULE BY MOUTH DAILY FOR HIGH BLOOD PRESSURE 3/4/25   Bria Matthews MD   Fluticasone Furoate-Vilanterol (Breo Ellipta) 100-25 MCG/ACT aerosol powder  Inhale 1 puff Daily.    Provider, MD Diogenes   furosemide (LASIX) 40 MG tablet Take 1 tablet by mouth Daily. 7/7/24   Darrion Burch MD   guaiFENesin (MUCINEX) 600 MG 12 hr tablet Take 2 tablets by mouth 2 (Two) Times a Day.    Provider, MD Diogenes   ipratropium-albuterol (DUO-NEB) 0.5-2.5 mg/3 ml nebulizer Take 3 mL by nebulization 4 (Four) Times a Day. Indications: Chronic Obstructive Lung Disease 10/30/23   Bria Matthews MD   levothyroxine (Synthroid) 112 MCG tablet Take 1 tablet by mouth Daily. 9/2/24   Bria Matthews MD   O2 (OXYGEN) Inhale 2 L/min Continuous. Indications: COPD exacerbation, uncomplicated 10/6/23   Arlyn Holland APRN   pantoprazole (PROTONIX) 40 MG EC tablet Take 1 tablet by mouth Daily. 2/20/25   Bria Matthews MD   polyethylene glycol (MIRALAX) 17 g packet Take 17 g by mouth Daily. 12/3/24   Jong Iyer MD   potassium chloride (KLOR-CON M20) 20 MEQ CR tablet Take 1 tablet by mouth Daily. 7/7/24   Darrion Burch MD   predniSONE (DELTASONE) 10 MG tablet Take 1 tablet by mouth Daily. 1/14/25   Bria Matthews MD   senna 8.6 MG tablet Take 2 tablets by mouth Every Evening. Indications: Constipation 8/27/24   Bria Matthews MD   spironolactone (ALDACTONE) 25 MG tablet TAKE 1 TABLET BY MOUTH DAILY FOR CONGESTIVE HEART FAILURE 8/16/24   Bria Matthews MD   warfarin (COUMADIN) 2.5 MG tablet Take 1 tablet by mouth See Admin Instructions. Take one tablet (2.5mg) 5 days weekly and two tablets (5mg) twice weekly. 6/25/24   Bria Matthews MD   warfarin (Coumadin) 3 MG tablet Take 3 mg (1 tablet) daily, except for 4.5 mg (1 and 1/2 tablet) on Saturday, or as directed by [facility].  Indications: history of DVT/PE 11/20/24    Bria Matthews MD   albuterol (ACCUNEB) 1.25 MG/3ML nebulizer solution Inhale. Indications: Spasm of Lung Air Passages 23   ProviderDiogenes MD   Cholecalciferol 50 MCG ( UT) tablet Take 1 tablet by mouth. Indications: Vitamin D Deficiency 23   Provider, MD Diogenes       Medical history:   Past Medical History:   Diagnosis Date    Adenomatous polyp of colon     Allergic rhinitis     Asthma     Cervical disc disease     Fracture of thoracic spine 2011    Lacunar infarction 2019    Mitral valve disorder     Postmenopausal osteoporosis     Restless leg syndrome        Social history:   Social History     Tobacco Use    Smoking status: Former     Current packs/day: 0.00     Average packs/day: 1 pack/day for 36.0 years (36.0 ttl pk-yrs)     Types: Cigarettes     Start date:      Quit date:      Years since quittin.2     Passive exposure: Never    Smokeless tobacco: Never   Substance Use Topics    Alcohol use: No       Allergies:    Latex and Penicillin g      This patient is managed via a collaborative agreement, pursuant to IC [id number]. The signed protocol is kept in the [facility] at [address] [place] [address].    Education: Selina Vazquez has been instructed to call if any changes in medications, doses, concerns, etc. Patient was provided dosing instructions and expressed verbal understanding and has no further questions at this time.    Plan:  1. no change; warfarin 3 mg PO daily, except warfarin 4.5 mg PO on Monday.  Total weekly dose = 22.5 mg.   2. INR should be tested in 1 week at PCP's office and called into clinic. Informed that if INR remains therapeutic, will start to extend visits out further.    Roselyn iHll, AubreeD  3/31/2025  10:09 EDT

## 2025-03-31 NOTE — PROGRESS NOTES
Capillary Blood Specimen Collection  Capillary blood collection performed in left middle finger by Minoo Portillo MA. Patient tolerated the procedure well without complications. INR   03/31/25   Minoo Portillo MA

## 2025-04-13 PROBLEM — J18.9 MULTIFOCAL PNEUMONIA: Status: RESOLVED | Noted: 2024-11-29 | Resolved: 2025-04-13

## 2025-04-13 NOTE — PATIENT INSTRUCTIONS
Health Maintenance Due   Topic Date Due    DIABETIC EYE EXAM  Never done    URINE MICROALBUMIN-CREATININE RATIO (uACR)  Never done    ZOSTER VACCINE (1 of 2) Never done    DXA SCAN  05/29/2017    RSV Vaccine - Adults (1 - 1-dose 75+ series) Never done    TDAP/TD VACCINES (2 - Td or Tdap) 12/05/2022    COVID-19 Vaccine (4 - 2024-25 season) 10/03/2024    HEMOGLOBIN A1C  12/19/2024

## 2025-04-13 NOTE — ASSESSMENT & PLAN NOTE
Patient's (There is no height or weight on file to calculate BMI.) indicates that they are morbidly/severely obese (BMI > 40 or > 35 with obesity - related health condition) with health conditions that include hypertension, coronary heart disease, and dyslipidemias . Weight is unchanged. BMI  is above average; BMI management plan is completed. We discussed portion control and increasing exercise.

## 2025-04-14 ENCOUNTER — LAB (OUTPATIENT)
Dept: FAMILY MEDICINE CLINIC | Facility: CLINIC | Age: 80
End: 2025-04-14
Payer: MEDICARE

## 2025-04-14 ENCOUNTER — ANTICOAGULATION VISIT (OUTPATIENT)
Dept: PHARMACY | Facility: HOSPITAL | Age: 80
End: 2025-04-14
Payer: MEDICARE

## 2025-04-14 ENCOUNTER — ANTICOAGULATION VISIT (OUTPATIENT)
Dept: FAMILY MEDICINE CLINIC | Facility: CLINIC | Age: 80
End: 2025-04-14
Payer: MEDICARE

## 2025-04-14 ENCOUNTER — OFFICE VISIT (OUTPATIENT)
Dept: FAMILY MEDICINE CLINIC | Facility: CLINIC | Age: 80
End: 2025-04-14
Payer: MEDICARE

## 2025-04-14 VITALS
HEART RATE: 80 BPM | DIASTOLIC BLOOD PRESSURE: 80 MMHG | WEIGHT: 219 LBS | BODY MASS INDEX: 43 KG/M2 | HEIGHT: 60 IN | OXYGEN SATURATION: 96 % | SYSTOLIC BLOOD PRESSURE: 147 MMHG | TEMPERATURE: 97.8 F

## 2025-04-14 DIAGNOSIS — I10 ESSENTIAL HYPERTENSION: Chronic | ICD-10-CM

## 2025-04-14 DIAGNOSIS — E78.2 MIXED HYPERLIPIDEMIA: Chronic | ICD-10-CM

## 2025-04-14 DIAGNOSIS — J43.1 PANLOBULAR EMPHYSEMA: Chronic | ICD-10-CM

## 2025-04-14 DIAGNOSIS — I25.10 CORONARY ARTERY DISEASE INVOLVING NATIVE CORONARY ARTERY OF NATIVE HEART WITHOUT ANGINA PECTORIS: ICD-10-CM

## 2025-04-14 DIAGNOSIS — E66.813 CLASS 3 SEVERE OBESITY DUE TO EXCESS CALORIES WITH SERIOUS COMORBIDITY AND BODY MASS INDEX (BMI) OF 40.0 TO 44.9 IN ADULT: ICD-10-CM

## 2025-04-14 DIAGNOSIS — F33.41 MAJOR DEPRESSIVE DISORDER, RECURRENT, IN PARTIAL REMISSION: ICD-10-CM

## 2025-04-14 DIAGNOSIS — J45.50 SEVERE PERSISTENT ASTHMA WITHOUT COMPLICATION: Primary | ICD-10-CM

## 2025-04-14 DIAGNOSIS — K22.4 ESOPHAGEAL MOTILITY DISORDER: ICD-10-CM

## 2025-04-14 DIAGNOSIS — Z78.0 POST-MENOPAUSAL: ICD-10-CM

## 2025-04-14 DIAGNOSIS — I48.91 ATRIAL FIBRILLATION WITH RAPID VENTRICULAR RESPONSE: ICD-10-CM

## 2025-04-14 DIAGNOSIS — E55.9 VITAMIN D DEFICIENCY: ICD-10-CM

## 2025-04-14 DIAGNOSIS — E66.01 CLASS 3 SEVERE OBESITY DUE TO EXCESS CALORIES WITH SERIOUS COMORBIDITY AND BODY MASS INDEX (BMI) OF 40.0 TO 44.9 IN ADULT: ICD-10-CM

## 2025-04-14 DIAGNOSIS — N39.3 STRESS INCONTINENCE (FEMALE) (MALE): ICD-10-CM

## 2025-04-14 DIAGNOSIS — M81.0 POSTMENOPAUSAL OSTEOPOROSIS: ICD-10-CM

## 2025-04-14 DIAGNOSIS — I26.99 PULMONARY EMBOLISM, UNSPECIFIED CHRONICITY, UNSPECIFIED PULMONARY EMBOLISM TYPE, UNSPECIFIED WHETHER ACUTE COR PULMONALE PRESENT: Primary | ICD-10-CM

## 2025-04-14 DIAGNOSIS — L43.9 LICHEN PLANUS: ICD-10-CM

## 2025-04-14 DIAGNOSIS — J40 BRONCHITIS: ICD-10-CM

## 2025-04-14 LAB
25(OH)D3 SERPL-MCNC: 23.3 NG/ML (ref 30–100)
ALBUMIN SERPL-MCNC: 3.8 G/DL (ref 3.5–5.2)
ALBUMIN/GLOB SERPL: 1.7 G/DL
ALP SERPL-CCNC: 67 U/L (ref 39–117)
ALT SERPL W P-5'-P-CCNC: 25 U/L (ref 1–33)
ANION GAP SERPL CALCULATED.3IONS-SCNC: 7 MMOL/L (ref 5–15)
AST SERPL-CCNC: 21 U/L (ref 1–32)
BASOPHILS # BLD AUTO: 0.03 10*3/MM3 (ref 0–0.2)
BASOPHILS NFR BLD AUTO: 0.3 % (ref 0–1.5)
BILIRUB SERPL-MCNC: 0.7 MG/DL (ref 0–1.2)
BILIRUB UR QL STRIP: NEGATIVE
BUN SERPL-MCNC: 17 MG/DL (ref 8–23)
BUN/CREAT SERPL: 11.8 (ref 7–25)
CALCIUM SPEC-SCNC: 8.7 MG/DL (ref 8.6–10.5)
CHLORIDE SERPL-SCNC: 102 MMOL/L (ref 98–107)
CHOLEST SERPL-MCNC: 179 MG/DL (ref 0–200)
CLARITY UR: CLEAR
CO2 SERPL-SCNC: 31 MMOL/L (ref 22–29)
COLOR UR: YELLOW
CREAT SERPL-MCNC: 1.44 MG/DL (ref 0.57–1)
DEPRECATED RDW RBC AUTO: 45.3 FL (ref 37–54)
EGFRCR SERPLBLD CKD-EPI 2021: 37.1 ML/MIN/1.73
EOSINOPHIL # BLD AUTO: 0.22 10*3/MM3 (ref 0–0.4)
EOSINOPHIL NFR BLD AUTO: 2.1 % (ref 0.3–6.2)
ERYTHROCYTE [DISTWIDTH] IN BLOOD BY AUTOMATED COUNT: 14.4 % (ref 12.3–15.4)
GLOBULIN UR ELPH-MCNC: 2.2 GM/DL
GLUCOSE SERPL-MCNC: 89 MG/DL (ref 65–99)
GLUCOSE UR STRIP-MCNC: NEGATIVE MG/DL
HCT VFR BLD AUTO: 44.6 % (ref 34–46.6)
HDLC SERPL-MCNC: 76 MG/DL (ref 40–60)
HGB BLD-MCNC: 13.9 G/DL (ref 12–15.9)
HGB UR QL STRIP.AUTO: NEGATIVE
HOLD SPECIMEN: NORMAL
IMM GRANULOCYTES # BLD AUTO: 0.11 10*3/MM3 (ref 0–0.05)
IMM GRANULOCYTES NFR BLD AUTO: 1 % (ref 0–0.5)
INR PPP: 2.7 (ref 2–3)
KETONES UR QL STRIP: NEGATIVE
LDLC SERPL CALC-MCNC: 82 MG/DL (ref 0–100)
LDLC/HDLC SERPL: 1.04 {RATIO}
LEUKOCYTE ESTERASE UR QL STRIP.AUTO: NEGATIVE
LYMPHOCYTES # BLD AUTO: 1.77 10*3/MM3 (ref 0.7–3.1)
LYMPHOCYTES NFR BLD AUTO: 16.9 % (ref 19.6–45.3)
MAGNESIUM SERPL-MCNC: 2.1 MG/DL (ref 1.6–2.4)
MCH RBC QN AUTO: 26.8 PG (ref 26.6–33)
MCHC RBC AUTO-ENTMCNC: 31.2 G/DL (ref 31.5–35.7)
MCV RBC AUTO: 85.9 FL (ref 79–97)
MONOCYTES # BLD AUTO: 1.12 10*3/MM3 (ref 0.1–0.9)
MONOCYTES NFR BLD AUTO: 10.7 % (ref 5–12)
NEUTROPHILS NFR BLD AUTO: 69 % (ref 42.7–76)
NEUTROPHILS NFR BLD AUTO: 7.23 10*3/MM3 (ref 1.7–7)
NITRITE UR QL STRIP: NEGATIVE
NRBC BLD AUTO-RTO: 0 /100 WBC (ref 0–0.2)
PH UR STRIP.AUTO: 8 [PH] (ref 5–8)
PLATELET # BLD AUTO: 132 10*3/MM3 (ref 140–450)
PMV BLD AUTO: 10.5 FL (ref 6–12)
POTASSIUM SERPL-SCNC: 5 MMOL/L (ref 3.5–5.2)
PROT SERPL-MCNC: 6 G/DL (ref 6–8.5)
PROT UR QL STRIP: ABNORMAL
RBC # BLD AUTO: 5.19 10*6/MM3 (ref 3.77–5.28)
SODIUM SERPL-SCNC: 140 MMOL/L (ref 136–145)
SP GR UR STRIP: 1.02 (ref 1–1.03)
T4 FREE SERPL-MCNC: <0.1 NG/DL (ref 0.93–1.7)
TRIGL SERPL-MCNC: 119 MG/DL (ref 0–150)
TSH SERPL DL<=0.05 MIU/L-ACNC: 128 UIU/ML (ref 0.27–4.2)
UROBILINOGEN UR QL STRIP: ABNORMAL
VIT B12 BLD-MCNC: 1108 PG/ML (ref 211–946)
VLDLC SERPL-MCNC: 21 MG/DL (ref 5–40)
WBC NRBC COR # BLD AUTO: 10.48 10*3/MM3 (ref 3.4–10.8)

## 2025-04-14 PROCEDURE — 82306 VITAMIN D 25 HYDROXY: CPT | Performed by: PREVENTIVE MEDICINE

## 2025-04-14 PROCEDURE — 84439 ASSAY OF FREE THYROXINE: CPT | Performed by: PREVENTIVE MEDICINE

## 2025-04-14 PROCEDURE — 84443 ASSAY THYROID STIM HORMONE: CPT | Performed by: PREVENTIVE MEDICINE

## 2025-04-14 PROCEDURE — 85025 COMPLETE CBC W/AUTO DIFF WBC: CPT | Performed by: PREVENTIVE MEDICINE

## 2025-04-14 PROCEDURE — 82607 VITAMIN B-12: CPT | Performed by: PREVENTIVE MEDICINE

## 2025-04-14 PROCEDURE — 80061 LIPID PANEL: CPT | Performed by: PREVENTIVE MEDICINE

## 2025-04-14 PROCEDURE — 81003 URINALYSIS AUTO W/O SCOPE: CPT | Performed by: PREVENTIVE MEDICINE

## 2025-04-14 PROCEDURE — 36415 COLL VENOUS BLD VENIPUNCTURE: CPT

## 2025-04-14 PROCEDURE — 83735 ASSAY OF MAGNESIUM: CPT | Performed by: PREVENTIVE MEDICINE

## 2025-04-14 PROCEDURE — 80053 COMPREHEN METABOLIC PANEL: CPT | Performed by: PREVENTIVE MEDICINE

## 2025-04-14 RX ORDER — CEFDINIR 300 MG/1
300 CAPSULE ORAL 2 TIMES DAILY
Qty: 20 CAPSULE | Refills: 0 | Status: SHIPPED | OUTPATIENT
Start: 2025-04-14

## 2025-04-14 RX ORDER — PREDNISONE 10 MG/1
10 TABLET ORAL DAILY
Qty: 5 TABLET | Refills: 0 | Status: SHIPPED | OUTPATIENT
Start: 2025-04-14

## 2025-04-14 RX ORDER — CEFTRIAXONE 1 G/1
1 INJECTION, POWDER, FOR SOLUTION INTRAMUSCULAR; INTRAVENOUS ONCE
Qty: 1 G | Refills: 0 | Status: SHIPPED | OUTPATIENT
Start: 2025-04-14 | End: 2025-04-14

## 2025-04-14 NOTE — PROGRESS NOTES
Anticoagulation Clinic Progress Note - Outside Lab INR Testing     INR Goal: 2 - 3  Today's INR: 2.7 (therapeutic). INR result was collected and called in today by Ricco at Dr. Matthews's office and appointment conducted telephonically.  Current warfarin dose: warfarin 3 mg PO daily, except warfarin 4.5 mg PO on Monday.  Current total weekly dose = 22.5 mg per week.     INR History:      Latest Ref Rng & Units 3/24/2025    10:15 AM 3/24/2025    10:46 AM 3/31/2025    12:00 AM 3/31/2025    10:00 AM 3/31/2025    10:09 AM 2025    12:00 AM 2025    11:54 AM   Anticoagulation Monitoring   INR  -- 1.60  2.10 2.10  2.70   INR Date   3/24/2025  3/31/2025 3/31/2025  2025   INR Goal  2.0-3.0 2.0-3.0  2.0-3.0 2.0-3.0  2.0-3.0   Trend   Up  Same Same  Same   Last Week Total   21 mg  22.5 mg 22.5 mg  22.5 mg   Next Week Total   22.5 mg  22.5 mg 22.5 mg  22.5 mg   Sun   3 mg  3 mg 3 mg  3 mg   Mon   4.5 mg  4.5 mg 4.5 mg  4.5 mg   Tue   3 mg  3 mg 3 mg  3 mg   Wed   3 mg  3 mg 3 mg  3 mg   Thu   3 mg  3 mg 3 mg  3 mg   Fri   3 mg  3 mg 3 mg  3 mg   Sat   3 mg  3 mg 3 mg  3 mg   Historical INR 2.00 - 3.00   2.10       2.70        Visit Report       Report Report       This result is from an external source.       Anticoagulation Summary  As of 2025      INR goal:  2.0-3.0   TTR:  37.9% (1.4 y)   INR used for dosin.70 (2025)   Warfarin maintenance plan:  4.5 mg every Mon; 3 mg all other days   Weekly warfarin total:  22.5 mg   No change documented:  Jaimie Chamberlain, PharmD   Plan last modified:  Mac Perea, Pharmacy Intern (3/24/2025)   Next INR check:  2025   Priority:  High   Target end date:  --    Indications    Pulmonary embolism [I26.99]                 Anticoagulation Episode Summary       INR check location:  --    Preferred lab:  --    Send INR reminders to:   JAMIE MTROSALINDA [facility] CLINIC CLINICAL POOL    Comments:  Bushra -   In-home caregiver, Cassy: [phone]  SisterConcha:  [phone]  Potential new home phone number: [phone] (Marcus, patient's son)  Jazmin (unit manager at [employer]: [phone]          Anticoagulation Care Providers       Provider Role Specialty Phone number    Bria Matthews MD Referring Family Medicine [phone]            Clinic Interview:   Patient Findings     Negatives:  Signs/symptoms of thrombosis, Signs/symptoms of bleeding,   Laboratory test error suspected, Change in health, Change in alcohol use,   Change in activity, Upcoming invasive procedure, Emergency department   visit, Upcoming dental procedure, Missed doses, Extra doses, Change in   medications, Change in diet/appetite, Hospital admission, Bruising, Other   complaints      Clinical Outcomes     Negatives:  Major bleeding event, Thromboembolic event,   Anticoagulation-related hospital admission, Anticoagulation-related ED   visit, Anticoagulation-related fatality        Current Medications:   Prior to Admission medications    Medication Sig Start Date End Date Taking? Authorizing Provider   albuterol sulfate  (90 Base) MCG/ACT inhaler Inhale 2 puffs Every 4 (Four) Hours As Needed for Wheezing or Shortness of Air. Indications: Chronic Obstructive Lung Disease    ProviderDiogenes MD   aspirin 81 MG EC tablet Take 1 tablet by mouth Daily. Indications: Venous Thromboembolism    ProviderDiogenes MD   atorvastatin (LIPITOR) 80 MG tablet Take 0.5 tablets by mouth Daily.    ProviderDiogenes MD   benzonatate (Tessalon Perles) 100 MG capsule Take 1 capsule by mouth 3 (Three) Times a Day As Needed for Cough. 1/14/25   Bria Matthews MD   budesonide (PULMICORT) 0.5 MG/2ML nebulizer solution Take 2 mL by nebulization 2 (Two) Times a Day. 6/21/24   Tracy Terry MD   cefdinir (OMNICEF) 300 MG capsule Take 1 capsule by mouth 2 (Two) Times a Day. 4/14/25   Bria Matthews MD   cefTRIAXone (ROCEPHIN) 1 g injection Inject 1 g into the appropriate muscle  as directed by prescriber 1 (One) Time for 1 dose. 4/14/25 4/14/25  Bria Matthews MD   dapagliflozin (Farxiga) 5 MG tablet tablet Take 1 tablet by mouth Daily. 1/17/25   Bria Matthews MD   dilTIAZem CD (CARDIZEM CD) 180 MG 24 hr capsule TAKE 1 CAPSULE BY MOUTH DAILY FOR HIGH BLOOD PRESSURE 3/4/25   Bria Matthews MD   Fluticasone Furoate-Vilanterol (Breo Ellipta) 100-25 MCG/ACT aerosol powder  Inhale 1 puff Daily.    Provider, MD Diogenes   furosemide (LASIX) 40 MG tablet Take 1 tablet by mouth Daily. 7/7/24   Darrion Burch MD   guaiFENesin (MUCINEX) 600 MG 12 hr tablet Take 2 tablets by mouth 2 (Two) Times a Day.    Provider, MD Diogenes   ipratropium-albuterol (DUO-NEB) 0.5-2.5 mg/3 ml nebulizer Take 3 mL by nebulization 4 (Four) Times a Day. Indications: Chronic Obstructive Lung Disease 10/30/23   Bria Matthews MD   levothyroxine (Synthroid) 112 MCG tablet Take 1 tablet by mouth Daily. 9/2/24   Bria Matthews MD   O2 (OXYGEN) Inhale 2 L/min Continuous. Indications: COPD exacerbation, uncomplicated 10/6/23   Arlyn Holland APRN   pantoprazole (PROTONIX) 40 MG EC tablet Take 1 tablet by mouth Daily. 2/20/25   Bria Matthews MD   polyethylene glycol (MIRALAX) 17 g packet Take 17 g by mouth Daily. 12/3/24   Jong Iyer MD   potassium chloride (KLOR-CON M20) 20 MEQ CR tablet Take 1 tablet by mouth Daily. 7/7/24   Darrion Burch MD   predniSONE (DELTASONE) 10 MG tablet Take 1 tablet by mouth Daily. 4/14/25   Bria Matthews MD   senna 8.6 MG tablet Take 2 tablets by mouth Every Evening. Indications: Constipation 8/27/24   Bria Matthews MD   spironolactone (ALDACTONE) 25 MG tablet TAKE 1 TABLET BY MOUTH DAILY FOR CONGESTIVE HEART FAILURE 8/16/24   Bria Matthews MD   warfarin (COUMADIN) 2.5 MG tablet Take 1 tablet by mouth See Admin Instructions. Take one tablet (2.5mg) 5 days weekly and two tablets (5mg) twice weekly.  24   Bria Matthews MD   warfarin (Coumadin) 3 MG tablet Take 3 mg (1 tablet) daily, except for 4.5 mg (1 and 1/2 tablet) on Saturday, or as directed by [facility].  Indications: history of DVT/PE 24   Bria Matthews MD   albuterol (ACCUNEB) 1.25 MG/3ML nebulizer solution Inhale. Indications: Spasm of Lung Air Passages 23   ProviderDiogenes MD   Cholecalciferol 50 MCG (2000 UT) tablet Take 1 tablet by mouth. Indications: Vitamin D Deficiency 23   Diogenes Olguin MD   clindamycin (Cleocin) 300 MG capsule Take 1 capsule by mouth 3 (Three) Times a Day. 25  Bria Matthews MD   predniSONE (DELTASONE) 10 MG tablet Take 1 tablet by mouth Daily. 25  Bria Matthews MD       Medical history:   Past Medical History:   Diagnosis Date    Adenomatous polyp of colon     Allergic rhinitis     Asthma     Cervical disc disease     Fracture of thoracic spine 2011    Lacunar infarction 2019    Mitral valve disorder     Postmenopausal osteoporosis     Restless leg syndrome        Social history:   Social History     Tobacco Use    Smoking status: Former     Current packs/day: 0.00     Average packs/day: 1 pack/day for 36.0 years (36.0 ttl pk-yrs)     Types: Cigarettes     Start date:      Quit date:      Years since quittin.3     Passive exposure: Never    Smokeless tobacco: Never   Substance Use Topics    Alcohol use: No       Allergies:    Latex and Penicillin g    This patient is managed via a collaborative agreement, pursuant to IC [id number]. The signed protocol is kept in the [facility] at [address] [place] [address].    Education: Selina Vazquez has been instructed to call if any changes in medications, doses, concerns, etc. Patient was provided dosing instructions and expressed verbal understanding and has no further questions at this time.    Plan:  1. no change;  warfarin 3 mg PO daily, except warfarin 4.5 mg PO on Monday.  Total weekly dose = 22.5 mg.   2. INR should be tested in 2 weeks at Dr. Matthews's office and called into clinic.     Jaimie Chamberlain, AubreeD  4/14/2025  11:55 EDT

## 2025-04-14 NOTE — PROGRESS NOTES
Subjective   Selina Vazquez is a 79 y.o. female presents for   Chief Complaint   Patient presents with    Primary Care Follow-Up     Is fasting    Hyperlipidemia       Health Maintenance Due   Topic Date Due    DIABETIC EYE EXAM  Never done    URINE MICROALBUMIN-CREATININE RATIO (uACR)  Never done    ZOSTER VACCINE (1 of 2) Never done    DXA SCAN  05/29/2017    RSV Vaccine - Adults (1 - 1-dose 75+ series) Never done    TDAP/TD VACCINES (2 - Td or Tdap) 12/05/2022    COVID-19 Vaccine (4 - 2024-25 season) 10/03/2024    HEMOGLOBIN A1C  12/19/2024       Primary Care Follow-Up  Conditions present:  Hyperlipidemia  Associated symptoms include: wheezing, cough and depressed mood. Pertinent negatives include no palpitations.   Hyperlipidemia       History of Present Illness  The patient is a 79-year-old female who is here today to follow up on severe persistent asthma, panlobular emphysema, coronary artery disease, hyperlipidemia, hypertension, lichen planus, class III severe obesity due to excess calories, postmenopausal atrial fibrillation, esophageal motility disorder, major depression, stress incontinence of urine, vitamin D deficiency, and postmenopausal status.    She reports experiencing congestion, which has recently worsened since the last weekend. She does not believe she has had a fever. The sputum she produces varies in color, ranging from green to clear. She occasionally experiences seasonal allergies but does not attribute her current symptoms to an allergic reaction. She reports no ear or throat pain and has not been in contact with individuals diagnosed with influenza or pneumonia. She reports no changes in her vision. She experiences wheezing at home and is currently on 2 liters of oxygen. Despite her symptoms, she maintains a daily walking routine of 20 minutes. She reports no chest pressure. She is allergic to PENICILLIN.    She occasionally experiences difficulty swallowing and choking. She has  "previously undergone esophageal dilation.    She has not sought medical attention for her lichen planus over the past year and has not consulted a dermatologist.    She reports no abdominal discomfort but does report hunger. She has noticed swelling, which has remained consistent. She manages this by elevating her legs as much as possible.    She is mindful of her saturated fat intake.    ALLERGIES  The patient is allergic to PENICILLINS.    MEDICATIONS  Discontinued: Imdur    Vitals:    04/14/25 1051 04/14/25 1054 04/14/25 1148   BP: 126/74 124/65 147/80   BP Location: Left arm Right arm Right arm   Patient Position: Sitting Standing Sitting   Cuff Size: Large Adult Large Adult Large Adult   Pulse: 67  80   Temp: 97.8 °F (36.6 °C)     TempSrc: Infrared     SpO2: 98%  96%   Weight: 99.3 kg (219 lb)     Height: 152.4 cm (60\")       Body mass index is 42.77 kg/m².    Current Outpatient Medications on File Prior to Visit   Medication Sig Dispense Refill    albuterol sulfate  (90 Base) MCG/ACT inhaler Inhale 2 puffs Every 4 (Four) Hours As Needed for Wheezing or Shortness of Air. Indications: Chronic Obstructive Lung Disease      aspirin 81 MG EC tablet Take 1 tablet by mouth Daily. Indications: Venous Thromboembolism      atorvastatin (LIPITOR) 80 MG tablet Take 0.5 tablets by mouth Daily.      benzonatate (Tessalon Perles) 100 MG capsule Take 1 capsule by mouth 3 (Three) Times a Day As Needed for Cough. 30 capsule 0    budesonide (PULMICORT) 0.5 MG/2ML nebulizer solution Take 2 mL by nebulization 2 (Two) Times a Day. 1 each 0    dapagliflozin (Farxiga) 5 MG tablet tablet Take 1 tablet by mouth Daily. 30 tablet 0    dilTIAZem CD (CARDIZEM CD) 180 MG 24 hr capsule TAKE 1 CAPSULE BY MOUTH DAILY FOR HIGH BLOOD PRESSURE 90 capsule 0    Fluticasone Furoate-Vilanterol (Breo Ellipta) 100-25 MCG/ACT aerosol powder  Inhale 1 puff Daily.      furosemide (LASIX) 40 MG tablet Take 1 tablet by mouth Daily. 7 tablet 0    " guaiFENesin (MUCINEX) 600 MG 12 hr tablet Take 2 tablets by mouth 2 (Two) Times a Day.      ipratropium-albuterol (DUO-NEB) 0.5-2.5 mg/3 ml nebulizer Take 3 mL by nebulization 4 (Four) Times a Day. Indications: Chronic Obstructive Lung Disease 360 mL 0    levothyroxine (Synthroid) 112 MCG tablet Take 1 tablet by mouth Daily. 90 tablet 3    O2 (OXYGEN) Inhale 2 L/min Continuous. Indications: COPD exacerbation, uncomplicated      pantoprazole (PROTONIX) 40 MG EC tablet Take 1 tablet by mouth Daily. 90 tablet 1    polyethylene glycol (MIRALAX) 17 g packet Take 17 g by mouth Daily.      potassium chloride (KLOR-CON M20) 20 MEQ CR tablet Take 1 tablet by mouth Daily. 7 tablet 0    senna 8.6 MG tablet Take 2 tablets by mouth Every Evening. Indications: Constipation 60 tablet 3    spironolactone (ALDACTONE) 25 MG tablet TAKE 1 TABLET BY MOUTH DAILY FOR CONGESTIVE HEART FAILURE 90 tablet 0    warfarin (COUMADIN) 2.5 MG tablet Take 1 tablet by mouth See Admin Instructions. Take one tablet (2.5mg) 5 days weekly and two tablets (5mg) twice weekly. 108 tablet 3    warfarin (Coumadin) 3 MG tablet Take 3 mg (1 tablet) daily, except for 4.5 mg (1 and 1/2 tablet) on Saturday, or as directed by [facility].  Indications: history of DVT/PE 97 tablet 1    [DISCONTINUED] clindamycin (Cleocin) 300 MG capsule Take 1 capsule by mouth 3 (Three) Times a Day. 30 capsule 0    [DISCONTINUED] predniSONE (DELTASONE) 10 MG tablet Take 1 tablet by mouth Daily. 5 tablet 0    [DISCONTINUED] albuterol (ACCUNEB) 1.25 MG/3ML nebulizer solution Inhale. Indications: Spasm of Lung Air Passages      [DISCONTINUED] Cholecalciferol 50 MCG (2000 UT) tablet Take 1 tablet by mouth. Indications: Vitamin D Deficiency       No current facility-administered medications on file prior to visit.       The following portions of the patient's history were reviewed and updated as appropriate: allergies, current medications, past family history, past  medical history, past social history, past surgical history, and problem list.    Review of Systems   HENT:  Positive for congestion.    Respiratory:  Positive for cough, chest tightness and wheezing.    Cardiovascular:  Negative for palpitations.   Genitourinary:  Positive for urinary incontinence.   Psychiatric/Behavioral:  Positive for depressed mood.        Objective   Physical Exam  Vitals reviewed.   Constitutional:       General: She is not in acute distress.     Appearance: She is well-developed. She is obese. She is not ill-appearing or toxic-appearing.   HENT:      Head: Normocephalic and atraumatic.      Right Ear: Tympanic membrane, ear canal and external ear normal.      Left Ear: Tympanic membrane, ear canal and external ear normal.      Nose: Nose normal.      Mouth/Throat:      Mouth: Mucous membranes are moist.      Pharynx: No posterior oropharyngeal erythema.   Eyes:      Extraocular Movements: Extraocular movements intact.      Conjunctiva/sclera: Conjunctivae normal.      Pupils: Pupils are equal, round, and reactive to light.   Neck:      Vascular: No carotid bruit.   Cardiovascular:      Rate and Rhythm: Normal rate and regular rhythm.      Heart sounds: Normal heart sounds.   Pulmonary:      Effort: Pulmonary effort is normal.      Breath sounds: Rhonchi present.   Abdominal:      General: Bowel sounds are normal. There is no distension.      Palpations: Abdomen is soft. There is no mass.      Tenderness: There is no abdominal tenderness. There is no right CVA tenderness or left CVA tenderness.   Musculoskeletal:         General: Normal range of motion.      Cervical back: Neck supple. No tenderness.      Right lower leg: Edema present.      Left lower leg: Edema present.   Lymphadenopathy:      Cervical: No cervical adenopathy.   Skin:     General: Skin is warm.      Findings: Bruising present.   Neurological:      General: No focal deficit present.      Mental Status: She is alert and  oriented to person, place, and time.   Psychiatric:         Mood and Affect: Mood normal.         Behavior: Behavior normal.       Physical Exam  There is some wax in the ears. The throat appears normal.  No signs of pneumonia detected in the lungs.  Heart rate and rhythm are normal.  Abdomen is nontender.    Vital Signs  Oxygen level is at 98 percent. Blood pressure is normal.    PHQ-9 Total Score:    Results           Assessment & Plan   Diagnoses and all orders for this visit:    1. Severe persistent asthma without complication (Primary)    2. Post-menopausal  -     DEXA Bone Density Axial; Future    3. Panlobular emphysema    4. Coronary artery disease involving native coronary artery of native heart without angina pectoris    5. Mixed hyperlipidemia  -     Comprehensive Metabolic Panel; Future  -     Lipid Panel; Future    6. Essential hypertension  -     CBC Auto Differential; Future    7. Lichen planus    8. Class 3 severe obesity due to excess calories with serious comorbidity and body mass index (BMI) of 40.0 to 44.9 in adult  Assessment & Plan:  Patient's (There is no height or weight on file to calculate BMI.) indicates that they are morbidly/severely obese (BMI > 40 or > 35 with obesity - related health condition) with health conditions that include hypertension, coronary heart disease, and dyslipidemias . Weight is unchanged. BMI  is above average; BMI management plan is completed. We discussed portion control and increasing exercise.       9. Postmenopausal osteoporosis    10. Atrial fibrillation with rapid ventricular response  -     Magnesium; Future  -     TSH Rfx On Abnormal To Free T4; Future    11. Esophageal motility disorder    12. Major depressive disorder, recurrent, in partial remission  -     Vitamin B12; Future    13. Stress incontinence (female) (male)  -     Urinalysis With Culture If Indicated - Urine, Clean Catch; Future    14. Vitamin D deficiency  -     Vitamin D,25-Hydroxy;  Future    15. Bronchitis  Comments:  Green and thick sputum, no fever, X2 days-not allergy    Other orders  -     cefdinir (OMNICEF) 300 MG capsule; Take 1 capsule by mouth 2 (Two) Times a Day.  Dispense: 20 capsule; Refill: 0  -     predniSONE (DELTASONE) 10 MG tablet; Take 1 tablet by mouth Daily.  Dispense: 5 tablet; Refill: 0  -     cefTRIAXone (ROCEPHIN) 1 g injection; Inject 1 g into the appropriate muscle as directed by prescriber 1 (One) Time for 1 dose.  Dispense: 1 g; Refill: 0      Assessment & Plan  1. Upper respiratory infection.  Her symptoms suggest an upper respiratory infection rather than pneumonia. A comprehensive treatment plan has been initiated, including ceftriaxone injection and oral cefdinir to be taken twice daily until completion. Additionally, a course of prednisone 1 tablet daily for 5 days has been prescribed. She is advised to maintain a daily walking regimen of 20 minutes. Laboratory tests will be conducted today. If her condition deteriorates, she is instructed to inform us immediately.    2. Esophageal motility disorder.  She reports occasional difficulty swallowing and choking. Given her history of esophageal dilation, it is recommended to consider another dilation procedure.    3. Lichen planus.  She has not had her lichen planus checked in the past year. It is recommended to schedule an appointment with a dermatologist or gynecologist for evaluation.    4. Hyperlipidemia.  She is advised to continue monitoring her saturated fat intake to manage her cholesterol levels.    5. Health maintenance.  She is due for a bone density test.    PROCEDURE  The patient has previously undergone esophageal dilation.    Patient Instructions     Health Maintenance Due   Topic Date Due    DIABETIC EYE EXAM  Never done    URINE MICROALBUMIN-CREATININE RATIO (uACR)  Never done    ZOSTER VACCINE (1 of 2) Never done    DXA SCAN  05/29/2017    RSV Vaccine - Adults (1 - 1-dose 75+ series) Never done     TDAP/TD VACCINES (2 - Td or Tdap) 12/05/2022    COVID-19 Vaccine (4 - 2024-25 season) 10/03/2024    HEMOGLOBIN A1C  12/19/2024           Patient or patient representative verbalized consent for the use of Ambient Listening during the visit with  Bria Matthews MD for chart documentation. 4/14/2025  12:44 EDT

## 2025-04-15 ENCOUNTER — RESULTS FOLLOW-UP (OUTPATIENT)
Dept: FAMILY MEDICINE CLINIC | Facility: CLINIC | Age: 80
End: 2025-04-15
Payer: MEDICARE

## 2025-04-15 DIAGNOSIS — N18.31 CHRONIC RENAL IMPAIRMENT, STAGE 3A: Primary | ICD-10-CM

## 2025-04-15 DIAGNOSIS — E03.9 HYPOTHYROIDISM, UNSPECIFIED TYPE: ICD-10-CM

## 2025-04-21 ENCOUNTER — ANTICOAGULATION VISIT (OUTPATIENT)
Dept: FAMILY MEDICINE CLINIC | Facility: CLINIC | Age: 80
End: 2025-04-21
Payer: MEDICARE

## 2025-04-21 ENCOUNTER — ANTICOAGULATION VISIT (OUTPATIENT)
Dept: PHARMACY | Facility: HOSPITAL | Age: 80
End: 2025-04-21
Payer: MEDICARE

## 2025-04-21 DIAGNOSIS — I26.99 PULMONARY EMBOLISM, UNSPECIFIED CHRONICITY, UNSPECIFIED PULMONARY EMBOLISM TYPE, UNSPECIFIED WHETHER ACUTE COR PULMONALE PRESENT: Primary | ICD-10-CM

## 2025-04-21 LAB — INR PPP: 2.2 (ref 0.9–1.1)

## 2025-04-21 PROCEDURE — 85610 PROTHROMBIN TIME: CPT | Performed by: PREVENTIVE MEDICINE

## 2025-04-21 PROCEDURE — 36416 COLLJ CAPILLARY BLOOD SPEC: CPT | Performed by: PREVENTIVE MEDICINE

## 2025-04-21 RX ORDER — LEVOTHYROXINE SODIUM 125 UG/1
125 TABLET ORAL
Qty: 30 TABLET | Refills: 6 | OUTPATIENT
Start: 2025-04-21

## 2025-04-21 RX ORDER — LEVOTHYROXINE SODIUM 125 UG/1
125 TABLET ORAL
Qty: 30 TABLET | Refills: 6 | Status: SHIPPED | OUTPATIENT
Start: 2025-04-21

## 2025-04-21 NOTE — PROGRESS NOTES
Capillary Blood Specimen Collection  Capillary blood collection performed in left middle finger by Minoo Portillo MA. Patient tolerated the procedure well without complications. INR   04/21/25   Minoo Portillo MA

## 2025-04-21 NOTE — TELEPHONE ENCOUNTER
Rx Refill Note  Requested Prescriptions     Pending Prescriptions Disp Refills    levothyroxine (Synthroid) 125 MCG tablet 30 tablet 6     Sig: Take 1 tablet by mouth Every Morning.      Last office visit with prescribing clinician: 4/14/2025   Last telemedicine visit with prescribing clinician: Visit date not found   Next office visit with prescribing clinician: 10/20/2025                         Would you like a call back once the refill request has been completed: [] Yes [] No    If the office needs to give you a call back, can they leave a voicemail: [] Yes [] No    Minoo Portillo MA  04/21/25, 15:50 EDT

## 2025-04-21 NOTE — PROGRESS NOTES
Anticoagulation Clinic Progress Note - Outside Lab INR Testing     INR Goal: 2 - 3  Today's INR: 2.2 (therapeutic). INR result was collected at Dr. Matthews's today and called in by Minoo at their office.   Current warfarin dose: warfarin 3 mg PO daily, except warfarin 4.5 mg PO on Monday.  Current total weekly dose = 22.5 mg per week.     INR History:      Latest Ref Rng & Units 3/31/2025    10:00 AM 3/31/2025    10:09 AM 2025    12:00 AM 2025    10:30 AM 2025    11:54 AM 2025     8:45 AM 2025     8:57 AM   Anticoagulation Monitoring   INR  2.10 2.10  -- 2.70 -- 2.20   INR Date  3/31/2025 3/31/2025   2025  2025   INR Goal  2.0-3.0 2.0-3.0  2.0-3.0 2.0-3.0 2.0-3.0 2.0-3.0   Trend  Same Same   Same  Same   Last Week Total  22.5 mg 22.5 mg   22.5 mg  22.5 mg   Next Week Total  22.5 mg 22.5 mg   22.5 mg  22.5 mg   Sun  3 mg 3 mg   3 mg  3 mg   Mon  4.5 mg 4.5 mg   4.5 mg  4.5 mg   Tue  3 mg 3 mg   3 mg  3 mg   Wed  3 mg 3 mg   3 mg  3 mg   Thu  3 mg 3 mg   3 mg  3 mg   Fri  3 mg 3 mg   3 mg  3 mg   Sat  3 mg 3 mg   3 mg  3 mg   Historical INR 2.00 - 3.00   2.70           Visit Report    Report Report Report         This result is from an external source.       Anticoagulation Summary  As of 2025      INR goal:  2.0-3.0   TTR:  38.8% (1.4 y)   INR used for dosin.20 (2025)   Warfarin maintenance plan:  4.5 mg every Mon; 3 mg all other days   Weekly warfarin total:  22.5 mg   No change documented:  Jaimie Chamberlain, PharmD   Plan last modified:  Mac Perea, Pharmacy Intern (3/24/2025)   Next INR check:  2025   Priority:  High   Target end date:  --    Indications    Pulmonary embolism [I26.99]                 Anticoagulation Episode Summary       INR check location:  --    Preferred lab:  --    Send INR reminders to:   [facility] CLINICAL POOL    Comments:  [place] -   In-home caregiver, Cassy: [phone]  SisterConcha: [phone]  Potential new home  phone number: [phone] (Marcus, patient's son)  Jazmin (unit manager at [facility] [employer]: [phone]          Anticoagulation Care Providers       Provider Role Specialty Phone number    Bria Matthews MD Referring Family Medicine [phone]            Clinic Interview:   Patient Findings     Negatives:  Signs/symptoms of thrombosis, Signs/symptoms of bleeding,   Laboratory test error suspected, Change in health, Change in alcohol use,   Change in activity, Upcoming invasive procedure, Emergency department   visit, Upcoming dental procedure, Missed doses, Extra doses, Change in   medications, Change in diet/appetite, Hospital admission, Bruising, Other   complaints      Clinical Outcomes     Negatives:  Major bleeding event, Thromboembolic event,   Anticoagulation-related hospital admission, Anticoagulation-related ED   visit, Anticoagulation-related fatality        Current Medications:   Prior to Admission medications    Medication Sig Start Date End Date Taking? Authorizing Provider   albuterol sulfate  (90 Base) MCG/ACT inhaler Inhale 2 puffs Every 4 (Four) Hours As Needed for Wheezing or Shortness of Air. Indications: Chronic Obstructive Lung Disease    ProviderDiogenes MD   aspirin 81 MG EC tablet Take 1 tablet by mouth Daily. Indications: Venous Thromboembolism    Provider, MD Diogenes   atorvastatin (LIPITOR) 80 MG tablet Take 0.5 tablets by mouth Daily.    ProviderDiogenes MD   benzonatate (Tessalon Perles) 100 MG capsule Take 1 capsule by mouth 3 (Three) Times a Day As Needed for Cough. 1/14/25   Bria Matthews MD   budesonide (PULMICORT) 0.5 MG/2ML nebulizer solution Take 2 mL by nebulization 2 (Two) Times a Day. 6/21/24   Tracy Terry MD   cefdinir (OMNICEF) 300 MG capsule Take 1 capsule by mouth 2 (Two) Times a Day. 4/14/25   Bria Matthews MD   dapagliflozin (Farxiga) 5 MG tablet tablet Take 1 tablet by mouth Daily. 1/17/25   Bria Matthews MD    dilTIAZem CD (CARDIZEM CD) 180 MG 24 hr capsule TAKE 1 CAPSULE BY MOUTH DAILY FOR HIGH BLOOD PRESSURE 3/4/25   Bria Matthews MD   Fluticasone Furoate-Vilanterol (Breo Ellipta) 100-25 MCG/ACT aerosol powder  Inhale 1 puff Daily.    Provider, MD Diogenes   furosemide (LASIX) 40 MG tablet Take 1 tablet by mouth Daily. 7/7/24   Darrion Burch MD   guaiFENesin (MUCINEX) 600 MG 12 hr tablet Take 2 tablets by mouth 2 (Two) Times a Day.    Provider, MD Diogenes   ipratropium-albuterol (DUO-NEB) 0.5-2.5 mg/3 ml nebulizer Take 3 mL by nebulization 4 (Four) Times a Day. Indications: Chronic Obstructive Lung Disease 10/30/23   Bria Matthews MD   levothyroxine (Synthroid) 112 MCG tablet Take 1 tablet by mouth Daily. 9/2/24   Bria Matthews MD   O2 (OXYGEN) Inhale 2 L/min Continuous. Indications: COPD exacerbation, uncomplicated 10/6/23   Arlyn Holland APRN   pantoprazole (PROTONIX) 40 MG EC tablet Take 1 tablet by mouth Daily. 2/20/25   Bria Matthews MD   polyethylene glycol (MIRALAX) 17 g packet Take 17 g by mouth Daily. 12/3/24   Jong Iyer MD   potassium chloride (KLOR-CON M20) 20 MEQ CR tablet Take 1 tablet by mouth Daily. 7/7/24   Darrion Burch MD   predniSONE (DELTASONE) 10 MG tablet Take 1 tablet by mouth Daily. 4/14/25   Bria Matthews MD   senna 8.6 MG tablet Take 2 tablets by mouth Every Evening. Indications: Constipation 8/27/24   Bria Matthews MD   spironolactone (ALDACTONE) 25 MG tablet TAKE 1 TABLET BY MOUTH DAILY FOR CONGESTIVE HEART FAILURE 8/16/24   Bria Matthews MD   warfarin (COUMADIN) 2.5 MG tablet Take 1 tablet by mouth See Admin Instructions. Take one tablet (2.5mg) 5 days weekly and two tablets (5mg) twice weekly. 6/25/24   Bria Matthews MD   warfarin (Coumadin) 3 MG tablet Take 3 mg (1 tablet) daily, except for 4.5 mg (1 and 1/2 tablet) on Saturday, or as directed by [facility].   Indications: history of DVT/PE 24   Bria Matthews MD   albuterol (ACCUNEB) 1.25 MG/3ML nebulizer solution Inhale. Indications: Spasm of Lung Air Passages 23   Provider, MD Diogenes   Cholecalciferol 50 MCG ( UT) tablet Take 1 tablet by mouth. Indications: Vitamin D Deficiency 23   Provider, MD Diogenes       Medical history:   Past Medical History:   Diagnosis Date    Adenomatous polyp of colon     Allergic rhinitis     Asthma     Cervical disc disease     Fracture of thoracic spine 2011    Lacunar infarction 2019    Mitral valve disorder     Postmenopausal osteoporosis     Restless leg syndrome        Social history:   Social History     Tobacco Use    Smoking status: Former     Current packs/day: 0.00     Average packs/day: 1 pack/day for 36.0 years (36.0 ttl pk-yrs)     Types: Cigarettes     Start date:      Quit date:      Years since quittin.3     Passive exposure: Never    Smokeless tobacco: Never   Substance Use Topics    Alcohol use: No       Allergies:    Latex and Penicillin g    This patient is managed via a collaborative agreement, pursuant to IC [id number]. The signed protocol is kept in the [facility] at [address] [place] [address].    Education: Selina Vazquez has been instructed to call if any changes in medications, doses, concerns, etc. Patient was provided dosing instructions and expressed verbal understanding and has no further questions at this time.    Plan:  1. no change; warfarin 3 mg PO daily, except warfarin 4.5 mg PO on Monday.  Total weekly dose = 22.5 mg.   2. INR should be tested in two weeks at Dr. Matthews's office and called into clinic.     Jaimie Chamberlain, PharmD  2025  08:59 EDT

## 2025-04-30 ENCOUNTER — TELEPHONE (OUTPATIENT)
Dept: FAMILY MEDICINE CLINIC | Facility: CLINIC | Age: 80
End: 2025-04-30
Payer: MEDICARE

## 2025-04-30 NOTE — TELEPHONE ENCOUNTER
Please call patient and make sure that she is taking her doxycycline and prednisone and that she does have a follow-up with her pulmonary doctor I do not see that she has seen pulmonary since 6 of 24.

## 2025-05-01 NOTE — TELEPHONE ENCOUNTER
Pts son stated that she is given her meds everyday as prescribed and will contact her pulmonologist to follow up.

## 2025-05-01 NOTE — TELEPHONE ENCOUNTER
Non anion gap metabolic acidosis   No current nephrolithaisis  HCO3 - 11.6, CO2 - 9, Chloride 118, K - 4.1    PLAN:  Continue HCO3 drip Pt advised me to call back in a few hours so that I could speak with her son.

## 2025-05-01 NOTE — PATIENT INSTRUCTIONS
You are due for adacel Tdap vaccination. (provides protection against tetanus, diptheria and whooping cough) Please  get the immunization at your local pharmacy at your earliest convenience.  Please click on the link for more information about this vaccine.    https://www.cdc.gov/vaccines/vpd/dtap-tdap-td/public/index.html    You are due for Shingrix vaccination series ( the newest shingles vaccine).  It is a two shot series spaced 2-6 months apart. Please get this vaccine series started at your earliest convenience at your local pharmacy to help avoid shingles outbreak. It is more effective than the old Zostavax vaccine and is recommended even if you have had the Zostavax vaccine in the past.  Once the Shingrix series is completed, it does not need to be repeated.   For more information, please look at the website below:  https://www.cdc.gov/vaccines/vpd/shingles/public/shingrix/index.html

## 2025-05-05 ENCOUNTER — ANTICOAGULATION VISIT (OUTPATIENT)
Dept: FAMILY MEDICINE CLINIC | Facility: CLINIC | Age: 80
End: 2025-05-05
Payer: MEDICARE

## 2025-05-05 ENCOUNTER — LAB (OUTPATIENT)
Dept: FAMILY MEDICINE CLINIC | Facility: CLINIC | Age: 80
End: 2025-05-05
Payer: MEDICARE

## 2025-05-05 ENCOUNTER — ANTICOAGULATION VISIT (OUTPATIENT)
Dept: PHARMACY | Facility: HOSPITAL | Age: 80
End: 2025-05-05
Payer: MEDICARE

## 2025-05-05 ENCOUNTER — OFFICE VISIT (OUTPATIENT)
Dept: FAMILY MEDICINE CLINIC | Facility: CLINIC | Age: 80
End: 2025-05-05
Payer: MEDICARE

## 2025-05-05 VITALS
HEART RATE: 72 BPM | DIASTOLIC BLOOD PRESSURE: 88 MMHG | SYSTOLIC BLOOD PRESSURE: 153 MMHG | OXYGEN SATURATION: 98 % | HEIGHT: 60 IN | WEIGHT: 213.2 LBS | BODY MASS INDEX: 41.86 KG/M2 | TEMPERATURE: 98.6 F

## 2025-05-05 DIAGNOSIS — N18.31 CHRONIC RENAL IMPAIRMENT, STAGE 3A: ICD-10-CM

## 2025-05-05 DIAGNOSIS — E66.01 CLASS 3 SEVERE OBESITY DUE TO EXCESS CALORIES WITH SERIOUS COMORBIDITY AND BODY MASS INDEX (BMI) OF 40.0 TO 44.9 IN ADULT: ICD-10-CM

## 2025-05-05 DIAGNOSIS — E03.9 HYPOTHYROIDISM, UNSPECIFIED TYPE: ICD-10-CM

## 2025-05-05 DIAGNOSIS — R05.1 ACUTE COUGH: ICD-10-CM

## 2025-05-05 DIAGNOSIS — I26.99 PULMONARY EMBOLISM, UNSPECIFIED CHRONICITY, UNSPECIFIED PULMONARY EMBOLISM TYPE, UNSPECIFIED WHETHER ACUTE COR PULMONALE PRESENT: Primary | ICD-10-CM

## 2025-05-05 DIAGNOSIS — Z09 HOSPITAL DISCHARGE FOLLOW-UP: Primary | ICD-10-CM

## 2025-05-05 DIAGNOSIS — J43.1 PANLOBULAR EMPHYSEMA: Chronic | ICD-10-CM

## 2025-05-05 DIAGNOSIS — R73.9 HYPERGLYCEMIA: ICD-10-CM

## 2025-05-05 DIAGNOSIS — E66.813 CLASS 3 SEVERE OBESITY DUE TO EXCESS CALORIES WITH SERIOUS COMORBIDITY AND BODY MASS INDEX (BMI) OF 40.0 TO 44.9 IN ADULT: ICD-10-CM

## 2025-05-05 DIAGNOSIS — I10 ESSENTIAL HYPERTENSION: ICD-10-CM

## 2025-05-05 LAB
ALBUMIN SERPL-MCNC: 3.8 G/DL (ref 3.5–5.2)
ALBUMIN UR-MCNC: <1.2 MG/DL
ALBUMIN/GLOB SERPL: 1.6 G/DL
ALP SERPL-CCNC: 88 U/L (ref 39–117)
ALT SERPL W P-5'-P-CCNC: 18 U/L (ref 1–33)
ANION GAP SERPL CALCULATED.3IONS-SCNC: 11 MMOL/L (ref 5–15)
AST SERPL-CCNC: 15 U/L (ref 1–32)
BASOPHILS # BLD AUTO: 0.07 10*3/MM3 (ref 0–0.2)
BASOPHILS NFR BLD AUTO: 0.4 % (ref 0–1.5)
BILIRUB SERPL-MCNC: 0.4 MG/DL (ref 0–1.2)
BUN SERPL-MCNC: 17 MG/DL (ref 8–23)
BUN/CREAT SERPL: 15.6 (ref 7–25)
CALCIUM SPEC-SCNC: 8.8 MG/DL (ref 8.6–10.5)
CHLORIDE SERPL-SCNC: 105 MMOL/L (ref 98–107)
CO2 SERPL-SCNC: 29 MMOL/L (ref 22–29)
CREAT SERPL-MCNC: 1.09 MG/DL (ref 0.57–1)
CREAT UR-MCNC: 169.5 MG/DL
DEPRECATED RDW RBC AUTO: 46.6 FL (ref 37–54)
EGFRCR SERPLBLD CKD-EPI 2021: 51.8 ML/MIN/1.73
EOSINOPHIL # BLD AUTO: 0.14 10*3/MM3 (ref 0–0.4)
EOSINOPHIL NFR BLD AUTO: 0.9 % (ref 0.3–6.2)
ERYTHROCYTE [DISTWIDTH] IN BLOOD BY AUTOMATED COUNT: 15.6 % (ref 12.3–15.4)
GLOBULIN UR ELPH-MCNC: 2.4 GM/DL
GLUCOSE SERPL-MCNC: 90 MG/DL (ref 65–99)
HBA1C MFR BLD: 6.3 % (ref 4.8–5.6)
HCT VFR BLD AUTO: 39 % (ref 34–46.6)
HGB BLD-MCNC: 12.5 G/DL (ref 12–15.9)
IMM GRANULOCYTES # BLD AUTO: 0.55 10*3/MM3 (ref 0–0.05)
IMM GRANULOCYTES NFR BLD AUTO: 3.4 % (ref 0–0.5)
INR PPP: 3.4 (ref 0.9–1.1)
INR PPP: 3.4 (ref 2–3)
LYMPHOCYTES # BLD AUTO: 2.35 10*3/MM3 (ref 0.7–3.1)
LYMPHOCYTES NFR BLD AUTO: 14.7 % (ref 19.6–45.3)
MCH RBC QN AUTO: 26.9 PG (ref 26.6–33)
MCHC RBC AUTO-ENTMCNC: 32.1 G/DL (ref 31.5–35.7)
MCV RBC AUTO: 83.9 FL (ref 79–97)
MICROALBUMIN/CREAT UR: NORMAL MG/G{CREAT}
MONOCYTES # BLD AUTO: 1.56 10*3/MM3 (ref 0.1–0.9)
MONOCYTES NFR BLD AUTO: 9.7 % (ref 5–12)
NEUTROPHILS NFR BLD AUTO: 11.37 10*3/MM3 (ref 1.7–7)
NEUTROPHILS NFR BLD AUTO: 70.9 % (ref 42.7–76)
NRBC BLD AUTO-RTO: 0 /100 WBC (ref 0–0.2)
PLATELET # BLD AUTO: 306 10*3/MM3 (ref 140–450)
PMV BLD AUTO: 10.2 FL (ref 6–12)
POTASSIUM SERPL-SCNC: 4 MMOL/L (ref 3.5–5.2)
PROT SERPL-MCNC: 6.2 G/DL (ref 6–8.5)
RBC # BLD AUTO: 4.65 10*6/MM3 (ref 3.77–5.28)
SODIUM SERPL-SCNC: 145 MMOL/L (ref 136–145)
T4 FREE SERPL-MCNC: 1.65 NG/DL (ref 0.92–1.68)
TSH SERPL DL<=0.05 MIU/L-ACNC: 6.93 UIU/ML (ref 0.27–4.2)
WBC NRBC COR # BLD AUTO: 16.04 10*3/MM3 (ref 3.4–10.8)

## 2025-05-05 PROCEDURE — 85025 COMPLETE CBC W/AUTO DIFF WBC: CPT

## 2025-05-05 PROCEDURE — 80053 COMPREHEN METABOLIC PANEL: CPT

## 2025-05-05 PROCEDURE — 83036 HEMOGLOBIN GLYCOSYLATED A1C: CPT

## 2025-05-05 PROCEDURE — 82570 ASSAY OF URINE CREATININE: CPT

## 2025-05-05 PROCEDURE — 82043 UR ALBUMIN QUANTITATIVE: CPT

## 2025-05-05 PROCEDURE — 84443 ASSAY THYROID STIM HORMONE: CPT

## 2025-05-05 PROCEDURE — 84439 ASSAY OF FREE THYROXINE: CPT

## 2025-05-05 PROCEDURE — 36415 COLL VENOUS BLD VENIPUNCTURE: CPT

## 2025-05-05 RX ORDER — BROMPHENIRAMINE MALEATE, PSEUDOEPHEDRINE HYDROCHLORIDE, AND DEXTROMETHORPHAN HYDROBROMIDE 2; 30; 10 MG/5ML; MG/5ML; MG/5ML
5 SYRUP ORAL 4 TIMES DAILY PRN
Qty: 118 ML | Refills: 0 | Status: SHIPPED | OUTPATIENT
Start: 2025-05-05

## 2025-05-05 RX ORDER — DOXYCYCLINE 100 MG/1
100 CAPSULE ORAL 2 TIMES DAILY
COMMUNITY
Start: 2025-04-29 | End: 2025-05-06

## 2025-05-05 NOTE — PROGRESS NOTES
"Subjective   Selina Vazquez is a 79 y.o. female presents for   Chief Complaint   Patient presents with    Hospital Follow Up Visit     Chronic cough and congestion       Health Maintenance Due   Topic Date Due    DIABETIC FOOT EXAM  Never done    DIABETIC EYE EXAM  Never done    URINE MICROALBUMIN-CREATININE RATIO (uACR)  Never done    DXA SCAN  05/29/2017    HEMOGLOBIN A1C  12/19/2024       History of Present Illness  The patient is a 79-year-old female who presents today for a hospital follow-up after being admitted on 04/28/2025 and discharged on 04/29/2025 from [facility] due to COPD exacerbation, pneumonia, and bronchitis. She is still currently having a chronic cough and congestion.    She reports persistent symptoms of chronic cough and congestion. She experiences episodes of coughing fits, during which she feels short of breath. She has not yet consulted with a pulmonologist. She is compliant with her daily inhaler regimen. She reports no diarrhea as a side effect of the antibiotic treatment. She is under constant supervision to ensure adherence to her medication schedule. Her son is currently living with her. Another caregiver who brought her to her appointment today is taking care of her medications and making sure is taking the correct ones.     She is taking her thyroid medication regularly.       Vitals:    05/05/25 0807   BP: 153/88   BP Location: Right arm   Patient Position: Sitting   Cuff Size: Large Adult   Pulse: 72   Temp: 98.6 °F (37 °C)   TempSrc: Tympanic   SpO2: 98%   Weight: 96.7 kg (213 lb 3.2 oz)   Height: 152.4 cm (60\")     Body mass index is 41.64 kg/m².    Current Outpatient Medications on File Prior to Visit   Medication Sig Dispense Refill    albuterol sulfate  (90 Base) MCG/ACT inhaler Inhale 2 puffs Every 4 (Four) Hours As Needed for Wheezing or Shortness of Air. Indications: Chronic Obstructive Lung Disease      aspirin 81 MG EC tablet Take 1 tablet by mouth " Daily. Indications: Blood Clot Within a Vein      atorvastatin (LIPITOR) 80 MG tablet Take 0.5 tablets by mouth Daily.      budesonide (PULMICORT) 0.5 MG/2ML nebulizer solution Take 2 mL by nebulization 2 (Two) Times a Day. 1 each 0    dapagliflozin (Farxiga) 5 MG tablet tablet Take 1 tablet by mouth Daily. 30 tablet 0    dilTIAZem CD (CARDIZEM CD) 180 MG 24 hr capsule TAKE 1 CAPSULE BY MOUTH DAILY FOR HIGH BLOOD PRESSURE 90 capsule 0    doxycycline (VIBRAMYCIN) 100 MG capsule Take 1 capsule by mouth 2 (Two) Times a Day.      Fluticasone Furoate-Vilanterol (Breo Ellipta) 100-25 MCG/ACT aerosol powder  Inhale 1 puff Daily.      furosemide (LASIX) 40 MG tablet Take 1 tablet by mouth Daily. 7 tablet 0    guaiFENesin (MUCINEX) 600 MG 12 hr tablet Take 2 tablets by mouth 2 (Two) Times a Day.      ipratropium-albuterol (DUO-NEB) 0.5-2.5 mg/3 ml nebulizer Take 3 mL by nebulization 4 (Four) Times a Day. Indications: Chronic Obstructive Lung Disease 360 mL 0    levothyroxine (Synthroid) 125 MCG tablet Take 1 tablet by mouth Every Morning. 30 tablet 6    O2 (OXYGEN) Inhale 2 L/min Continuous. Indications: COPD exacerbation, uncomplicated      pantoprazole (PROTONIX) 40 MG EC tablet Take 1 tablet by mouth Daily. 90 tablet 1    polyethylene glycol (MIRALAX) 17 g packet Take 17 g by mouth Daily.      potassium chloride (KLOR-CON M20) 20 MEQ CR tablet Take 1 tablet by mouth Daily. 7 tablet 0    predniSONE (DELTASONE) 10 MG tablet Take 1 tablet by mouth Daily. 5 tablet 0    senna 8.6 MG tablet Take 2 tablets by mouth Every Evening. Indications: Constipation 60 tablet 3    spironolactone (ALDACTONE) 25 MG tablet TAKE 1 TABLET BY MOUTH DAILY FOR CONGESTIVE HEART FAILURE 90 tablet 0    warfarin (COUMADIN) 2.5 MG tablet Take 1 tablet by mouth See Admin Instructions. Take one tablet (2.5mg) 5 days weekly and two tablets (5mg) twice weekly. 108 tablet 3    warfarin (Coumadin) 3 MG tablet Take 3 mg (1 tablet) daily, except for 4.5 mg (1  and 1/2 tablet) on Saturday, or as directed by [facility].  Indications: history of DVT/PE 97 tablet 1    benzonatate (Tessalon Perles) 100 MG capsule Take 1 capsule by mouth 3 (Three) Times a Day As Needed for Cough. (Patient not taking: Reported on 5/5/2025) 30 capsule 0    cefdinir (OMNICEF) 300 MG capsule Take 1 capsule by mouth 2 (Two) Times a Day. (Patient not taking: Reported on 5/5/2025) 20 capsule 0    [DISCONTINUED] albuterol (ACCUNEB) 1.25 MG/3ML nebulizer solution Inhale. Indications: Spasm of Lung Air Passages      [DISCONTINUED] Cholecalciferol 50 MCG (2000 UT) tablet Take 1 tablet by mouth. Indications: Vitamin D Deficiency       No current facility-administered medications on file prior to visit.       The following portions of the patient's history were reviewed and updated as appropriate: allergies, current medications, past family history, past medical history, past social history, past surgical history, and problem list.    Review of Systems   Respiratory:  Positive for cough and shortness of breath.        Objective   Physical Exam  Vitals and nursing note reviewed.   Constitutional:       Appearance: Normal appearance. She is well-developed. She is obese. She is ill-appearing.   HENT:      Head: Normocephalic and atraumatic.      Right Ear: External ear normal.      Left Ear: External ear normal.      Nose: Nose normal.   Eyes:      Extraocular Movements: Extraocular movements intact.      Pupils: Pupils are equal, round, and reactive to light.   Cardiovascular:      Rate and Rhythm: Normal rate and regular rhythm.      Heart sounds: Normal heart sounds.   Pulmonary:      Effort: Pulmonary effort is normal. Tachypnea present.      Breath sounds: Examination of the right-lower field reveals decreased breath sounds. Examination of the left-lower field reveals decreased breath sounds. No wheezing, rhonchi or rales.   Abdominal:      General: Bowel sounds are normal.       Palpations: Abdomen is soft.   Genitourinary:     Vagina: Normal.   Musculoskeletal:         General: Normal range of motion.      Cervical back: Normal range of motion and neck supple.   Skin:     General: Skin is warm and dry.   Neurological:      General: No focal deficit present.      Mental Status: She is alert and oriented to person, place, and time.      Motor: Weakness present.      Gait: Gait abnormal.   Psychiatric:         Mood and Affect: Mood normal.         Behavior: Behavior normal.         Judgment: Judgment normal.          Respiratory: Clear to auscultation, no wheezing, rales or rhonchi  PHQ-9 Total Score:      Results  Imaging   - Chest x-ray: Did not show pneumonia       Assessment & Plan   Diagnoses and all orders for this visit:    1. Hospital discharge follow-up (Primary)    2. Essential hypertension  -     CBC & Differential    3. Chronic renal impairment, stage 3a  -     Cancel: Comprehensive Metabolic Panel    4. Hyperglycemia  -     Hemoglobin A1c  -     Microalbumin / Creatinine Urine Ratio - Urine, Clean Catch    5. Acute cough  -     brompheniramine-pseudoephedrine-DM 30-2-10 MG/5ML syrup; Take 5 mL by mouth 4 (Four) Times a Day As Needed for Allergies.  Dispense: 118 mL; Refill: 0    6. Hypothyroidism, unspecified type    7. Class 3 severe obesity due to excess calories with serious comorbidity and body mass index (BMI) of 40.0 to 44.9 in adult    8. Panlobular emphysema             Assessment & Plan  1. Chronic Obstructive Pulmonary Disease (COPD).  - Recently admitted due to a COPD exacerbation.  - Oxygen saturation levels are within normal range, and no radiographic evidence of pneumonia on chest x-ray.  - Discussed the likelihood of cough due to postnasal drip, exacerbating breathing difficulties during coughing episodes.  - Prescription for cough syrup issued to alleviate cough. Advised to continue using inhaler daily to prevent further COPD exacerbations and breathing  difficulties. Referral to pulmonologist made for potential bronchoscopy. If unable to see pulmonologist before symptoms worsen, advised to return to ER for bronchoscopy. Laboratory tests ordered today.    2. Thyroid dysfunction.  - Thyroid function was abnormal during last visit.  - New prescription for levothyroxine sent last Wednesday.  - Advised to continue taking levothyroxine as prescribed.  - Thyroid levels will be rechecked in 4 to 6 weeks.    Follow-up  The patient will follow up in 6 weeks.    Patient Instructions   You are due for adacel Tdap vaccination. (provides protection against tetanus, diptheria and whooping cough) Please  get the immunization at your local pharmacy at your earliest convenience.  Please click on the link for more information about this vaccine.    https://www.cdc.gov/vaccines/vpd/dtap-tdap-td/public/index.html    You are due for Shingrix vaccination series ( the newest shingles vaccine).  It is a two shot series spaced 2-6 months apart. Please get this vaccine series started at your earliest convenience at your local pharmacy to help avoid shingles outbreak. It is more effective than the old Zostavax vaccine and is recommended even if you have had the Zostavax vaccine in the past.  Once the Shingrix series is completed, it does not need to be repeated.   For more information, please look at the website below:  https://www.cdc.gov/vaccines/vpd/shingles/public/shingrix/index.html            Patient or patient representative verbalized consent for the use of Ambient Listening during the visit with  FELIBERTO Guerra for chart documentation. 5/5/2025  09:00 EDT

## 2025-05-05 NOTE — PROGRESS NOTES
Capillary Blood Specimen Collection  Capillary blood collection performed in left middle finger by Minoo Portillo MA. Patient tolerated the procedure well without complications. INR   05/05/25   Minoo Portillo MA

## 2025-05-05 NOTE — PROGRESS NOTES
Anticoagulation Clinic Progress Note - Outside Lab INR Testing     INR Goal: 2 - 3  Today's INR: 3.4 (supratherapeutic). INR result was collected at Dr. Matthews's office and called in today by Minoo.   Current warfarin dose: warfarin 3 mg PO daily, except warfarin 4.5 mg PO on Monday.  Current total weekly dose = 22.5 mg per week.     Prescribed doxycycline x 7 days 4/29 for a URTI (may increase INR).    INR History:      Latest Ref Rng & Units 4/14/2025    12:00 AM 4/14/2025    10:30 AM 4/14/2025    11:54 AM 4/21/2025     8:45 AM 4/21/2025     8:57 AM 5/5/2025    12:00 AM 5/5/2025    10:53 AM   Anticoagulation Monitoring   INR   -- 2.70 -- 2.20  3.40   INR Date    4/14/2025 4/21/2025 5/5/2025   INR Goal   2.0-3.0 2.0-3.0 2.0-3.0 2.0-3.0  2.0-3.0   Trend    Same  Same  Same   Last Week Total    22.5 mg  22.5 mg  22.5 mg   Next Week Total    22.5 mg  22.5 mg  21 mg   Sun    3 mg  3 mg  3 mg   Mon    4.5 mg  4.5 mg  3 mg (5/5)   Tue    3 mg  3 mg  3 mg   Wed    3 mg  3 mg  3 mg   Thu    3 mg  3 mg  3 mg   Fri    3 mg  3 mg  3 mg   Sat    3 mg  3 mg  3 mg   Historical INR 2.00 - 3.00 2.70         3.40        Visit Report  Report Report Report   Report Report       This result is from an external source.       Anticoagulation Summary  As of 5/5/2025      INR goal:  2.0-3.0   TTR:  39.5% (1.5 y)   INR used for dosing:  3.40 (5/5/2025)   Warfarin maintenance plan:  4.5 mg every Mon; 3 mg all other days   Weekly warfarin total:  22.5 mg   Plan last modified:  Mac Perea, Pharmacy Intern (3/24/2025)   Next INR check:  5/12/2025   Target end date:  --    Indications    Pulmonary embolism [I26.99]                 Anticoagulation Episode Summary       INR check location:  --    Preferred lab:  --    Send INR reminders to:  [facility] CLINICAL POOL    Comments:  FYIs -   In-home caregiver, Cassy: [phone]  SisterConcha: [phone]  Potential new home phone number: [phone] (Marcus, patient's  abdirahmanTiffanie Wu (unit manager at [facility]: [phone]          Anticoagulation Care Providers       Provider Role Specialty Phone number    Bria Matthews MD Referring Family Medicine [phone]            Clinic Interview:   Patient Findings     Positives:  Change in medications    Negatives:  Signs/symptoms of thrombosis, Signs/symptoms of bleeding,   Laboratory test error suspected, Change in health, Change in alcohol use,   Change in activity, Upcoming invasive procedure, Emergency department   visit, Upcoming dental procedure, Missed doses, Extra doses, Change in   diet/appetite, Hospital admission, Bruising, Other complaints      Clinical Outcomes     Negatives:  Major bleeding event, Thromboembolic event,   Anticoagulation-related hospital admission, Anticoagulation-related ED   visit, Anticoagulation-related fatality        Current Medications:   Prior to Admission medications    Medication Sig Start Date End Date Taking? Authorizing Provider   albuterol sulfate  (90 Base) MCG/ACT inhaler Inhale 2 puffs Every 4 (Four) Hours As Needed for Wheezing or Shortness of Air. Indications: Chronic Obstructive Lung Disease    ProviderDiogenes MD   aspirin 81 MG EC tablet Take 1 tablet by mouth Daily. Indications: Blood Clot Within a Vein    ProviderDiogenes MD   atorvastatin (LIPITOR) 80 MG tablet Take 0.5 tablets by mouth Daily.    ProviderDiogenes MD   benzonatate (Tessalon Perles) 100 MG capsule Take 1 capsule by mouth 3 (Three) Times a Day As Needed for Cough.  Patient not taking: Reported on 5/5/2025 1/14/25   Bria Matthews MD   brompheniramine-pseudoephedrine-DM 30-2-10 MG/5ML syrup Take 5 mL by mouth 4 (Four) Times a Day As Needed for Allergies. 5/5/25   Brianna Rodriguez APRN   budesonide (PULMICORT) 0.5 MG/2ML nebulizer solution Take 2 mL by nebulization 2 (Two) Times a Day. 6/21/24   Tracy Terry MD   cefdinir (OMNICEF) 300 MG capsule Take 1 capsule by mouth 2  (Two) Times a Day.  Patient not taking: Reported on 5/5/2025 4/14/25   Bria Matthews MD   dapagliflozin (Farxiga) 5 MG tablet tablet Take 1 tablet by mouth Daily. 1/17/25   Bria Matthews MD   dilTIAZem CD (CARDIZEM CD) 180 MG 24 hr capsule TAKE 1 CAPSULE BY MOUTH DAILY FOR HIGH BLOOD PRESSURE 3/4/25   Bria Matthews MD   doxycycline (VIBRAMYCIN) 100 MG capsule Take 1 capsule by mouth 2 (Two) Times a Day. 4/29/25 5/6/25  ProviderDiogenes MD   Fluticasone Furoate-Vilanterol (Breo Ellipta) 100-25 MCG/ACT aerosol powder  Inhale 1 puff Daily.    ProviderDiogenes MD   furosemide (LASIX) 40 MG tablet Take 1 tablet by mouth Daily. 7/7/24   Darrion Burch MD   guaiFENesin (MUCINEX) 600 MG 12 hr tablet Take 2 tablets by mouth 2 (Two) Times a Day.    ProviderDiogenes MD   ipratropium-albuterol (DUO-NEB) 0.5-2.5 mg/3 ml nebulizer Take 3 mL by nebulization 4 (Four) Times a Day. Indications: Chronic Obstructive Lung Disease 10/30/23   Bria Matthews MD   levothyroxine (Synthroid) 125 MCG tablet Take 1 tablet by mouth Every Morning. 4/21/25   Arlyn Holland APRN   O2 (OXYGEN) Inhale 2 L/min Continuous. Indications: COPD exacerbation, uncomplicated 10/6/23   Arlny Holland APRN   pantoprazole (PROTONIX) 40 MG EC tablet Take 1 tablet by mouth Daily. 2/20/25   Bria Matthews MD   polyethylene glycol (MIRALAX) 17 g packet Take 17 g by mouth Daily. 12/3/24   Jong Iyer MD   potassium chloride (KLOR-CON M20) 20 MEQ CR tablet Take 1 tablet by mouth Daily. 7/7/24   Darrion Burch MD   predniSONE (DELTASONE) 10 MG tablet Take 1 tablet by mouth Daily. 4/14/25   Bria Matthews MD   senna 8.6 MG tablet Take 2 tablets by mouth Every Evening. Indications: Constipation 8/27/24   Bria Matthews MD   spironolactone (ALDACTONE) 25 MG tablet TAKE 1 TABLET BY MOUTH DAILY FOR CONGESTIVE HEART FAILURE 8/16/24   Bria Matthews MD   warfarin (COUMADIN)  2.5 MG tablet Take 1 tablet by mouth See Admin Instructions. Take one tablet (2.5mg) 5 days weekly and two tablets (5mg) twice weekly. 24   Bria Matthews MD   warfarin (Coumadin) 3 MG tablet Take 3 mg (1 tablet) daily, except for 4.5 mg (1 and 1/2 tablet) on Saturday, or as directed by [facility].  Indications: history of DVT/PE 24   Bria Matthews MD   albuterol (ACCUNEB) 1.25 MG/3ML nebulizer solution Inhale. Indications: Spasm of Lung Air Passages 23   ProviderDiogenes MD   Cholecalciferol 50 MCG ( UT) tablet Take 1 tablet by mouth. Indications: Vitamin D Deficiency 23   Provider, MD Diogenes       Medical history:   Past Medical History:   Diagnosis Date    Adenomatous polyp of colon     Allergic rhinitis     Asthma     Cervical disc disease     Fracture of thoracic spine 2011    Lacunar infarction 2019    Mitral valve disorder     Postmenopausal osteoporosis     Restless leg syndrome        Social history:   Social History     Tobacco Use    Smoking status: Former     Current packs/day: 0.00     Average packs/day: 1 pack/day for 36.0 years (36.0 ttl pk-yrs)     Types: Cigarettes     Start date:      Quit date:      Years since quittin.3     Passive exposure: Never    Smokeless tobacco: Never   Substance Use Topics    Alcohol use: No       Allergies:    Latex and Penicillin g    This patient is managed via a collaborative agreement, pursuant to IC [id number]. The signed protocol is kept in the [facility] at [address] [place] [address].    Education: Selina Vazquez has been instructed to call if any changes in medications, doses, concerns, etc. Patient was provided dosing instructions and expressed verbal understanding and has no further questions at this time.    Plan:  1. INR likely supratherpeutic due to current antibiotic use and recent prednisone. Will give lower dose of 3 mg today and  then continue 3 mg daily the rest of the week as per normal schedule. Total dose of next 7 days = 21 mg (6.7% decrease)  2. INR should be tested in one week at Dr. Matthews's office and called into clinic.     Counseled patient on increased bleeding risk associated with elevated INR, signs/symptoms to monitor for, and when to seek medical attention    Jaimie Chamberlain PharmD  5/5/2025  11:03 EDT

## 2025-05-06 ENCOUNTER — RESULTS FOLLOW-UP (OUTPATIENT)
Dept: FAMILY MEDICINE CLINIC | Facility: CLINIC | Age: 80
End: 2025-05-06
Payer: MEDICARE

## 2025-05-12 ENCOUNTER — ANTICOAGULATION VISIT (OUTPATIENT)
Dept: PHARMACY | Facility: HOSPITAL | Age: 80
End: 2025-05-12
Payer: MEDICARE

## 2025-05-12 ENCOUNTER — ANTICOAGULATION VISIT (OUTPATIENT)
Dept: FAMILY MEDICINE CLINIC | Facility: CLINIC | Age: 80
End: 2025-05-12
Payer: MEDICARE

## 2025-05-12 ENCOUNTER — TELEPHONE (OUTPATIENT)
Dept: PHARMACY | Facility: HOSPITAL | Age: 80
End: 2025-05-12
Payer: MEDICARE

## 2025-05-12 DIAGNOSIS — I26.01 ACUTE SEPTIC PULMONARY EMBOLISM WITH ACUTE COR PULMONALE: Primary | ICD-10-CM

## 2025-05-12 DIAGNOSIS — I26.99 PULMONARY EMBOLISM, UNSPECIFIED CHRONICITY, UNSPECIFIED PULMONARY EMBOLISM TYPE, UNSPECIFIED WHETHER ACUTE COR PULMONALE PRESENT: Primary | ICD-10-CM

## 2025-05-12 LAB — INR PPP: 3.7 (ref 0.9–1.1)

## 2025-05-12 RX ORDER — WARFARIN SODIUM 3 MG/1
TABLET ORAL
Qty: 90 TABLET | Refills: 2 | Status: SHIPPED | OUTPATIENT
Start: 2025-05-12

## 2025-05-12 NOTE — PROGRESS NOTES
Anticoagulation Clinic Progress Note    INR Goal: 2 - 3  Today's INR: 3.4 (supratherapeutic)  Appointment conducted telephonically at Dr. Matthews's office with FOREIGN Hennessy and patient.    Current warfarin dose: warfarin 3 mg PO daily Current total weekly dose = 21 mg per week.     -Of note: Patient denies any bruising or bleeding.  Patient completed 7 days of doxycycline which start on 4/29.       INR History:      Latest Ref Rng & Units 4/21/2025     8:45 AM 4/21/2025     8:57 AM 5/5/2025    12:00 AM 5/5/2025     8:15 AM 5/5/2025    10:53 AM 5/12/2025    11:42 AM 5/12/2025    11:45 AM   Anticoagulation Monitoring   INR  -- 2.20  -- 3.40 3.70 --   INR Date   4/21/2025 5/5/2025 5/12/2025    INR Goal  2.0-3.0 2.0-3.0  2.0-3.0 2.0-3.0 2.0-3.0 2.0-3.0   Trend   Same   Same Same    Last Week Total   22.5 mg   22.5 mg 21 mg    Next Week Total   22.5 mg   21 mg 18 mg    Sun   3 mg   3 mg 3 mg    Mon   4.5 mg   3 mg (5/5) Hold (5/12)    Tue   3 mg   3 mg 3 mg    Wed   3 mg   3 mg 3 mg    Thu   3 mg   3 mg 3 mg    Fri   3 mg   3 mg 3 mg    Sat   3 mg   3 mg 3 mg    Historical INR 2.00 - 3.00   3.40           Visit Report    Report Report Report         This result is from an external source.       Anticoagulation Summary  As of 5/12/2025      INR goal:  2.0-3.0   TTR:  39.0% (1.5 y)   INR used for dosing:  3.70 (5/12/2025)   Warfarin maintenance plan:  4.5 mg every Mon; 3 mg all other days   Weekly warfarin total:  22.5 mg   Plan last modified:  Mac Perea, Pharmacy Intern (3/24/2025)   Next INR check:  5/19/2025   Target end date:  --    Indications    Pulmonary embolism [I26.99]                 Anticoagulation Episode Summary       INR check location:  --    Preferred lab:  --    Send INR reminders to:  [facility] CLINICAL POOL    Comments:  FYIs -   In-home caregiverCassy: [phone]  Sister, Concha: [phone]  Potential new home phone number: [phone] (Marcus, patient's son)  Jazmin (unit  manager at [facility]: [phone]          Anticoagulation Care Providers       Provider Role Specialty Phone number    Bria Matthews MD Referring Family Medicine [phone]            Clinic Interview:   Patient Findings     Negatives:  Signs/symptoms of thrombosis, Signs/symptoms of bleeding,   Laboratory test error suspected, Change in health, Change in alcohol use,   Change in activity, Upcoming invasive procedure, Emergency department   visit, Upcoming dental procedure, Missed doses, Extra doses, Change in   medications, Change in diet/appetite, Hospital admission, Bruising, Other   complaints      Clinical Outcomes     Negatives:  Major bleeding event, Thromboembolic event,   Anticoagulation-related hospital admission, Anticoagulation-related ED   visit, Anticoagulation-related fatality        Current Medications:   Prior to Admission medications    Medication Sig Start Date End Date Taking? Authorizing Provider   albuterol sulfate  (90 Base) MCG/ACT inhaler Inhale 2 puffs Every 4 (Four) Hours As Needed for Wheezing or Shortness of Air. Indications: Chronic Obstructive Lung Disease    ProviderDiogenes MD   aspirin 81 MG EC tablet Take 1 tablet by mouth Daily. Indications: Blood Clot Within a Vein    ProviderDiogenes MD   atorvastatin (LIPITOR) 80 MG tablet Take 0.5 tablets by mouth Daily.    Provider, MD Diogenes   benzonatate (Tessalon Perles) 100 MG capsule Take 1 capsule by mouth 3 (Three) Times a Day As Needed for Cough.  Patient not taking: Reported on 5/5/2025 1/14/25   Bria Matthews MD   brompheniramine-pseudoephedrine-DM 30-2-10 MG/5ML syrup Take 5 mL by mouth 4 (Four) Times a Day As Needed for Allergies. 5/5/25   Brianna Rodriguez APRN   budesonide (PULMICORT) 0.5 MG/2ML nebulizer solution Take 2 mL by nebulization 2 (Two) Times a Day. 6/21/24   Tracy Terry MD   cefdinir (OMNICEF) 300 MG capsule Take 1 capsule by mouth 2 (Two) Times a Day.  Patient not  taking: Reported on 5/5/2025 4/14/25   Bria Matthews MD   dapagliflozin (Farxiga) 5 MG tablet tablet Take 1 tablet by mouth Daily. 1/17/25   Bria Matthews MD   dilTIAZem CD (CARDIZEM CD) 180 MG 24 hr capsule TAKE 1 CAPSULE BY MOUTH DAILY FOR HIGH BLOOD PRESSURE 3/4/25   Bria Matthews MD   Fluticasone Furoate-Vilanterol (Breo Ellipta) 100-25 MCG/ACT aerosol powder  Inhale 1 puff Daily.    Provider, MD Diogenes   furosemide (LASIX) 40 MG tablet Take 1 tablet by mouth Daily. 7/7/24   Darrion Burch MD   guaiFENesin (MUCINEX) 600 MG 12 hr tablet Take 2 tablets by mouth 2 (Two) Times a Day.    Provider, MD Diogenes   ipratropium-albuterol (DUO-NEB) 0.5-2.5 mg/3 ml nebulizer Take 3 mL by nebulization 4 (Four) Times a Day. Indications: Chronic Obstructive Lung Disease 10/30/23   Bria Matthews MD   levothyroxine (Synthroid) 125 MCG tablet Take 1 tablet by mouth Every Morning. 4/21/25   Arlyn Holland APRN   O2 (OXYGEN) Inhale 2 L/min Continuous. Indications: COPD exacerbation, uncomplicated 10/6/23   Arlyn Holland APRN   pantoprazole (PROTONIX) 40 MG EC tablet Take 1 tablet by mouth Daily. 2/20/25   Bria Matthews MD   polyethylene glycol (MIRALAX) 17 g packet Take 17 g by mouth Daily. 12/3/24   Jong Iyer MD   potassium chloride (KLOR-CON M20) 20 MEQ CR tablet Take 1 tablet by mouth Daily. 7/7/24   Darrion Burch MD   predniSONE (DELTASONE) 10 MG tablet Take 1 tablet by mouth Daily. 4/14/25   Bria Matthews MD   senna 8.6 MG tablet Take 2 tablets by mouth Every Evening. Indications: Constipation 8/27/24   Bria Matthews MD   spironolactone (ALDACTONE) 25 MG tablet TAKE 1 TABLET BY MOUTH DAILY FOR CONGESTIVE HEART FAILURE 8/16/24   Bria Matthews MD   warfarin (COUMADIN) 2.5 MG tablet Take 1 tablet by mouth See Admin Instructions. Take one tablet (2.5mg) 5 days weekly and two tablets (5mg) twice weekly. 6/25/24   Bria Matthews  MD Paige   warfarin (Coumadin) 3 MG tablet Take 3 mg (1 tablet) daily, except for 4.5 mg (1 and 1/2 tablet) on Saturday, or as directed by [facility].  Indications: history of DVT/PE 24   Bria Matthews MD   albuterol (ACCUNEB) 1.25 MG/3ML nebulizer solution Inhale. Indications: Spasm of Lung Air Passages 23   ProviderDiogenes MD   Cholecalciferol 50 MCG ( UT) tablet Take 1 tablet by mouth. Indications: Vitamin D Deficiency 23   Provider, MD Diogenes       Medical history:   Past Medical History:   Diagnosis Date    Adenomatous polyp of colon     Allergic rhinitis     Asthma     Cervical disc disease     Fracture of thoracic spine 2011    Lacunar infarction 2019    Mitral valve disorder     Postmenopausal osteoporosis     Restless leg syndrome        Social history:   Social History     Tobacco Use    Smoking status: Former     Current packs/day: 0.00     Average packs/day: 1 pack/day for 36.0 years (36.0 ttl pk-yrs)     Types: Cigarettes     Start date:      Quit date:      Years since quittin.3     Passive exposure: Never    Smokeless tobacco: Never   Substance Use Topics    Alcohol use: No       Allergies:    Latex and Penicillin g    Vaccine History:   Immunization History   Administered Date(s) Administered    31-influenza Vac Quardvalent Preservativ 10/06/2023    ABRYSVO (RSV, 60+ or pregnant women 32-36 wks) 12/10/2024    COVID-19 (MODERNA) 12YRS+ (SPIKEVAX) 2024    COVID-19 (MODERNA) 1st,2nd,3rd Dose Monovalent 2021, 2021    Covid-19 (Pfizer) Bivalent 6mos-4yrs 10/22/2022    FLUAD TRI 65YR+ 10/21/2019    Flu Vaccine Quad PF 6-35MO 2015    Flu Vaccine Quad PF >36MO 2015    Fluad Quad 65+ 2020    Fluzone (or Fluarix & Flulaval for VFC) >6mos 2023    Fluzone High-Dose 65+YRS 2016, 2017, 10/01/2024    Fluzone High-Dose 65+yrs 2022, 2023    Pneumococcal Conjugate  13-Valent (PCV13) 02/05/2012, 06/16/2015    Pneumococcal Conjugate 20-Valent (PCV20) 12/04/2023    Pneumococcal Polysaccharide (PPSV23) 10/25/2018    TST, UF 07/18/2024, 08/04/2024    Tdap 12/05/2012       This patient is managed via a collaborative agreement, pursuant to IC [id number]. The signed protocol is kept in the [facility] at [address] [place] [address].    Education: Selina Vazquez has been instructed to call if any changes in medications, doses, concerns, etc. Patient was provided dosing instructions and expressed verbal understanding and has no further questions at this time.    Plan:  1. Anticoagulation   - Hold dose today then resume; warfarin 3 mg PO daily  Next 7 day total = 18 mg (14% decrease).   - RTC in 1 week(s) at Dr. Matthews's office    Counseled patient on increased bleeding risk associated with elevated INR, signs/symptoms to monitor for, and when to seek medical attention      Yeny Lagunas, PharmD  5/12/2025  11:43 EDT

## 2025-05-12 NOTE — TELEPHONE ENCOUNTER
Medication Management Clinic: [facility]  Clinical Outreach     Selina Vazquez is a 79 y.o. female and is a patient of the [facility] Management [facility] at [facility] for anticoagulation monitoring.     Attempted to contact patient about INR today, but didn't see patient on Dr. Matthews's lab schedule for today.  Next appointment scheduled is 6/16.  Patient's last INR on 5/5 was elevated and patient was on antibiotics and prednisone so wanted to check the INR earlier to ensure INR went back into her goal range.  No answer, left voicemail.      Yeny Lagunas, PharmD  Ambulatory Care Clinical Pharmacist  5/12/2025  10:54 EDT

## 2025-05-12 NOTE — PROGRESS NOTES
Capillary Blood Specimen Collection  Capillary blood collection performed in left middle finger by Minoo Portillo MA. Patient tolerated the procedure well without complications. INR   05/12/25   Minoo Portillo MA

## 2025-05-19 ENCOUNTER — ANTICOAGULATION VISIT (OUTPATIENT)
Dept: FAMILY MEDICINE CLINIC | Facility: CLINIC | Age: 80
End: 2025-05-19
Payer: MEDICARE

## 2025-05-19 ENCOUNTER — ANTICOAGULATION VISIT (OUTPATIENT)
Dept: PHARMACY | Facility: HOSPITAL | Age: 80
End: 2025-05-19
Payer: MEDICARE

## 2025-05-19 DIAGNOSIS — I26.99 PULMONARY EMBOLISM, UNSPECIFIED CHRONICITY, UNSPECIFIED PULMONARY EMBOLISM TYPE, UNSPECIFIED WHETHER ACUTE COR PULMONALE PRESENT: Primary | ICD-10-CM

## 2025-05-19 LAB
INR PPP: 5 (ref 0.9–1.1)
INR PPP: 5 (ref 2–3)

## 2025-05-19 NOTE — PROGRESS NOTES
Capillary Blood Specimen Collection  Capillary blood collection performed in left middle finger by Minoo Portillo MA. Patient tolerated the procedure well without complications.  INR   05/19/25   Minoo Portillo MA

## 2025-05-19 NOTE — PROGRESS NOTES
"Anticoagulation Clinic Progress Note -  PCP's Office  INR Testing     INR Goal: 2 - 3  Today's INR: 5.0 (supratherapeutic). INR result was called in today by Minoo DIXON at PCP's office). Patient present for appointment.  Current warfarin dose: warfarin 3 mg PO daily. Patient was also supposed to hold dose on . Current total weekly dose = 18 mg per week.     Of note, patient answered \"whatever you all told me to take\" when asked what dose of warfarin she has been taking. Patient confirmed she held dose but only after clinic asked. Patient denies any unusual bleeding or bruising at this time but says she is feeling pretty dizzy.    INR History:      Latest Ref Rng & Units 2025    12:00 AM 2025     8:15 AM 2025    10:53 AM 2025    11:42 AM 2025    11:45 AM 2025    12:00 AM 2025    10:44 AM   Anticoagulation Monitoring   INR   -- 3.40 3.70 --  5.00   INR Date    2025   INR Goal   2.0-3.0 2.0-3.0 2.0-3.0 2.0-3.0  2.0-3.0   Trend    Same Same   Same   Last Week Total    22.5 mg 21 mg   18 mg   Next Week Total    21 mg 18 mg   15 mg   Sun    3 mg 3 mg   -   Mon    3 mg () Hold ()   Hold ()   Tue    3 mg 3 mg   Hold ()   Wed    3 mg 3 mg   3 mg   Thu    3 mg 3 mg   -   Fri    3 mg 3 mg   -   Sat    3 mg 3 mg   -   Historical INR 2.00 - 3.00 3.40         5.00        Visit Report  Report Report Report           This result is from an external source.       Anticoagulation Summary  As of 2025      INR goal:  2.0-3.0   TTR:  38.5% (1.5 y)   INR used for dosin.00 (2025)   Warfarin maintenance plan:  4.5 mg every Mon; 3 mg all other days   Weekly warfarin total:  22.5 mg   Plan last modified:  Mac Perea, Pharmacy Intern (3/24/2025)   Next INR check:  2025   Target end date:  --    Indications    Pulmonary embolism [I26.99]                 Anticoagulation Episode Summary       INR check location:  --    Preferred lab:  --    Send " INR reminders to:  [facility] CLINICAL POOL    Comments:  FYIs -   In-home caregiver, Cassy: [phone]  Sister, Concha: [phone]  Potential new home phone number: [phone] (Marcus, patient's son)  Jazmin (unit manager at [facility]: [phone]          Anticoagulation Care Providers       Provider Role Specialty Phone number    Bria Matthews MD Referring Family Medicine [phone]            Clinic Interview:   Patient Findings     Positives:  Change in health (Reports feeling very tired (possibly could   affect INR))    Negatives:  Signs/symptoms of thrombosis, Signs/symptoms of bleeding,   Laboratory test error suspected, Change in alcohol use, Change in   activity, Upcoming invasive procedure, Emergency department visit,   Upcoming dental procedure, Missed doses, Extra doses, Change in   medications, Change in diet/appetite, Hospital admission, Bruising, Other   complaints      Clinical Outcomes     Negatives:  Major bleeding event, Thromboembolic event,   Anticoagulation-related hospital admission, Anticoagulation-related ED   visit, Anticoagulation-related fatality        Current Medications:   Prior to Admission medications    Medication Sig Start Date End Date Taking? Authorizing Provider   albuterol sulfate  (90 Base) MCG/ACT inhaler Inhale 2 puffs Every 4 (Four) Hours As Needed for Wheezing or Shortness of Air. Indications: Chronic Obstructive Lung Disease    ProviderDiogenes MD   aspirin 81 MG EC tablet Take 1 tablet by mouth Daily. Indications: Blood Clot Within a Vein    ProviderDiogenes MD   atorvastatin (LIPITOR) 80 MG tablet Take 0.5 tablets by mouth Daily.    ProviderDiogenes MD   benzonatate (Tessalon Perles) 100 MG capsule Take 1 capsule by mouth 3 (Three) Times a Day As Needed for Cough.  Patient not taking: Reported on 5/5/2025 1/14/25   Bria Matthews MD   brompheniramine-pseudoephedrine-DM 30-2-10 MG/5ML syrup Take 5 mL by  mouth 4 (Four) Times a Day As Needed for Allergies. 5/5/25   Brianna Rodriguez APRN   budesonide (PULMICORT) 0.5 MG/2ML nebulizer solution Take 2 mL by nebulization 2 (Two) Times a Day. 6/21/24   Tracy Terry MD   cefdinir (OMNICEF) 300 MG capsule Take 1 capsule by mouth 2 (Two) Times a Day.  Patient not taking: Reported on 5/5/2025 4/14/25   Bria Matthews MD   dapagliflozin (Farxiga) 5 MG tablet tablet Take 1 tablet by mouth Daily. 1/17/25   Bria Matthews MD   dilTIAZem CD (CARDIZEM CD) 180 MG 24 hr capsule TAKE 1 CAPSULE BY MOUTH DAILY FOR HIGH BLOOD PRESSURE 3/4/25   Bria Matthews MD   Fluticasone Furoate-Vilanterol (Breo Ellipta) 100-25 MCG/ACT aerosol powder  Inhale 1 puff Daily.    Provider, MD Diogenes   furosemide (LASIX) 40 MG tablet Take 1 tablet by mouth Daily. 7/7/24   Darrion Burch MD   guaiFENesin (MUCINEX) 600 MG 12 hr tablet Take 2 tablets by mouth 2 (Two) Times a Day.    Provider, MD Diogenes   ipratropium-albuterol (DUO-NEB) 0.5-2.5 mg/3 ml nebulizer Take 3 mL by nebulization 4 (Four) Times a Day. Indications: Chronic Obstructive Lung Disease 10/30/23   Bria Matthews MD   levothyroxine (Synthroid) 125 MCG tablet Take 1 tablet by mouth Every Morning. 4/21/25   Arlyn Holland APRN   O2 (OXYGEN) Inhale 2 L/min Continuous. Indications: COPD exacerbation, uncomplicated 10/6/23   Arlyn Holland APRN   pantoprazole (PROTONIX) 40 MG EC tablet Take 1 tablet by mouth Daily. 2/20/25   Bria Matthews MD   polyethylene glycol (MIRALAX) 17 g packet Take 17 g by mouth Daily. 12/3/24   Jong Iyer MD   potassium chloride (KLOR-CON M20) 20 MEQ CR tablet Take 1 tablet by mouth Daily. 7/7/24   Darrion Burch MD   predniSONE (DELTASONE) 10 MG tablet Take 1 tablet by mouth Daily. 4/14/25   Bria Matthews MD   senna 8.6 MG tablet Take 2 tablets by mouth Every Evening. Indications: Constipation 8/27/24   Bria Matthews MD    spironolactone (ALDACTONE) 25 MG tablet TAKE 1 TABLET BY MOUTH DAILY FOR CONGESTIVE HEART FAILURE 24   Bria Matthews MD   warfarin (COUMADIN) 2.5 MG tablet Take 1 tablet by mouth See Admin Instructions. Take one tablet (2.5mg) 5 days weekly and two tablets (5mg) twice weekly. 24   Bria aMtthews MD   warfarin (Coumadin) 3 MG tablet Take 3 mg by mouth daily or as directed by [facility].  Indications: history of DVT/PE 25   Bria Matthews MD   albuterol (ACCUNEB) 1.25 MG/3ML nebulizer solution Inhale. Indications: Spasm of Lung Air Passages 23   Provider, MD Diogenes   Cholecalciferol 50 MCG ( UT) tablet Take 1 tablet by mouth. Indications: Vitamin D Deficiency 23   Provider, MD Diogenes       Medical history:   Past Medical History:   Diagnosis Date    Adenomatous polyp of colon     Allergic rhinitis     Asthma     Cervical disc disease     Fracture of thoracic spine 2011    Lacunar infarction 2019    Mitral valve disorder     Postmenopausal osteoporosis     Restless leg syndrome        Social history:   Social History     Tobacco Use    Smoking status: Former     Current packs/day: 0.00     Average packs/day: 1 pack/day for 36.0 years (36.0 ttl pk-yrs)     Types: Cigarettes     Start date:      Quit date:      Years since quittin.3     Passive exposure: Never    Smokeless tobacco: Never   Substance Use Topics    Alcohol use: No       Allergies:    Latex and Penicillin g      This patient is managed via a collaborative agreement, pursuant to IC [id number]. The signed protocol is kept in the [facility] at [address] [place] [address].    Education: Selina Vazquez has been instructed to call if any changes in medications, doses, concerns, etc. Patient was provided dosing instructions and expressed verbal understanding and has no further questions at this time.    Plan:  1. Hold dose today  and tomorrow then take warfarin 3 mg on Wednesday, 5/21.  2. INR should be tested in 3 days (5/22) at PCP's office and called into clinic.     - Discussed signs/symptoms patient should monitor for of unusual bleeding or bruising. Also emphasized that if patient should fall (due to reported dizziness) that this should be reported to PCP office and/or clinic.    Roselyn Hill, PharmAUGUSTINA  5/19/2025  10:45 EDT

## 2025-05-22 ENCOUNTER — TELEPHONE (OUTPATIENT)
Dept: PHARMACY | Facility: HOSPITAL | Age: 80
End: 2025-05-22
Payer: MEDICARE

## 2025-05-22 NOTE — TELEPHONE ENCOUNTER
Medication Management Clinic: [facility]  Clinical Outreach     Selina Vazquez is a 79 y.o. female and is a patient of the [facility] Management Clinic at [facility] for anticoagulation monitoring.     -Attempted to contact patient regarding missing INR appointment today at Dr. Matthews's office.  Phone not set up to accept Voicemails so couldn't leave a message.  Was only given warfarin instructions until today.  Patient has appointment with Dr. Matthews next Thursday 5/22.  Sent a secure chat to MA to have an INR added on at her appointment.      Yeny Lagunas, PharmD  Ambulatory Care Clinical Pharmacist  5/22/2025  13:21 EDT

## 2025-05-26 PROBLEM — R42 VERTIGO: Status: ACTIVE | Noted: 2025-03-14

## 2025-05-26 NOTE — PATIENT INSTRUCTIONS
Health Maintenance Due   Topic Date Due    DIABETIC FOOT EXAM  Never done    DIABETIC EYE EXAM  Never done    DXA SCAN  05/29/2017    TDAP/TD VACCINES (2 - Td or Tdap) 12/05/2022    COVID-19 Vaccine (4 - 2024-25 season) 10/03/2024    Patient to call and get appointment with Dr. Vega   Please bring in all meds when returns tank

## 2025-05-29 ENCOUNTER — ANTICOAGULATION VISIT (OUTPATIENT)
Dept: FAMILY MEDICINE CLINIC | Facility: CLINIC | Age: 80
End: 2025-05-29
Payer: MEDICARE

## 2025-05-29 ENCOUNTER — OFFICE VISIT (OUTPATIENT)
Dept: FAMILY MEDICINE CLINIC | Facility: CLINIC | Age: 80
End: 2025-05-29
Payer: MEDICARE

## 2025-05-29 ENCOUNTER — ANTICOAGULATION VISIT (OUTPATIENT)
Dept: PHARMACY | Facility: HOSPITAL | Age: 80
End: 2025-05-29
Payer: MEDICARE

## 2025-05-29 VITALS
OXYGEN SATURATION: 93 % | HEART RATE: 85 BPM | BODY MASS INDEX: 41.5 KG/M2 | DIASTOLIC BLOOD PRESSURE: 83 MMHG | HEIGHT: 60 IN | SYSTOLIC BLOOD PRESSURE: 125 MMHG | WEIGHT: 211.4 LBS | TEMPERATURE: 98.6 F

## 2025-05-29 DIAGNOSIS — J43.1 PANLOBULAR EMPHYSEMA: Chronic | ICD-10-CM

## 2025-05-29 DIAGNOSIS — I26.99 PULMONARY EMBOLISM, UNSPECIFIED CHRONICITY, UNSPECIFIED PULMONARY EMBOLISM TYPE, UNSPECIFIED WHETHER ACUTE COR PULMONALE PRESENT: Primary | ICD-10-CM

## 2025-05-29 DIAGNOSIS — I26.99 PULMONARY EMBOLISM, UNSPECIFIED CHRONICITY, UNSPECIFIED PULMONARY EMBOLISM TYPE, UNSPECIFIED WHETHER ACUTE COR PULMONALE PRESENT: ICD-10-CM

## 2025-05-29 DIAGNOSIS — F33.41 MAJOR DEPRESSIVE DISORDER, RECURRENT, IN PARTIAL REMISSION: ICD-10-CM

## 2025-05-29 DIAGNOSIS — E66.01 CLASS 3 SEVERE OBESITY DUE TO EXCESS CALORIES WITH SERIOUS COMORBIDITY AND BODY MASS INDEX (BMI) OF 40.0 TO 44.9 IN ADULT: ICD-10-CM

## 2025-05-29 DIAGNOSIS — L43.9 LICHEN PLANUS: ICD-10-CM

## 2025-05-29 DIAGNOSIS — E78.2 MIXED HYPERLIPIDEMIA: Chronic | ICD-10-CM

## 2025-05-29 DIAGNOSIS — I10 ESSENTIAL HYPERTENSION: Primary | Chronic | ICD-10-CM

## 2025-05-29 DIAGNOSIS — K22.4 ESOPHAGEAL MOTILITY DISORDER: ICD-10-CM

## 2025-05-29 DIAGNOSIS — J45.50 SEVERE PERSISTENT ASTHMA WITHOUT COMPLICATION: ICD-10-CM

## 2025-05-29 DIAGNOSIS — E66.813 CLASS 3 SEVERE OBESITY DUE TO EXCESS CALORIES WITH SERIOUS COMORBIDITY AND BODY MASS INDEX (BMI) OF 40.0 TO 44.9 IN ADULT: ICD-10-CM

## 2025-05-29 DIAGNOSIS — I48.91 ATRIAL FIBRILLATION WITH RAPID VENTRICULAR RESPONSE: ICD-10-CM

## 2025-05-29 DIAGNOSIS — M81.0 POSTMENOPAUSAL OSTEOPOROSIS: ICD-10-CM

## 2025-05-29 LAB — INR PPP: 1.8 (ref 0.9–1.1)

## 2025-05-29 RX ORDER — POTASSIUM CHLORIDE 1500 MG/1
20 TABLET, EXTENDED RELEASE ORAL DAILY
Qty: 90 TABLET | OUTPATIENT
Start: 2025-05-29

## 2025-05-29 RX ORDER — SPIRONOLACTONE 25 MG/1
25 TABLET ORAL DAILY
Qty: 90 TABLET | Refills: 0 | Status: SHIPPED | OUTPATIENT
Start: 2025-05-29

## 2025-05-29 RX ORDER — SENNOSIDES 8.6 MG/1
2 TABLET ORAL EVERY EVENING
Qty: 60 TABLET | Refills: 3 | Status: SHIPPED | OUTPATIENT
Start: 2025-05-29

## 2025-05-29 RX ORDER — POTASSIUM CHLORIDE 1500 MG/1
20 TABLET, EXTENDED RELEASE ORAL DAILY
Qty: 30 TABLET | Refills: 0 | Status: SHIPPED | OUTPATIENT
Start: 2025-05-29

## 2025-05-29 RX ORDER — MELATONIN 3 MG
1 CAPSULE ORAL
COMMUNITY

## 2025-05-29 RX ORDER — AZITHROMYCIN 250 MG/1
250 TABLET, FILM COATED ORAL DAILY
COMMUNITY
Start: 2025-05-27

## 2025-05-29 NOTE — PROGRESS NOTES
Anticoagulation Clinic Progress Note    INR Goal: 2 - 3  Today's INR: 1.8 (subtherapeutic)  Current warfarin dose:  Patient is unsure of what dose she has been taking and no one was present with her at her appointment. Clinic attempted to contact patient's son to try to find out current dose of past 7 days. Patient's son said he isn't sure but believes it was 3 mg daily. He reports he will ask his significant other, who handles patient's medications, to call and let clinic know what her dose is.      INR History:      Latest Ref Rng & Units 2025    11:42 AM 2025    11:45 AM 2025    12:00 AM 2025    10:30 AM 2025    10:44 AM 2025     1:15 PM 2025     2:04 PM   Anticoagulation Monitoring   INR  3.70 --  -- 5.00 -- 1.80   INR Date  2025   INR Goal  2.0-3.0 2.0-3.0  2.0-3.0 2.0-3.0 2.0-3.0 2.0-3.0   Trend  Same    Same  Down   Last Week Total  21 mg    18 mg  22.5 mg   Next Week Total  18 mg    15 mg  21 mg   Sun  3 mg    -  3 mg   Mon  Hold ()    Hold ()  3 mg   Tue  3 mg    Hold ()  3 mg   Wed  3 mg    3 mg  3 mg   Thu  3 mg    -  3 mg   Fri  3 mg    -  3 mg   Sat  3 mg    -  3 mg   Historical INR 2.00 - 3.00   5.00           Visit Report       Report Report       This result is from an external source.       Anticoagulation Summary  As of 2025      INR goal:  2.0-3.0   TTR:  38.4% (1.5 y)   INR used for dosin.80 (2025)   Warfarin maintenance plan:  3 mg every day   Weekly warfarin total:  21 mg   Plan last modified:  Jaimie Chamberlain, PharmD (2025)   Next INR check:  2025   Target end date:  --    Indications    Pulmonary embolism [I26.99]                 Anticoagulation Episode Summary       INR check location:  --    Preferred lab:  --    Send INR reminders to:  [facility] CLINICAL POOL    Comments:  Bushra -   In-home caregiverCassy: [phone]  SisterConcha: [phone]  Potential new home phone  number: [phone] (Marcus, patient's son)  Jazmin (unit manager at [facility] [employer] [facility]: [phone]          Anticoagulation Care Providers       Provider Role Specialty Phone number    Bria Matthews MD Referring Family Medicine [phone]            Clinic Interview:   Patient Findings     Negatives:  Signs/symptoms of thrombosis, Signs/symptoms of bleeding,   Laboratory test error suspected, Change in health, Change in alcohol use,   Change in activity, Upcoming invasive procedure, Emergency department   visit, Upcoming dental procedure, Missed doses, Extra doses, Change in   medications, Change in diet/appetite, Hospital admission, Bruising, Other   complaints      Clinical Outcomes     Negatives:  Major bleeding event, Thromboembolic event,   Anticoagulation-related hospital admission, Anticoagulation-related ED   visit, Anticoagulation-related fatality        Current Medications:   Prior to Admission medications    Medication Sig Start Date End Date Taking? Authorizing Provider   albuterol sulfate  (90 Base) MCG/ACT inhaler Inhale 2 puffs Every 4 (Four) Hours As Needed for Wheezing or Shortness of Air. Indications: Chronic Obstructive Lung Disease    ProviderDiogenes MD   aspirin 81 MG EC tablet Take 1 tablet by mouth Daily. Indications: Blood Clot Within a Vein    ProviderDiogenes MD   atorvastatin (LIPITOR) 80 MG tablet Take 0.5 tablets by mouth Daily.    ProviderDioegnes MD   azithromycin (ZITHROMAX) 250 MG tablet Take 1 tablet by mouth Daily. 5/27/25   ProviderDiogenes MD   brompheniramine-pseudoephedrine-DM 30-2-10 MG/5ML syrup Take 5 mL by mouth 4 (Four) Times a Day As Needed for Allergies. 5/5/25   Brianna Rodriguez APRN   budesonide (PULMICORT) 0.5 MG/2ML nebulizer solution Take 2 mL by nebulization 2 (Two) Times a Day. 6/21/24   Tracy Terry MD   dapagliflozin (Farxiga) 5 MG tablet tablet Take 1 tablet by mouth Daily. 1/17/25   Bria Matthews MD   dilTIAZem  CD (CARDIZEM CD) 180 MG 24 hr capsule TAKE 1 CAPSULE BY MOUTH DAILY FOR HIGH BLOOD PRESSURE 3/4/25   Bria Matthews MD   Fluticasone Furoate-Vilanterol (Breo Ellipta) 100-25 MCG/ACT aerosol powder  Inhale 1 puff Daily.    Provider, MD Diogenes   furosemide (LASIX) 40 MG tablet Take 1 tablet by mouth Daily. 7/7/24   Darrion Burch MD   guaiFENesin (MUCINEX) 600 MG 12 hr tablet Take 2 tablets by mouth 2 (Two) Times a Day.    Provider, MD Diogenes   ipratropium-albuterol (DUO-NEB) 0.5-2.5 mg/3 ml nebulizer Take 3 mL by nebulization 4 (Four) Times a Day. Indications: Chronic Obstructive Lung Disease 10/30/23   Bria Matthews MD   levothyroxine (Synthroid) 125 MCG tablet Take 1 tablet by mouth Every Morning. 4/21/25   Arlyn Holland APRN   O2 (OXYGEN) Inhale 2 L/min Continuous. Indications: COPD exacerbation, uncomplicated 10/6/23   Arlyn Holland APRN   pantoprazole (PROTONIX) 40 MG EC tablet Take 1 tablet by mouth Daily. 2/20/25   Bria Matthews MD   polyethylene glycol (MIRALAX) 17 g packet Take 17 g by mouth Daily. 12/3/24   Jong Iyer MD   potassium chloride (KLOR-CON M20) 20 MEQ CR tablet Take 1 tablet by mouth Daily. 5/29/25   Bria Matthews MD   predniSONE (DELTASONE) 10 MG tablet Take 1 tablet by mouth Daily. 4/14/25   Bria Matthews MD   senna 8.6 MG tablet Take 2 tablets by mouth Every Evening. Indications: Constipation 5/29/25   Bria Matthews MD   spironolactone (ALDACTONE) 25 MG tablet Take 1 tablet by mouth Daily. 5/29/25   Bria Matthews MD   warfarin (COUMADIN) 2.5 MG tablet Take 1 tablet by mouth See Admin Instructions. Take one tablet (2.5mg) 5 days weekly and two tablets (5mg) twice weekly. 6/25/24   Bria Matthews MD   warfarin (Coumadin) 3 MG tablet Take 3 mg by mouth daily or as directed by [facility].  Indications: history of DVT/PE 5/12/25   Bria Matthews MD   albuterol (ACCUNEB) 1.25  MG/3ML nebulizer solution Inhale. Indications: Spasm of Lung Air Passages 23   Provider, MD Diogenes   benzonatate (Tessalon Perles) 100 MG capsule Take 1 capsule by mouth 3 (Three) Times a Day As Needed for Cough.  Patient not taking: Reported on 2025  Bria Matthews MD   cefdinir (OMNICEF) 300 MG capsule Take 1 capsule by mouth 2 (Two) Times a Day.  Patient not taking: Reported on 2025  Bria Matthews MD   Cholecalciferol 50 MCG (2000 UT) tablet Take 1 tablet by mouth. Indications: Vitamin D Deficiency 23   ProviderDiogenes MD   potassium chloride (KLOR-CON M20) 20 MEQ CR tablet Take 1 tablet by mouth Daily. 24  Darrion Burch MD   senna 8.6 MG tablet Take 2 tablets by mouth Every Evening. Indications: Constipation 24  Bria Matthews MD   spironolactone (ALDACTONE) 25 MG tablet TAKE 1 TABLET BY MOUTH DAILY FOR CONGESTIVE HEART FAILURE 24  Bria Matthews MD       Medical history:   Past Medical History:   Diagnosis Date    Adenomatous polyp of colon     Allergic rhinitis     Asthma     Cervical disc disease     Fracture of thoracic spine 2011    Lacunar infarction 2019    Mitral valve disorder     Postmenopausal osteoporosis     Restless leg syndrome        Social history:   Social History     Tobacco Use    Smoking status: Former     Current packs/day: 0.00     Average packs/day: 1 pack/day for 36.0 years (36.0 ttl pk-yrs)     Types: Cigarettes     Start date:      Quit date:      Years since quittin.4     Passive exposure: Never    Smokeless tobacco: Never   Substance Use Topics    Alcohol use: No       Allergies:    Latex and Penicillin g    Vaccine History:   Immunization History   Administered Date(s) Administered    31-influenza Vac Quardvalent Preservativ 10/06/2023    ABRYSVO (RSV, 60+ or pregnant women 32-36 wks) 12/10/2024    COVID-19 (MODERNA)  12YRS+ (SPIKEVAX) 08/08/2024    COVID-19 (MODERNA) 1st,2nd,3rd Dose Monovalent 03/18/2021, 04/20/2021    Covid-19 (Pfizer) Bivalent 6mos-4yrs 10/22/2022    FLUAD TRI 65YR+ 10/21/2019    Flu Vaccine Quad PF 6-35MO 11/02/2015    Flu Vaccine Quad PF >36MO 11/02/2015    Fluad Quad 65+ 09/16/2020    Fluzone (or Fluarix & Flulaval for VFC) >6mos 12/12/2023    Fluzone High-Dose 65+YRS 11/28/2016, 11/03/2017, 10/01/2024    Fluzone High-Dose 65+yrs 12/25/2022, 12/04/2023    Pneumococcal Conjugate 13-Valent (PCV13) 02/05/2012, 06/16/2015    Pneumococcal Conjugate 20-Valent (PCV20) 12/04/2023    Pneumococcal Polysaccharide (PPSV23) 10/25/2018    TST, UF 07/18/2024, 08/04/2024    Tdap 12/05/2012       This patient is managed via a collaborative agreement, pursuant to IC [id number]. The signed protocol is kept in the [facility] at [address] [place] [address].    Education: Selina Vazquez has been instructed to call if any changes in medications, doses, concerns, etc. Patient was provided dosing instructions and expressed verbal understanding and has no further questions at this time.    Plan:  1. Anticoagulation   - no change as unsure exactly what dose patient has been getting. As best guess is 3 mg daily, will plan to continue 3 mg daily for now.  - RTC in 1.5 week(s) (patient also has previously scheduled appointment at Dr. Matthews's office 6/16, so next INR will be checked then as well. )      Jaimie Chamberlain, PharmD  5/29/2025  14:05 EDT

## 2025-05-29 NOTE — PROGRESS NOTES
Subjective   Selina Vazquez is a 79 y.o. female presents for   Chief Complaint   Patient presents with    Primary Care Follow-Up    Diabetes       Health Maintenance Due   Topic Date Due    DIABETIC FOOT EXAM  Never done    DIABETIC EYE EXAM  Never done    DXA SCAN  05/29/2017    TDAP/TD VACCINES (2 - Td or Tdap) 12/05/2022    COVID-19 Vaccine (4 - 2024-25 season) 10/03/2024   Daughter-in-law was present throughout the exam and was allowed to stay she is the patient's caretaker.  The patient did run out of oxygen when she was here and after she got back on oxygen from our supply her oxygen level improved and coughing stopped.    Primary Care Follow-UpAssociated symptoms include: shortness of breath, cough and depressed mood.   Diabetes     History of Present Illness  The patient is a 79-year-old female who presents today for follow-up on asthma. She has a medical history of essential hypertension, major depressive disorder, lichen planus, hyperlipidemia, postmenopausal osteoporosis, pulmonary embolism, atrial fibrillation, panlobular emphysema class III, higher body weight, and esophageal motility disorder.    She has been experiencing a persistent cough for approximately 4 days. She was seen by Dr. Green yesterday, who prescribed a course of steroids and antibiotics, which she filled at [facility] in [place]. She reports no episodes of vomiting or changes in her auditory perception. Throat discomfort is present only during coughing episodes. She has not had any recent ophthalmological or dental consultations. Her appetite remains unaffected. She has sufficient oxygen supply at home.    She has not had any recent consultations with her cardiologist, Dr. Vega, and reports no symptoms of chest pressure or choking on food. She is currently on warfarin therapy.    She is making efforts to reduce her intake of saturated fats. She does not monitor her blood glucose levels.    She is compliant with her diuretic  "medications, Lasix and spironolactone. She is also on a regimen of potassium supplements, taking 1 tablet 3 times daily.    Her mood is stable, and she does not report any feelings of depression.    Vitals:    05/29/25 1323 05/29/25 1327   BP: 126/78 125/83   BP Location: Left arm Right arm   Patient Position: Sitting Sitting   Cuff Size: Large Adult Large Adult   Pulse: 85    Temp: 98.6 °F (37 °C)    TempSrc: Infrared    SpO2: 93%    Weight: 95.9 kg (211 lb 6.4 oz)    Height: 152.4 cm (60\")      Body mass index is 41.29 kg/m².    Current Outpatient Medications on File Prior to Visit   Medication Sig Dispense Refill    albuterol sulfate  (90 Base) MCG/ACT inhaler Inhale 2 puffs Every 4 (Four) Hours As Needed for Wheezing or Shortness of Air. Indications: Chronic Obstructive Lung Disease      aspirin 81 MG EC tablet Take 1 tablet by mouth Daily. Indications: Blood Clot Within a Vein      atorvastatin (LIPITOR) 80 MG tablet Take 0.5 tablets by mouth Daily.      azithromycin (ZITHROMAX) 250 MG tablet Take 1 tablet by mouth Daily.      brompheniramine-pseudoephedrine-DM 30-2-10 MG/5ML syrup Take 5 mL by mouth 4 (Four) Times a Day As Needed for Allergies. 118 mL 0    budesonide (PULMICORT) 0.5 MG/2ML nebulizer solution Take 2 mL by nebulization 2 (Two) Times a Day. 1 each 0    dapagliflozin (Farxiga) 5 MG tablet tablet Take 1 tablet by mouth Daily. 30 tablet 0    dilTIAZem CD (CARDIZEM CD) 180 MG 24 hr capsule TAKE 1 CAPSULE BY MOUTH DAILY FOR HIGH BLOOD PRESSURE 90 capsule 0    Fluticasone Furoate-Vilanterol (Breo Ellipta) 100-25 MCG/ACT aerosol powder  Inhale 1 puff Daily.      furosemide (LASIX) 40 MG tablet Take 1 tablet by mouth Daily. 7 tablet 0    guaiFENesin (MUCINEX) 600 MG 12 hr tablet Take 2 tablets by mouth 2 (Two) Times a Day.      ipratropium-albuterol (DUO-NEB) 0.5-2.5 mg/3 ml nebulizer Take 3 mL by nebulization 4 (Four) Times a Day. Indications: Chronic Obstructive Lung Disease 360 mL 0    " levothyroxine (Synthroid) 125 MCG tablet Take 1 tablet by mouth Every Morning. 30 tablet 6    O2 (OXYGEN) Inhale 2 L/min Continuous. Indications: COPD exacerbation, uncomplicated      pantoprazole (PROTONIX) 40 MG EC tablet Take 1 tablet by mouth Daily. 90 tablet 1    polyethylene glycol (MIRALAX) 17 g packet Take 17 g by mouth Daily.      predniSONE (DELTASONE) 10 MG tablet Take 1 tablet by mouth Daily. 5 tablet 0    warfarin (COUMADIN) 2.5 MG tablet Take 1 tablet by mouth See Admin Instructions. Take one tablet (2.5mg) 5 days weekly and two tablets (5mg) twice weekly. 108 tablet 3    warfarin (Coumadin) 3 MG tablet Take 3 mg by mouth daily or as directed by [facility].  Indications: history of DVT/PE 90 tablet 2    [DISCONTINUED] potassium chloride (KLOR-CON M20) 20 MEQ CR tablet Take 1 tablet by mouth Daily. 7 tablet 0    [DISCONTINUED] senna 8.6 MG tablet Take 2 tablets by mouth Every Evening. Indications: Constipation 60 tablet 3    [DISCONTINUED] spironolactone (ALDACTONE) 25 MG tablet TAKE 1 TABLET BY MOUTH DAILY FOR CONGESTIVE HEART FAILURE 90 tablet 0    [DISCONTINUED] albuterol (ACCUNEB) 1.25 MG/3ML nebulizer solution Inhale. Indications: Spasm of Lung Air Passages      [DISCONTINUED] benzonatate (Tessalon Perles) 100 MG capsule Take 1 capsule by mouth 3 (Three) Times a Day As Needed for Cough. (Patient not taking: Reported on 5/29/2025) 30 capsule 0    [DISCONTINUED] cefdinir (OMNICEF) 300 MG capsule Take 1 capsule by mouth 2 (Two) Times a Day. (Patient not taking: Reported on 5/29/2025) 20 capsule 0    [DISCONTINUED] Cholecalciferol 50 MCG (2000 UT) tablet Take 1 tablet by mouth. Indications: Vitamin D Deficiency       No current facility-administered medications on file prior to visit.       The following portions of the patient's history were reviewed and updated as appropriate: allergies, current medications, past family history, past medical history, past social history, past  surgical history, and problem list.    Review of Systems   Respiratory:  Positive for cough and shortness of breath.    Psychiatric/Behavioral:  Positive for depressed mood.        Objective   Physical Exam  Vitals reviewed.   Constitutional:       General: She is not in acute distress.     Appearance: She is well-developed. She is obese. She is not ill-appearing or toxic-appearing.   HENT:      Head: Normocephalic and atraumatic.      Right Ear: Tympanic membrane, ear canal and external ear normal.      Left Ear: Tympanic membrane, ear canal and external ear normal.      Nose: Nose normal.      Mouth/Throat:      Mouth: Mucous membranes are moist.      Pharynx: No posterior oropharyngeal erythema.   Eyes:      Extraocular Movements: Extraocular movements intact.      Conjunctiva/sclera: Conjunctivae normal.      Pupils: Pupils are equal, round, and reactive to light.   Neck:      Vascular: No carotid bruit.   Cardiovascular:      Rate and Rhythm: Normal rate. Rhythm irregular.      Comments: Heart sounds faint  Pulmonary:      Effort: Pulmonary effort is normal.      Comments: Breath sounds decreased bilaterally with some scattered rhonchi  Abdominal:      General: Bowel sounds are normal. There is no distension.      Palpations: Abdomen is soft. There is no mass.      Tenderness: There is no abdominal tenderness. There is no right CVA tenderness or left CVA tenderness.   Musculoskeletal:         General: Normal range of motion.      Cervical back: Neck supple. No tenderness.      Right lower leg: Edema present.      Left lower leg: Edema present.   Lymphadenopathy:      Cervical: No cervical adenopathy.   Skin:     General: Skin is warm.   Neurological:      General: No focal deficit present.      Mental Status: She is alert and oriented to person, place, and time.   Psychiatric:         Mood and Affect: Mood normal.         Behavior: Behavior normal.       Physical Exam  Mouth/Throat: Mucous membranes moist, no  erythema, no exudate  Respiratory: Clear to auscultation, no wheezing, rales or rhonchi  Cardiovascular: Regular rate and rhythm, no murmurs, rubs, or gallops  Extremities: Bilateral ankle edema    PHQ-9 Total Score:    Results  Labs   - PT/INR: 05/29/2025, A little bit low   - Kidney function test: Better   - White blood cell count: Elevated   - Potassium level: Normal         Assessment & Plan   Diagnoses and all orders for this visit:    1. Essential hypertension (Primary)  -     CBC Auto Differential; Future  -     Comprehensive Metabolic Panel; Future    2. Severe persistent asthma without complication    3. Atrial fibrillation with rapid ventricular response    4. Panlobular emphysema    5. Class 3 severe obesity due to excess calories with serious comorbidity and body mass index (BMI) of 40.0 to 44.9 in adult    6. Esophageal motility disorder    7. Major depressive disorder, recurrent, in partial remission  -     Magnesium; Future    8. Lichen planus    9. Mixed hyperlipidemia    10. Postmenopausal osteoporosis    11. Pulmonary embolism, unspecified chronicity, unspecified pulmonary embolism type, unspecified whether acute cor pulmonale present    Other orders  -     potassium chloride (KLOR-CON M20) 20 MEQ CR tablet; Take 1 tablet by mouth Daily.  Dispense: 30 tablet; Refill: 0  -     senna 8.6 MG tablet; Take 2 tablets by mouth Every Evening. Indications: Constipation  Dispense: 60 tablet; Refill: 3  -     spironolactone (ALDACTONE) 25 MG tablet; Take 1 tablet by mouth Daily.  Dispense: 90 tablet; Refill: 0      Assessment & Plan  1. Asthma.  - She has been experiencing coughing for about a week.  - Examination reveals no evidence of pneumonia.  - She was recently prescribed azithromycin and another medication by Dr. Green; the specific medication will be confirmed with the pharmacy.  - She is advised to continue her current medications and ensure she has adequate oxygen supply at home.    2. Essential  Hypertension.  - Blood pressure readings are within the normal range today.  - No chest pressure or choking reported.  - She is advised to continue her current antihypertensive medications and monitor her blood pressure regularly.  - Follow-up with cardiologist, Dr. Vega, is recommended.    3. Major Depressive Disorder.  - Mood has been stable and she is not feeling depressed.  - No changes in hearing or vision reported.  - She is advised to continue her current antidepressant regimen and follow up with her mental health provider as needed.  - Regular monitoring of mood and depression symptoms is recommended.    4. Lichen Planus.  - No specific treatment changes were discussed during this visit.  - She is advised to continue her current management plan.  - Regular follow-up to monitor condition progression is recommended.  - No new medications or therapies prescribed.    5. Hyperlipidemia.  - She is advised to continue monitoring her saturated fat intake.  - Blood pressure and cholesterol levels are being monitored.  - A fasting lipid panel will be obtained during her next visit.  - Continue current lipid-lowering therapy.    6. Postmenopausal Osteoporosis.  - No specific treatment changes were discussed during this visit.  - She is advised to continue her current osteoporosis management plan, including any prescribed medications and calcium/vitamin D supplements.  - Regular follow-up to monitor bone density and fracture risk.  - Ensure adherence to prescribed osteoporosis medications.    7. Pulmonary Embolism.  - PT/INR levels are slightly below the therapeutic range today.  - She is advised to continue her anticoagulation therapy.  - Dr. Vega's office will contact her regarding a potential increase in her anticoagulant dosage.  - Regular monitoring of PT/INR levels and adjustment of anticoagulant dosage as needed.    8. Atrial Fibrillation.  - Heart rhythm is regular.  - She is advised to schedule an appointment  with her cardiologist, Dr. Vega, for further evaluation and management.  - Regular monitoring of heart rhythm and symptoms.  - Ensure adherence to prescribed medications for atrial fibrillation.    9. Panlobular Emphysema, Class 3.  - She is advised to continue her current respiratory medications.  - Ensure she has adequate oxygen supply at home.  - Regular follow-up to monitor respiratory function and symptoms.  - No new medications or therapies prescribed.    10. Severe Obesity.  - Encouraged to continue working on weight management through diet and exercise.  - Regular monitoring of weight and BMI.  - Counseling on lifestyle modifications for weight management.  - No new medications or therapies prescribed.    11. Esophageal Motility Disorder.  - No specific treatment changes were discussed during this visit.  - She is advised to continue her current management plan.  - Regular follow-up to monitor symptoms and condition progression.  - No new medications or therapies prescribed.    12. Medication Management.  - She is advised to bring all her medications to the next visit for review.  - Prescription for potassium supplements will be provided, with instructions to take 1 tablet daily.  - Prescription for Senna will also be provided.  - Regular review and adjustment of medications as needed.    Follow-up  - The patient will follow up in 3 months.    Patient Instructions     Health Maintenance Due   Topic Date Due    DIABETIC FOOT EXAM  Never done    DIABETIC EYE EXAM  Never done    DXA SCAN  05/29/2017    TDAP/TD VACCINES (2 - Td or Tdap) 12/05/2022    COVID-19 Vaccine (4 - 2024-25 season) 10/03/2024    Patient to call and get appointment with Dr. Vega   Please bring in all meds when returns tank       Patient or patient representative verbalized consent for the use of Ambient Listening during the visit with  Bria Matthews MD for chart documentation. 5/29/2025  14:49 EDT

## 2025-05-29 NOTE — PROGRESS NOTES
Capillary Blood Specimen Collection  Capillary blood collection performed in left middle finger by Minoo Portillo MA. Patient tolerated the procedure well without complications. INR   05/29/25   Minoo Portillo MA

## 2025-06-12 RX ORDER — DILTIAZEM HYDROCHLORIDE 180 MG/1
CAPSULE, COATED, EXTENDED RELEASE ORAL
Qty: 90 CAPSULE | Refills: 0 | Status: SHIPPED | OUTPATIENT
Start: 2025-06-12

## 2025-06-14 NOTE — PATIENT INSTRUCTIONS
Health Maintenance Due   Topic Date Due    DIABETIC FOOT EXAM  Never done    DIABETIC EYE EXAM  Never done    DXA SCAN  05/29/2017    TDAP/TD VACCINES (2 - Td or Tdap) 12/05/2022    COVID-19 Vaccine (4 - 2024-25 season) 10/03/2024    Patient to set up appointment with Dr. Vega and Dr. carrillo    Take Furosimide 40 am and pm and take potassium each time for one week and let me know what weight is every day by recording and end of 7 days call those to us

## 2025-06-16 ENCOUNTER — ANTICOAGULATION VISIT (OUTPATIENT)
Dept: FAMILY MEDICINE CLINIC | Facility: CLINIC | Age: 80
End: 2025-06-16
Payer: MEDICARE

## 2025-06-16 ENCOUNTER — LAB (OUTPATIENT)
Dept: FAMILY MEDICINE CLINIC | Facility: CLINIC | Age: 80
End: 2025-06-16
Payer: MEDICARE

## 2025-06-16 ENCOUNTER — ANTICOAGULATION VISIT (OUTPATIENT)
Dept: PHARMACY | Facility: HOSPITAL | Age: 80
End: 2025-06-16
Payer: MEDICARE

## 2025-06-16 ENCOUNTER — OFFICE VISIT (OUTPATIENT)
Dept: FAMILY MEDICINE CLINIC | Facility: CLINIC | Age: 80
End: 2025-06-16
Payer: MEDICARE

## 2025-06-16 VITALS
WEIGHT: 208.6 LBS | HEART RATE: 76 BPM | TEMPERATURE: 97.5 F | OXYGEN SATURATION: 98 % | HEIGHT: 60 IN | DIASTOLIC BLOOD PRESSURE: 74 MMHG | BODY MASS INDEX: 40.95 KG/M2 | SYSTOLIC BLOOD PRESSURE: 110 MMHG

## 2025-06-16 DIAGNOSIS — Z12.4 CERVICAL CANCER SCREENING: ICD-10-CM

## 2025-06-16 DIAGNOSIS — I26.99 PULMONARY EMBOLISM, UNSPECIFIED CHRONICITY, UNSPECIFIED PULMONARY EMBOLISM TYPE, UNSPECIFIED WHETHER ACUTE COR PULMONALE PRESENT: Primary | ICD-10-CM

## 2025-06-16 DIAGNOSIS — J45.50 SEVERE PERSISTENT ASTHMA WITHOUT COMPLICATION: Primary | ICD-10-CM

## 2025-06-16 DIAGNOSIS — E66.813 CLASS 3 SEVERE OBESITY DUE TO EXCESS CALORIES WITH SERIOUS COMORBIDITY AND BODY MASS INDEX (BMI) OF 40.0 TO 44.9 IN ADULT: ICD-10-CM

## 2025-06-16 DIAGNOSIS — I48.91 ATRIAL FIBRILLATION WITH RAPID VENTRICULAR RESPONSE: ICD-10-CM

## 2025-06-16 DIAGNOSIS — F33.41 MAJOR DEPRESSIVE DISORDER, RECURRENT, IN PARTIAL REMISSION: ICD-10-CM

## 2025-06-16 DIAGNOSIS — L43.9 LICHEN PLANUS: ICD-10-CM

## 2025-06-16 DIAGNOSIS — I10 ESSENTIAL HYPERTENSION: Chronic | ICD-10-CM

## 2025-06-16 DIAGNOSIS — K22.4 ESOPHAGEAL MOTILITY DISORDER: ICD-10-CM

## 2025-06-16 DIAGNOSIS — E66.01 CLASS 3 SEVERE OBESITY DUE TO EXCESS CALORIES WITH SERIOUS COMORBIDITY AND BODY MASS INDEX (BMI) OF 40.0 TO 44.9 IN ADULT: ICD-10-CM

## 2025-06-16 LAB
ALBUMIN SERPL-MCNC: 3.9 G/DL (ref 3.5–5.2)
ALBUMIN/GLOB SERPL: 1.4 G/DL
ALP SERPL-CCNC: 68 U/L (ref 39–117)
ALT SERPL W P-5'-P-CCNC: 24 U/L (ref 1–33)
ANION GAP SERPL CALCULATED.3IONS-SCNC: 10.5 MMOL/L (ref 5–15)
AST SERPL-CCNC: 25 U/L (ref 1–32)
BASOPHILS # BLD AUTO: 0.09 10*3/MM3 (ref 0–0.2)
BASOPHILS NFR BLD AUTO: 0.9 % (ref 0–1.5)
BILIRUB SERPL-MCNC: 1 MG/DL (ref 0–1.2)
BUN SERPL-MCNC: 20 MG/DL (ref 8–23)
BUN/CREAT SERPL: 14.8 (ref 7–25)
CALCIUM SPEC-SCNC: 9.4 MG/DL (ref 8.6–10.5)
CHLORIDE SERPL-SCNC: 100 MMOL/L (ref 98–107)
CO2 SERPL-SCNC: 29.5 MMOL/L (ref 22–29)
CREAT SERPL-MCNC: 1.35 MG/DL (ref 0.57–1)
DEPRECATED RDW RBC AUTO: 48.1 FL (ref 37–54)
EGFRCR SERPLBLD CKD-EPI 2021: 40.1 ML/MIN/1.73
EOSINOPHIL # BLD AUTO: 0.22 10*3/MM3 (ref 0–0.4)
EOSINOPHIL NFR BLD AUTO: 2.1 % (ref 0.3–6.2)
ERYTHROCYTE [DISTWIDTH] IN BLOOD BY AUTOMATED COUNT: 15.1 % (ref 12.3–15.4)
GLOBULIN UR ELPH-MCNC: 2.8 GM/DL
GLUCOSE SERPL-MCNC: 94 MG/DL (ref 65–99)
HCT VFR BLD AUTO: 40.4 % (ref 34–46.6)
HGB BLD-MCNC: 13 G/DL (ref 12–15.9)
IMM GRANULOCYTES # BLD AUTO: 0.04 10*3/MM3 (ref 0–0.05)
IMM GRANULOCYTES NFR BLD AUTO: 0.4 % (ref 0–0.5)
INR PPP: 2.1 (ref 0.9–1.1)
LYMPHOCYTES # BLD AUTO: 1.93 10*3/MM3 (ref 0.7–3.1)
LYMPHOCYTES NFR BLD AUTO: 18.6 % (ref 19.6–45.3)
MAGNESIUM SERPL-MCNC: 2.2 MG/DL (ref 1.6–2.4)
MCH RBC QN AUTO: 28.4 PG (ref 26.6–33)
MCHC RBC AUTO-ENTMCNC: 32.2 G/DL (ref 31.5–35.7)
MCV RBC AUTO: 88.2 FL (ref 79–97)
MONOCYTES # BLD AUTO: 1.2 10*3/MM3 (ref 0.1–0.9)
MONOCYTES NFR BLD AUTO: 11.6 % (ref 5–12)
NEUTROPHILS NFR BLD AUTO: 6.9 10*3/MM3 (ref 1.7–7)
NEUTROPHILS NFR BLD AUTO: 66.4 % (ref 42.7–76)
NRBC BLD AUTO-RTO: 0 /100 WBC (ref 0–0.2)
PLATELET # BLD AUTO: 227 10*3/MM3 (ref 140–450)
PMV BLD AUTO: 10.8 FL (ref 6–12)
POTASSIUM SERPL-SCNC: 4.5 MMOL/L (ref 3.5–5.2)
PROT SERPL-MCNC: 6.7 G/DL (ref 6–8.5)
RBC # BLD AUTO: 4.58 10*6/MM3 (ref 3.77–5.28)
SODIUM SERPL-SCNC: 140 MMOL/L (ref 136–145)
WBC NRBC COR # BLD AUTO: 10.38 10*3/MM3 (ref 3.4–10.8)

## 2025-06-16 PROCEDURE — 36415 COLL VENOUS BLD VENIPUNCTURE: CPT

## 2025-06-16 PROCEDURE — 3078F DIAST BP <80 MM HG: CPT | Performed by: PREVENTIVE MEDICINE

## 2025-06-16 PROCEDURE — 99214 OFFICE O/P EST MOD 30 MIN: CPT | Performed by: PREVENTIVE MEDICINE

## 2025-06-16 PROCEDURE — 80053 COMPREHEN METABOLIC PANEL: CPT | Performed by: PREVENTIVE MEDICINE

## 2025-06-16 PROCEDURE — 3074F SYST BP LT 130 MM HG: CPT | Performed by: PREVENTIVE MEDICINE

## 2025-06-16 PROCEDURE — 85025 COMPLETE CBC W/AUTO DIFF WBC: CPT | Performed by: PREVENTIVE MEDICINE

## 2025-06-16 PROCEDURE — 1126F AMNT PAIN NOTED NONE PRSNT: CPT | Performed by: PREVENTIVE MEDICINE

## 2025-06-16 PROCEDURE — 36416 COLLJ CAPILLARY BLOOD SPEC: CPT | Performed by: PREVENTIVE MEDICINE

## 2025-06-16 PROCEDURE — 83735 ASSAY OF MAGNESIUM: CPT | Performed by: PREVENTIVE MEDICINE

## 2025-06-16 PROCEDURE — G2211 COMPLEX E/M VISIT ADD ON: HCPCS | Performed by: PREVENTIVE MEDICINE

## 2025-06-16 PROCEDURE — 85610 PROTHROMBIN TIME: CPT | Performed by: PREVENTIVE MEDICINE

## 2025-06-16 RX ORDER — DOXYCYCLINE 100 MG/1
100 CAPSULE ORAL 2 TIMES DAILY
Qty: 20 CAPSULE | Refills: 0 | Status: SHIPPED | OUTPATIENT
Start: 2025-06-16

## 2025-06-16 NOTE — PROGRESS NOTES
Anticoagulation Clinic Progress Note - Outside Office INR Testing     INR Goal: 2 - 3  Today's INR: 2.1 (therapeutic). INR result was called in today by Lizy DIXON at PCP's office).   Current warfarin dose: warfarin 3 mg PO daily. Current total weekly dose = 21 mg per week.     INR History:      Latest Ref Rng & Units 2025    12:00 AM 2025    10:30 AM 2025    10:44 AM 2025     1:15 PM 2025     2:04 PM 2025    11:45 AM 2025     1:40 PM   Anticoagulation Monitoring   INR   -- 5.00 -- 1.80 -- 2.10   INR Date    2025   INR Goal   2.0-3.0 2.0-3.0 2.0-3.0 2.0-3.0 2.0-3.0 2.0-3.0   Trend    Same  Down  Same   Last Week Total    18 mg  22.5 mg  21 mg   Next Week Total    15 mg  21 mg  21 mg   Sun    -  3 mg  3 mg   Mon    Hold ()  3 mg  3 mg   Tue    Hold ()  3 mg  3 mg   Wed    3 mg  3 mg  3 mg   Thu    -  3 mg  3 mg   Fri    -  3 mg  3 mg   Sat    -  3 mg  3 mg   Historical INR 2.00 - 3.00 5.00             Visit Report     Report Report Report Report       This result is from an external source.       Anticoagulation Summary  As of 2025      INR goal:  2.0-3.0   TTR:  38.2% (1.6 y)   INR used for dosin.10 (2025)   Warfarin maintenance plan:  3 mg every day   Weekly warfarin total:  21 mg   No change documented:  Roselyn Hill, PharmD   Plan last modified:  Jaimie hCamberlain, Ella (2025)   Next INR check:  2025   Target end date:  --    Indications    Pulmonary embolism [I26.99]                 Anticoagulation Episode Summary       INR check location:  --    Preferred lab:  --    Send INR reminders to:  [facility] CLINICAL POOL    Comments:  FYIs -   In-home caregiverCassy: [phone]  SisterConcha: [phone]  Potential new home phone number: [phone] (Marcus, patient's son)  Jazmin (unit manager at [facility]: [phone]          Anticoagulation Care Providers       Provider Role Specialty Phone  number    Bria Matthews MD Referring Family Medicine [phone]            Clinic Interview:   Patient Findings     Negatives:  Signs/symptoms of thrombosis, Signs/symptoms of bleeding,   Laboratory test error suspected, Change in health, Change in alcohol use,   Change in activity, Upcoming invasive procedure, Emergency department   visit, Upcoming dental procedure, Missed doses, Extra doses, Change in   medications, Change in diet/appetite, Hospital admission, Bruising, Other   complaints      Clinical Outcomes     Negatives:  Major bleeding event, Thromboembolic event,   Anticoagulation-related hospital admission, Anticoagulation-related ED   visit, Anticoagulation-related fatality        Current Medications:   Prior to Admission medications    Medication Sig Start Date End Date Taking? Authorizing Provider   albuterol sulfate  (90 Base) MCG/ACT inhaler Inhale 2 puffs Every 4 (Four) Hours As Needed for Wheezing or Shortness of Air. Indications: Chronic Obstructive Lung Disease    ProviderDiogenes MD   aspirin 81 MG EC tablet Take 1 tablet by mouth Daily. Indications: Blood Clot Within a Vein    ProviderDiogenes MD   atorvastatin (LIPITOR) 80 MG tablet Take 0.5 tablets by mouth Daily.    Provider, MD Diogenes   brompheniramine-pseudoephedrine-DM 30-2-10 MG/5ML syrup Take 5 mL by mouth 4 (Four) Times a Day As Needed for Allergies. 5/5/25   Brianna Rodriguez APRN   budesonide (PULMICORT) 0.5 MG/2ML nebulizer solution Take 2 mL by nebulization 2 (Two) Times a Day. 6/21/24   Tracy Terry MD   dapagliflozin (Farxiga) 5 MG tablet tablet Take 1 tablet by mouth Daily. 1/17/25   Bria Matthews MD   dilTIAZem CD (CARDIZEM CD) 180 MG 24 hr capsule TAKE 1 CAPSULE BY MOUTH DAILY FOR HIGH BLOOD PRESSURE 6/12/25   Bria Matthews MD   doxycycline (MONODOX) 100 MG capsule Take 1 capsule by mouth 2 (Two) Times a Day. 6/16/25   Bria Matthews MD   Fluticasone Furoate-Vilanterol  (Breo Ellipta) 100-25 MCG/ACT aerosol powder  Inhale 1 puff Daily.    Provider, MD Diogenes   furosemide (LASIX) 40 MG tablet Take 1 tablet by mouth Daily. 7/7/24   Darrion Burch MD   guaiFENesin (MUCINEX) 600 MG 12 hr tablet Take 2 tablets by mouth 2 (Two) Times a Day.    Provider, MD Diogenes   ipratropium-albuterol (DUO-NEB) 0.5-2.5 mg/3 ml nebulizer Take 3 mL by nebulization 4 (Four) Times a Day. Indications: Chronic Obstructive Lung Disease 10/30/23   Bria Matthews MD   levothyroxine (Synthroid) 125 MCG tablet Take 1 tablet by mouth Every Morning. 4/21/25   Arlyn Holland APRN   Melatonin 3 MG capsule Take 1 capsule by mouth every night at bedtime.    Provider, MD Diogenes   Misc. Devices (Pulse Oximeter For Finger) misc Use 1 each 4 (Four) Times a Day. 6/16/25   Bria Matthews MD   O2 (OXYGEN) Inhale 2 L/min Continuous. Indications: COPD exacerbation, uncomplicated 10/6/23   Arlyn Holland APRN   pantoprazole (PROTONIX) 40 MG EC tablet Take 1 tablet by mouth Daily. 2/20/25   Bria Matthews MD   polyethylene glycol (MIRALAX) 17 g packet Take 17 g by mouth Daily. 12/3/24   Jong Iyer MD   potassium chloride (KLOR-CON M20) 20 MEQ CR tablet Take 1 tablet by mouth Daily. 5/29/25   Bria Matthews MD   senna 8.6 MG tablet Take 2 tablets by mouth Every Evening. Indications: Constipation 5/29/25   Bria Matthews MD   spironolactone (ALDACTONE) 25 MG tablet Take 1 tablet by mouth Daily. 5/29/25   Bria Matthews MD   warfarin (COUMADIN) 2.5 MG tablet Take 1 tablet by mouth See Admin Instructions. Take one tablet (2.5mg) 5 days weekly and two tablets (5mg) twice weekly. 6/25/24   Bria Matthews MD   warfarin (Coumadin) 3 MG tablet Take 3 mg by mouth daily or as directed by [facility].  Indications: history of DVT/PE 5/12/25   Bria Matthews MD   albuterol (ACCUNEB) 1.25 MG/3ML nebulizer solution Inhale. Indications:  Spasm of Lung Air Passages 23   Provider, MD Diogenes   azithromycin (ZITHROMAX) 250 MG tablet Take 1 tablet by mouth Daily. 25  Diogenes Olguin MD   Cholecalciferol 50 MCG (2000) tablet Take 1 tablet by mouth. Indications: Vitamin D Deficiency 23   Diogenes Olguin MD   predniSONE (DELTASONE) 10 MG tablet Take 1 tablet by mouth Daily. 25  Bria Matthews MD       Medical history:   Past Medical History:   Diagnosis Date    Adenomatous polyp of colon     Allergic rhinitis     Asthma     Cervical disc disease     Fracture of thoracic spine 2011    Lacunar infarction 2019    Mitral valve disorder     Postmenopausal osteoporosis     Restless leg syndrome        Social history:   Social History     Tobacco Use    Smoking status: Former     Current packs/day: 0.00     Average packs/day: 1 pack/day for 36.0 years (36.0 ttl pk-yrs)     Types: Cigarettes     Start date:      Quit date:      Years since quittin.4     Passive exposure: Never    Smokeless tobacco: Never   Substance Use Topics    Alcohol use: No       Allergies:    Latex and Penicillin g    This patient is managed via a collaborative agreement, pursuant to IC [id number]. The signed protocol is kept in the [facility] at [address] [place] [address].    Education: Selina Vazquez has been instructed to call if any changes in medications, doses, concerns, etc. Patient was provided dosing instructions and expressed verbal understanding and has no further questions at this time.    Plan:  1. no change; warfarin 3 mg PO daily. Total weekly dose = 21 mg.   2. INR should be tested in one week at PCP's office and called into clinic.     Roselyn Hill, PharmD  2025  13:41 EDT

## 2025-06-16 NOTE — PROGRESS NOTES
Subjective   Selina Vazquez is a 79 y.o. female presents for   Chief Complaint   Patient presents with    Primary Care Follow-Up     6 wk f/u     Hypertension       Health Maintenance Due   Topic Date Due    DIABETIC FOOT EXAM  Never done    DIABETIC EYE EXAM  Never done    DXA SCAN  05/29/2017    TDAP/TD VACCINES (2 - Td or Tdap) 12/05/2022    COVID-19 Vaccine (4 - 2024-25 season) 10/03/2024       Primary Care Follow-UpAssociated symptoms include: shortness of breath and cough.   Hypertension  Associated symptoms: shortness of breath    This portable oxygen tank was not working on arrival we hooked her up to our oxygen tank and then noticed that it was empty.  When we hooked her up to 2 L of oxygen her oxygen saturation went up to 98% we have reordered oxygen for her and then I am not certain that her tanks at home are working.  History of Present Illness  The patient is a 79-year-old female who is here today for severe persistent asthma without complication, atrial fibrillation, class III, higher body weight, essential hypertension, esophageal motility disorder, and major depressive disorder.    She reports that her oxygen supply was not functioning upon arrival, which she believes has exacerbated her respiratory distress. She is uncertain about the last refill of her oxygen tank. She has been experiencing a productive cough with greenish sputum, which she was able to expectorate while on antibiotics prescribed by Dr. Green 6 weeks ago. She completed the antibiotic course and noticed a slight improvement in her condition. She also reports a mild fever yesterday and today, despite being in an air-conditioned environment. She has not experienced any ear or mouth pain but does report a headache. She has not had any recent exposure to high temperatures outdoors. She has not eaten anything today. She has been adhering to her prescribed medications for asthma. She is currently on continuous oxygen therapy at 2  "L/min.    She occasionally experiences depressive episodes but does not endorse any homicidal or suicidal ideation.    She reports normal bowel movements and urination, with no associated burning sensation. She does not report any unusual vaginal discharge. She had a bowel movement this morning without any blood. She has noticed some swelling but is unsure of its duration. She has not had a recent gynecological examination. She underwent a hysterectomy in 2007.    She is not experiencing any choking issues related to her esophageal motility disorder.    She is currently taking spironolactone and Lasix once daily. She is not on prednisone.    PAST SURGICAL HISTORY:  Hysterectomy in 2007    Vitals:    06/16/25 1117 06/16/25 1131 06/16/25 1154   BP: 120/74 110/74    BP Location: Left arm Right arm    Patient Position: Sitting Sitting    Cuff Size: Large Adult Large Adult    Pulse: 76     Temp: 97.5 °F (36.4 °C)     TempSrc: Infrared     SpO2: 91%  98%   Weight: 94.6 kg (208 lb 9.6 oz)     Height: 152.4 cm (60\")       Body mass index is 40.74 kg/m².    Current Outpatient Medications on File Prior to Visit   Medication Sig Dispense Refill    albuterol sulfate  (90 Base) MCG/ACT inhaler Inhale 2 puffs Every 4 (Four) Hours As Needed for Wheezing or Shortness of Air. Indications: Chronic Obstructive Lung Disease      aspirin 81 MG EC tablet Take 1 tablet by mouth Daily. Indications: Blood Clot Within a Vein      atorvastatin (LIPITOR) 80 MG tablet Take 0.5 tablets by mouth Daily.      brompheniramine-pseudoephedrine-DM 30-2-10 MG/5ML syrup Take 5 mL by mouth 4 (Four) Times a Day As Needed for Allergies. 118 mL 0    budesonide (PULMICORT) 0.5 MG/2ML nebulizer solution Take 2 mL by nebulization 2 (Two) Times a Day. 1 each 0    dapagliflozin (Farxiga) 5 MG tablet tablet Take 1 tablet by mouth Daily. 30 tablet 0    dilTIAZem CD (CARDIZEM CD) 180 MG 24 hr capsule TAKE 1 CAPSULE BY MOUTH DAILY FOR HIGH BLOOD PRESSURE 90 " capsule 0    Fluticasone Furoate-Vilanterol (Breo Ellipta) 100-25 MCG/ACT aerosol powder  Inhale 1 puff Daily.      furosemide (LASIX) 40 MG tablet Take 1 tablet by mouth Daily. 7 tablet 0    guaiFENesin (MUCINEX) 600 MG 12 hr tablet Take 2 tablets by mouth 2 (Two) Times a Day.      ipratropium-albuterol (DUO-NEB) 0.5-2.5 mg/3 ml nebulizer Take 3 mL by nebulization 4 (Four) Times a Day. Indications: Chronic Obstructive Lung Disease 360 mL 0    levothyroxine (Synthroid) 125 MCG tablet Take 1 tablet by mouth Every Morning. 30 tablet 6    Melatonin 3 MG capsule Take 1 capsule by mouth every night at bedtime.      O2 (OXYGEN) Inhale 2 L/min Continuous. Indications: COPD exacerbation, uncomplicated      pantoprazole (PROTONIX) 40 MG EC tablet Take 1 tablet by mouth Daily. 90 tablet 1    polyethylene glycol (MIRALAX) 17 g packet Take 17 g by mouth Daily.      potassium chloride (KLOR-CON M20) 20 MEQ CR tablet Take 1 tablet by mouth Daily. 30 tablet 0    senna 8.6 MG tablet Take 2 tablets by mouth Every Evening. Indications: Constipation 60 tablet 3    spironolactone (ALDACTONE) 25 MG tablet Take 1 tablet by mouth Daily. 90 tablet 0    warfarin (COUMADIN) 2.5 MG tablet Take 1 tablet by mouth See Admin Instructions. Take one tablet (2.5mg) 5 days weekly and two tablets (5mg) twice weekly. 108 tablet 3    warfarin (Coumadin) 3 MG tablet Take 3 mg by mouth daily or as directed by [facility].  Indications: history of DVT/PE 90 tablet 2    [DISCONTINUED] azithromycin (ZITHROMAX) 250 MG tablet Take 1 tablet by mouth Daily.      [DISCONTINUED] predniSONE (DELTASONE) 10 MG tablet Take 1 tablet by mouth Daily. 5 tablet 0    [DISCONTINUED] albuterol (ACCUNEB) 1.25 MG/3ML nebulizer solution Inhale. Indications: Spasm of Lung Air Passages      [DISCONTINUED] Cholecalciferol 50 MCG (2000 UT) tablet Take 1 tablet by mouth. Indications: Vitamin D Deficiency       No current facility-administered medications on file  prior to visit.       The following portions of the patient's history were reviewed and updated as appropriate: allergies, current medications, past family history, past medical history, past social history, past surgical history, and problem list.    Review of Systems   Constitutional:  Positive for fever.   Respiratory:  Positive for cough and shortness of breath.    Cardiovascular:  Positive for leg swelling.   Genitourinary:  Negative for dysuria and hematuria.   Musculoskeletal:  Positive for gait problem.   Psychiatric/Behavioral:  Positive for dysphoric mood.        Objective   Physical Exam  Vitals reviewed.   Constitutional:       General: She is not in acute distress.     Appearance: She is well-developed. She is obese. She is not ill-appearing or toxic-appearing.   HENT:      Head: Normocephalic and atraumatic.      Nose: Nose normal.   Eyes:      Extraocular Movements: Extraocular movements intact.      Conjunctiva/sclera: Conjunctivae normal.      Pupils: Pupils are equal, round, and reactive to light.   Cardiovascular:      Rate and Rhythm: Normal rate. Rhythm irregular.   Pulmonary:      Effort: Pulmonary effort is normal.      Comments: Decreased breath sounds bilaterally  Abdominal:      General: Abdomen is flat. Bowel sounds are normal. There is no distension.      Palpations: Abdomen is soft. There is no mass.      Tenderness: There is no abdominal tenderness. There is no right CVA tenderness, left CVA tenderness, guarding or rebound.   Musculoskeletal:      Right lower leg: Edema present.      Left lower leg: Edema present.   Skin:     Findings: Bruising and erythema present.   Neurological:      Mental Status: She is alert and oriented to person, place, and time.   Psychiatric:         Mood and Affect: Mood normal.         Behavior: Behavior normal.       Physical Exam  Respiratory: Clear to auscultation, no wheezing, rales or rhonchi  Cardiovascular: Irregular rhythm due to atrial fibrillation,  heart rate 79    PHQ-9 Total Score:    Results           Assessment & Plan   Diagnoses and all orders for this visit:    1. Severe persistent asthma without complication (Primary)  -     XR Chest PA & Lateral  -     Oxygen Therapy    2. Atrial fibrillation with rapid ventricular response  -     Oxygen Therapy    3. Class 3 severe obesity due to excess calories with serious comorbidity and body mass index (BMI) of 40.0 to 44.9 in adult    4. Essential hypertension    5. Esophageal motility disorder    6. Major depressive disorder, recurrent, in partial remission    7. Cervical cancer screening  -     Ambulatory Referral to Obstetrics / Gynecology    8. Lichen planus  -     Ambulatory Referral to Obstetrics / Gynecology    Other orders  -     doxycycline (MONODOX) 100 MG capsule; Take 1 capsule by mouth 2 (Two) Times a Day.  Dispense: 20 capsule; Refill: 0  -     Misc. Devices (Pulse Oximeter For Finger) misc; Use 1 each 4 (Four) Times a Day.  Dispense: 1 each; Refill: 0      Assessment & Plan  1. Severe persistent asthma without complication.  - Oxygen saturation was low upon arrival due to an empty tank; improved to 98% on 2 L of oxygen after switching tanks.  - Increased pain and limited mobility.  - A repeat chest x-ray will be ordered as the last one was in 01/2025.  - Antibiotic will be prescribed and should be completed; if pneumonia is detected on the chest x-ray, a steroid will be prescribed. Oxygen therapy will continue at 2 L/minute, and additional large tanks will be ordered. An oximeter will be provided for home use, and oxygen levels should be maintained above 94%. Use of cane and portable oxygen system for stability when going out is advised.    2. Atrial fibrillation, class 3.  - Heart rate is irregular but stable at 79 bpm.  - No recent choking problems.  - She has not seen her cardiologist, Dr. Vega, in over a year.  - Schedule an appointment with Dr. Vega for further evaluation.    3. Major  depressive disorder.  - Reports occasional low mood but no homicidal or suicidal ideation.  - No recent choking problems.  - No recent choking problems.  - No recent choking problems.    4. Esophageal motility disorder.  - Reports no recent choking problems.  - No recent choking problems.  - No recent choking problems.  - No recent choking problems.    5. Essential hypertension.  - Blood pressure is well-controlled today.  - No recent choking problems.  - No recent choking problems.  - No recent choking problems.    6. Severe obesity.  - Weight has decreased by 3 pounds since the last visit.  - No recent choking problems.  - No recent choking problems.  - No recent choking problems.    7. Lichen sclerosus.  - Referral to Dr. Bolden for lichen sclerosus will be made.  - No recent choking problems.  - No recent choking problems.  - No recent choking problems.    Follow-up  - Follow up in 6 weeks or sooner if necessary.    Patient Instructions     Health Maintenance Due   Topic Date Due    DIABETIC FOOT EXAM  Never done    DIABETIC EYE EXAM  Never done    DXA SCAN  05/29/2017    TDAP/TD VACCINES (2 - Td or Tdap) 12/05/2022    COVID-19 Vaccine (4 - 2024-25 season) 10/03/2024    Patient to set up appointment with Dr. Vega and Dr. bolden    Take Furosimide 40 am and pm and take potassium each time for one week and let me know what weight is every day by recording and end of 7 days call those to us       Patient or patient representative verbalized consent for the use of Ambient Listening during the visit with  Bria Matthews MD for chart documentation. 6/16/2025  12:43 EDT

## 2025-06-16 NOTE — PROGRESS NOTES
Identified after appointment conducted telephonically that PCP prescribed doxycycline x 10 days at appointment today (may increase INR). Given INR is on lower end of goal range and patient has historically had difficulty with implementing warfarin dose changes, will continue with previously instructed regimen of warfarin 3 mg PO daily. INR being checked in one week so will keep in mind DDI when assessing INR value.    Roselyn Hill PharmD, BCPS, BCACP  06/16/25 13:44 EDT

## 2025-06-23 ENCOUNTER — ANTICOAGULATION VISIT (OUTPATIENT)
Dept: PHARMACY | Facility: HOSPITAL | Age: 80
End: 2025-06-23
Payer: MEDICARE

## 2025-06-23 ENCOUNTER — ANTICOAGULATION VISIT (OUTPATIENT)
Dept: FAMILY MEDICINE CLINIC | Facility: CLINIC | Age: 80
End: 2025-06-23
Payer: MEDICARE

## 2025-06-23 DIAGNOSIS — I26.99 PULMONARY EMBOLISM, UNSPECIFIED CHRONICITY, UNSPECIFIED PULMONARY EMBOLISM TYPE, UNSPECIFIED WHETHER ACUTE COR PULMONALE PRESENT: Primary | ICD-10-CM

## 2025-06-23 DIAGNOSIS — I26.01 ACUTE SEPTIC PULMONARY EMBOLISM WITH ACUTE COR PULMONALE: Primary | ICD-10-CM

## 2025-06-23 LAB — INR PPP: 2.4 (ref 0.9–1.1)

## 2025-06-23 PROCEDURE — 85610 PROTHROMBIN TIME: CPT | Performed by: PREVENTIVE MEDICINE

## 2025-06-23 PROCEDURE — 36416 COLLJ CAPILLARY BLOOD SPEC: CPT | Performed by: PREVENTIVE MEDICINE

## 2025-06-23 RX ORDER — POTASSIUM CHLORIDE 1500 MG/1
20 TABLET, EXTENDED RELEASE ORAL DAILY
Qty: 30 TABLET | Refills: 0 | Status: SHIPPED | OUTPATIENT
Start: 2025-06-23

## 2025-06-23 NOTE — PROGRESS NOTES
Capillary Blood Specimen Collection  Capillary blood collection performed in left middle finger by Minoo Portillo MA. Patient tolerated the procedure well without complications. INR   06/23/25   Minoo Portillo MA

## 2025-06-26 ENCOUNTER — TELEPHONE (OUTPATIENT)
Dept: FAMILY MEDICINE CLINIC | Facility: CLINIC | Age: 80
End: 2025-06-26
Payer: MEDICARE

## 2025-06-26 NOTE — TELEPHONE ENCOUNTER
[facility] CALLED AND WANTING US TO SEND A FORM SO GERMÁN CAN GET HER OXYGEN. THEY SAID IT WOULD BE THE QUALIFYING SATURATION TESTING FORM. THE FAX NUMBER [phone].

## 2025-07-02 ENCOUNTER — TELEPHONE (OUTPATIENT)
Dept: PHARMACY | Facility: HOSPITAL | Age: 80
End: 2025-07-02
Payer: MEDICARE

## 2025-07-02 NOTE — TELEPHONE ENCOUNTER
[facility]  Clinical Outreach     Selina Vazquez is a 79 y.o. female and is a patient of [facility].     Attempted to contact patient to re-schedule INR since patient was a no show for her appointment at Dr. Matthews's office on 6/30.  No answer, left voicemail.      Yeny Lagunas, PharmD  Ambulatory Care Clinical Pharmacist  7/2/2025  14:42 EDT

## 2025-07-08 ENCOUNTER — ANTICOAGULATION VISIT (OUTPATIENT)
Dept: FAMILY MEDICINE CLINIC | Facility: CLINIC | Age: 80
End: 2025-07-08
Payer: MEDICARE

## 2025-07-08 ENCOUNTER — ANTICOAGULATION VISIT (OUTPATIENT)
Dept: PHARMACY | Facility: HOSPITAL | Age: 80
End: 2025-07-08
Payer: MEDICARE

## 2025-07-08 DIAGNOSIS — I26.99 PULMONARY EMBOLISM, UNSPECIFIED CHRONICITY, UNSPECIFIED PULMONARY EMBOLISM TYPE, UNSPECIFIED WHETHER ACUTE COR PULMONALE PRESENT: Primary | ICD-10-CM

## 2025-07-08 LAB
INR PPP: 2.7 (ref 0.9–1.1)
INR PPP: 2.7 (ref 2–3)

## 2025-07-08 PROCEDURE — 85610 PROTHROMBIN TIME: CPT | Performed by: PREVENTIVE MEDICINE

## 2025-07-08 PROCEDURE — 36416 COLLJ CAPILLARY BLOOD SPEC: CPT | Performed by: PREVENTIVE MEDICINE

## 2025-07-08 NOTE — PROGRESS NOTES
Capillary Blood Specimen Collection  Capillary blood collection performed in left middle finger by Minoo Portillo MA. Patient tolerated the procedure well without complications. INR   07/08/25   Minoo Portillo MA

## 2025-07-08 NOTE — PROGRESS NOTES
Anticoagulation Clinic Progress Note - Outside Lab INR Testing     INR Goal: 2 - 3  Today's INR: 2.7 (therapeutic). INR result was called in today by Minoo DIXON at PCP's office). Patient present during telephonic appointment today.   Current warfarin dose: warfarin 3 mg PO daily. Current total weekly dose = 21 mg per week.     INR History:      Latest Ref Rng & Units 2025     2:04 PM 2025    11:45 AM 2025     1:40 PM 2025    10:47 AM 2025    11:30 AM 2025    12:00 AM 2025     9:48 AM   Anticoagulation Monitoring   INR  1.80 -- 2.10 2.40 --  2.70   INR Date  2025   INR Goal  2.0-3.0 2.0-3.0 2.0-3.0 2.0-3.0 2.0-3.0  2.0-3.0   Trend  Down  Same Same   Same   Last Week Total  22.5 mg  21 mg 21 mg   21 mg   Next Week Total  21 mg  21 mg 21 mg   21 mg   Sun  3 mg  3 mg 3 mg   3 mg   Mon  3 mg  3 mg 3 mg   3 mg   Tue  3 mg  3 mg 3 mg   3 mg   Wed  3 mg  3 mg 3 mg   3 mg   Thu  3 mg  3 mg 3 mg   3 mg   Fri  3 mg  3 mg 3 mg   3 mg   Sat  3 mg  3 mg 3 mg   3 mg   Historical INR 2.00 - 3.00      2.70        Visit Report  Report Report Report           This result is from an external source.       Anticoagulation Summary  As of 2025      INR goal:  2.0-3.0   TTR:  40.4% (1.6 y)   INR used for dosin.70 (2025)   Warfarin maintenance plan:  3 mg every day   Weekly warfarin total:  21 mg   No change documented:  Roselyn Hill, Ella   Plan last modified:  Jaimie Chamberlain, Ella (2025)   Next INR check:  2025   Target end date:  --    Indications    Pulmonary embolism [I26.99]                 Anticoagulation Episode Summary       INR check location:  --    Preferred lab:  --    Send INR reminders to:  [facility]IC CLINICAL POOL    Comments:  FYIs -   In-home caregiverCassy: [phone]  Sister, Concha: [phone]  Potential new home phone number: [phone] (Marcus, patient's son)  Jazmin (unit manager at [employer]:  [phone]          Anticoagulation Care Providers       Provider Role Specialty Phone number    Bria Matthews MD Referring Family Medicine [phone]            Clinic Interview:   Patient Findings     Negatives:  Signs/symptoms of thrombosis, Signs/symptoms of bleeding,   Laboratory test error suspected, Change in health, Change in alcohol use,   Change in activity, Upcoming invasive procedure, Emergency department   visit, Upcoming dental procedure, Missed doses, Extra doses, Change in   medications, Change in diet/appetite, Hospital admission, Bruising, Other   complaints      Clinical Outcomes     Negatives:  Major bleeding event, Thromboembolic event,   Anticoagulation-related hospital admission, Anticoagulation-related ED   visit, Anticoagulation-related fatality        Current Medications:   Prior to Admission medications    Medication Sig Start Date End Date Taking? Authorizing Provider   albuterol sulfate  (90 Base) MCG/ACT inhaler Inhale 2 puffs Every 4 (Four) Hours As Needed for Wheezing or Shortness of Air. Indications: Chronic Obstructive Lung Disease    ProviderDiogenes MD   aspirin 81 MG EC tablet Take 1 tablet by mouth Daily. Indications: Blood Clot Within a Vein    Provider, MD Diogenes   atorvastatin (LIPITOR) 80 MG tablet Take 0.5 tablets by mouth Daily.    Provider, MD Diogenes   brompheniramine-pseudoephedrine-DM 30-2-10 MG/5ML syrup Take 5 mL by mouth 4 (Four) Times a Day As Needed for Allergies. 5/5/25   Brianna Rodriguez APRN   budesonide (PULMICORT) 0.5 MG/2ML nebulizer solution Take 2 mL by nebulization 2 (Two) Times a Day. 6/21/24   Tracy Terry MD   dilTIAZem CD (CARDIZEM CD) 180 MG 24 hr capsule TAKE 1 CAPSULE BY MOUTH DAILY FOR HIGH BLOOD PRESSURE 6/12/25   Bria Matthews MD   doxycycline (MONODOX) 100 MG capsule Take 1 capsule by mouth 2 (Two) Times a Day. 6/16/25   Bria Matthews MD   Farxiga 10 MG tablet Take 10 mg by mouth Daily.  6/17/25   Bria Matthews MD   Fluticasone Furoate-Vilanterol (Breo Ellipta) 100-25 MCG/ACT aerosol powder  Inhale 1 puff Daily.    ProviderDiogenes MD   furosemide (LASIX) 40 MG tablet Take 1 tablet by mouth Daily. 7/7/24   Darrion Burch MD   guaiFENesin (MUCINEX) 600 MG 12 hr tablet Take 2 tablets by mouth 2 (Two) Times a Day.    Provider, MD Diogenes   ipratropium-albuterol (DUO-NEB) 0.5-2.5 mg/3 ml nebulizer Take 3 mL by nebulization 4 (Four) Times a Day. Indications: Chronic Obstructive Lung Disease 10/30/23   Bria Matthews MD   levothyroxine (Synthroid) 125 MCG tablet Take 1 tablet by mouth Every Morning. 4/21/25   Arlyn Holland APRN   Melatonin 3 MG capsule Take 1 capsule by mouth every night at bedtime.    Provider, MD Diogenes   Misc. Devices (Pulse Oximeter For Finger) misc Use 1 each 4 (Four) Times a Day. 6/16/25   Bria Matthews MD   O2 (OXYGEN) Inhale 2 L/min Continuous. Indications: COPD exacerbation, uncomplicated 10/6/23   Arlyn Holland APRN   pantoprazole (PROTONIX) 40 MG EC tablet Take 1 tablet by mouth Daily. 2/20/25   Bria Matthews MD   polyethylene glycol (MIRALAX) 17 g packet Take 17 g by mouth Daily. 12/3/24   Jong Iyer MD   potassium chloride (KLOR-CON M20) 20 MEQ CR tablet TAKE 1 TABLET BY MOUTH DAILY 6/23/25   Bria Matthews MD   senna 8.6 MG tablet Take 2 tablets by mouth Every Evening. Indications: Constipation 5/29/25   Bria Matthews MD   spironolactone (ALDACTONE) 25 MG tablet Take 1 tablet by mouth Daily. 5/29/25   Bria Matthews MD   warfarin (COUMADIN) 2.5 MG tablet Take 1 tablet by mouth See Admin Instructions. Take one tablet (2.5mg) 5 days weekly and two tablets (5mg) twice weekly. 6/25/24   Bria Matthews MD   warfarin (Coumadin) 3 MG tablet Take 3 mg by mouth daily or as directed by [facility]ication Management Clinic.  Indications: history of DVT/PE 5/12/25   Bria Matthews MD    albuterol (ACCUNEB) 1.25 MG/3ML nebulizer solution Inhale. Indications: Spasm of Lung Air Passages 23   Provider, MD Diogenes   Cholecalciferol 50 MCG ( UT) tablet Take 1 tablet by mouth. Indications: Vitamin D Deficiency 23   Provider, MD Diogenes       Medical history:   Past Medical History:   Diagnosis Date    Adenomatous polyp of colon     Allergic rhinitis     Asthma     Cervical disc disease     Fracture of thoracic spine 2011    Lacunar infarction 2019    Mitral valve disorder     Postmenopausal osteoporosis     Restless leg syndrome        Social history:   Social History     Tobacco Use    Smoking status: Former     Current packs/day: 0.00     Average packs/day: 1 pack/day for 36.0 years (36.0 ttl pk-yrs)     Types: Cigarettes     Start date:      Quit date:      Years since quittin.5     Passive exposure: Never    Smokeless tobacco: Never   Substance Use Topics    Alcohol use: No       Allergies:    Latex and Penicillin g      This patient is managed via a collaborative agreement, pursuant to IC [id number]. The signed protocol is kept in the [facility] at [address] [place] [facility].    Education: Selina Vazquez has been instructed to call if any changes in medications, doses, concerns, etc. Patient was provided dosing instructions and expressed verbal understanding and has no further questions at this time.    Plan:  1. no change; warfarin 3 mg PO daily. Total weekly dose = 21 mg.   2. INR should be tested in 2 weeks at PCP's office and called into clinic.     Roselyn Hill, AubreeD  2025  09:49 EDT

## 2025-07-09 ENCOUNTER — TELEPHONE (OUTPATIENT)
Dept: FAMILY MEDICINE CLINIC | Facility: CLINIC | Age: 80
End: 2025-07-09
Payer: MEDICARE

## 2025-07-09 ENCOUNTER — APPOINTMENT (OUTPATIENT)
Dept: GENERAL RADIOLOGY | Facility: HOSPITAL | Age: 80
End: 2025-07-09
Payer: MEDICARE

## 2025-07-09 ENCOUNTER — HOSPITAL ENCOUNTER (OUTPATIENT)
Facility: HOSPITAL | Age: 80
Setting detail: OBSERVATION
Discharge: HOME | End: 2025-07-11
Attending: EMERGENCY MEDICINE | Admitting: EMERGENCY MEDICINE
Payer: MEDICARE

## 2025-07-09 DIAGNOSIS — J40 BRONCHITIS: ICD-10-CM

## 2025-07-09 DIAGNOSIS — I48.91 ATRIAL FIBRILLATION WITH RAPID VENTRICULAR RESPONSE: ICD-10-CM

## 2025-07-09 DIAGNOSIS — J44.1 COPD WITH ACUTE EXACERBATION: ICD-10-CM

## 2025-07-09 DIAGNOSIS — J44.1 ACUTE EXACERBATION OF CHRONIC OBSTRUCTIVE PULMONARY DISEASE (COPD): Primary | ICD-10-CM

## 2025-07-09 LAB
ALBUMIN SERPL-MCNC: 4 G/DL (ref 3.5–5.2)
ALBUMIN/GLOB SERPL: 2 G/DL
ALP SERPL-CCNC: 67 U/L (ref 39–117)
ALT SERPL W P-5'-P-CCNC: 28 U/L (ref 1–33)
ANION GAP SERPL CALCULATED.3IONS-SCNC: 11.8 MMOL/L (ref 5–15)
AST SERPL-CCNC: 31 U/L (ref 1–32)
B PARAPERT DNA SPEC QL NAA+PROBE: NOT DETECTED
B PERT DNA SPEC QL NAA+PROBE: NOT DETECTED
BASOPHILS # BLD AUTO: 0.08 10*3/MM3 (ref 0–0.2)
BASOPHILS NFR BLD AUTO: 0.9 % (ref 0–1.5)
BILIRUB SERPL-MCNC: 0.5 MG/DL (ref 0–1.2)
BUN SERPL-MCNC: 14.7 MG/DL (ref 8–23)
BUN/CREAT SERPL: 10.9 (ref 7–25)
C PNEUM DNA NPH QL NAA+NON-PROBE: NOT DETECTED
CALCIUM SPEC-SCNC: 9.6 MG/DL (ref 8.6–10.5)
CHLORIDE SERPL-SCNC: 102 MMOL/L (ref 98–107)
CO2 SERPL-SCNC: 25.2 MMOL/L (ref 22–29)
CREAT SERPL-MCNC: 1.35 MG/DL (ref 0.57–1)
DEPRECATED RDW RBC AUTO: 46.6 FL (ref 37–54)
EGFRCR SERPLBLD CKD-EPI 2021: 40.1 ML/MIN/1.73
EOSINOPHIL # BLD AUTO: 0.3 10*3/MM3 (ref 0–0.4)
EOSINOPHIL NFR BLD AUTO: 3.5 % (ref 0.3–6.2)
ERYTHROCYTE [DISTWIDTH] IN BLOOD BY AUTOMATED COUNT: 14.6 % (ref 12.3–15.4)
FLUAV SUBTYP SPEC NAA+PROBE: NOT DETECTED
FLUBV RNA NPH QL NAA+NON-PROBE: NOT DETECTED
GEN 5 1HR TROPONIN T REFLEX: 63 NG/L
GLOBULIN UR ELPH-MCNC: 2 GM/DL
GLUCOSE SERPL-MCNC: 105 MG/DL (ref 65–99)
HADV DNA SPEC NAA+PROBE: NOT DETECTED
HCOV 229E RNA SPEC QL NAA+PROBE: NOT DETECTED
HCOV HKU1 RNA SPEC QL NAA+PROBE: NOT DETECTED
HCOV NL63 RNA SPEC QL NAA+PROBE: NOT DETECTED
HCOV OC43 RNA SPEC QL NAA+PROBE: NOT DETECTED
HCT VFR BLD AUTO: 39.1 % (ref 34–46.6)
HGB BLD-MCNC: 12 G/DL (ref 12–15.9)
HMPV RNA NPH QL NAA+NON-PROBE: NOT DETECTED
HPIV1 RNA ISLT QL NAA+PROBE: NOT DETECTED
HPIV2 RNA SPEC QL NAA+PROBE: NOT DETECTED
HPIV3 RNA NPH QL NAA+PROBE: NOT DETECTED
HPIV4 P GENE NPH QL NAA+PROBE: NOT DETECTED
IMM GRANULOCYTES # BLD AUTO: 0.02 10*3/MM3 (ref 0–0.05)
IMM GRANULOCYTES NFR BLD AUTO: 0.2 % (ref 0–0.5)
INR PPP: 2.57 (ref 2–3)
LYMPHOCYTES # BLD AUTO: 2.16 10*3/MM3 (ref 0.7–3.1)
LYMPHOCYTES NFR BLD AUTO: 25.3 % (ref 19.6–45.3)
M PNEUMO IGG SER IA-ACNC: NOT DETECTED
MCH RBC QN AUTO: 26.7 PG (ref 26.6–33)
MCHC RBC AUTO-ENTMCNC: 30.7 G/DL (ref 31.5–35.7)
MCV RBC AUTO: 87.1 FL (ref 79–97)
MONOCYTES # BLD AUTO: 0.9 10*3/MM3 (ref 0.1–0.9)
MONOCYTES NFR BLD AUTO: 10.5 % (ref 5–12)
NEUTROPHILS NFR BLD AUTO: 5.09 10*3/MM3 (ref 1.7–7)
NEUTROPHILS NFR BLD AUTO: 59.6 % (ref 42.7–76)
NRBC BLD AUTO-RTO: 0 /100 WBC (ref 0–0.2)
NT-PROBNP SERPL-MCNC: 288 PG/ML (ref 0–1800)
PLATELET # BLD AUTO: 195 10*3/MM3 (ref 140–450)
PMV BLD AUTO: 11 FL (ref 6–12)
POTASSIUM SERPL-SCNC: 4.1 MMOL/L (ref 3.5–5.2)
PROCALCITONIN SERPL-MCNC: 0.07 NG/ML (ref 0–0.25)
PROT SERPL-MCNC: 6 G/DL (ref 6–8.5)
PROTHROMBIN TIME: 27.9 SECONDS (ref 19.4–28.5)
RBC # BLD AUTO: 4.49 10*6/MM3 (ref 3.77–5.28)
RHINOVIRUS RNA SPEC NAA+PROBE: NOT DETECTED
RSV RNA NPH QL NAA+NON-PROBE: NOT DETECTED
SARS-COV-2 RNA RESP QL NAA+PROBE: NOT DETECTED
SODIUM SERPL-SCNC: 139 MMOL/L (ref 136–145)
TROPONIN T % DELTA: -2
TROPONIN T NUMERIC DELTA: -1 NG/L
TROPONIN T SERPL HS-MCNC: 64 NG/L
WBC NRBC COR # BLD AUTO: 8.55 10*3/MM3 (ref 3.4–10.8)

## 2025-07-09 PROCEDURE — 94799 UNLISTED PULMONARY SVC/PX: CPT

## 2025-07-09 PROCEDURE — 85025 COMPLETE CBC W/AUTO DIFF WBC: CPT | Performed by: EMERGENCY MEDICINE

## 2025-07-09 PROCEDURE — 96374 THER/PROPH/DIAG INJ IV PUSH: CPT

## 2025-07-09 PROCEDURE — G0378 HOSPITAL OBSERVATION PER HR: HCPCS

## 2025-07-09 PROCEDURE — 85610 PROTHROMBIN TIME: CPT | Performed by: EMERGENCY MEDICINE

## 2025-07-09 PROCEDURE — 93005 ELECTROCARDIOGRAM TRACING: CPT | Performed by: EMERGENCY MEDICINE

## 2025-07-09 PROCEDURE — 71045 X-RAY EXAM CHEST 1 VIEW: CPT

## 2025-07-09 PROCEDURE — 93005 ELECTROCARDIOGRAM TRACING: CPT

## 2025-07-09 PROCEDURE — 36415 COLL VENOUS BLD VENIPUNCTURE: CPT

## 2025-07-09 PROCEDURE — 83880 ASSAY OF NATRIURETIC PEPTIDE: CPT | Performed by: EMERGENCY MEDICINE

## 2025-07-09 PROCEDURE — 25010000002 METHYLPREDNISOLONE PER 125 MG: Performed by: EMERGENCY MEDICINE

## 2025-07-09 PROCEDURE — 80053 COMPREHEN METABOLIC PANEL: CPT | Performed by: EMERGENCY MEDICINE

## 2025-07-09 PROCEDURE — 99285 EMERGENCY DEPT VISIT HI MDM: CPT

## 2025-07-09 PROCEDURE — 0202U NFCT DS 22 TRGT SARS-COV-2: CPT | Performed by: EMERGENCY MEDICINE

## 2025-07-09 PROCEDURE — 84484 ASSAY OF TROPONIN QUANT: CPT | Performed by: EMERGENCY MEDICINE

## 2025-07-09 PROCEDURE — 84145 PROCALCITONIN (PCT): CPT | Performed by: EMERGENCY MEDICINE

## 2025-07-09 RX ORDER — AZITHROMYCIN 250 MG/1
500 TABLET, FILM COATED ORAL ONCE
Status: COMPLETED | OUTPATIENT
Start: 2025-07-09 | End: 2025-07-09

## 2025-07-09 RX ORDER — POLYETHYLENE GLYCOL 3350 17 G/17G
17 POWDER, FOR SOLUTION ORAL DAILY PRN
Status: DISCONTINUED | OUTPATIENT
Start: 2025-07-09 | End: 2025-07-11 | Stop reason: HOSPADM

## 2025-07-09 RX ORDER — BISACODYL 10 MG
10 SUPPOSITORY, RECTAL RECTAL DAILY PRN
Status: DISCONTINUED | OUTPATIENT
Start: 2025-07-09 | End: 2025-07-11 | Stop reason: HOSPADM

## 2025-07-09 RX ORDER — METHYLPREDNISOLONE SODIUM SUCCINATE 125 MG/2ML
125 INJECTION, POWDER, LYOPHILIZED, FOR SOLUTION INTRAMUSCULAR; INTRAVENOUS ONCE
Status: COMPLETED | OUTPATIENT
Start: 2025-07-09 | End: 2025-07-09

## 2025-07-09 RX ORDER — IPRATROPIUM BROMIDE AND ALBUTEROL SULFATE 2.5; .5 MG/3ML; MG/3ML
3 SOLUTION RESPIRATORY (INHALATION) ONCE
Status: COMPLETED | OUTPATIENT
Start: 2025-07-09 | End: 2025-07-09

## 2025-07-09 RX ORDER — SODIUM CHLORIDE 9 MG/ML
40 INJECTION, SOLUTION INTRAVENOUS AS NEEDED
Status: DISCONTINUED | OUTPATIENT
Start: 2025-07-09 | End: 2025-07-11 | Stop reason: HOSPADM

## 2025-07-09 RX ORDER — SODIUM CHLORIDE 0.9 % (FLUSH) 0.9 %
10 SYRINGE (ML) INJECTION AS NEEDED
Status: DISCONTINUED | OUTPATIENT
Start: 2025-07-09 | End: 2025-07-11 | Stop reason: HOSPADM

## 2025-07-09 RX ORDER — SODIUM CHLORIDE 0.9 % (FLUSH) 0.9 %
10 SYRINGE (ML) INJECTION EVERY 12 HOURS SCHEDULED
Status: DISCONTINUED | OUTPATIENT
Start: 2025-07-09 | End: 2025-07-11 | Stop reason: HOSPADM

## 2025-07-09 RX ORDER — BISACODYL 5 MG/1
5 TABLET, DELAYED RELEASE ORAL DAILY PRN
Status: DISCONTINUED | OUTPATIENT
Start: 2025-07-09 | End: 2025-07-11 | Stop reason: HOSPADM

## 2025-07-09 RX ORDER — IPRATROPIUM BROMIDE AND ALBUTEROL SULFATE 2.5; .5 MG/3ML; MG/3ML
3 SOLUTION RESPIRATORY (INHALATION)
Status: DISCONTINUED | OUTPATIENT
Start: 2025-07-09 | End: 2025-07-11 | Stop reason: HOSPADM

## 2025-07-09 RX ORDER — AMOXICILLIN 250 MG
2 CAPSULE ORAL 2 TIMES DAILY PRN
Status: DISCONTINUED | OUTPATIENT
Start: 2025-07-09 | End: 2025-07-11 | Stop reason: HOSPADM

## 2025-07-09 RX ADMIN — METHYLPREDNISOLONE SODIUM SUCCINATE 125 MG: 125 INJECTION, POWDER, FOR SOLUTION INTRAMUSCULAR; INTRAVENOUS at 22:48

## 2025-07-09 RX ADMIN — IPRATROPIUM BROMIDE AND ALBUTEROL SULFATE 3 ML: .5; 3 SOLUTION RESPIRATORY (INHALATION) at 22:20

## 2025-07-09 RX ADMIN — AZITHROMYCIN 500 MG: 250 TABLET, FILM COATED ORAL at 22:48

## 2025-07-09 NOTE — TELEPHONE ENCOUNTER
I received a phone call about Selina. They said that they took Selina to the hospital because she's needing something for her nerves. She said Selina is getting nervous and anxious and is having a hard time breathing. They said they gave Selina a nerve pill at the hospital that helped but they would like something called in.

## 2025-07-09 NOTE — TELEPHONE ENCOUNTER
Patient called and YESSYCARL was unable to contact her back I did call her and she said she was having having trouble breathing she went to [place] and they sent her home I advised that she go to [facility].

## 2025-07-09 NOTE — TELEPHONE ENCOUNTER
I do not see what medication she was given at [facility].  If you could tell us what the medicine is we will decide if we can prescribe it or will need to refer her to get that prescription.  Please let us know

## 2025-07-10 LAB
ANION GAP SERPL CALCULATED.3IONS-SCNC: 10.4 MMOL/L (ref 5–15)
BUN SERPL-MCNC: 15.2 MG/DL (ref 8–23)
BUN/CREAT SERPL: 11.3 (ref 7–25)
CALCIUM SPEC-SCNC: 9.3 MG/DL (ref 8.6–10.5)
CHLORIDE SERPL-SCNC: 105 MMOL/L (ref 98–107)
CO2 SERPL-SCNC: 25.6 MMOL/L (ref 22–29)
CREAT SERPL-MCNC: 1.35 MG/DL (ref 0.57–1)
DEPRECATED RDW RBC AUTO: 46.1 FL (ref 37–54)
EGFRCR SERPLBLD CKD-EPI 2021: 40.1 ML/MIN/1.73
ERYTHROCYTE [DISTWIDTH] IN BLOOD BY AUTOMATED COUNT: 14.4 % (ref 12.3–15.4)
GLUCOSE BLDC GLUCOMTR-MCNC: 156 MG/DL (ref 70–105)
GLUCOSE BLDC GLUCOMTR-MCNC: 168 MG/DL (ref 70–105)
GLUCOSE BLDC GLUCOMTR-MCNC: 233 MG/DL (ref 70–105)
GLUCOSE BLDC GLUCOMTR-MCNC: 240 MG/DL (ref 70–105)
GLUCOSE SERPL-MCNC: 256 MG/DL (ref 65–99)
HBA1C MFR BLD: 5.37 % (ref 4.8–5.6)
HCT VFR BLD AUTO: 39.8 % (ref 34–46.6)
HGB BLD-MCNC: 12.1 G/DL (ref 12–15.9)
INR PPP: 2.77 (ref 2–3)
MCH RBC QN AUTO: 26.7 PG (ref 26.6–33)
MCHC RBC AUTO-ENTMCNC: 30.4 G/DL (ref 31.5–35.7)
MCV RBC AUTO: 87.7 FL (ref 79–97)
PLATELET # BLD AUTO: 177 10*3/MM3 (ref 140–450)
PMV BLD AUTO: 11.1 FL (ref 6–12)
POTASSIUM SERPL-SCNC: 4.3 MMOL/L (ref 3.5–5.2)
PROTHROMBIN TIME: 29.6 SECONDS (ref 19.4–28.5)
QT INTERVAL: 375 MS
QTC INTERVAL: 434 MS
RBC # BLD AUTO: 4.54 10*6/MM3 (ref 3.77–5.28)
SODIUM SERPL-SCNC: 141 MMOL/L (ref 136–145)
TROPONIN T SERPL HS-MCNC: 48 NG/L
WBC NRBC COR # BLD AUTO: 5.84 10*3/MM3 (ref 3.4–10.8)

## 2025-07-10 PROCEDURE — 25010000002 METHYLPREDNISOLONE PER 40 MG: Performed by: PHYSICIAN ASSISTANT

## 2025-07-10 PROCEDURE — 85027 COMPLETE CBC AUTOMATED: CPT | Performed by: EMERGENCY MEDICINE

## 2025-07-10 PROCEDURE — 80048 BASIC METABOLIC PNL TOTAL CA: CPT | Performed by: EMERGENCY MEDICINE

## 2025-07-10 PROCEDURE — 85610 PROTHROMBIN TIME: CPT | Performed by: PHYSICIAN ASSISTANT

## 2025-07-10 PROCEDURE — 84484 ASSAY OF TROPONIN QUANT: CPT | Performed by: PHYSICIAN ASSISTANT

## 2025-07-10 PROCEDURE — 94799 UNLISTED PULMONARY SVC/PX: CPT

## 2025-07-10 PROCEDURE — 96376 TX/PRO/DX INJ SAME DRUG ADON: CPT

## 2025-07-10 PROCEDURE — 83036 HEMOGLOBIN GLYCOSYLATED A1C: CPT | Performed by: PHYSICIAN ASSISTANT

## 2025-07-10 PROCEDURE — 82948 REAGENT STRIP/BLOOD GLUCOSE: CPT

## 2025-07-10 PROCEDURE — 94640 AIRWAY INHALATION TREATMENT: CPT

## 2025-07-10 PROCEDURE — 82948 REAGENT STRIP/BLOOD GLUCOSE: CPT | Performed by: PHYSICIAN ASSISTANT

## 2025-07-10 PROCEDURE — G0378 HOSPITAL OBSERVATION PER HR: HCPCS

## 2025-07-10 PROCEDURE — 94664 DEMO&/EVAL PT USE INHALER: CPT

## 2025-07-10 PROCEDURE — 94761 N-INVAS EAR/PLS OXIMETRY MLT: CPT

## 2025-07-10 RX ORDER — NICOTINE POLACRILEX 4 MG
15 LOZENGE BUCCAL
Status: DISCONTINUED | OUTPATIENT
Start: 2025-07-10 | End: 2025-07-11 | Stop reason: HOSPADM

## 2025-07-10 RX ORDER — ONDANSETRON 2 MG/ML
4 INJECTION INTRAMUSCULAR; INTRAVENOUS EVERY 6 HOURS PRN
Status: DISCONTINUED | OUTPATIENT
Start: 2025-07-10 | End: 2025-07-11 | Stop reason: HOSPADM

## 2025-07-10 RX ORDER — METHYLPREDNISOLONE SODIUM SUCCINATE 40 MG/ML
40 INJECTION, POWDER, LYOPHILIZED, FOR SOLUTION INTRAMUSCULAR; INTRAVENOUS EVERY 12 HOURS
Status: DISCONTINUED | OUTPATIENT
Start: 2025-07-10 | End: 2025-07-11 | Stop reason: HOSPADM

## 2025-07-10 RX ORDER — INSULIN LISPRO 100 [IU]/ML
2-7 INJECTION, SOLUTION INTRAVENOUS; SUBCUTANEOUS
Status: DISCONTINUED | OUTPATIENT
Start: 2025-07-10 | End: 2025-07-10

## 2025-07-10 RX ORDER — AZITHROMYCIN 250 MG/1
500 TABLET, FILM COATED ORAL
Status: DISCONTINUED | OUTPATIENT
Start: 2025-07-10 | End: 2025-07-11 | Stop reason: HOSPADM

## 2025-07-10 RX ORDER — DILTIAZEM HYDROCHLORIDE 180 MG/1
180 CAPSULE, COATED, EXTENDED RELEASE ORAL DAILY
COMMUNITY

## 2025-07-10 RX ORDER — CODEINE PHOSPHATE AND GUAIFENESIN 10; 100 MG/5ML; MG/5ML
10 SOLUTION ORAL EVERY 6 HOURS PRN
Status: DISCONTINUED | OUTPATIENT
Start: 2025-07-10 | End: 2025-07-11 | Stop reason: HOSPADM

## 2025-07-10 RX ORDER — ASPIRIN 81 MG/1
81 TABLET ORAL DAILY
Status: DISCONTINUED | OUTPATIENT
Start: 2025-07-10 | End: 2025-07-11 | Stop reason: HOSPADM

## 2025-07-10 RX ORDER — WARFARIN SODIUM 3 MG/1
3 TABLET ORAL
Status: COMPLETED | OUTPATIENT
Start: 2025-07-10 | End: 2025-07-10

## 2025-07-10 RX ORDER — IBUPROFEN 600 MG/1
1 TABLET ORAL
Status: DISCONTINUED | OUTPATIENT
Start: 2025-07-10 | End: 2025-07-11 | Stop reason: HOSPADM

## 2025-07-10 RX ORDER — BENZONATATE 100 MG/1
200 CAPSULE ORAL 3 TIMES DAILY PRN
Status: DISCONTINUED | OUTPATIENT
Start: 2025-07-10 | End: 2025-07-11 | Stop reason: HOSPADM

## 2025-07-10 RX ORDER — DILTIAZEM HYDROCHLORIDE 180 MG/1
180 CAPSULE, COATED, EXTENDED RELEASE ORAL DAILY
Status: DISCONTINUED | OUTPATIENT
Start: 2025-07-10 | End: 2025-07-11 | Stop reason: HOSPADM

## 2025-07-10 RX ORDER — INSULIN LISPRO 100 [IU]/ML
2-9 INJECTION, SOLUTION INTRAVENOUS; SUBCUTANEOUS
Status: DISCONTINUED | OUTPATIENT
Start: 2025-07-10 | End: 2025-07-10

## 2025-07-10 RX ORDER — ALUMINA, MAGNESIA, AND SIMETHICONE 2400; 2400; 240 MG/30ML; MG/30ML; MG/30ML
15 SUSPENSION ORAL EVERY 6 HOURS PRN
Status: DISCONTINUED | OUTPATIENT
Start: 2025-07-10 | End: 2025-07-11 | Stop reason: HOSPADM

## 2025-07-10 RX ORDER — SODIUM CHLORIDE 0.9 % (FLUSH) 0.9 %
10 SYRINGE (ML) INJECTION EVERY 12 HOURS SCHEDULED
Status: DISCONTINUED | OUTPATIENT
Start: 2025-07-10 | End: 2025-07-10

## 2025-07-10 RX ORDER — ONDANSETRON 4 MG/1
4 TABLET, ORALLY DISINTEGRATING ORAL EVERY 6 HOURS PRN
Status: DISCONTINUED | OUTPATIENT
Start: 2025-07-10 | End: 2025-07-11 | Stop reason: HOSPADM

## 2025-07-10 RX ORDER — PANTOPRAZOLE SODIUM 40 MG/1
40 TABLET, DELAYED RELEASE ORAL DAILY
Status: DISCONTINUED | OUTPATIENT
Start: 2025-07-10 | End: 2025-07-11 | Stop reason: HOSPADM

## 2025-07-10 RX ORDER — SODIUM CHLORIDE 9 MG/ML
40 INJECTION, SOLUTION INTRAVENOUS AS NEEDED
Status: DISCONTINUED | OUTPATIENT
Start: 2025-07-10 | End: 2025-07-10

## 2025-07-10 RX ORDER — DEXTROSE MONOHYDRATE 25 G/50ML
25 INJECTION, SOLUTION INTRAVENOUS
Status: DISCONTINUED | OUTPATIENT
Start: 2025-07-10 | End: 2025-07-11 | Stop reason: HOSPADM

## 2025-07-10 RX ORDER — GUAIFENESIN 600 MG/1
600 TABLET, EXTENDED RELEASE ORAL EVERY 12 HOURS SCHEDULED
Status: DISCONTINUED | OUTPATIENT
Start: 2025-07-10 | End: 2025-07-11 | Stop reason: HOSPADM

## 2025-07-10 RX ORDER — BUDESONIDE 0.5 MG/2ML
0.5 INHALANT ORAL
Status: DISCONTINUED | OUTPATIENT
Start: 2025-07-10 | End: 2025-07-11 | Stop reason: HOSPADM

## 2025-07-10 RX ORDER — SPIRONOLACTONE 25 MG/1
25 TABLET ORAL DAILY
Status: DISCONTINUED | OUTPATIENT
Start: 2025-07-10 | End: 2025-07-11 | Stop reason: HOSPADM

## 2025-07-10 RX ORDER — LEVOTHYROXINE SODIUM 125 UG/1
125 TABLET ORAL
Status: DISCONTINUED | OUTPATIENT
Start: 2025-07-11 | End: 2025-07-11 | Stop reason: HOSPADM

## 2025-07-10 RX ORDER — SODIUM CHLORIDE 0.9 % (FLUSH) 0.9 %
10 SYRINGE (ML) INJECTION AS NEEDED
Status: DISCONTINUED | OUTPATIENT
Start: 2025-07-10 | End: 2025-07-10

## 2025-07-10 RX ORDER — ACETAMINOPHEN 325 MG/1
650 TABLET ORAL EVERY 6 HOURS PRN
Status: DISCONTINUED | OUTPATIENT
Start: 2025-07-10 | End: 2025-07-11 | Stop reason: HOSPADM

## 2025-07-10 RX ADMIN — IPRATROPIUM BROMIDE AND ALBUTEROL SULFATE 3 ML: .5; 3 SOLUTION RESPIRATORY (INHALATION) at 18:25

## 2025-07-10 RX ADMIN — GUAIFENESIN 600 MG: 600 TABLET, EXTENDED RELEASE ORAL at 21:19

## 2025-07-10 RX ADMIN — Medication 10 ML: at 21:19

## 2025-07-10 RX ADMIN — ACETAMINOPHEN 650 MG: 325 TABLET, FILM COATED ORAL at 23:52

## 2025-07-10 RX ADMIN — PANTOPRAZOLE SODIUM 40 MG: 40 TABLET, DELAYED RELEASE ORAL at 15:32

## 2025-07-10 RX ADMIN — WARFARIN SODIUM 3 MG: 3 TABLET ORAL at 18:02

## 2025-07-10 RX ADMIN — METHYLPREDNISOLONE SODIUM SUCCINATE 40 MG: 40 INJECTION, POWDER, FOR SOLUTION INTRAMUSCULAR; INTRAVENOUS at 21:19

## 2025-07-10 RX ADMIN — GUAIFENESIN 600 MG: 600 TABLET, EXTENDED RELEASE ORAL at 10:21

## 2025-07-10 RX ADMIN — IPRATROPIUM BROMIDE AND ALBUTEROL SULFATE 3 ML: .5; 3 SOLUTION RESPIRATORY (INHALATION) at 11:11

## 2025-07-10 RX ADMIN — BUDESONIDE 0.5 MG: 0.5 SUSPENSION RESPIRATORY (INHALATION) at 11:15

## 2025-07-10 RX ADMIN — AZITHROMYCIN 500 MG: 250 TABLET, FILM COATED ORAL at 21:19

## 2025-07-10 RX ADMIN — ASPIRIN 81 MG: 81 TABLET, COATED ORAL at 15:32

## 2025-07-10 RX ADMIN — IPRATROPIUM BROMIDE AND ALBUTEROL SULFATE 3 ML: .5; 3 SOLUTION RESPIRATORY (INHALATION) at 07:03

## 2025-07-10 RX ADMIN — BUDESONIDE 0.5 MG: 0.5 SUSPENSION RESPIRATORY (INHALATION) at 18:30

## 2025-07-10 RX ADMIN — Medication 2.5 MG: at 21:19

## 2025-07-10 RX ADMIN — DILTIAZEM HYDROCHLORIDE 180 MG: 180 CAPSULE, COATED, EXTENDED RELEASE ORAL at 15:32

## 2025-07-10 RX ADMIN — IPRATROPIUM BROMIDE AND ALBUTEROL SULFATE 3 ML: .5; 3 SOLUTION RESPIRATORY (INHALATION) at 23:05

## 2025-07-10 RX ADMIN — METHYLPREDNISOLONE SODIUM SUCCINATE 40 MG: 40 INJECTION, POWDER, FOR SOLUTION INTRAMUSCULAR; INTRAVENOUS at 10:21

## 2025-07-10 RX ADMIN — SPIRONOLACTONE 25 MG: 25 TABLET ORAL at 15:32

## 2025-07-10 RX ADMIN — Medication 10 ML: at 10:21

## 2025-07-10 RX ADMIN — ACETAMINOPHEN 650 MG: 325 TABLET, FILM COATED ORAL at 17:50

## 2025-07-10 RX ADMIN — EMPAGLIFLOZIN 25 MG: 25 TABLET, FILM COATED ORAL at 15:32

## 2025-07-10 NOTE — PROGRESS NOTES
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    INR Goal: 2 - 3  Home medication?: warfarin 3 mg PO daily  Bridge Therapy Present?:  No  Interacting Medications Evaluation (New/Present/Discontinued): Azithromycin and Methylprednisolone can increase the AC effects of warfarin.  Additional Contributing Factors: Age, body habitus    Assessment/Plan:    Pt is on warfarin 3mg daily outpatient for hx of PE. Current INR 2.77 (therapeutic). It appears this pt INR teeters close to the U.L. of the therapeutic range while outpt also. Will continue home regimen of warfarin 3 mg today.      May need to decrease daily doses due to DDIs while inpt. Daily INRs ordered.  Continue to monitor and adjust based on INR.     Date 7/9 7/10          INR 2.57 2.77          Dose ?PTA 3 mg            Diet Order   Procedures    Diet: Cardiac, Diabetic; Healthy Heart (2-3 Na+); Consistent Carbohydrate; Fluid Consistency: Thin (IDDSI 0)      Objective:  [Ht: 152.4 cm (60\"); Wt: 94.7 kg (208 lb 12.4 oz); BMI: Body mass index is 40.77 kg/m².]    Lab Results   Component Value Date    ALBUMIN 4.0 07/09/2025     Lab Results   Component Value Date    INR 2.77 07/10/2025    INR 2.57 07/09/2025    INR 2.70 (A) 07/08/2025    PROTIME 29.6 (H) 07/10/2025    PROTIME 27.9 07/09/2025    PROTIME 21.0 12/04/2024     Lab Results   Component Value Date    HGB 12.1 07/10/2025    HGB 12.0 07/09/2025    HGB 13.0 06/16/2025     Lab Results   Component Value Date    HCT 39.8 07/10/2025    HCT 39.1 07/09/2025    HCT 40.4 06/16/2025     Yao Maradiaga RPH  07/10/25 10:23 EDT     "

## 2025-07-10 NOTE — PLAN OF CARE
Problem: Adult Inpatient Plan of Care  Goal: Plan of Care Review  Outcome: Progressing  Goal: Patient-Specific Goal (Individualized)  Outcome: Progressing  Goal: Absence of Hospital-Acquired Illness or Injury  Outcome: Progressing  Intervention: Identify and Manage Fall Risk  Intervention: Prevent Infection  Goal: Optimal Comfort and Wellbeing  Outcome: Progressing  Intervention: Provide Person-Centered Care  Goal: Readiness for Transition of Care  Outcome: Progressing  Intervention: Mutually Develop Transition Plan     Problem: Skin Injury Risk Increased  Goal: Skin Health and Integrity  Outcome: Progressing  Intervention: Optimize Skin Protection   Goal Outcome Evaluation:

## 2025-07-10 NOTE — ED PROVIDER NOTES
Subjective   History of Present Illness  79-year-old female patient presents for points of shortness of breath going on for 1 to 2 days.  Breathing is worse when she lays flat.  States she has chronic lower extremity swelling that is actually improved from prior.  States he is a cough, sometimes productive, chills, mild sore throat, runny nose, lightheadedness when she goes to stand up.  States she does not have any pain including no chest pain.  She uses 2 L of oxygen at home.  Did a breathing treatment an hour or 2 prior to arrival without significant present symptoms.  Seen in outside emergency department yesterday but states she did not receive any medications or intervention.  She takes warfarin and had a therapeutic INR yesterday.      Review of Systems  See HPI.  Past Medical History:   Diagnosis Date    Adenomatous polyp of colon     Allergic rhinitis     Asthma     Cervical disc disease     Fracture of thoracic spine 11/16/2011    Lacunar infarction 08/07/2019    Mitral valve disorder     Postmenopausal osteoporosis     Restless leg syndrome        Allergies   Allergen Reactions    Latex Unknown - High Severity     Pt cannot remember at this time    Penicillin G Rash       Past Surgical History:   Procedure Laterality Date    APPENDECTOMY OPEN      BRONCHOSCOPY N/A 06/08/2023    Procedure: BRONCHOSCOPY WITH BRONCHIAL WASHING;  Surgeon: Brenda Zuniga MD;  Location: [facility] ENDOSCOPY;  Service: Pulmonary;  Laterality: N/A;  Post: PNEUMONIA    BRONCHOSCOPY N/A 6/19/2024    Procedure: BRONCHOSCOPY WITH RIGHT LOWER LOBE BRONCHOALVEOLAR LAVAGE;  Surgeon: Brenda Zuniga MD;  Location: [facility] ENDOSCOPY;  Service: Pulmonary;  Laterality: N/A;    CARDIAC CATHETERIZATION      CHOLECYSTECTOMY      CORONARY STENT PLACEMENT      THYROIDECTOMY      TOTAL ABDOMINAL HYSTERECTOMY WITH SALPINGO OOPHORECTOMY         Family History   Problem Relation Age of Onset    Diabetes Mother     Pancreatic cancer Father     Pancreatic  "cancer Brother        Social History     Socioeconomic History    Marital status:    Tobacco Use    Smoking status: Former     Current packs/day: 0.00     Average packs/day: 1 pack/day for 36.0 years (36.0 ttl pk-yrs)     Types: Cigarettes     Start date:      Quit date:      Years since quittin.5     Passive exposure: Never    Smokeless tobacco: Never   Vaping Use    Vaping status: Never Used   Substance and Sexual Activity    Alcohol use: No    Drug use: No    Sexual activity: Defer           Objective   Physical Exam  No acute distress, mildly tachypneic, normocephalic, no scleral icterus, moist oral mucosa, bilateral lower extremity edema, extremities warm and well-perfused, diminished breath sounds bilaterally, regular rhythm and normal heart rate, abdomen soft nontender without peritoneal signs, alert.  Procedures           ED Course      /73   Pulse 84   Temp 97.9 °F (36.6 °C) (Oral)   Resp 18   Ht 152.4 cm (60\")   Wt 94.7 kg (208 lb 12.4 oz)   SpO2 99%   BMI 40.77 kg/m²   Labs Reviewed   COMPREHENSIVE METABOLIC PANEL - Abnormal; Notable for the following components:       Result Value    Glucose 105 (*)     Creatinine 1.35 (*)     eGFR 40.1 (*)     All other components within normal limits    Narrative:     GFR Categories in Chronic Kidney Disease (CKD)              GFR Category          GFR (mL/min/1.73)    Interpretation  G1                    90 or greater        Normal or high (1)  G2                    60-89                Mild decrease (1)  G3a                   45-59                Mild to moderate decrease  G3b                   30-44                Moderate to severe decrease  G4                    15-29                Severe decrease  G5                    14 or less           Kidney failure    (1)In the absence of evidence of kidney disease, neither GFR category G1 or G2 fulfill the criteria for CKD.    eGFR calculation  CKD-EPI creatinine equation, which does " not include race as a factor   TROPONIN - Abnormal; Notable for the following components:    HS Troponin T 64 (*)     All other components within normal limits    Narrative:     High Sensitive Troponin T Reference Range:  <14.0 ng/L- Negative Female for AMI  <22.0 ng/L- Negative Male for AMI  >=14 - Abnormal Female indicating possible myocardial injury.  >=22 - Abnormal Male indicating possible myocardial injury.   Clinicians would have to utilize clinical acumen, EKG, Troponin, and serial changes to determine if it is an Acute Myocardial Infarction or myocardial injury due to an underlying chronic condition.        CBC WITH AUTO DIFFERENTIAL - Abnormal; Notable for the following components:    MCHC 30.7 (*)     All other components within normal limits   HIGH SENSITIVITIY TROPONIN T 1HR - Abnormal; Notable for the following components:    HS Troponin T 63 (*)     All other components within normal limits    Narrative:     High Sensitive Troponin T Reference Range:  <14.0 ng/L- Negative Female for AMI  <22.0 ng/L- Negative Male for AMI  >=14 - Abnormal Female indicating possible myocardial injury.  >=22 - Abnormal Male indicating possible myocardial injury.   Clinicians would have to utilize clinical acumen, EKG, Troponin, and serial changes to determine if it is an Acute Myocardial Infarction or myocardial injury due to an underlying chronic condition.        RESPIRATORY PANEL PCR W/ COVID-19 (SARS-COV-2), NP SWAB IN UTM/VTP, 2 HR TAT - Normal    Narrative:     In the setting of a positive respiratory panel with a viral infection PLUS a negative procalcitonin without other underlying concern for bacterial infection, consider observing off antibiotics or discontinuation of antibiotics and continue supportive care. If the respiratory panel is positive for atypical bacterial infection (Bordetella pertussis, Chlamydophila pneumoniae, or Mycoplasma pneumoniae), consider antibiotic de-escalation to target atypical  "bacterial infection.   BNP (IN-HOUSE) - Normal    Narrative:     This assay is used as an aid in the diagnosis of individuals suspected of having heart failure. It can be used as an aid in the diagnosis of acute decompensated heart failure (ADHF) in patients presenting with signs and symptoms of ADHF to the emergency department (ED). In addition, NT-proBNP of <300 pg/mL indicates ADHF is not likely.    Age Range Result Interpretation  NT-proBNP Concentration (pg/mL:      <50             Positive            >450                   Gray                 300-450                    Negative             <300    50-75           Positive            >900                  Gray                300-900                  Negative            <300      >75             Positive            >1800                  Gray                300-1800                  Negative            <300   PROTIME-INR - Normal   PROCALCITONIN - Normal    Narrative:     As a Marker for Sepsis (Non-Neonates):    1. <0.5 ng/mL represents a low risk of severe sepsis and/or septic shock.  2. >2 ng/mL represents a high risk of severe sepsis and/or septic shock.    As a Marker for Lower Respiratory Tract Infections that require antibiotic therapy:    PCT on Admission    Antibiotic Therapy       6-12 Hrs later    >0.5                Strongly Recommended  >0.25 - <0.5        Recommended   0.1 - 0.25          Discouraged              Remeasure/reassess PCT  <0.1                Strongly Discouraged     Remeasure/reassess PCT    As 28 day mortality risk marker: \"Change in Procalcitonin Result\" (>80% or <=80%) if Day 0 (or Day 1) and Day 4 values are available. Refer to http://www.[employer]-pct-calculator.com    Change in PCT <=80%  A decrease of PCT levels below or equal to 80% defines a positive change in PCT test result representing a higher risk for 28-day all-cause mortality of patients diagnosed with severe sepsis for septic shock.    Change in PCT >80%  A decrease of " PCT levels of more than 80% defines a negative change in PCT result representing a lower risk for 28-day all-cause mortality of patients diagnosed with severe sepsis or septic shock.      CBC (NO DIFF)   BASIC METABOLIC PANEL   CBC AND DIFFERENTIAL    Narrative:     The following orders were created for panel order CBC & Differential.  Procedure                               Abnormality         Status                     ---------                               -----------         ------                     CBC Auto Differential[[id number]]        Abnormal            Final result                 Please view results for these tests on the individual orders.     Medications   sodium chloride 0.9 % flush 10 mL (has no administration in time range)   sodium chloride 0.9 % flush 10 mL (10 mL Intravenous Not Given 7/9/25 2244)   sodium chloride 0.9 % flush 10 mL (has no administration in time range)   sodium chloride 0.9 % infusion 40 mL (has no administration in time range)   Potassium Replacement - Follow Nurse / BPA Driven Protocol (has no administration in time range)   Magnesium Standard Dose Replacement - Follow Nurse / BPA Driven Protocol (has no administration in time range)   Phosphorus Replacement - Follow Nurse / BPA Driven Protocol (has no administration in time range)   Calcium Replacement - Follow Nurse / BPA Driven Protocol (has no administration in time range)   sennosides-docusate (PERICOLACE) 8.6-50 MG per tablet 2 tablet (has no administration in time range)     And   polyethylene glycol (MIRALAX) packet 17 g (has no administration in time range)     And   bisacodyl (DULCOLAX) EC tablet 5 mg (has no administration in time range)     And   bisacodyl (DULCOLAX) suppository 10 mg (has no administration in time range)   ipratropium-albuterol (DUO-NEB) nebulizer solution 3 mL (3 mL Nebulization Not Given 7/9/25 2248)   ipratropium-albuterol (DUO-NEB) nebulizer solution 3 mL (3 mL Nebulization Given 7/9/25  2220)   methylPREDNISolone sodium succinate (SOLU-Medrol) injection 125 mg (125 mg Intravenous Given 7/9/25 2248)   azithromycin (ZITHROMAX) tablet 500 mg (500 mg Oral Given 7/9/25 2248)     XR Chest 1 View   Final Result   Impression:   No acute cardiopulmonary findings.         Electronically Signed: Tomi Sotomayor MD     7/9/2025 9:34 PM EDT     Workstation ID: [id number]                                                         Medical Decision Making  Problems Addressed:  Acute exacerbation of chronic obstructive pulmonary disease (COPD): complicated acute illness or injury    Amount and/or Complexity of Data Reviewed  Labs: ordered.  Radiology: ordered.  ECG/medicine tests: ordered.    Risk  OTC drugs.  Prescription drug management.  Decision regarding hospitalization.    My interpretation of chest x-ray is negative for pneumothorax.  See system for radiology interpretation.    EKG interpretation: Rate 80, normal sinus rhythm, normal axis, normal intervals, no acute ischemic changes, interpretation in some leads limited by artifact.    Patient with reassuring labs.  Elevated troponin.  Reassuring procalcitonin.  Pending repeat troponin.  Therapeutic INR.  Low suspicion for PE.    Troponin flat.  On baseline oxygen.  Suspect COPD exacerbation.  Will treat with steroids, albuterol, azithromycin.  Placed in observation.    Final diagnoses:   Acute exacerbation of chronic obstructive pulmonary disease (COPD)       ED Disposition  ED Disposition       ED Disposition   Decision to Admit    Condition   --    Comment   --               No follow-up provider specified.       Medication List      No changes were made to your prescriptions during this visit.            Felix Deutsch MD  07/09/25 [id number]

## 2025-07-10 NOTE — CASE MANAGEMENT/SOCIAL WORK
Discharge Planning Assessment   Javan     Patient Name: Selina Vazquez  MRN: [id number]  Today's Date: 7/10/2025    Admit Date: 7/9/2025    Plan: Return home with adult son. Has continuous home oxygen 2 liters with portable tank from [facility]   Discharge Needs Assessment       Row Name 07/10/25 1511       Living Environment    People in Home child(xiomy), adult    Name(s) of People in Home son    Current Living Arrangements home    Potentially Unsafe Housing Conditions none    In the past 12 months has the electric, gas, oil, or water company threatened to shut off services in your home? No    Primary Care Provided by self    Provides Primary Care For no one, unable/limited ability to care for self    Family Caregiver if Needed child(xiomy), adult    Family Caregiver Names Adult son lives with her    Quality of Family Relationships supportive    Able to Return to Prior Arrangements yes       Resource/Environmental Concerns    Resource/Environmental Concerns none    Transportation Concerns none       Transportation Needs    In the past 12 months, has lack of transportation kept you from medical appointments or from getting medications? no    In the past 12 months, has lack of transportation kept you from meetings, work, or from getting things needed for daily living? No       Food Insecurity    Within the past 12 months, you worried that your food would run out before you got the money to buy more. Never true    Within the past 12 months, the food you bought just didn't last and you didn't have money to get more. Never true       Transition Planning    Patient/Family Anticipates Transition to home with family    Patient/Family Anticipated Services at Transition none    Transportation Anticipated family or friend will provide       Discharge Needs Assessment    Readmission Within the Last 30 Days no previous admission in last 30 days    Equipment Currently Used at Home oxygen;walker, rolling;cpap;nebulizer;rollator;cane,  straight    Concerns to be Addressed no discharge needs identified    Do you want help finding or keeping work or a job? I do not need or want help    Do you want help with school or training? For example, starting or completing job training or getting a high school diploma, GED or equivalent No    Anticipated Changes Related to Illness none    Equipment Needed After Discharge none                   Discharge Plan       Row Name 07/10/25 1279       Plan    Plan Return home with adult son. Has continuous home oxygen 2 liters with portable tank from [facility]    Patient/Family in Agreement with Plan yes    Plan Comments Barriers: IV steroids. CM met with Mrs. Vazquez who is a/o and said she lives with an adult son in his 50s. She drives and is independent. Denied financial concerns. She uses a cane, rollator walker, nebulizer and continous oxygen with portable tanks.  CM reminded her to have family bring her portable oxygen tank for transport home. PCP and Pharmacy confirmed.                              Demographic Summary       Row Name 07/10/25 1508       General Information    Admission Type observation    Arrived From emergency department    Required Notices Provided Observation Status Notice    Referral Source admission list    Reason for Consult discharge planning    Preferred Language English       Contact Information    Permission Granted to Share Info With                    Functional Status       Row Name 07/10/25 1509       Functional Status    Usual Activity Tolerance moderate    Current Activity Tolerance moderate       Functional Status, IADL    Medications independent    Meal Preparation independent    Housekeeping independent    Laundry independent    Shopping independent    If for any reason you need help with day-to-day activities such as bathing, preparing meals, shopping, managing finances, etc., do you get the help you need? I don't need any help       Mental Status    General  Appearance WDL WDL       Mental Status Summary    Recent Changes in Mental Status/Cognitive Functioning no changes       Employment/    Employment Status retired                             Rebecca CRABTREEN,RN Case Manager  [facility]  Phone: desk- [phone] cell- [phone]

## 2025-07-10 NOTE — H&P
[facility]ly [facility] H&P    Patient Name: Selina Vazquez  : 1945  MRN: [id number]  Primary Care Physician: Bria Matthews MD  Date of admission: 2025     Patient Care Team:  Bria Matthews MD as PCP - General (Family Medicine)  Michael Green MD as Consulting Physician (Pulmonary Disease)  Jong Vega MD as Consulting Physician (Cardiology)  Duarte Washington MD as Consulting Physician (Nephrology)  Akash Polo DO as Consulting Physician (Internal Medicine)  Lenny Alba RN as Ambulatory  ([facility])          Subjective   History Present Illness     Chief Complaint:   Chief Complaint   Patient presents with    Shortness of Breath     soa    Shortness of Breath  Associated symptoms: sputum production    Associated symptoms: no chest pain, no fever and no leg swelling        ED  79-year-old female patient presents for points of shortness of breath going on for 1 to 2 days. Breathing is worse when she lays flat. States she has chronic lower extremity swelling that is actually improved from prior. States he is a cough, sometimes productive, chills, mild sore throat, runny nose, lightheadedness when she goes to stand up. States she does not have any pain including no chest pain. She uses 2 L of oxygen at home. Did a breathing treatment an hour or 2 prior to arrival without significant present symptoms. Seen in outside emergency department yesterday but states she did not receive any medications or intervention. She takes warfarin and had a therapeutic INR yesterday.     Observation 7/10/25  Pt concurs with er hpi. Pt still having soa. BS diminished. Coughing.     Review of Systems   Constitutional: Negative for fever.   Cardiovascular:  Negative for chest pain and leg swelling.   Respiratory:  Positive for cough, shortness of breath and sputum production.        Personal History     Past Medical History:   Past Medical History:   Diagnosis Date    Adenomatous polyp  of colon     Allergic rhinitis     Asthma     Cervical disc disease     Fracture of thoracic spine 11/16/2011    Lacunar infarction 08/07/2019    Mitral valve disorder     Postmenopausal osteoporosis     Restless leg syndrome        Surgical History:      Past Surgical History:   Procedure Laterality Date    APPENDECTOMY OPEN      BRONCHOSCOPY N/A 06/08/2023    Procedure: BRONCHOSCOPY WITH BRONCHIAL WASHING;  Surgeon: Brenda Zuniga MD;  Location: [facility] ENDOSCOPY;  Service: Pulmonary;  Laterality: N/A;  Post: PNEUMONIA    BRONCHOSCOPY N/A 6/19/2024    Procedure: BRONCHOSCOPY WITH RIGHT LOWER LOBE BRONCHOALVEOLAR LAVAGE;  Surgeon: Brenda Zuniga MD;  Location: [facility] ENDOSCOPY;  Service: Pulmonary;  Laterality: N/A;    CARDIAC CATHETERIZATION      CHOLECYSTECTOMY      CORONARY STENT PLACEMENT      THYROIDECTOMY      TOTAL ABDOMINAL HYSTERECTOMY WITH SALPINGO OOPHORECTOMY             Family History: family history includes Diabetes in her mother; Pancreatic cancer in her brother and father. Otherwise pertinent FHx was reviewed and unremarkable.     Social History:  reports that she quit smoking about 28 years ago. Her smoking use included cigarettes. She started smoking about 64 years ago. She has a 36 pack-year smoking history. She has never been exposed to tobacco smoke. She has never used smokeless tobacco. She reports that she does not drink alcohol and does not use drugs.      Medications:  Prior to Admission medications    Medication Sig Start Date End Date Taking? Authorizing Provider   dilTIAZem CD (CARDIZEM CD) 180 MG 24 hr capsule Take 1 capsule by mouth Daily.   Yes Provider, MD Diogenes   levothyroxine (Synthroid) 125 MCG tablet Take 1 tablet by mouth Every Morning. 4/21/25  Yes Arlyn Holland APRN   pantoprazole (PROTONIX) 40 MG EC tablet Take 1 tablet by mouth Daily. 2/20/25  Yes Bria Matthews MD   polyethylene glycol (MIRALAX) 17 g packet Take 17 g by mouth Daily. 12/3/24  Yes Shireen  Jong Jaramillo MD   potassium chloride (KLOR-CON M20) 20 MEQ CR tablet TAKE 1 TABLET BY MOUTH DAILY 6/23/25  Yes Bria Matthews MD   spironolactone (ALDACTONE) 25 MG tablet Take 1 tablet by mouth Daily. 5/29/25  Yes Bria Matthews MD   warfarin (Coumadin) 3 MG tablet Take 3 mg by mouth daily or as directed by [facility].  Indications: history of DVT/PE 5/12/25  Yes Bria Matthews MD   dilTIAZem CD (CARDIZEM CD) 180 MG 24 hr capsule TAKE 1 CAPSULE BY MOUTH DAILY FOR HIGH BLOOD PRESSURE 6/12/25 7/10/25 Yes Bria Matthews MD   doxycycline (MONODOX) 100 MG capsule Take 1 capsule by mouth 2 (Two) Times a Day. 6/16/25 7/10/25 Yes Bria Matthews MD   albuterol sulfate  (90 Base) MCG/ACT inhaler Inhale 2 puffs Every 4 (Four) Hours As Needed for Wheezing or Shortness of Air. Indications: Chronic Obstructive Lung Disease    ProviderDiogenes MD   aspirin 81 MG EC tablet Take 1 tablet by mouth Daily. Indications: Blood Clot Within a Vein    ProviderDiogenes MD   atorvastatin (LIPITOR) 80 MG tablet Take 0.5 tablets by mouth Daily.    ProviderDiogenes MD   budesonide (PULMICORT) 0.5 MG/2ML nebulizer solution Take 2 mL by nebulization 2 (Two) Times a Day. 6/21/24   Tracy Terry MD   Farxiga 10 MG tablet Take 10 mg by mouth Daily. 6/17/25   Bria Matthews MD   Fluticasone Furoate-Vilanterol (Breo Ellipta) 100-25 MCG/ACT aerosol powder  Inhale 1 puff Daily.    ProviderDiogenes MD   furosemide (LASIX) 40 MG tablet Take 1 tablet by mouth Daily. 7/7/24   Darrion Burch MD   guaiFENesin (MUCINEX) 600 MG 12 hr tablet Take 2 tablets by mouth 2 (Two) Times a Day.    Diogenes Olguin MD   ipratropium-albuterol (DUO-NEB) 0.5-2.5 mg/3 ml nebulizer Take 3 mL by nebulization 4 (Four) Times a Day. Indications: Chronic Obstructive Lung Disease 10/30/23   Bria Matthews MD   Melatonin 3 MG capsule Take 1 capsule by mouth every night at  bedtime.    Provider, MD Diogenes   senna 8.6 MG tablet Take 2 tablets by mouth Every Evening. Indications: Constipation 5/29/25   Bria Matthews MD   albuterol (ACCUNEB) 1.25 MG/3ML nebulizer solution Inhale. Indications: Spasm of Lung Air Passages 9/27/23   Diogenes Olguin MD   brompheniramine-pseudoephedrine-DM 30-2-10 MG/5ML syrup Take 5 mL by mouth 4 (Four) Times a Day As Needed for Allergies. 5/5/25 7/10/25  Brianna Rodriguez APRN   Cholecalciferol 50 MCG (2000 UT) tablet Take 1 tablet by mouth. Indications: Vitamin D Deficiency 8/29/23   Diogenes Olguin MD   Misc. Devices (Pulse Oximeter For Finger) misc Use 1 each 4 (Four) Times a Day. 6/16/25 7/10/25  Bria Matthews MD   O2 (OXYGEN) Inhale 2 L/min Continuous. Indications: COPD exacerbation, uncomplicated 10/6/23 7/10/25  Arlyn Holland APRN   warfarin (COUMADIN) 2.5 MG tablet Take 1 tablet by mouth See Admin Instructions. Take one tablet (2.5mg) 5 days weekly and two tablets (5mg) twice weekly. 6/25/24 7/10/25  Bria Matthews MD       Allergies:    Allergies   Allergen Reactions    Latex Unknown - High Severity     Pt cannot remember at this time    Penicillin G Rash       Objective   Objective     Vital Signs  Temp:  [97.4 °F (36.3 °C)-98.2 °F (36.8 °C)] 98.2 °F (36.8 °C)  Heart Rate:  [] 76  Resp:  [15-20] 18  BP: (133-193)/(67-88) 155/77  SpO2:  [94 %-100 %] 100 %  on  Flow (L/min) (Oxygen Therapy):  [2-3] 2;   Device (Oxygen Therapy): nasal cannula  Body mass index is 40.77 kg/m².    Physical Exam  Constitutional:       Appearance: Normal appearance. She is obese.   Cardiovascular:      Rate and Rhythm: Normal rate and regular rhythm.   Pulmonary:      Effort: Pulmonary effort is normal.      Comments: Diminished breath sounds, scattered wheezes  Abdominal:      Palpations: Abdomen is soft.   Skin:     General: Skin is warm and dry.   Neurological:      General: No focal deficit present.      Mental Status: She  is alert and oriented to person, place, and time.   Psychiatric:         Mood and Affect: Mood normal.         Behavior: Behavior normal.       Results Review:  I have personally reviewed most recent cardiac tracings, lab results, and radiology images and interpretations and agree with findings, most notably: cbc, cmp, troponin, inr, A1c, bnp, procal, rpp, chest xray, ekg.    Results from last 7 days   Lab Units 07/10/25  0934 07/10/25  0406   WBC 10*3/mm3  --  5.84   HEMOGLOBIN g/dL  --  12.1   HEMATOCRIT %  --  39.8   PLATELETS 10*3/mm3  --  177   INR  2.77  --      Results from last 7 days   Lab Units 07/10/25  0406 07/09/25  2200 07/09/25  2103   SODIUM mmol/L 141  --  139   POTASSIUM mmol/L 4.3  --  4.1   CHLORIDE mmol/L 105  --  102   CO2 mmol/L 25.6  --  25.2   BUN mg/dL 15.2  --  14.7   CREATININE mg/dL 1.35*  --  1.35*   GLUCOSE mg/dL 256*  --  105*   CALCIUM mg/dL 9.3  --  9.6   ALK PHOS U/L  --   --  67   ALT (SGPT) U/L  --   --  28   AST (SGOT) U/L  --   --  31   HSTROP T ng/L 48* 63* 64*   PROBNP pg/mL  --   --  288.0   PROCALCITONIN ng/mL  --   --  0.07     Estimated Creatinine Clearance: 34.8 mL/min (A) (by C-G formula based on SCr of 1.35 mg/dL (H)).  Brief Urine Lab Results  (Last result in the past 365 days)        Color   Clarity   Blood   Leuk Est   Nitrite   Protein   CREAT   Urine HCG        05/05/25 0856             169.5                 Microbiology Results (last 10 days)       Procedure Component Value - Date/Time    Respiratory Panel PCR w/COVID-19(SARS-CoV-2) RACQUEL/SHELLI/JAMIE/PAD/COR/GAL In-House, NP Swab in UTM/VTM, 2 HR TAT - Swab, Nasopharynx [[id number]]  (Normal) Collected: 07/09/25 2104    Lab Status: Final result Specimen: Swab from Nasopharynx Updated: 07/09/25 2156     ADENOVIRUS, PCR Not Detected     Coronavirus 229E Not Detected     Coronavirus HKU1 Not Detected     Coronavirus NL63 Not Detected     Coronavirus OC43 Not Detected     COVID19 Not Detected     Human Metapneumovirus Not  Detected     Human Rhinovirus/Enterovirus Not Detected     Influenza A PCR Not Detected     Influenza B PCR Not Detected     Parainfluenza Virus 1 Not Detected     Parainfluenza Virus 2 Not Detected     Parainfluenza Virus 3 Not Detected     Parainfluenza Virus 4 Not Detected     RSV, PCR Not Detected     Bordetella pertussis pcr Not Detected     Bordetella parapertussis PCR Not Detected     Chlamydophila pneumoniae PCR Not Detected     Mycoplasma pneumo by PCR Not Detected    Narrative:      In the setting of a positive respiratory panel with a viral infection PLUS a negative procalcitonin without other underlying concern for bacterial infection, consider observing off antibiotics or discontinuation of antibiotics and continue supportive care. If the respiratory panel is positive for atypical bacterial infection (Bordetella pertussis, Chlamydophila pneumoniae, or Mycoplasma pneumoniae), consider antibiotic de-escalation to target atypical bacterial infection.            ECG/EMG Results (most recent)       Procedure Component Value Units Date/Time    Telemetry Scan [[id number]] Resulted: 07/10/25 0034     Updated: 07/10/25 0120    Telemetry Scan [[id number]] Resulted: 07/10/25 0247     Updated: 07/10/25 0409    ECG 12 Lead Dyspnea [[id number]] Collected: 07/09/25 2022     Updated: 07/10/25 0645     QT Interval 375 ms      QTC Interval 434 ms     Narrative:      HEART RATE=80  bpm  RR Gxwukycw=810  ms  TN Bzpbpbrv=561  ms  P Horizontal Axis=-23  deg  P Front Axis=57  deg  QRSD Interval=96  ms  QT Rymsmreo=568  ms  LFjF=587  ms  QRS Axis=30  deg  T Wave Axis=46  deg  - OTHERWISE NORMAL ECG -  Sinus rhythm  Low voltage, precordial leads  When compared with ECG of 29-Nov-2024 12:08:40,  Significant axis, voltage or hypertrophy change  Electronically Signed By: Felix Deutsch (JAMIE) 2025-07-10 06:45:05  Date and Time of Study:2025-07-09 20:22:21    Telemetry Scan [[id number]] Resulted: 07/10/25 0714     Updated: 07/10/25  0741    Telemetry Scan [[id number]] Resulted: 07/10/25 1229     Updated: 07/10/25 1251                Results for orders placed during the hospital encounter of 07/02/24    Adult Transthoracic Echo Complete W/ Cont if Necessary Per Protocol    Interpretation Summary    Left ventricular systolic function is normal. Calculated left ventricular EF = 60%    Left ventricular wall thickness is consistent with mild to moderate concentric hypertrophy.    Left ventricular diastolic function is consistent with (grade Ia w/high LAP) impaired relaxation.    The left atrial cavity is moderate to severely dilated.    The right atrial cavity is mild to moderately  dilated.    There is moderate calcification of the aortic valve.    There is a trivial pericardial effusion.    Transthoracic echocardiography reveals mild to moderate LVH with EF of 60%.  Moderate to severe left atrial enlargement.  Aortic valve calcified without aortic stenosis.  Trivial effusion noted.    Electronically signed by Maverick Osorio MD, 07/04/24, 1:51 PM EDT.      XR Chest 1 View  Result Date: 7/9/2025  Impression: No acute cardiopulmonary findings. Electronically Signed: Tomi Sotomayor MD  7/9/2025 9:34 PM EDT  Workstation ID: [id number]        Estimated Creatinine Clearance: 34.8 mL/min (A) (by C-G formula based on SCr of 1.35 mg/dL (H)).    Assessment & Plan   Assessment/Plan       Active Hospital Problems    Diagnosis  POA    **COPD with acute exacerbation [J44.1]  Yes      Resolved Hospital Problems   No resolved problems to display.     Copd exacerbation  - cbc, bnp, procal unremarkable  - troponin 64, 63, 48  - EKG rate 80 sinus  - rpp negative  - chest xray reviewed and showing no acute process   - duonebs, steroids, mucinex, flutter  - azithromycin      Elevated blood sugar  - pt is not diabetic   - A1c 5.3  - pt is on steroids  - will give pt consistent carb diet and monitor bg      Hx of afib  - cont warfarin  - inr2 .77    CKD  - creatinine  1.35 baseline for pt    VTE Prophylaxis - Active VTE Prophylaxis:  Pharmacologic:        Start     Dose Route Frequency Stop    07/10/25 1800  warfarin (COUMADIN) tablet 3 mg        Question:  Target INR  Answer:  2 - 3    3 mg PO Once (Warfarin) --    07/10/25 0729  Pharmacy to dose warfarin        Question:  Target INR  Answer:  2 - 3    -- XX Continuous PRN --                  Select Mechanical VTE Prophylaxis if Desired & AppropriateDocumented VTE Prophylaxis Not Indicated  Reason:        Start        07/09/25 2240  VTE Prophylaxis Not Indicated: Contraindicated; Coagulopathy  Once                              CODE STATUS:    Code Status and Medical Interventions: CPR (Attempt to Resuscitate); Full Support   Ordered at: 07/09/25 2241     Code Status (Patient has no pulse and is not breathing):    CPR (Attempt to Resuscitate)     Medical Interventions (Patient has pulse or is breathing):    Full Support       This patient has been examined wearing personal protective equipment.     I discussed the patient's findings and my recommendations with patient and nursing staff.      Signature:Electronically signed by Kiya Holt PA-C, 07/10/25, 2:48 PM EDT.

## 2025-07-11 ENCOUNTER — APPOINTMENT (OUTPATIENT)
Dept: CT IMAGING | Facility: HOSPITAL | Age: 80
End: 2025-07-11
Payer: MEDICARE

## 2025-07-11 ENCOUNTER — READMISSION MANAGEMENT (OUTPATIENT)
Dept: CALL CENTER | Facility: HOSPITAL | Age: 80
End: 2025-07-11
Payer: MEDICARE

## 2025-07-11 VITALS
RESPIRATION RATE: 19 BRPM | SYSTOLIC BLOOD PRESSURE: 126 MMHG | TEMPERATURE: 97.8 F | OXYGEN SATURATION: 100 % | DIASTOLIC BLOOD PRESSURE: 70 MMHG | WEIGHT: 208.78 LBS | BODY MASS INDEX: 40.99 KG/M2 | HEART RATE: 72 BPM | HEIGHT: 60 IN

## 2025-07-11 LAB
ANION GAP SERPL CALCULATED.3IONS-SCNC: 11.6 MMOL/L (ref 5–15)
BASOPHILS # BLD AUTO: 0.02 10*3/MM3 (ref 0–0.2)
BASOPHILS NFR BLD AUTO: 0.1 % (ref 0–1.5)
BUN SERPL-MCNC: 20.1 MG/DL (ref 8–23)
BUN/CREAT SERPL: 16.3 (ref 7–25)
CALCIUM SPEC-SCNC: 9.5 MG/DL (ref 8.6–10.5)
CHLORIDE SERPL-SCNC: 103 MMOL/L (ref 98–107)
CO2 SERPL-SCNC: 24.4 MMOL/L (ref 22–29)
CREAT SERPL-MCNC: 1.23 MG/DL (ref 0.57–1)
DEPRECATED RDW RBC AUTO: 45.8 FL (ref 37–54)
EGFRCR SERPLBLD CKD-EPI 2021: 44.8 ML/MIN/1.73
EOSINOPHIL # BLD AUTO: 0 10*3/MM3 (ref 0–0.4)
EOSINOPHIL NFR BLD AUTO: 0 % (ref 0.3–6.2)
ERYTHROCYTE [DISTWIDTH] IN BLOOD BY AUTOMATED COUNT: 14.3 % (ref 12.3–15.4)
GLUCOSE BLDC GLUCOMTR-MCNC: 154 MG/DL (ref 70–105)
GLUCOSE SERPL-MCNC: 189 MG/DL (ref 65–99)
HCT VFR BLD AUTO: 39.3 % (ref 34–46.6)
HGB BLD-MCNC: 11.9 G/DL (ref 12–15.9)
IMM GRANULOCYTES # BLD AUTO: 0.14 10*3/MM3 (ref 0–0.05)
IMM GRANULOCYTES NFR BLD AUTO: 0.8 % (ref 0–0.5)
INR PPP: 2.81 (ref 2–3)
LYMPHOCYTES # BLD AUTO: 0.96 10*3/MM3 (ref 0.7–3.1)
LYMPHOCYTES NFR BLD AUTO: 5.5 % (ref 19.6–45.3)
MCH RBC QN AUTO: 26.4 PG (ref 26.6–33)
MCHC RBC AUTO-ENTMCNC: 30.3 G/DL (ref 31.5–35.7)
MCV RBC AUTO: 87.1 FL (ref 79–97)
MONOCYTES # BLD AUTO: 0.39 10*3/MM3 (ref 0.1–0.9)
MONOCYTES NFR BLD AUTO: 2.2 % (ref 5–12)
NEUTROPHILS NFR BLD AUTO: 15.89 10*3/MM3 (ref 1.7–7)
NEUTROPHILS NFR BLD AUTO: 91.4 % (ref 42.7–76)
NRBC BLD AUTO-RTO: 0 /100 WBC (ref 0–0.2)
PLATELET # BLD AUTO: 210 10*3/MM3 (ref 140–450)
PMV BLD AUTO: 10.8 FL (ref 6–12)
POTASSIUM SERPL-SCNC: 4.3 MMOL/L (ref 3.5–5.2)
PROTHROMBIN TIME: 29.9 SECONDS (ref 19.4–28.5)
RBC # BLD AUTO: 4.51 10*6/MM3 (ref 3.77–5.28)
SODIUM SERPL-SCNC: 139 MMOL/L (ref 136–145)
WBC NRBC COR # BLD AUTO: 17.4 10*3/MM3 (ref 3.4–10.8)

## 2025-07-11 PROCEDURE — 25010000002 METHYLPREDNISOLONE PER 40 MG: Performed by: PHYSICIAN ASSISTANT

## 2025-07-11 PROCEDURE — 96376 TX/PRO/DX INJ SAME DRUG ADON: CPT

## 2025-07-11 PROCEDURE — 94761 N-INVAS EAR/PLS OXIMETRY MLT: CPT

## 2025-07-11 PROCEDURE — 82948 REAGENT STRIP/BLOOD GLUCOSE: CPT | Performed by: PHYSICIAN ASSISTANT

## 2025-07-11 PROCEDURE — 80048 BASIC METABOLIC PNL TOTAL CA: CPT | Performed by: PHYSICIAN ASSISTANT

## 2025-07-11 PROCEDURE — G0378 HOSPITAL OBSERVATION PER HR: HCPCS

## 2025-07-11 PROCEDURE — 94799 UNLISTED PULMONARY SVC/PX: CPT

## 2025-07-11 PROCEDURE — 71250 CT THORAX DX C-: CPT

## 2025-07-11 PROCEDURE — 94664 DEMO&/EVAL PT USE INHALER: CPT

## 2025-07-11 PROCEDURE — 85025 COMPLETE CBC W/AUTO DIFF WBC: CPT | Performed by: PHYSICIAN ASSISTANT

## 2025-07-11 PROCEDURE — 85610 PROTHROMBIN TIME: CPT | Performed by: PHYSICIAN ASSISTANT

## 2025-07-11 RX ORDER — WARFARIN SODIUM 3 MG/1
3 TABLET ORAL
Status: DISCONTINUED | OUTPATIENT
Start: 2025-07-11 | End: 2025-07-11 | Stop reason: HOSPADM

## 2025-07-11 RX ORDER — ALBUTEROL SULFATE 0.83 MG/ML
2.5 SOLUTION RESPIRATORY (INHALATION) ONCE
Status: DISCONTINUED | OUTPATIENT
Start: 2025-07-11 | End: 2025-07-11 | Stop reason: HOSPADM

## 2025-07-11 RX ORDER — CODEINE PHOSPHATE AND GUAIFENESIN 10; 100 MG/5ML; MG/5ML
5 SOLUTION ORAL 3 TIMES DAILY PRN
Qty: 237 ML | Refills: 0 | Status: SHIPPED | OUTPATIENT
Start: 2025-07-11

## 2025-07-11 RX ORDER — CODEINE PHOSPHATE AND GUAIFENESIN 10; 100 MG/5ML; MG/5ML
10 SOLUTION ORAL EVERY 6 HOURS PRN
Qty: 237 ML | Refills: 0 | Status: SHIPPED | OUTPATIENT
Start: 2025-07-11

## 2025-07-11 RX ORDER — AZITHROMYCIN 500 MG/1
500 TABLET, FILM COATED ORAL
Qty: 2 TABLET | Refills: 0 | Status: SHIPPED | OUTPATIENT
Start: 2025-07-11 | End: 2025-07-13

## 2025-07-11 RX ORDER — BENZONATATE 200 MG/1
200 CAPSULE ORAL 3 TIMES DAILY PRN
Qty: 20 CAPSULE | Refills: 0 | Status: SHIPPED | OUTPATIENT
Start: 2025-07-11

## 2025-07-11 RX ORDER — PREDNISONE 5 MG/1
5 TABLET ORAL DAILY
Qty: 21 TABLET | Refills: 0 | Status: SHIPPED | OUTPATIENT
Start: 2025-07-11

## 2025-07-11 RX ADMIN — IPRATROPIUM BROMIDE AND ALBUTEROL SULFATE 3 ML: .5; 3 SOLUTION RESPIRATORY (INHALATION) at 10:47

## 2025-07-11 RX ADMIN — GUAIFENESIN 600 MG: 600 TABLET, EXTENDED RELEASE ORAL at 08:08

## 2025-07-11 RX ADMIN — ACETAMINOPHEN 650 MG: 325 TABLET, FILM COATED ORAL at 08:08

## 2025-07-11 RX ADMIN — ASPIRIN 81 MG: 81 TABLET, COATED ORAL at 08:08

## 2025-07-11 RX ADMIN — BENZONATATE 200 MG: 100 CAPSULE ORAL at 10:30

## 2025-07-11 RX ADMIN — EMPAGLIFLOZIN 25 MG: 25 TABLET, FILM COATED ORAL at 08:08

## 2025-07-11 RX ADMIN — IPRATROPIUM BROMIDE AND ALBUTEROL SULFATE 3 ML: .5; 3 SOLUTION RESPIRATORY (INHALATION) at 03:28

## 2025-07-11 RX ADMIN — SPIRONOLACTONE 25 MG: 25 TABLET ORAL at 08:17

## 2025-07-11 RX ADMIN — IPRATROPIUM BROMIDE AND ALBUTEROL SULFATE 3 ML: .5; 3 SOLUTION RESPIRATORY (INHALATION) at 06:54

## 2025-07-11 RX ADMIN — GUAIFENESIN AND CODEINE PHOSPHATE 10 ML: 100; 10 SOLUTION ORAL at 10:30

## 2025-07-11 RX ADMIN — LEVOTHYROXINE SODIUM 125 MCG: 125 TABLET ORAL at 06:04

## 2025-07-11 RX ADMIN — Medication 10 ML: at 08:07

## 2025-07-11 RX ADMIN — METHYLPREDNISOLONE SODIUM SUCCINATE 40 MG: 40 INJECTION, POWDER, FOR SOLUTION INTRAMUSCULAR; INTRAVENOUS at 08:07

## 2025-07-11 RX ADMIN — BUDESONIDE 0.5 MG: 0.5 SUSPENSION RESPIRATORY (INHALATION) at 06:54

## 2025-07-11 RX ADMIN — DILTIAZEM HYDROCHLORIDE 180 MG: 180 CAPSULE, COATED, EXTENDED RELEASE ORAL at 08:08

## 2025-07-11 RX ADMIN — PANTOPRAZOLE SODIUM 40 MG: 40 TABLET, DELAYED RELEASE ORAL at 08:08

## 2025-07-11 NOTE — PROGRESS NOTES
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    INR Goal: 2 - 3  Home medication?: warfarin 3 mg PO daily  Bridge Therapy Present?:  No  Interacting Medications Evaluation (New/Present/Discontinued): Azithromycin and Methylprednisolone can increase the AC effects of warfarin.  Additional Contributing Factors: Age, body habitus    Assessment/Plan:    Pt is on warfarin 3mg daily outpatient for hx of PE. Current INR 2.81 (therapeutic). It appears this pt INR teeters close to the U.L. of the therapeutic range while outpt also. Will continue home regimen of warfarin 3 mg today.      May need to decrease daily doses due to DDIs while inpt. Daily INRs ordered.  Continue to monitor and adjust based on INR.     Date 7/9 7/10 7/11         INR 2.57 2.77 2.81         Dose ?PTA 3 mg 3 mg           Diet Order   Procedures    Diet: Cardiac, Diabetic; Healthy Heart (2-3 Na+); Consistent Carbohydrate; Fluid Consistency: Thin (IDDSI 0)      Objective:  [Ht: 152.4 cm (60\"); Wt: 94.7 kg (208 lb 12.4 oz); BMI: Body mass index is 40.77 kg/m².]    Lab Results   Component Value Date    ALBUMIN 4.0 07/09/2025     Lab Results   Component Value Date    INR 2.77 07/10/2025    INR 2.57 07/09/2025    INR 2.70 (A) 07/08/2025    PROTIME 29.6 (H) 07/10/2025    PROTIME 27.9 07/09/2025    PROTIME 21.0 12/04/2024     Lab Results   Component Value Date    HGB 12.1 07/10/2025    HGB 12.0 07/09/2025    HGB 13.0 06/16/2025     Lab Results   Component Value Date    HCT 39.8 07/10/2025    HCT 39.1 07/09/2025    HCT 40.4 06/16/2025     Yao Maradiaga RPH  07/10/25 10:23 EDT       "

## 2025-07-11 NOTE — PLAN OF CARE
Problem: Adult Inpatient Plan of Care  Goal: Absence of Hospital-Acquired Illness or Injury  Intervention: Identify and Manage Fall Risk  Recent Flowsheet Documentation  Taken 7/11/2025 0420 by Jazmine Rousseau RN  Safety Promotion/Fall Prevention:   clutter free environment maintained   assistive device/personal items within reach   safety round/check completed   lighting adjusted  Taken 7/11/2025 0208 by Jazmine Rousseau RN  Safety Promotion/Fall Prevention:   safety round/check completed   clutter free environment maintained   assistive device/personal items within reach   lighting adjusted  Taken 7/11/2025 0008 by Jazmine Rousseau RN  Safety Promotion/Fall Prevention:   assistive device/personal items within reach   clutter free environment maintained   room organization consistent  Taken 7/10/2025 2207 by Jazmine Rousseau RN  Safety Promotion/Fall Prevention:   clutter free environment maintained   assistive device/personal items within reach   safety round/check completed   lighting adjusted  Taken 7/10/2025 2018 by Jazmine Rousseau RN  Safety Promotion/Fall Prevention:   safety round/check completed   clutter free environment maintained   assistive device/personal items within reach   lighting adjusted  Intervention: Prevent Skin Injury  Recent Flowsheet Documentation  Taken 7/11/2025 0008 by Jazmine Rousseau RN  Body Position: turned  Intervention: Prevent Infection  Recent Flowsheet Documentation  Taken 7/11/2025 0420 by Jazmine Rousseau RN  Infection Prevention:   rest/sleep promoted   personal protective equipment utilized   hand hygiene promoted   equipment surfaces disinfected   environmental surveillance performed   cohorting utilized  Taken 7/11/2025 0208 by Jazmine Rousseau RN  Infection Prevention:   rest/sleep promoted   environmental surveillance performed   cohorting utilized   equipment surfaces disinfected   hand hygiene promoted   personal protective  equipment utilized  Taken 7/11/2025 0008 by Jazmine Rousseau RN  Infection Prevention:   rest/sleep promoted   personal protective equipment utilized   hand hygiene promoted   equipment surfaces disinfected   environmental surveillance performed   cohorting utilized  Taken 7/10/2025 2207 by Jazmine Rousseau RN  Infection Prevention:   rest/sleep promoted   personal protective equipment utilized   hand hygiene promoted   equipment surfaces disinfected   environmental surveillance performed   cohorting utilized  Taken 7/10/2025 2018 by Jazmine Rousseau RN  Infection Prevention:   personal protective equipment utilized   hand hygiene promoted   rest/sleep promoted   equipment surfaces disinfected   environmental surveillance performed   cohorting utilized  Goal: Optimal Comfort and Wellbeing  Intervention: Provide Person-Centered Care  Recent Flowsheet Documentation  Taken 7/10/2025 2018 by Jazmine Rousseau RN  Trust Relationship/Rapport:   care explained   choices provided   emotional support provided   empathic listening provided   questions answered   questions encouraged   reassurance provided   thoughts/feelings acknowledged     Problem: Skin Injury Risk Increased  Goal: Skin Health and Integrity  Intervention: Optimize Skin Protection  Recent Flowsheet Documentation  Taken 7/11/2025 0008 by Jazmine Rousseau RN  Head of Bed (HOB) Positioning: HOB at 30-45 degrees  Taken 7/10/2025 2018 by Jazmine Rousseau RN  Pressure Reduction Techniques: frequent weight shift encouraged  Head of Bed (HOB) Positioning: HOB at 20-30 degrees  Pressure Reduction Devices: pressure-redistributing mattress utilized   Goal Outcome Evaluation:      Pt resting in bed without complaints, will continue to monitor.

## 2025-07-11 NOTE — OUTREACH NOTE
Prep Survey      Flowsheet Row Responses   [facility] patient discharged from? [facility]   Is LACE score < 7 ? No   Eligibility [facility]   Date of Admission 07/09/25   Date of Discharge 07/11/25   Discharge diagnosis COPD with acute exacerbation   Does the patient have one of the following disease processes/diagnoses(primary or secondary)? COPD   Prep survey completed? Yes            Arpita JACKMAN - Registered Nurse
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
## 2025-07-11 NOTE — DISCHARGE SUMMARY
[facility]    Bria Matthews MD    CHIEF COMPLAINT:     soa    HISTORY OF PRESENT ILLNESS:    HPI    ED  79-year-old female patient presents for points of shortness of breath going on for 1 to 2 days. Breathing is worse when she lays flat. States she has chronic lower extremity swelling that is actually improved from prior. States he is a cough, sometimes productive, chills, mild sore throat, runny nose, lightheadedness when she goes to stand up. States she does not have any pain including no chest pain. She uses 2 L of oxygen at home. Did a breathing treatment an hour or 2 prior to arrival without significant present symptoms. Seen in outside emergency department yesterday but states she did not receive any medications or intervention. She takes warfarin and had a therapeutic INR yesterday.      Observation 7/10/25  Pt concurs with er hpi. Pt still having soa. BS diminished. Coughing.     Past Medical History:   Diagnosis Date    Adenomatous polyp of colon     Allergic rhinitis     Asthma     Cervical disc disease     Fracture of thoracic spine 11/16/2011    Lacunar infarction 08/07/2019    Mitral valve disorder     Postmenopausal osteoporosis     Restless leg syndrome      Past Surgical History:   Procedure Laterality Date    APPENDECTOMY OPEN      BRONCHOSCOPY N/A 06/08/2023    Procedure: BRONCHOSCOPY WITH BRONCHIAL WASHING;  Surgeon: Brenda Zuniga MD;  Location: [facility] ENDOSCOPY;  Service: Pulmonary;  Laterality: N/A;  Post: PNEUMONIA    BRONCHOSCOPY N/A 6/19/2024    Procedure: BRONCHOSCOPY WITH RIGHT LOWER LOBE BRONCHOALVEOLAR LAVAGE;  Surgeon: Brenda Zuniga MD;  Location: [facility] ENDOSCOPY;  Service: Pulmonary;  Laterality: N/A;    CARDIAC CATHETERIZATION      CHOLECYSTECTOMY      CORONARY STENT PLACEMENT      THYROIDECTOMY      TOTAL ABDOMINAL HYSTERECTOMY WITH SALPINGO OOPHORECTOMY       Family History   Problem Relation Age of Onset    Diabetes Mother     Pancreatic cancer  Father     Pancreatic cancer Brother      Social History     Tobacco Use    Smoking status: Former     Current packs/day: 0.00     Average packs/day: 1 pack/day for 36.0 years (36.0 ttl pk-yrs)     Types: Cigarettes     Start date:      Quit date:      Years since quittin.5     Passive exposure: Never    Smokeless tobacco: Never   Vaping Use    Vaping status: Never Used   Substance Use Topics    Alcohol use: No    Drug use: No     Medications Prior to Admission   Medication Sig Dispense Refill Last Dose/Taking    dilTIAZem CD (CARDIZEM CD) 180 MG 24 hr capsule Take 1 capsule by mouth Daily.   Taking    levothyroxine (Synthroid) 125 MCG tablet Take 1 tablet by mouth Every Morning. 30 tablet 6 Taking    pantoprazole (PROTONIX) 40 MG EC tablet Take 1 tablet by mouth Daily. 90 tablet 1 Taking    polyethylene glycol (MIRALAX) 17 g packet Take 17 g by mouth Daily.   Taking    potassium chloride (KLOR-CON M20) 20 MEQ CR tablet TAKE 1 TABLET BY MOUTH DAILY 30 tablet 0 Taking    spironolactone (ALDACTONE) 25 MG tablet Take 1 tablet by mouth Daily. 90 tablet 0 Taking    warfarin (Coumadin) 3 MG tablet Take 3 mg by mouth daily or as directed by [facility].  Indications: history of DVT/PE 90 tablet 2 Taking    albuterol sulfate  (90 Base) MCG/ACT inhaler Inhale 2 puffs Every 4 (Four) Hours As Needed for Wheezing or Shortness of Air. Indications: Chronic Obstructive Lung Disease       aspirin 81 MG EC tablet Take 1 tablet by mouth Daily. Indications: Blood Clot Within a Vein       atorvastatin (LIPITOR) 80 MG tablet Take 0.5 tablets by mouth Daily.       budesonide (PULMICORT) 0.5 MG/2ML nebulizer solution Take 2 mL by nebulization 2 (Two) Times a Day. 1 each 0     Farxiga 10 MG tablet Take 10 mg by mouth Daily. 90 tablet 3     Fluticasone Furoate-Vilanterol (Breo Ellipta) 100-25 MCG/ACT aerosol powder  Inhale 1 puff Daily.       furosemide (LASIX) 40 MG tablet Take 1 tablet by mouth Daily.  7 tablet 0     guaiFENesin (MUCINEX) 600 MG 12 hr tablet Take 2 tablets by mouth 2 (Two) Times a Day.       ipratropium-albuterol (DUO-NEB) 0.5-2.5 mg/3 ml nebulizer Take 3 mL by nebulization 4 (Four) Times a Day. Indications: Chronic Obstructive Lung Disease 360 mL 0     Melatonin 3 MG capsule Take 1 capsule by mouth every night at bedtime.       senna 8.6 MG tablet Take 2 tablets by mouth Every Evening. Indications: Constipation 60 tablet 3      Allergies:  Latex and Penicillin g    Immunization History   Administered Date(s) Administered    31-influenza Vac Quardvalent Preservativ 10/06/2023    ABRYSVO (RSV, 60+ or pregnant women 32-36 wks) 12/10/2024    COVID-19 (MODERNA) 12YRS+ (SPIKEVAX) 08/08/2024    COVID-19 (MODERNA) 1st,2nd,3rd Dose Monovalent 03/18/2021, 04/20/2021    Covid-19 (Pfizer) Bivalent 6mos-4yrs 10/22/2022    FLUAD TRI 65YR+ 10/21/2019    Flu Vaccine Quad PF 6-35MO 11/02/2015    Flu Vaccine Quad PF >36MO 11/02/2015    Fluad Quad 65+ 09/16/2020    Fluzone (or Fluarix & Flulaval for VFC) >6mos 12/12/2023    Fluzone High-Dose 65+YRS 11/28/2016, 11/03/2017, 10/01/2024    Fluzone High-Dose 65+yrs 12/25/2022, 12/04/2023    Pneumococcal Conjugate 13-Valent (PCV13) 02/05/2012, 06/16/2015    Pneumococcal Conjugate 20-Valent (PCV20) 12/04/2023    Pneumococcal Polysaccharide (PPSV23) 10/25/2018    TST, UF 07/18/2024, 08/04/2024    Tdap 12/05/2012           REVIEW OF SYSTEMS:    ROS  Constitutional: Negative for fever.   Cardiovascular:  Negative for chest pain and leg swelling.   Respiratory:  Positive for cough, shortness of breath and sputum production.     Vital Signs  Temp:  [97.4 °F (36.3 °C)-98.2 °F (36.8 °C)] 97.8 °F (36.6 °C)  Heart Rate:  [] 68  Resp:  [16-21] 17  BP: (126-163)/(69-93) 126/70          Physical Exam:  Physical Exam    Constitutional:       Appearance: Normal appearance. She is obese.   Cardiovascular:      Rate and Rhythm: Normal rate and regular rhythm.   Pulmonary:       Effort: Pulmonary effort is normal.      Comments: Diminished breath sounds, scattered wheezes  Abdominal:      Palpations: Abdomen is soft.   Skin:     General: Skin is warm and dry.   Neurological:      General: No focal deficit present.      Mental Status: She is alert and oriented to person, place, and time.   Psychiatric:         Mood and Affect: Mood normal.         Behavior: Behavior normal.     Emotional Behavior:    wnl   Debilities:   None      Results Review:    I reviewed the patient's new clinical results.  Lab Results (most recent)       Procedure Component Value Units Date/Time    POC Glucose 4x Daily Before Meals & at Bedtime [[id number]]  (Abnormal) Collected: 07/11/25 0730    Specimen: Blood Updated: 07/11/25 0735     Glucose 154 mg/dL      Comment: Serial Number: [id number]:  [id number]       Basic Metabolic Panel [[id number]]  (Abnormal) Collected: 07/11/25 0326    Specimen: Blood from Arm, Right Updated: 07/11/25 0438     Glucose 189 mg/dL      BUN 20.1 mg/dL      Creatinine 1.23 mg/dL      Sodium 139 mmol/L      Potassium 4.3 mmol/L      Chloride 103 mmol/L      CO2 24.4 mmol/L      Calcium 9.5 mg/dL      BUN/Creatinine Ratio 16.3     Anion Gap 11.6 mmol/L      eGFR 44.8 mL/min/1.73     Narrative:      GFR Categories in Chronic Kidney Disease (CKD)              GFR Category          GFR (mL/min/1.73)    Interpretation  G1                    90 or greater        Normal or high (1)  G2                    60-89                Mild decrease (1)  G3a                   45-59                Mild to moderate decrease  G3b                   30-44                Moderate to severe decrease  G4                    15-29                Severe decrease  G5                    14 or less           Kidney failure    (1)In the absence of evidence of kidney disease, neither GFR category G1 or G2 fulfill the criteria for CKD.    eGFR calculation 2021 CKD-EPI creatinine equation, which does not include race as  a factor    Protime-INR [[id number]]  (Abnormal) Collected: 07/11/25 0326    Specimen: Blood from Arm, Right Updated: 07/11/25 0432     Protime 29.9 Seconds      INR 2.81    CBC & Differential [[id number]]  (Abnormal) Collected: 07/11/25 0326    Specimen: Blood from Arm, Right Updated: 07/11/25 0422    Narrative:      The following orders were created for panel order CBC & Differential.  Procedure                               Abnormality         Status                     ---------                               -----------         ------                     CBC Auto Differential[[id number]]        Abnormal            Final result                 Please view results for these tests on the individual orders.    CBC Auto Differential [[id number]]  (Abnormal) Collected: 07/11/25 0326    Specimen: Blood from Arm, Right Updated: 07/11/25 0422     WBC 17.40 10*3/mm3      RBC 4.51 10*6/mm3      Hemoglobin 11.9 g/dL      Hematocrit 39.3 %      MCV 87.1 fL      MCH 26.4 pg      MCHC 30.3 g/dL      RDW 14.3 %      RDW-SD 45.8 fl      MPV 10.8 fL      Platelets 210 10*3/mm3      Neutrophil % 91.4 %      Lymphocyte % 5.5 %      Monocyte % 2.2 %      Eosinophil % 0.0 %      Basophil % 0.1 %      Immature Grans % 0.8 %      Neutrophils, Absolute 15.89 10*3/mm3      Lymphocytes, Absolute 0.96 10*3/mm3      Monocytes, Absolute 0.39 10*3/mm3      Eosinophils, Absolute 0.00 10*3/mm3      Basophils, Absolute 0.02 10*3/mm3      Immature Grans, Absolute 0.14 10*3/mm3      nRBC 0.0 /100 WBC     POC Glucose Once [[id number]]  (Abnormal) Collected: 07/10/25 2119    Specimen: Blood Updated: 07/10/25 2121     Glucose 156 mg/dL      Comment: Serial Number: [id number]:  [id number]       Hemoglobin A1c [[id number]]  (Normal) Collected: 07/10/25 0406    Specimen: Blood from Arm, Left Updated: 07/10/25 1019     Hemoglobin A1C 5.37 %     Narrative:      Hemoglobin A1C Ranges:    Increased Risk for Diabetes  5.7% to 6.4%  Diabetes                      >= 6.5%  Diabetic Goal                < 7.0%    Protime-INR [[id number]]  (Abnormal) Collected: 07/10/25 0934    Specimen: Blood from Hand, Right Updated: 07/10/25 0957     Protime 29.6 Seconds      INR 2.77    High Sensitivity Troponin T [[id number]]  (Abnormal) Collected: 07/10/25 0406    Specimen: Blood from Arm, Left Updated: 07/10/25 0747     HS Troponin T 48 ng/L     Narrative:      High Sensitive Troponin T Reference Range:  <14.0 ng/L- Negative Female for AMI  <22.0 ng/L- Negative Male for AMI  >=14 - Abnormal Female indicating possible myocardial injury.  >=22 - Abnormal Male indicating possible myocardial injury.   Clinicians would have to utilize clinical acumen, EKG, Troponin, and serial changes to determine if it is an Acute Myocardial Infarction or myocardial injury due to an underlying chronic condition.         Basic Metabolic Panel [[id number]]  (Abnormal) Collected: 07/10/25 0406    Specimen: Blood from Arm, Left Updated: 07/10/25 0532     Glucose 256 mg/dL      BUN 15.2 mg/dL      Creatinine 1.35 mg/dL      Sodium 141 mmol/L      Potassium 4.3 mmol/L      Chloride 105 mmol/L      CO2 25.6 mmol/L      Calcium 9.3 mg/dL      BUN/Creatinine Ratio 11.3     Anion Gap 10.4 mmol/L      eGFR 40.1 mL/min/1.73     Narrative:      GFR Categories in Chronic Kidney Disease (CKD)              GFR Category          GFR (mL/min/1.73)    Interpretation  G1                    90 or greater        Normal or high (1)  G2                    60-89                Mild decrease (1)  G3a                   45-59                Mild to moderate decrease  G3b                   30-44                Moderate to severe decrease  G4                    15-29                Severe decrease  G5                    14 or less           Kidney failure    (1)In the absence of evidence of kidney disease, neither GFR category G1 or G2 fulfill the criteria for CKD.    eGFR calculation 2021 CKD-EPI creatinine equation, which does not  include race as a factor    CBC (No Diff) [[id number]]  (Abnormal) Collected: 07/10/25 0406    Specimen: Blood from Arm, Left Updated: 07/10/25 0508     WBC 5.84 10*3/mm3      RBC 4.54 10*6/mm3      Hemoglobin 12.1 g/dL      Hematocrit 39.8 %      MCV 87.7 fL      MCH 26.7 pg      MCHC 30.4 g/dL      RDW 14.4 %      RDW-SD 46.1 fl      MPV 11.1 fL      Platelets 177 10*3/mm3     High Sensitivity Troponin T 1Hr [[id number]]  (Abnormal) Collected: 07/09/25 2200    Specimen: Blood from Arm, Right Updated: 07/09/25 2228     HS Troponin T 63 ng/L      Troponin T Numeric Delta -1 ng/L      Troponin T % Delta -2    Narrative:      High Sensitive Troponin T Reference Range:  <14.0 ng/L- Negative Female for AMI  <22.0 ng/L- Negative Male for AMI  >=14 - Abnormal Female indicating possible myocardial injury.  >=22 - Abnormal Male indicating possible myocardial injury.   Clinicians would have to utilize clinical acumen, EKG, Troponin, and serial changes to determine if it is an Acute Myocardial Infarction or myocardial injury due to an underlying chronic condition.         Respiratory Panel PCR w/COVID-19(SARS-CoV-2) RACQUEL/SHELLI/JAMIE/PAD/COR/GAL In-House, NP Swab in [facility], 2 HR TAT - Swab, Nasopharynx [[id number]]  (Normal) Collected: 07/09/25 2104    Specimen: Swab from Nasopharynx Updated: 07/09/25 2156     ADENOVIRUS, PCR Not Detected     Coronavirus 229E Not Detected     Coronavirus HKU1 Not Detected     Coronavirus NL63 Not Detected     Coronavirus OC43 Not Detected     COVID19 Not Detected     Human Metapneumovirus Not Detected     Human Rhinovirus/Enterovirus Not Detected     Influenza A PCR Not Detected     Influenza B PCR Not Detected     Parainfluenza Virus 1 Not Detected     Parainfluenza Virus 2 Not Detected     Parainfluenza Virus 3 Not Detected     Parainfluenza Virus 4 Not Detected     RSV, PCR Not Detected     Bordetella pertussis pcr Not Detected     Bordetella parapertussis PCR Not Detected     Chlamydophila  pneumoniae PCR Not Detected     Mycoplasma pneumo by PCR Not Detected    Narrative:      In the setting of a positive respiratory panel with a viral infection PLUS a negative procalcitonin without other underlying concern for bacterial infection, consider observing off antibiotics or discontinuation of antibiotics and continue supportive care. If the respiratory panel is positive for atypical bacterial infection (Bordetella pertussis, Chlamydophila pneumoniae, or Mycoplasma pneumoniae), consider antibiotic de-escalation to target atypical bacterial infection.    High Sensitivity Troponin T [[id number]]  (Abnormal) Collected: 07/09/25 2103    Specimen: Blood from Arm, Right Updated: 07/09/25 2140     HS Troponin T 64 ng/L     Narrative:      High Sensitive Troponin T Reference Range:  <14.0 ng/L- Negative Female for AMI  <22.0 ng/L- Negative Male for AMI  >=14 - Abnormal Female indicating possible myocardial injury.  >=22 - Abnormal Male indicating possible myocardial injury.   Clinicians would have to utilize clinical acumen, EKG, Troponin, and serial changes to determine if it is an Acute Myocardial Infarction or myocardial injury due to an underlying chronic condition.         Comprehensive Metabolic Panel [[id number]]  (Abnormal) Collected: 07/09/25 2103    Specimen: Blood from Arm, Right Updated: 07/09/25 2140     Glucose 105 mg/dL      BUN 14.7 mg/dL      Creatinine 1.35 mg/dL      Sodium 139 mmol/L      Potassium 4.1 mmol/L      Chloride 102 mmol/L      CO2 25.2 mmol/L      Calcium 9.6 mg/dL      Total Protein 6.0 g/dL      Albumin 4.0 g/dL      ALT (SGPT) 28 U/L      AST (SGOT) 31 U/L      Alkaline Phosphatase 67 U/L      Total Bilirubin 0.5 mg/dL      Globulin 2.0 gm/dL      A/G Ratio 2.0 g/dL      BUN/Creatinine Ratio 10.9     Anion Gap 11.8 mmol/L      eGFR 40.1 mL/min/1.73     Narrative:      GFR Categories in Chronic Kidney Disease (CKD)              GFR Category          GFR (mL/min/1.73)     Interpretation  G1                    90 or greater        Normal or high (1)  G2                    60-89                Mild decrease (1)  G3a                   45-59                Mild to moderate decrease  G3b                   30-44                Moderate to severe decrease  G4                    15-29                Severe decrease  G5                    14 or less           Kidney failure    (1)In the absence of evidence of kidney disease, neither GFR category G1 or G2 fulfill the criteria for CKD.    eGFR calculation 2021 CKD-EPI creatinine equation, which does not include race as a factor    BNP [[id number]]  (Normal) Collected: 07/09/25 2103    Specimen: Blood from Arm, Right Updated: 07/09/25 2140     proBNP 288.0 pg/mL     Narrative:      This assay is used as an aid in the diagnosis of individuals suspected of having heart failure. It can be used as an aid in the diagnosis of acute decompensated heart failure (ADHF) in patients presenting with signs and symptoms of ADHF to the emergency department (ED). In addition, NT-proBNP of <300 pg/mL indicates ADHF is not likely.    Age Range Result Interpretation  NT-proBNP Concentration (pg/mL:      <50             Positive            >450                   Gray                 300-450                    Negative             <300    50-75           Positive            >900                  Gray                300-900                  Negative            <300      >75             Positive            >1800                  Gray                300-1800                  Negative            <300    Procalcitonin [[id number]]  (Normal) Collected: 07/09/25 2103    Specimen: Blood from Arm, Right Updated: 07/09/25 2140     Procalcitonin 0.07 ng/mL     Narrative:      As a Marker for Sepsis (Non-Neonates):    1. <0.5 ng/mL represents a low risk of severe sepsis and/or septic shock.  2. >2 ng/mL represents a high risk of severe sepsis and/or septic shock.    As a Marker  "for Lower Respiratory Tract Infections that require antibiotic therapy:    PCT on Admission    Antibiotic Therapy       6-12 Hrs later    >0.5                Strongly Recommended  >0.25 - <0.5        Recommended   0.1 - 0.25          Discouraged              Remeasure/reassess PCT  <0.1                Strongly Discouraged     Remeasure/reassess PCT    As 28 day mortality risk marker: \"Change in Procalcitonin Result\" (>80% or <=80%) if Day 0 (or Day 1) and Day 4 values are available. Refer to [email or web address]    Change in PCT <=80%  A decrease of PCT levels below or equal to 80% defines a positive change in PCT test result representing a higher risk for 28-day all-cause mortality of patients diagnosed with severe sepsis for septic shock.    Change in PCT >80%  A decrease of PCT levels of more than 80% defines a negative change in PCT result representing a lower risk for 28-day all-cause mortality of patients diagnosed with severe sepsis or septic shock.       CBC & Differential [[id number]]  (Abnormal) Collected: 07/09/25 2103    Specimen: Blood from Arm, Right Updated: 07/09/25 2110    Narrative:      The following orders were created for panel order CBC & Differential.  Procedure                               Abnormality         Status                     ---------                               -----------         ------                     CBC Auto Differential[[id number]]        Abnormal            Final result                 Please view results for these tests on the individual orders.    CBC Auto Differential [[id number]]  (Abnormal) Collected: 07/09/25 2103    Specimen: Blood from Arm, Right Updated: 07/09/25 2110     WBC 8.55 10*3/mm3      RBC 4.49 10*6/mm3      Hemoglobin 12.0 g/dL      Hematocrit 39.1 %      MCV 87.1 fL      MCH 26.7 pg      MCHC 30.7 g/dL      RDW 14.6 %      RDW-SD 46.6 fl      MPV 11.0 fL      Platelets 195 10*3/mm3      Neutrophil % 59.6 %      Lymphocyte % 25.3 %      " Monocyte % 10.5 %      Eosinophil % 3.5 %      Basophil % 0.9 %      Immature Grans % 0.2 %      Neutrophils, Absolute 5.09 10*3/mm3      Lymphocytes, Absolute 2.16 10*3/mm3      Monocytes, Absolute 0.90 10*3/mm3      Eosinophils, Absolute 0.30 10*3/mm3      Basophils, Absolute 0.08 10*3/mm3      Immature Grans, Absolute 0.02 10*3/mm3      nRBC 0.0 /100 WBC             Imaging Results (Most Recent)       Procedure Component Value Units Date/Time    CT Chest Without Contrast Diagnostic [[id number]] Collected: 07/11/25 0936     Updated: 07/11/25 0957    Narrative:      CT CHEST WO CONTRAST DIAGNOSTIC    Date of Exam: 7/11/2025 9:28 AM EDT    Indication: cough, elevated wbc.    Comparison: Chest x-ray July 9, 2025, CT chest November 25, 2024    Technique: Axial CT images were obtained of the chest without contrast administration.  Sagittal and coronal reconstructions were performed.  Automated exposure control and iterative reconstruction methods were used.      Findings:  There is atherosclerotic vascular calcification including coronary artery calcification. There are some minimal fluid in the pericardial recess. There is a small precarinal lymph node which has been suggested.    On evaluation of lung windows, there is some right basilar atelectasis. There is persistent consolidation in the left lower lobe with probable mucus impaction left lower lobe bronchi. There are no definite effusions. There is a minimal atelectasis or   scarring left upper lobe and along the posterior pleural margin right upper lobe. There is a small noncalcified pulmonary nodule along the posterior pleural margin in the right lower lobe measuring 4.2 mm on image 38 series 5 which looks like it's been   present.    Lower slices through the upper abdomen reveal no acute findings.    There are some wedge compression deformities in the thoracic area unchanged.          Impression:      Impression:  1.There is persistent consolidation in the left  lower lobe with probable mucus impaction left lower lobe bronchi. Correlation with prior bronchoscopy suggested.  2.There is some right basilar atelectasis.  3.There is a small noncalcified pulmonary nodule along the posterior pleural margin in the right lower lobe which looks like it's been present.  4.Atherosclerotic vascular calcification including coronary artery calcification.        Electronically Signed: Chris Bhatti MD    7/11/2025 9:55 AM EDT    Workstation ID: [id number]    XR Chest 1 View [[id number]] Collected: 07/09/25 2132     Updated: 07/09/25 2136    Narrative:      XR CHEST 1 VW    Date of Exam: 7/9/2025 9:16 PM EDT    Indication: sob    Comparison: Chest x-ray 7/8/2025    Findings:  Similar interstitial prominence in the lung bases. No focal consolidation. Stable cardiomediastinal silhouette with mild cardiomegaly. No pneumothorax. No pleural effusion.      Impression:      Impression:  No acute cardiopulmonary findings.      Electronically Signed: Tomi Sotomayor MD    7/9/2025 9:34 PM EDT    Workstation ID: [id number]          reviewed    ECG/EMG Results (most recent)       Procedure Component Value Units Date/Time    Telemetry Scan [[id number]] Resulted: 07/10/25 0034     Updated: 07/10/25 0120    Telemetry Scan [[id number]] Resulted: 07/10/25 0247     Updated: 07/10/25 0409    ECG 12 Lead Dyspnea [[id number]] Collected: 07/09/25 2022     Updated: 07/10/25 0645     QT Interval 375 ms      QTC Interval 434 ms     Narrative:      HEART RATE=80  bpm  RR Jwspqznw=984  ms  MA Fuikoibf=770  ms  P Horizontal Axis=-23  deg  P Front Axis=57  deg  QRSD Interval=96  ms  QT Yfyrtrbe=058  ms  HHgT=054  ms  QRS Axis=30  deg  T Wave Axis=46  deg  - OTHERWISE NORMAL ECG -  Sinus rhythm  Low voltage, precordial leads  When compared with ECG of 29-Nov-2024 12:08:40,  Significant axis, voltage or hypertrophy change  Electronically Signed By: Felix Deutsch (JAMIE) 2025-07-10 06:45:05  Date and Time of Study:2025-07-09  20:22:21    Telemetry Scan [[id number]] Resulted: 07/10/25 0714     Updated: 07/10/25 0741    Telemetry Scan [[id number]] Resulted: 07/10/25 1229     Updated: 07/10/25 1251    Telemetry Scan [[id number]] Resulted: 07/10/25 1506     Updated: 07/10/25 1538    Telemetry Scan [[id number]] Resulted: 07/10/25 1907     Updated: 07/10/25 1936    Telemetry Scan [[id number]] Resulted: 07/10/25 2242     Updated: 07/10/25 2305    Telemetry Scan [[id number]] Resulted: 07/11/25 0254     Updated: 07/11/25 0306          reviewed        Results for orders placed during the hospital encounter of 07/02/24    Adult Transthoracic Echo Complete W/ Cont if Necessary Per Protocol    Interpretation Summary    Left ventricular systolic function is normal. Calculated left ventricular EF = 60%    Left ventricular wall thickness is consistent with mild to moderate concentric hypertrophy.    Left ventricular diastolic function is consistent with (grade Ia w/high LAP) impaired relaxation.    The left atrial cavity is moderate to severely dilated.    The right atrial cavity is mild to moderately  dilated.    There is moderate calcification of the aortic valve.    There is a trivial pericardial effusion.    Transthoracic echocardiography reveals mild to moderate LVH with EF of 60%.  Moderate to severe left atrial enlargement.  Aortic valve calcified without aortic stenosis.  Trivial effusion noted.    Electronically signed by Maverick Osorio MD, 07/04/24, 1:51 PM EDT.      Microbiology Results (last 10 days)       Procedure Component Value - Date/Time    Respiratory Panel PCR w/COVID-19(SARS-CoV-2) [facility] In-House, NP Swab in UTM/VTM, 2 HR TAT - Swab, Nasopharynx [[id number]]  (Normal) Collected: 07/09/25 2104    Lab Status: Final result Specimen: Swab from Nasopharynx Updated: 07/09/25 2156     ADENOVIRUS, PCR Not Detected     Coronavirus 229E Not Detected     Coronavirus HKU1 Not Detected     Coronavirus NL63 Not Detected      Coronavirus OC43 Not Detected     COVID19 Not Detected     Human Metapneumovirus Not Detected     Human Rhinovirus/Enterovirus Not Detected     Influenza A PCR Not Detected     Influenza B PCR Not Detected     Parainfluenza Virus 1 Not Detected     Parainfluenza Virus 2 Not Detected     Parainfluenza Virus 3 Not Detected     Parainfluenza Virus 4 Not Detected     RSV, PCR Not Detected     Bordetella pertussis pcr Not Detected     Bordetella parapertussis PCR Not Detected     Chlamydophila pneumoniae PCR Not Detected     Mycoplasma pneumo by PCR Not Detected    Narrative:      In the setting of a positive respiratory panel with a viral infection PLUS a negative procalcitonin without other underlying concern for bacterial infection, consider observing off antibiotics or discontinuation of antibiotics and continue supportive care. If the respiratory panel is positive for atypical bacterial infection (Bordetella pertussis, Chlamydophila pneumoniae, or Mycoplasma pneumoniae), consider antibiotic de-escalation to target atypical bacterial infection.            Assessment & Plan     COPD with acute exacerbation     Copd exacerbation  - pt on 2 lpm nc, home o2 is 2 lpm nc  - Bnp, procal unremarkable  - wbc 5, 17 most likely due to steroids  - troponin 64, 63, 48  - EKG rate 80 sinus  - rpp negative  - chest xray reviewed and showing no acute process   - duonebs, steroids, mucinex, flutter, tessalon, cough syrup  - azithromycin  - ct chest showing persistent consolidation in the left lower lobe with probable mucus impaction left lower lobe bronchi. Correlation with prior bronchoscopy suggested. right basilar atelectasis. small noncalcified pulmonary nodule along the posterior pleural margin in the right lower lobe which looks like it's been present.   - pt to follow up with Dr Green who she is current with for pulmonary medicine  - pt to be discharged with azithromycin, steroids, cough syrup, and tessalon    Elevated blood  sugar  - pt is not diabetic   - A1c 5.3  - pt is on steroids  - will give pt consistent carb diet and monitor bg        Hx of afib  - cont warfarin  - inr2 .77     CKD  - creatinine 1.35 baseline for pt    I discussed the patients findings and my recommendations with patient and nursing staff.     Discharge Diagnosis:      COPD with acute exacerbation      Hospital Course  Patient is a 79 y.o. female presented with cough and soa. Er evaluated and admitted to observation. Labs including cbc, bnp, procal are normal. Troponin is stable. EKG rate 80 sinus. Rpp negative. Chest xray showing no acute process. Ct chest showing some retained mucous in left lower lobe and pt will follow up with Dr Green in pulmonary who she is current with. Blood sugars are high but A1c is normal. This is most likely due to steroids. Pt to be discharged with steroids, tessalon, cough syrup. Pt to continue inhaled steroids, duoneb and mucinex. Pt already has home 02 at 2 lpm nc and is currently satting 95% on 2 lpm nc. Discharge discussed with pt and she is agreeable to plan. Instructed pt to return to er if symptoms reoccur or worsen.      Past Medical History:     Past Medical History:   Diagnosis Date    Adenomatous polyp of colon     Allergic rhinitis     Asthma     Cervical disc disease     Fracture of thoracic spine 11/16/2011    Lacunar infarction 08/07/2019    Mitral valve disorder     Postmenopausal osteoporosis     Restless leg syndrome        Past Surgical History:     Past Surgical History:   Procedure Laterality Date    APPENDECTOMY OPEN      BRONCHOSCOPY N/A 06/08/2023    Procedure: BRONCHOSCOPY WITH BRONCHIAL WASHING;  Surgeon: Brenda Zuniga MD;  Location: [facility] ENDOSCOPY;  Service: Pulmonary;  Laterality: N/A;  Post: PNEUMONIA    BRONCHOSCOPY N/A 6/19/2024    Procedure: BRONCHOSCOPY WITH RIGHT LOWER LOBE BRONCHOALVEOLAR LAVAGE;  Surgeon: Brenda Zuniga MD;  Location: [facility] ENDOSCOPY;  Service: Pulmonary;  Laterality: N/A;     CARDIAC CATHETERIZATION      CHOLECYSTECTOMY      CORONARY STENT PLACEMENT      THYROIDECTOMY      TOTAL ABDOMINAL HYSTERECTOMY WITH SALPINGO OOPHORECTOMY         Social History:   Social History     Socioeconomic History    Marital status:    Tobacco Use    Smoking status: Former     Current packs/day: 0.00     Average packs/day: 1 pack/day for 36.0 years (36.0 ttl pk-yrs)     Types: Cigarettes     Start date:      Quit date:      Years since quittin.5     Passive exposure: Never    Smokeless tobacco: Never   Vaping Use    Vaping status: Never Used   Substance and Sexual Activity    Alcohol use: No    Drug use: No    Sexual activity: Defer       Procedures Performed         Consults:   Consults       No orders found from 6/10/2025 to 7/10/2025.            Condition on Discharge:     Stable    Discharge Disposition  Home or Self Care    Discharge Medications     Discharge Medications        New Medications        Instructions Start Date   azithromycin 500 MG tablet  Commonly known as: ZITHROMAX   500 mg, Oral, Every 24 Hours Scheduled      benzonatate 200 MG capsule  Commonly known as: TESSALON   200 mg, Oral, 3 Times Daily PRN      guaiFENesin-codeine 100-10 MG/5ML solution/syrup  Commonly known as: ROMILAR-AC   10 mL, Oral, Every 6 Hours PRN      predniSONE 5 MG tablet  Commonly known as: DELTASONE   5 mg, Oral, Daily, Take 2 tablets twice daily for 3 days then take 1 tablet twice daily for 3 days then take 1 tablet daily for 3 days             Continue These Medications        Instructions Start Date   albuterol sulfate  (90 Base) MCG/ACT inhaler  Commonly known as: PROVENTIL HFA;VENTOLIN HFA;PROAIR HFA   2 puffs, Every 4 Hours PRN      aspirin 81 MG EC tablet   1 tablet, Daily      atorvastatin 80 MG tablet  Commonly known as: LIPITOR   40 mg, Daily      Breo Ellipta 100-25 MCG/ACT aerosol powder   Generic drug: Fluticasone Furoate-Vilanterol   1 puff, Daily - RT      budesonide  0.5 MG/2ML nebulizer solution  Commonly known as: PULMICORT   0.5 mg, Nebulization, 2 Times Daily - RT      dilTIAZem  MG 24 hr capsule  Commonly known as: CARDIZEM CD   180 mg, Daily      Farxiga 10 MG tablet  Generic drug: dapagliflozin Propanediol   10 mg, Oral, Daily      furosemide 40 MG tablet  Commonly known as: LASIX   40 mg, Oral, Daily      guaiFENesin 600 MG 12 hr tablet  Commonly known as: MUCINEX   1,200 mg, 2 Times Daily      ipratropium-albuterol 0.5-2.5 mg/3 ml nebulizer  Commonly known as: DUO-NEB   3 mL, Nebulization, 4 Times Daily      levothyroxine 125 MCG tablet  Commonly known as: Synthroid   125 mcg, Oral, Every Early Morning      Melatonin 3 MG capsule   1 capsule, Every Night at Bedtime      pantoprazole 40 MG EC tablet  Commonly known as: PROTONIX   40 mg, Oral, Daily      polyethylene glycol 17 g packet  Commonly known as: MIRALAX   17 g, Oral, Daily      potassium chloride 20 MEQ CR tablet  Commonly known as: KLOR-CON M20   20 mEq, Oral, Daily      senna 8.6 MG tablet  Commonly known as: SENOKOT   2 tablets, Oral, Every Evening      spironolactone 25 MG tablet  Commonly known as: ALDACTONE   25 mg, Oral, Daily      warfarin 3 MG tablet  Commonly known as: Coumadin   Take 3 mg by mouth daily or as directed by [facility].               Discharge Diet:     Activity at Discharge:     Follow-up Appointments  Future Appointments   Date Time Provider Department Center   7/22/2025 10:00 AM NURSE/MA SHANNON PEREZ [facility]   7/29/2025 11:00 AM Bria Matthews MD [facility]   9/2/2025 11:00 AM Bria Matthews MD [facility]   10/20/2025  9:00 AM Bria Matthews MD [facility]     Additional Instructions for the Follow-ups that You Need to Schedule       Discharge Follow-up with PCP   As directed       Currently Documented PCP:    Bria Matthews MD    PCP Phone Number:    [phone]     Follow Up Details: 1-2 weeks                 Test Results Pending at Discharge  Pending Results       None             Risk for Readmission (LACE) Score: 8 (7/11/2025  6:00 AM)      Less Than 30 minutes spent in discharge activities for this patient    Signature:Electronically signed by Kiya Holt PA-C, 07/11/25, 10:34 AM EDT.

## 2025-07-14 ENCOUNTER — TRANSITIONAL CARE MANAGEMENT TELEPHONE ENCOUNTER (OUTPATIENT)
Dept: CALL CENTER | Facility: HOSPITAL | Age: 80
End: 2025-07-14
Payer: MEDICARE

## 2025-07-14 NOTE — OUTREACH NOTE
Call Center TCM Note      Flowsheet Row Responses   [facility] facility patient discharged from? [facility]   Does the patient have one of the following disease processes/diagnoses(primary or secondary)? COPD   TCM attempt successful? No   Unsuccessful attempts Attempt 1   Call Status Left message            Reshma Suazo Registered Nurse    7/14/2025, 16:05 EDT

## 2025-07-14 NOTE — OUTREACH NOTE
Call Center TCM Note      Flowsheet Row Responses   [facility] facility patient discharged from? [facility]   Does the patient have one of the following disease processes/diagnoses(primary or secondary)? COPD   TCM attempt successful? No   Unsuccessful attempts Attempt 2   Call Status Left message            Reshma Suazo Registered Nurse    7/14/2025, 16:39 EDT

## 2025-07-15 ENCOUNTER — TRANSITIONAL CARE MANAGEMENT TELEPHONE ENCOUNTER (OUTPATIENT)
Dept: CALL CENTER | Facility: HOSPITAL | Age: 80
End: 2025-07-15
Payer: MEDICARE

## 2025-07-15 ENCOUNTER — TELEPHONE (OUTPATIENT)
Dept: PHARMACY | Facility: HOSPITAL | Age: 80
End: 2025-07-15
Payer: MEDICARE

## 2025-07-15 NOTE — TELEPHONE ENCOUNTER
Update - received call from JUAN Murillo who said patient is discharging to [facility] today. INR was therapeutic at 3.0 today and warfarin dose was held yesterday.    Roselyn Beam, PharmD, BCPS, BCACP  07/15/25 16:17 EDT

## 2025-07-15 NOTE — TELEPHONE ENCOUNTER
" [facility]: [facility]  Clinical Outreach     Selina Vazquez is a 79 y.o. female and is a patient of the [facility] at [facility] for anticoagulation monitoring.     Patient admitted to [facility] 7/9-7/11. Attempted to contact patient yesterday at listed phone number but it did not ring through. Attempted to contact again today and spoke to patient's son who said \"as far as he knew she was still in the hospital.\" Asked if this was [facility] that she was admitted to (not showing up in system) and he said he thought it was [facility]. Contacted [facility] and confirmed with RN Edd that patient has been admitted there since 7/11. Current discharge plan is still pending but possible therapy needs (unsure if she will go to facility or discharge home). Requested clinic name and phone number be given to case management so that they can follow-up with clinic closer to discharge.    Roselyn Hill, PharmD  Ambulatory Care Clinical Pharmacist  7/15/2025  09:33 EDT    "

## 2025-07-21 RX ORDER — POTASSIUM CHLORIDE 1500 MG/1
20 TABLET, EXTENDED RELEASE ORAL DAILY
Qty: 90 TABLET | Refills: 1 | Status: SHIPPED | OUTPATIENT
Start: 2025-07-21

## 2025-07-29 ENCOUNTER — TELEPHONE (OUTPATIENT)
Dept: PHARMACY | Facility: HOSPITAL | Age: 80
End: 2025-07-29
Payer: MEDICARE

## 2025-07-29 NOTE — TELEPHONE ENCOUNTER
[facility]: [facility]  Clinical Outreach     Selina Vazquez is a 80 y.o. female and is a patient of the [facility] at [facility] for anticoagulation monitoring.     Per Riana at [facility], patient is still at facility as of 7/29.    Roselyn Hill PharmD  Ambulatory Care Clinical Pharmacist  7/29/2025  09:16 EDT

## 2025-08-12 ENCOUNTER — TELEPHONE (OUTPATIENT)
Dept: PHARMACY | Facility: HOSPITAL | Age: 80
End: 2025-08-12
Payer: MEDICARE

## (undated) DEVICE — BAPTIST FLOYD BRONCHOSCOPY: Brand: MEDLINE INDUSTRIES, INC.